# Patient Record
Sex: FEMALE | Employment: PART TIME | URBAN - METROPOLITAN AREA
[De-identification: names, ages, dates, MRNs, and addresses within clinical notes are randomized per-mention and may not be internally consistent; named-entity substitution may affect disease eponyms.]

---

## 2023-06-07 ENCOUNTER — HOSPITAL ENCOUNTER (OUTPATIENT)
Dept: DATA CONVERSION | Facility: HOSPITAL | Age: 68
End: 2023-06-07
Attending: RADIOLOGY | Admitting: RADIOLOGY
Payer: MEDICARE

## 2023-06-07 DIAGNOSIS — C50.811 MALIGNANT NEOPLASM OF OVERLAPPING SITES OF RIGHT FEMALE BREAST (MULTI): ICD-10-CM

## 2023-06-07 DIAGNOSIS — N63.10 UNSPECIFIED LUMP IN THE RIGHT BREAST, UNSPECIFIED QUADRANT: ICD-10-CM

## 2023-06-07 DIAGNOSIS — N63.15 UNSPECIFIED LUMP IN THE RIGHT BREAST, OVERLAPPING QUADRANTS: ICD-10-CM

## 2023-06-07 DIAGNOSIS — Z17.1 ESTROGEN RECEPTOR NEGATIVE STATUS (ER-): ICD-10-CM

## 2023-06-09 LAB
COMPLETE PATHOLOGY REPORT: NORMAL
CONVERTED ADDENDUM DIAGNOSIS 2: NORMAL
CONVERTED ADDENDUM DIAGNOSIS 3: NORMAL
CONVERTED CLINICAL DIAGNOSIS-HISTORY: NORMAL
CONVERTED FINAL DIAGNOSIS: NORMAL
CONVERTED FINAL REPORT PDF LINK TO COPY AND PASTE: NORMAL
CONVERTED GROSS DESCRIPTION: NORMAL

## 2023-08-11 ENCOUNTER — HOSPITAL ENCOUNTER (OUTPATIENT)
Dept: DATA CONVERSION | Facility: HOSPITAL | Age: 68
End: 2023-08-11

## 2023-08-11 DIAGNOSIS — C77.3 SECONDARY AND UNSPECIFIED MALIGNANT NEOPLASM OF AXILLA AND UPPER LIMB LYMPH NODES (MULTI): ICD-10-CM

## 2023-08-16 ENCOUNTER — HOSPITAL ENCOUNTER (OUTPATIENT)
Dept: DATA CONVERSION | Facility: HOSPITAL | Age: 68
Discharge: HOME | End: 2023-08-16
Payer: MEDICARE

## 2023-08-16 DIAGNOSIS — C77.3 SECONDARY AND UNSPECIFIED MALIGNANT NEOPLASM OF AXILLA AND UPPER LIMB LYMPH NODES (MULTI): ICD-10-CM

## 2023-08-16 DIAGNOSIS — C50.911 MALIGNANT NEOPLASM OF UNSPECIFIED SITE OF RIGHT FEMALE BREAST (MULTI): ICD-10-CM

## 2023-08-16 DIAGNOSIS — L76.32 POSTPROCEDURAL HEMATOMA OF SKIN AND SUBCUTANEOUS TISSUE FOLLOWING OTHER PROCEDURE: ICD-10-CM

## 2023-08-16 DIAGNOSIS — F17.200 NICOTINE DEPENDENCE, UNSPECIFIED, UNCOMPLICATED: ICD-10-CM

## 2023-08-16 LAB — GLUCOSE BLD STRIP.AUTO-MCNC: 132 MG/DL (ref 65–99)

## 2023-08-19 PROBLEM — E11.9 TYPE II DIABETES MELLITUS (MULTI): Status: ACTIVE | Noted: 2023-08-19

## 2023-08-19 PROBLEM — F31.9 BIPOLAR 1 DISORDER (MULTI): Status: ACTIVE | Noted: 2023-08-19

## 2023-08-19 PROBLEM — L40.9 PSORIASIS: Status: ACTIVE | Noted: 2023-08-19

## 2023-08-19 PROBLEM — C50.319 MALIGNANT NEOPLASM OF LOWER-INNER QUADRANT OF FEMALE BREAST (MULTI): Status: ACTIVE | Noted: 2023-08-19

## 2023-08-19 PROBLEM — B37.2 CANDIDIASIS OF SKIN AND NAILS: Status: ACTIVE | Noted: 2023-08-19

## 2023-08-19 PROBLEM — J30.9 ALLERGIC RHINITIS: Status: ACTIVE | Noted: 2023-08-19

## 2023-08-19 PROBLEM — I10 HIGH BLOOD PRESSURE: Status: ACTIVE | Noted: 2023-08-19

## 2023-08-19 PROBLEM — G47.00 INSOMNIA: Status: ACTIVE | Noted: 2023-08-19

## 2023-08-19 PROBLEM — K21.9 GASTROESOPHAGEAL REFLUX DISEASE: Status: ACTIVE | Noted: 2023-08-19

## 2023-08-19 PROBLEM — C77.3 MALIGNANT NEOPLASM METASTATIC TO LYMPH NODE OF UPPER EXTREMITY (MULTI): Status: ACTIVE | Noted: 2023-08-19

## 2023-08-19 PROBLEM — E78.00 HIGH CHOLESTEROL: Status: ACTIVE | Noted: 2023-08-19

## 2023-08-19 PROBLEM — F41.9 ANXIETY: Status: ACTIVE | Noted: 2023-08-19

## 2023-08-19 RX ORDER — BUPROPION HYDROCHLORIDE 150 MG/1
150 TABLET ORAL DAILY
COMMUNITY
Start: 2022-12-14 | End: 2023-10-18 | Stop reason: WASHOUT

## 2023-08-19 RX ORDER — PAROXETINE HYDROCHLORIDE 20 MG/1
20 TABLET, FILM COATED ORAL DAILY
COMMUNITY
Start: 2011-10-31

## 2023-08-19 RX ORDER — NYSTATIN 100000 U/G
CREAM TOPICAL 2 TIMES DAILY
COMMUNITY
Start: 2023-05-31 | End: 2023-12-01

## 2023-08-19 RX ORDER — PAROXETINE 10 MG/1
10 TABLET, FILM COATED ORAL DAILY
COMMUNITY
Start: 2022-11-10 | End: 2023-10-18 | Stop reason: WASHOUT

## 2023-08-19 RX ORDER — DEXTROSE 4 G
TABLET,CHEWABLE ORAL 3 TIMES DAILY
COMMUNITY
Start: 2021-11-18

## 2023-08-19 RX ORDER — GLUCOSAM/CHONDRO/HERB 149/HYAL 750-100 MG
1 TABLET ORAL DAILY
COMMUNITY

## 2023-08-19 RX ORDER — LAMOTRIGINE 25 MG/1
3 TABLET ORAL DAILY
COMMUNITY
Start: 2023-03-22 | End: 2023-11-08

## 2023-08-19 RX ORDER — BUPROPION HYDROCHLORIDE 75 MG/1
75 TABLET ORAL DAILY
COMMUNITY
Start: 2022-09-29 | End: 2023-10-18 | Stop reason: WASHOUT

## 2023-08-19 RX ORDER — QUETIAPINE FUMARATE 25 MG/1
25 TABLET, FILM COATED ORAL NIGHTLY
COMMUNITY
Start: 2022-09-07

## 2023-08-19 RX ORDER — LAMOTRIGINE 100 MG/1
100 TABLET ORAL DAILY
COMMUNITY

## 2023-08-19 RX ORDER — AMLODIPINE BESYLATE 10 MG/1
10 TABLET ORAL DAILY
COMMUNITY
End: 2023-10-11 | Stop reason: SDUPTHER

## 2023-08-19 RX ORDER — LORATADINE 10 MG/1
10 TABLET ORAL DAILY
COMMUNITY

## 2023-08-19 RX ORDER — IBUPROFEN 800 MG/1
800 TABLET ORAL EVERY 6 HOURS PRN
COMMUNITY
Start: 2023-04-25 | End: 2023-11-08

## 2023-08-19 RX ORDER — BUPROPION HCL 300 MG
300 TABLET, EXTENDED RELEASE 24 HR ORAL EVERY MORNING
COMMUNITY

## 2023-08-19 RX ORDER — TRIAMCINOLONE ACETONIDE 1 MG/G
1 CREAM TOPICAL 2 TIMES WEEKLY
COMMUNITY
Start: 2022-05-06 | End: 2023-11-06

## 2023-08-19 RX ORDER — ATORVASTATIN CALCIUM 10 MG/1
10 TABLET, FILM COATED ORAL EVERY OTHER DAY
COMMUNITY
End: 2024-01-31

## 2023-08-19 RX ORDER — LORATADINE 10 MG
10 TABLET,DISINTEGRATING ORAL DAILY
COMMUNITY
End: 2023-10-18 | Stop reason: WASHOUT

## 2023-08-19 RX ORDER — NAPROXEN SODIUM 220 MG/1
81 TABLET, FILM COATED ORAL ONCE
COMMUNITY
End: 2023-11-08 | Stop reason: WASHOUT

## 2023-08-19 RX ORDER — TOPIRAMATE 25 MG/1
25 TABLET, COATED ORAL DAILY
COMMUNITY

## 2023-08-19 RX ORDER — GABAPENTIN 400 MG/1
1 CAPSULE ORAL DAILY
COMMUNITY

## 2023-08-19 RX ORDER — METFORMIN HYDROCHLORIDE 500 MG/1
500 TABLET ORAL 3 TIMES DAILY
COMMUNITY
End: 2023-12-12 | Stop reason: SDUPTHER

## 2023-08-19 RX ORDER — LISINOPRIL 20 MG/1
20 TABLET ORAL DAILY
COMMUNITY
End: 2023-11-22 | Stop reason: SDUPTHER

## 2023-08-19 RX ORDER — PAROXETINE 30 MG/1
30 TABLET, FILM COATED ORAL DAILY
COMMUNITY
Start: 2022-11-03 | End: 2023-10-18 | Stop reason: WASHOUT

## 2023-08-19 RX ORDER — FAMOTIDINE 20 MG/1
20 TABLET, FILM COATED ORAL NIGHTLY PRN
COMMUNITY
Start: 2010-01-19

## 2023-08-19 RX ORDER — BLOOD SUGAR DIAGNOSTIC
STRIP MISCELLANEOUS 3 TIMES DAILY
COMMUNITY
Start: 2021-11-18

## 2023-09-14 ENCOUNTER — HOSPITAL ENCOUNTER (OUTPATIENT)
Dept: DATA CONVERSION | Facility: HOSPITAL | Age: 68
Discharge: HOME | End: 2023-09-14
Payer: MEDICARE

## 2023-09-14 DIAGNOSIS — C77.3 SECONDARY AND UNSPECIFIED MALIGNANT NEOPLASM OF AXILLA AND UPPER LIMB LYMPH NODES (MULTI): ICD-10-CM

## 2023-09-27 ENCOUNTER — HOSPITAL ENCOUNTER (OUTPATIENT)
Dept: DATA CONVERSION | Facility: HOSPITAL | Age: 68
Discharge: HOME | End: 2023-09-27
Payer: MEDICARE

## 2023-09-27 DIAGNOSIS — C50.311 MALIGNANT NEOPLASM OF LOWER-INNER QUADRANT OF RIGHT FEMALE BREAST (MULTI): ICD-10-CM

## 2023-10-02 ENCOUNTER — TELEPHONE (OUTPATIENT)
Dept: HEMATOLOGY/ONCOLOGY | Facility: HOSPITAL | Age: 68
End: 2023-10-02
Payer: MEDICARE

## 2023-10-02 DIAGNOSIS — C50.919 MALIGNANT NEOPLASM OF BREAST IN FEMALE, ESTROGEN RECEPTOR POSITIVE, UNSPECIFIED LATERALITY, UNSPECIFIED SITE OF BREAST (MULTI): Primary | ICD-10-CM

## 2023-10-02 DIAGNOSIS — Z17.0 MALIGNANT NEOPLASM OF BREAST IN FEMALE, ESTROGEN RECEPTOR POSITIVE, UNSPECIFIED LATERALITY, UNSPECIFIED SITE OF BREAST (MULTI): Primary | ICD-10-CM

## 2023-10-02 RX ORDER — ANASTROZOLE 1 MG/1
1 TABLET ORAL DAILY
Qty: 30 TABLET | Refills: 2 | Status: SHIPPED | OUTPATIENT
Start: 2023-10-02 | End: 2024-01-02 | Stop reason: SDUPTHER

## 2023-10-03 NOTE — TELEPHONE ENCOUNTER
Called patient with [facility] of 22.  No adjuvant chemotherapy required.  She will proceed with adjuvant RT- emailed Dr. Tineo.  Will reschedule her next visit with me to 3-4 months in person at [place] location.  She will start anastrozole, as RT will be delayed due to seroma.  Instructed to call with side effects.

## 2023-10-04 ENCOUNTER — PREP FOR PROCEDURE (OUTPATIENT)
Dept: OPERATING ROOM | Facility: HOSPITAL | Age: 68
End: 2023-10-04
Payer: MEDICARE

## 2023-10-04 ENCOUNTER — APPOINTMENT (OUTPATIENT)
Dept: PRIMARY CARE | Facility: CLINIC | Age: 68
End: 2023-10-04
Payer: MEDICARE

## 2023-10-04 PROBLEM — C50.919 BREAST CANCER (MULTI): Status: ACTIVE | Noted: 2023-10-04

## 2023-10-04 PROBLEM — F17.210 CIGARETTE SMOKER: Status: ACTIVE | Noted: 2023-10-04

## 2023-10-04 PROBLEM — E66.9 OBESITY WITH BODY MASS INDEX 30 OR GREATER: Status: ACTIVE | Noted: 2023-10-04

## 2023-10-04 RX ORDER — NYSTATIN 100000 [USP'U]/G
POWDER TOPICAL 2 TIMES DAILY
COMMUNITY
Start: 2023-07-28 | End: 2023-11-08 | Stop reason: ALTCHOICE

## 2023-10-05 ENCOUNTER — APPOINTMENT (OUTPATIENT)
Dept: HEMATOLOGY/ONCOLOGY | Facility: HOSPITAL | Age: 68
End: 2023-10-05
Payer: MEDICARE

## 2023-10-10 DIAGNOSIS — N64.89 SEROMA OF BREAST: Primary | ICD-10-CM

## 2023-10-11 ENCOUNTER — TELEPHONE (OUTPATIENT)
Dept: ADMISSION | Facility: HOSPITAL | Age: 68
End: 2023-10-11
Payer: MEDICARE

## 2023-10-11 ENCOUNTER — TELEPHONE (OUTPATIENT)
Dept: RADIATION ONCOLOGY | Facility: CLINIC | Age: 68
End: 2023-10-11
Payer: MEDICARE

## 2023-10-11 DIAGNOSIS — I10 PRIMARY HYPERTENSION: Primary | ICD-10-CM

## 2023-10-11 RX ORDER — AMLODIPINE BESYLATE 10 MG/1
10 TABLET ORAL DAILY
Qty: 90 TABLET | Refills: 1 | Status: SHIPPED | OUTPATIENT
Start: 2023-10-11 | End: 2024-04-04

## 2023-10-11 NOTE — TELEPHONE ENCOUNTER
The patient called in and left a VM on the RN Triage line wanting to speak with Dr. Ford nurse, no other details left. I called the patient back and she states that she wanted to make   Dr. Hannon and Dr. Tineo aware that per Dr. Boone did not put in the penrose drain in yesterday as scheduled, Dr. Boone stated that she aspirated 40cc of serosanguanous fluid and that the caridad wasn't needed, Wanted to know if it was OK to continue on anastrozole, also wants to know rad onc plan now. Per Tatyana, Dr. Richards # is [phone] if she needs contacted  Pt also stated that her Primary PCP is drawing 6 month lab work next month, does Dr. Hannon want to add anything?

## 2023-10-11 NOTE — TELEPHONE ENCOUNTER
10/11/23 0340 Spoke with patient regarding her visit with Dayanara Boone, surgeon yesterday. Patient states that Dr. Boone did not end up putting a drain in the patient. The seroma is smaller. She also spoke with Dr. Hannon last week and he started her on Anastrozole last week. Patient is wondering what the next step will be. Will discuss with Dr. Tineo and call the patient back. Patient verbalizes understanding with verbal teach back. Jose Luis Martinez MSN, RN, OCN

## 2023-10-11 NOTE — TELEPHONE ENCOUNTER
Dr. Parvez Arvizu is aware and radiation is working on getting her set up. She is supposed to see him in January I believe appointment is scheduled.

## 2023-10-18 ENCOUNTER — OFFICE VISIT (OUTPATIENT)
Dept: PRIMARY CARE | Facility: CLINIC | Age: 68
End: 2023-10-18

## 2023-10-18 VITALS — SYSTOLIC BLOOD PRESSURE: 136 MMHG | OXYGEN SATURATION: 96 % | HEART RATE: 86 BPM | DIASTOLIC BLOOD PRESSURE: 78 MMHG

## 2023-10-18 DIAGNOSIS — Z17.0 MALIGNANT NEOPLASM OF OVERLAPPING SITES OF RIGHT BREAST IN FEMALE, ESTROGEN RECEPTOR POSITIVE (MULTI): Primary | ICD-10-CM

## 2023-10-18 DIAGNOSIS — C50.811 MALIGNANT NEOPLASM OF OVERLAPPING SITES OF RIGHT BREAST IN FEMALE, ESTROGEN RECEPTOR POSITIVE (MULTI): Primary | ICD-10-CM

## 2023-10-18 DIAGNOSIS — Z85.3 HISTORY OF BREAST CANCER: ICD-10-CM

## 2023-10-18 DIAGNOSIS — G89.29 CHRONIC BILATERAL LOW BACK PAIN WITHOUT SCIATICA: Primary | ICD-10-CM

## 2023-10-18 DIAGNOSIS — M54.50 CHRONIC BILATERAL LOW BACK PAIN WITHOUT SCIATICA: Primary | ICD-10-CM

## 2023-10-18 PROCEDURE — 3075F SYST BP GE 130 - 139MM HG: CPT | Performed by: FAMILY MEDICINE

## 2023-10-18 PROCEDURE — 3078F DIAST BP <80 MM HG: CPT | Performed by: FAMILY MEDICINE

## 2023-10-18 PROCEDURE — 1125F AMNT PAIN NOTED PAIN PRSNT: CPT | Performed by: FAMILY MEDICINE

## 2023-10-18 PROCEDURE — 1159F MED LIST DOCD IN RCRD: CPT | Performed by: FAMILY MEDICINE

## 2023-10-18 PROCEDURE — 97811 ACUP 1/> W/O ESTIM EA ADD 15: CPT | Performed by: FAMILY MEDICINE

## 2023-10-18 PROCEDURE — 4010F ACE/ARB THERAPY RXD/TAKEN: CPT | Performed by: FAMILY MEDICINE

## 2023-10-18 PROCEDURE — 3044F HG A1C LEVEL LT 7.0%: CPT | Performed by: FAMILY MEDICINE

## 2023-10-18 PROCEDURE — 97810 ACUP 1/> WO ESTIM 1ST 15 MIN: CPT | Performed by: FAMILY MEDICINE

## 2023-10-18 PROCEDURE — 1160F RVW MEDS BY RX/DR IN RCRD: CPT | Performed by: FAMILY MEDICINE

## 2023-10-18 ASSESSMENT — PAIN SCALES - GENERAL: PAINLEVEL: 6

## 2023-10-18 NOTE — PROGRESS NOTES
Subjective   Patient ID: Tatyana Mckeon is a 68 y.o. female who presents for Acupuncture.    Gerri today for her acupuncture treatment.  She states her back pain has been doing pretty well but she has right knee pain that is worse.  She did have her breast drained by Dr. Gutiérrez.  Also, they started her on the anastrozole while they are waiting to see what they can to do with radiation.  She has not started BCP 3 yet.  She is currently taking son with Romero and Liang Allen.          Review of Systems    Objective   /78   Pulse 86   SpO2 96%     Physical Exam  Vitals reviewed.   Constitutional:       General: She is not in acute distress.     Appearance: Normal appearance.   Pulmonary:      Effort: Pulmonary effort is normal.      Breath sounds: Normal breath sounds.   Musculoskeletal:      Comments: Knee without too much swelling.  Some tenderness along the joint line on the right side.   Neurological:      Mental Status: She is alert.   Psychiatric:         Mood and Affect: Mood normal.         Thought Content: Thought content normal.         Judgment: Judgment normal.         Assessment/Plan   Diagnoses and all orders for this visit:  Chronic bilateral low back pain without sciatica  History of breast cancer  We will continue regular acupuncture treatments.  I want her to take the BCP 3 to help support her energy foundation and process any radiation or medications that she is taking.  Also continue the sun with moon to help manage the stress/liver function.  Back pain is pretty good so hopefully the knees will come around as well.      Acupuncture treatment: I used the following points bilaterally liver 2, liver 3, stomach 36, large intestine 4.  I also added the knees eyes on the right side.  These were retained for 20 minutes.  She tolerated this very well.

## 2023-10-18 NOTE — PATIENT INSTRUCTIONS
Continue to take the sun with moon and add the BCP 3.  We want to support you as you go through the radiation and the use of the anastrozole.

## 2023-10-19 ENCOUNTER — TELEPHONE (OUTPATIENT)
Dept: RADIATION ONCOLOGY | Facility: CLINIC | Age: 68
End: 2023-10-19
Payer: MEDICARE

## 2023-10-19 NOTE — TELEPHONE ENCOUNTER
10/19/23 1010 Patient calling to see if we have heard from Dr. Boone or received her note. She wants to know if she is cleared for radiation. This nurse sent an email directly to Dr. Boone looking for answers. Will call the patient back once we have heard from her. Patient verbalizes understanding with verbal teach back. Jose Luis ANDRADE, RN, OCN      10/19/23 1240 Patient was called and given an appointment for CT sim on 10/25/23. Jose Luis Martinez, RAYMOND, RN, OCN

## 2023-10-24 ENCOUNTER — HOSPITAL ENCOUNTER (OUTPATIENT)
Dept: RADIOLOGY | Facility: EXTERNAL LOCATION | Age: 68
Discharge: HOME | End: 2023-10-24
Payer: MEDICARE

## 2023-10-24 DIAGNOSIS — C50.812 MALIGNANT NEOPLASM OF OVERLAPPING SITES OF LEFT FEMALE BREAST, UNSPECIFIED ESTROGEN RECEPTOR STATUS (MULTI): ICD-10-CM

## 2023-10-25 ENCOUNTER — HOSPITAL ENCOUNTER (OUTPATIENT)
Dept: RADIATION ONCOLOGY | Facility: CLINIC | Age: 68
Setting detail: RADIATION/ONCOLOGY SERIES
Discharge: STILL ADMITTED OR ADMINISTRATIVE | End: 2023-10-25
Payer: MEDICARE

## 2023-10-25 PROCEDURE — 77334 RADIATION TREATMENT AID(S): CPT | Performed by: STUDENT IN AN ORGANIZED HEALTH CARE EDUCATION/TRAINING PROGRAM

## 2023-10-25 PROCEDURE — 77290 THER RAD SIMULAJ FIELD CPLX: CPT | Performed by: STUDENT IN AN ORGANIZED HEALTH CARE EDUCATION/TRAINING PROGRAM

## 2023-10-25 PROCEDURE — 77263 THER RADIOLOGY TX PLNG CPLX: CPT | Performed by: STUDENT IN AN ORGANIZED HEALTH CARE EDUCATION/TRAINING PROGRAM

## 2023-10-25 NOTE — PROGRESS NOTES
Pt is in today for CT sim. She received education on what to expect during today's scan, what to expect when treatments start, the next time she would hear from the office, as well as nside effects of xRT and OTV's in the future. She had paperwork to be given to social work and has spoken with finance prior to leaving today's appt. All questions were answered. No further concerns for nursing at this time. The patient verbalized understand of information and knows how to get in toouch with the office should she need us for any reason.

## 2023-11-05 DIAGNOSIS — L40.9 PSORIASIS: Primary | ICD-10-CM

## 2023-11-06 RX ORDER — TRIAMCINOLONE ACETONIDE 1 MG/G
CREAM TOPICAL
Qty: 30 G | Refills: 0 | Status: SHIPPED | OUTPATIENT
Start: 2023-11-06 | End: 2024-01-24

## 2023-11-08 ENCOUNTER — OFFICE VISIT (OUTPATIENT)
Dept: PRIMARY CARE | Facility: CLINIC | Age: 68
End: 2023-11-08

## 2023-11-08 VITALS
WEIGHT: 200 LBS | HEART RATE: 92 BPM | SYSTOLIC BLOOD PRESSURE: 137 MMHG | DIASTOLIC BLOOD PRESSURE: 57 MMHG | BODY MASS INDEX: 48.22 KG/M2

## 2023-11-08 DIAGNOSIS — M54.40 CHRONIC RIGHT-SIDED LOW BACK PAIN WITH SCIATICA, SCIATICA LATERALITY UNSPECIFIED: Primary | ICD-10-CM

## 2023-11-08 DIAGNOSIS — G89.29 CHRONIC RIGHT-SIDED LOW BACK PAIN WITH SCIATICA, SCIATICA LATERALITY UNSPECIFIED: Primary | ICD-10-CM

## 2023-11-08 PROCEDURE — 97810 ACUP 1/> WO ESTIM 1ST 15 MIN: CPT | Performed by: FAMILY MEDICINE

## 2023-11-08 PROCEDURE — 3044F HG A1C LEVEL LT 7.0%: CPT | Performed by: FAMILY MEDICINE

## 2023-11-08 PROCEDURE — 97811 ACUP 1/> W/O ESTIM EA ADD 15: CPT | Performed by: FAMILY MEDICINE

## 2023-11-08 PROCEDURE — 1125F AMNT PAIN NOTED PAIN PRSNT: CPT | Performed by: FAMILY MEDICINE

## 2023-11-08 PROCEDURE — 3075F SYST BP GE 130 - 139MM HG: CPT | Performed by: FAMILY MEDICINE

## 2023-11-08 PROCEDURE — 1159F MED LIST DOCD IN RCRD: CPT | Performed by: FAMILY MEDICINE

## 2023-11-08 PROCEDURE — 3078F DIAST BP <80 MM HG: CPT | Performed by: FAMILY MEDICINE

## 2023-11-08 PROCEDURE — 1160F RVW MEDS BY RX/DR IN RCRD: CPT | Performed by: FAMILY MEDICINE

## 2023-11-08 PROCEDURE — 4010F ACE/ARB THERAPY RXD/TAKEN: CPT | Performed by: FAMILY MEDICINE

## 2023-11-08 ASSESSMENT — PAIN SCALES - GENERAL: PAINLEVEL: 5

## 2023-11-08 NOTE — PATIENT INSTRUCTIONS
Continue your acupuncture treatments.    He just had labs done in May of this year so I do not need any before your appointment in 2 weeks.  We will do your hemoglobin A1c off your finger.

## 2023-11-08 NOTE — PROGRESS NOTES
Subjective   Patient ID: Tatyana Mckeon is a 68 y.o. female who presents for Acupuncture.    Well.  She still gets some pain that is in the low back and on the right side of the hip and sometimes down part of the leg.  However, this has been improved.    She is still frustrated that she has not had her radiation treatment scheduled yet.  Overall, though she states she has been doing pretty well dealing with all of this.         Review of Systems    Objective   /57   Pulse 92   Wt 90.7 kg (200 lb)   BMI 48.22 kg/m²     Physical Exam  Constitutional:       General: She is not in acute distress.     Appearance: Normal appearance. She is not ill-appearing.   Pulmonary:      Effort: Pulmonary effort is normal.   Skin:     General: Skin is warm and dry.      Coloration: Skin is not jaundiced.      Findings: No bruising.   Neurological:      Mental Status: She is alert.   Psychiatric:         Mood and Affect: Mood normal.         Thought Content: Thought content normal.         Assessment/Plan   Back Pain.   Overall doing well.  She will continue her regular acupuncture treatments.  She is scheduled for a diabetic follow-up in 2 weeks and only needs a hemoglobin A1c since she did have labs done in May and continues to have them done with her oncologist.  We will do hemoglobin A1c here.    Acupuncture note: I used the following points bilaterally: Stomach 36, 41, and 40; liver 2, LI 4, and bladder 40, 57 and 60.  She tolerated this very well.  She laid with her knees bent.  The lap over her lower abdomen.  Needles were retained for 20 minutes.

## 2023-11-11 ENCOUNTER — HOSPITAL ENCOUNTER (OUTPATIENT)
Dept: RADIATION ONCOLOGY | Facility: CLINIC | Age: 68
Setting detail: RADIATION/ONCOLOGY SERIES
Discharge: HOME | End: 2023-11-11
Payer: MEDICARE

## 2023-11-11 PROCEDURE — 77295 3-D RADIOTHERAPY PLAN: CPT | Performed by: STUDENT IN AN ORGANIZED HEALTH CARE EDUCATION/TRAINING PROGRAM

## 2023-11-11 PROCEDURE — 77334 RADIATION TREATMENT AID(S): CPT | Performed by: STUDENT IN AN ORGANIZED HEALTH CARE EDUCATION/TRAINING PROGRAM

## 2023-11-11 PROCEDURE — 77300 RADIATION THERAPY DOSE PLAN: CPT | Performed by: STUDENT IN AN ORGANIZED HEALTH CARE EDUCATION/TRAINING PROGRAM

## 2023-11-16 ENCOUNTER — HOSPITAL ENCOUNTER (OUTPATIENT)
Dept: RADIATION ONCOLOGY | Facility: CLINIC | Age: 68
Setting detail: RADIATION/ONCOLOGY SERIES
Discharge: HOME | End: 2023-11-16
Payer: MEDICARE

## 2023-11-16 ENCOUNTER — RADIATION ONCOLOGY OTV (OUTPATIENT)
Dept: RADIATION ONCOLOGY | Facility: CLINIC | Age: 68
End: 2023-11-16
Payer: MEDICARE

## 2023-11-16 VITALS
RESPIRATION RATE: 18 BRPM | BODY MASS INDEX: 39.39 KG/M2 | HEART RATE: 100 BPM | WEIGHT: 200.62 LBS | OXYGEN SATURATION: 97 % | SYSTOLIC BLOOD PRESSURE: 112 MMHG | TEMPERATURE: 96.4 F | DIASTOLIC BLOOD PRESSURE: 86 MMHG | HEIGHT: 60 IN

## 2023-11-16 DIAGNOSIS — C77.3 SECONDARY AND UNSPECIFIED MALIGNANT NEOPLASM OF AXILLA AND UPPER LIMB LYMPH NODES (MULTI): ICD-10-CM

## 2023-11-16 DIAGNOSIS — Z51.0 ENCOUNTER FOR ANTINEOPLASTIC RADIATION THERAPY: ICD-10-CM

## 2023-11-16 DIAGNOSIS — C50.811 MALIGNANT NEOPLASM OF OVERLAPPING SITES OF RIGHT FEMALE BREAST (MULTI): ICD-10-CM

## 2023-11-16 LAB
RAD ONC MSQ ACTUAL FRACTIONS DELIVERED: 1
RAD ONC MSQ ACTUAL SESSION DELIVERED DOSE: 266 CGRAY
RAD ONC MSQ ACTUAL TOTAL DOSE: 266 CGRAY
RAD ONC MSQ ELAPSED DAYS: 0
RAD ONC MSQ LAST DATE: NORMAL
RAD ONC MSQ PRESCRIBED FRACTIONAL DOSE: 266 CGRAY
RAD ONC MSQ PRESCRIBED NUMBER OF FRACTIONS: 16
RAD ONC MSQ PRESCRIBED TECHNIQUE: NORMAL
RAD ONC MSQ PRESCRIBED TOTAL DOSE: 4256 CGRAY
RAD ONC MSQ START DATE: NORMAL
RAD ONC MSQ TREATMENT COURSE NUMBER: 1
RAD ONC MSQ TREATMENT SITE: NORMAL

## 2023-11-16 PROCEDURE — 77412 RADIATION TX DELIVERY LVL 3: CPT | Performed by: STUDENT IN AN ORGANIZED HEALTH CARE EDUCATION/TRAINING PROGRAM

## 2023-11-16 PROCEDURE — 77427 RADIATION TX MANAGEMENT X5: CPT | Performed by: STUDENT IN AN ORGANIZED HEALTH CARE EDUCATION/TRAINING PROGRAM

## 2023-11-16 PROCEDURE — 77280 THER RAD SIMULAJ FIELD SMPL: CPT | Performed by: STUDENT IN AN ORGANIZED HEALTH CARE EDUCATION/TRAINING PROGRAM

## 2023-11-16 SDOH — ECONOMIC STABILITY: FOOD INSECURITY: WITHIN THE PAST 12 MONTHS, YOU WORRIED THAT YOUR FOOD WOULD RUN OUT BEFORE YOU GOT MONEY TO BUY MORE.: NEVER TRUE

## 2023-11-16 SDOH — ECONOMIC STABILITY: FOOD INSECURITY: WITHIN THE PAST 12 MONTHS, THE FOOD YOU BOUGHT JUST DIDN'T LAST AND YOU DIDN'T HAVE MONEY TO GET MORE.: NEVER TRUE

## 2023-11-16 ASSESSMENT — ENCOUNTER SYMPTOMS
OCCASIONAL FEELINGS OF UNSTEADINESS: 1
LOSS OF SENSATION IN FEET: 0
DEPRESSION: 0

## 2023-11-16 ASSESSMENT — PAIN SCALES - GENERAL: PAINLEVEL: 4

## 2023-11-16 ASSESSMENT — LIFESTYLE VARIABLES
SKIP TO QUESTIONS 9-10: 1
HOW OFTEN DO YOU HAVE A DRINK CONTAINING ALCOHOL: MONTHLY OR LESS
HOW MANY STANDARD DRINKS CONTAINING ALCOHOL DO YOU HAVE ON A TYPICAL DAY: 1 OR 2
AUDIT-C TOTAL SCORE: 1
HOW OFTEN DO YOU HAVE SIX OR MORE DRINKS ON ONE OCCASION: NEVER

## 2023-11-16 ASSESSMENT — PATIENT HEALTH QUESTIONNAIRE - PHQ9
SUM OF ALL RESPONSES TO PHQ9 QUESTIONS 1 & 2: 0
2. FEELING DOWN, DEPRESSED OR HOPELESS: NOT AT ALL
1. LITTLE INTEREST OR PLEASURE IN DOING THINGS: NOT AT ALL

## 2023-11-16 ASSESSMENT — SOCIAL DETERMINANTS OF HEALTH (SDOH): IN THE PAST 12 MONTHS, HAS THE ELECTRIC, GAS, OIL, OR WATER COMPANY THREATENED TO SHUT OFF SERVICE IN YOUR HOME?: NO

## 2023-11-16 NOTE — PROGRESS NOTES
Radiation Oncology On Treatment Visit    Patient Name:  Tatyana Mckeon  MRN:  [id number]  :  1955    Referring Provider: No ref. 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 Hematoma previously noted has continued to resolve. Denies any pain or discomfort.      OBJECTIVE:   Vital Signs:  /86 (BP Location: Left arm, Patient Position: Sitting)   Pulse 100   Temp 35.8 °C (96.4 °F) (Tympanic)   Resp 18   Ht 1.524 m (5')   Wt 91 kg (200 lb 9.9 oz)   SpO2 97%   BMI 39.18 kg/m²    Pain Scale: The patient's current pain level was assessed.  They report currently having a pain of 0 out of 10.    Other Pertinent Findings:     Toxicity Assessment          2023    15:07   Toxicity Assessment   Treatment Site Breast   Anorexia Grade 0   Anxiety Grade 0   Dehydration Grade 0   Depression Grade 0   Dermatitis Radiation Grade 0   Diarrhea Grade 0   Fatigue Grade 0   Fibrosis Deep Connective Tissue Grade 0   Fracture Grade 0   Nausea Grade 0    Pain Grade 0   Treatment Related Secondary Malignancy Grade 0   Tumor Pain Grade 0   Vomiting Grade 0   Abdominal Pain Grade 0   Dysphagia Grade 0   Esophagitis Grade 0   Gastric Hemorrhage Grade 0   Mucositis Oral Grade 0   Dry Mouth Grade 0   Breast Infection Grade 0   Seroma Grade 0   Joint Range of Motion Decreased Grade 0   Joint Range of Motion Decreased Lumbar Spine Grade 0   Brachial Plexopathy Grade 0   Breast Atrophy Grade 0   Breast Pain Grade 0   Nipple Deformity Grade 0   Pneumonitis Grade 0   Edema Limbs Grade 0   Lymphedema Grade 0   Thromboembolic Event Grade 0   Hot Flashes Grade 0        Assessment / Plan:  The patient is tolerating radiation therapy as anticipated.  Continue per current treatment plan.

## 2023-11-17 ENCOUNTER — HOSPITAL ENCOUNTER (OUTPATIENT)
Dept: RADIATION ONCOLOGY | Facility: CLINIC | Age: 68
Setting detail: RADIATION/ONCOLOGY SERIES
Discharge: HOME | End: 2023-11-17
Payer: MEDICARE

## 2023-11-17 DIAGNOSIS — Z51.0 ENCOUNTER FOR ANTINEOPLASTIC RADIATION THERAPY: ICD-10-CM

## 2023-11-17 DIAGNOSIS — C77.3 SECONDARY AND UNSPECIFIED MALIGNANT NEOPLASM OF AXILLA AND UPPER LIMB LYMPH NODES (MULTI): ICD-10-CM

## 2023-11-17 DIAGNOSIS — C50.811 MALIGNANT NEOPLASM OF OVERLAPPING SITES OF RIGHT FEMALE BREAST (MULTI): ICD-10-CM

## 2023-11-17 LAB
RAD ONC MSQ ACTUAL FRACTIONS DELIVERED: 2
RAD ONC MSQ ACTUAL SESSION DELIVERED DOSE: 266 CGRAY
RAD ONC MSQ ACTUAL TOTAL DOSE: 532 CGRAY
RAD ONC MSQ ELAPSED DAYS: 1
RAD ONC MSQ LAST DATE: NORMAL
RAD ONC MSQ PRESCRIBED FRACTIONAL DOSE: 266 CGRAY
RAD ONC MSQ PRESCRIBED NUMBER OF FRACTIONS: 16
RAD ONC MSQ PRESCRIBED TECHNIQUE: NORMAL
RAD ONC MSQ PRESCRIBED TOTAL DOSE: 4256 CGRAY
RAD ONC MSQ START DATE: NORMAL
RAD ONC MSQ TREATMENT COURSE NUMBER: 1
RAD ONC MSQ TREATMENT SITE: NORMAL

## 2023-11-17 PROCEDURE — 77412 RADIATION TX DELIVERY LVL 3: CPT | Performed by: STUDENT IN AN ORGANIZED HEALTH CARE EDUCATION/TRAINING PROGRAM

## 2023-11-19 ENCOUNTER — HOSPITAL ENCOUNTER (OUTPATIENT)
Dept: RADIATION ONCOLOGY | Facility: CLINIC | Age: 68
Setting detail: RADIATION/ONCOLOGY SERIES
Discharge: HOME | End: 2023-11-19
Payer: MEDICARE

## 2023-11-19 DIAGNOSIS — C77.3 SECONDARY AND UNSPECIFIED MALIGNANT NEOPLASM OF AXILLA AND UPPER LIMB LYMPH NODES (MULTI): ICD-10-CM

## 2023-11-19 DIAGNOSIS — C50.811 MALIGNANT NEOPLASM OF OVERLAPPING SITES OF RIGHT FEMALE BREAST (MULTI): ICD-10-CM

## 2023-11-19 DIAGNOSIS — Z51.0 ENCOUNTER FOR ANTINEOPLASTIC RADIATION THERAPY: ICD-10-CM

## 2023-11-19 LAB
RAD ONC MSQ ACTUAL FRACTIONS DELIVERED: 3
RAD ONC MSQ ACTUAL SESSION DELIVERED DOSE: 266 CGRAY
RAD ONC MSQ ACTUAL TOTAL DOSE: 798 CGRAY
RAD ONC MSQ ELAPSED DAYS: 3
RAD ONC MSQ LAST DATE: NORMAL
RAD ONC MSQ PRESCRIBED FRACTIONAL DOSE: 266 CGRAY
RAD ONC MSQ PRESCRIBED NUMBER OF FRACTIONS: 16
RAD ONC MSQ PRESCRIBED TECHNIQUE: NORMAL
RAD ONC MSQ PRESCRIBED TOTAL DOSE: 4256 CGRAY
RAD ONC MSQ START DATE: NORMAL
RAD ONC MSQ TREATMENT COURSE NUMBER: 1
RAD ONC MSQ TREATMENT SITE: NORMAL

## 2023-11-19 PROCEDURE — 77412 RADIATION TX DELIVERY LVL 3: CPT | Performed by: STUDENT IN AN ORGANIZED HEALTH CARE EDUCATION/TRAINING PROGRAM

## 2023-11-20 ENCOUNTER — HOSPITAL ENCOUNTER (OUTPATIENT)
Dept: RADIATION ONCOLOGY | Facility: CLINIC | Age: 68
Setting detail: RADIATION/ONCOLOGY SERIES
Discharge: HOME | End: 2023-11-20
Payer: MEDICARE

## 2023-11-20 ENCOUNTER — RADIATION ONCOLOGY OTV (OUTPATIENT)
Dept: RADIATION ONCOLOGY | Facility: CLINIC | Age: 68
End: 2023-11-20
Payer: MEDICARE

## 2023-11-20 VITALS
HEIGHT: 60 IN | WEIGHT: 202.82 LBS | OXYGEN SATURATION: 96 % | BODY MASS INDEX: 39.82 KG/M2 | DIASTOLIC BLOOD PRESSURE: 71 MMHG | HEART RATE: 95 BPM | SYSTOLIC BLOOD PRESSURE: 109 MMHG | RESPIRATION RATE: 18 BRPM | TEMPERATURE: 96.6 F

## 2023-11-20 DIAGNOSIS — Z51.0 ENCOUNTER FOR ANTINEOPLASTIC RADIATION THERAPY: ICD-10-CM

## 2023-11-20 DIAGNOSIS — C77.3 SECONDARY AND UNSPECIFIED MALIGNANT NEOPLASM OF AXILLA AND UPPER LIMB LYMPH NODES (MULTI): ICD-10-CM

## 2023-11-20 DIAGNOSIS — C50.811 MALIGNANT NEOPLASM OF OVERLAPPING SITES OF RIGHT FEMALE BREAST (MULTI): ICD-10-CM

## 2023-11-20 LAB
RAD ONC MSQ ACTUAL FRACTIONS DELIVERED: 4
RAD ONC MSQ ACTUAL SESSION DELIVERED DOSE: 266 CGRAY
RAD ONC MSQ ACTUAL TOTAL DOSE: 1064 CGRAY
RAD ONC MSQ ELAPSED DAYS: 4
RAD ONC MSQ LAST DATE: NORMAL
RAD ONC MSQ PRESCRIBED FRACTIONAL DOSE: 266 CGRAY
RAD ONC MSQ PRESCRIBED NUMBER OF FRACTIONS: 16
RAD ONC MSQ PRESCRIBED TECHNIQUE: NORMAL
RAD ONC MSQ PRESCRIBED TOTAL DOSE: 4256 CGRAY
RAD ONC MSQ START DATE: NORMAL
RAD ONC MSQ TREATMENT COURSE NUMBER: 1
RAD ONC MSQ TREATMENT SITE: NORMAL

## 2023-11-20 PROCEDURE — 77412 RADIATION TX DELIVERY LVL 3: CPT | Performed by: STUDENT IN AN ORGANIZED HEALTH CARE EDUCATION/TRAINING PROGRAM

## 2023-11-20 RX ORDER — IBUPROFEN 200 MG
200 TABLET ORAL 2 TIMES DAILY
COMMUNITY

## 2023-11-20 RX ORDER — ACETAMINOPHEN 500 MG
500 TABLET ORAL 2 TIMES DAILY
COMMUNITY

## 2023-11-20 RX ORDER — CORTISONE ACETATE 25 MG/1
25 TABLET ORAL DAILY
COMMUNITY
End: 2024-04-26 | Stop reason: ALTCHOICE

## 2023-11-20 ASSESSMENT — PAIN SCALES - GENERAL: PAINLEVEL: 4

## 2023-11-20 ASSESSMENT — COLUMBIA-SUICIDE SEVERITY RATING SCALE - C-SSRS
1. IN THE PAST MONTH, HAVE YOU WISHED YOU WERE DEAD OR WISHED YOU COULD GO TO SLEEP AND NOT WAKE UP?: NO
6. HAVE YOU EVER DONE ANYTHING, STARTED TO DO ANYTHING, OR PREPARED TO DO ANYTHING TO END YOUR LIFE?: NO
2. HAVE YOU ACTUALLY HAD ANY THOUGHTS OF KILLING YOURSELF?: NO

## 2023-11-20 ASSESSMENT — ENCOUNTER SYMPTOMS
OCCASIONAL FEELINGS OF UNSTEADINESS: 1
LOSS OF SENSATION IN FEET: 0
DEPRESSION: 0

## 2023-11-20 ASSESSMENT — PAIN DESCRIPTION - DESCRIPTORS: DESCRIPTORS: ACHING

## 2023-11-20 NOTE — PROGRESS NOTES
Radiation Oncology On Treatment Visit    Patient Name:  Tatyana Mckeon  MRN:  [id number]  :  1955    Referring Provider: No ref. 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 Mild erythema along the central/inferior aspect of the breast without desquamation. Denies any pain or discomfort. Discussed regular skin cream usage and care.     OBJECTIVE:   Vital Signs:  /71 (BP Location: Left arm, Patient Position: Sitting, BP Cuff Size: Adult)   Pulse 95   Temp 35.9 °C (96.6 °F) (Tympanic)   Resp 18   Ht 1.524 m (5')   Wt 92 kg (202 lb 13.2 oz)   SpO2 96%   BMI 39.61 kg/m²    Pain Scale: The patient's current pain level was assessed.  They report currently having a pain of 4 out of 10.    Other Pertinent Findings:     Toxicity Assessment          2023    15:07 2023    15:20   Toxicity Assessment   Treatment Site Breast Breast   Anorexia Grade 0 Grade 0   Anxiety Grade 0 Grade 0   Dehydration Grade 0 Grade 0   Depression Grade 0 Grade 0    Dermatitis Radiation Grade 0 Grade 1       slight redness to the right breast. Patient insturcted to start using Aquaphor BID   Diarrhea Grade 0 Grade 0   Fatigue Grade 0 Grade 0   Fibrosis Deep Connective Tissue Grade 0 Grade 0   Fracture Grade 0 Grade 0   Nausea Grade 0 Grade 0   Pain Grade 0 Grade 1       pain 4 in knees   Treatment Related Secondary Malignancy Grade 0    Tumor Pain Grade 0 Grade 0   Vomiting Grade 0 Grade 0   Abdominal Pain Grade 0    Dysphagia Grade 0    Esophagitis Grade 0    Gastric Hemorrhage Grade 0    Mucositis Oral Grade 0    Dry Mouth Grade 0 Grade 0   Breast Infection Grade 0 Grade 0   Seroma Grade 0 Grade 0   Joint Range of Motion Decreased Grade 0 Grade 0   Joint Range of Motion Decreased Lumbar Spine Grade 0 Grade 0   Brachial Plexopathy Grade 0 Grade 0   Breast Atrophy Grade 0 Grade 0   Breast Pain Grade 0 Grade 0   Nipple Deformity Grade 0 Grade 0   Pneumonitis Grade 0 Grade 0   Edema Limbs Grade 0 Grade 0   Lymphedema Grade 0 Grade 0   Thromboembolic Event Grade 0    Hot Flashes Grade 0 Grade 0        Assessment / Plan:  The patient is tolerating radiation therapy as anticipated.  Continue per current treatment plan.

## 2023-11-21 ENCOUNTER — HOSPITAL ENCOUNTER (OUTPATIENT)
Dept: RADIATION ONCOLOGY | Facility: CLINIC | Age: 68
Setting detail: RADIATION/ONCOLOGY SERIES
Discharge: HOME | End: 2023-11-21
Payer: MEDICARE

## 2023-11-21 DIAGNOSIS — Z51.0 ENCOUNTER FOR ANTINEOPLASTIC RADIATION THERAPY: ICD-10-CM

## 2023-11-21 DIAGNOSIS — C50.811 MALIGNANT NEOPLASM OF OVERLAPPING SITES OF RIGHT FEMALE BREAST (MULTI): ICD-10-CM

## 2023-11-21 DIAGNOSIS — C77.3 SECONDARY AND UNSPECIFIED MALIGNANT NEOPLASM OF AXILLA AND UPPER LIMB LYMPH NODES (MULTI): ICD-10-CM

## 2023-11-21 LAB
RAD ONC MSQ ACTUAL FRACTIONS DELIVERED: 5
RAD ONC MSQ ACTUAL SESSION DELIVERED DOSE: 266 CGRAY
RAD ONC MSQ ACTUAL TOTAL DOSE: 1330 CGRAY
RAD ONC MSQ ELAPSED DAYS: 5
RAD ONC MSQ LAST DATE: NORMAL
RAD ONC MSQ PRESCRIBED FRACTIONAL DOSE: 266 CGRAY
RAD ONC MSQ PRESCRIBED NUMBER OF FRACTIONS: 16
RAD ONC MSQ PRESCRIBED TECHNIQUE: NORMAL
RAD ONC MSQ PRESCRIBED TOTAL DOSE: 4256 CGRAY
RAD ONC MSQ START DATE: NORMAL
RAD ONC MSQ TREATMENT COURSE NUMBER: 1
RAD ONC MSQ TREATMENT SITE: NORMAL

## 2023-11-21 PROCEDURE — 77412 RADIATION TX DELIVERY LVL 3: CPT | Performed by: STUDENT IN AN ORGANIZED HEALTH CARE EDUCATION/TRAINING PROGRAM

## 2023-11-22 ENCOUNTER — OFFICE VISIT (OUTPATIENT)
Dept: PRIMARY CARE | Facility: CLINIC | Age: 68
End: 2023-11-22
Payer: MEDICARE

## 2023-11-22 ENCOUNTER — HOSPITAL ENCOUNTER (OUTPATIENT)
Dept: RADIATION ONCOLOGY | Facility: CLINIC | Age: 68
Setting detail: RADIATION/ONCOLOGY SERIES
Discharge: HOME | End: 2023-11-22
Payer: MEDICARE

## 2023-11-22 VITALS
SYSTOLIC BLOOD PRESSURE: 110 MMHG | HEART RATE: 95 BPM | OXYGEN SATURATION: 96 % | DIASTOLIC BLOOD PRESSURE: 68 MMHG | WEIGHT: 202 LBS | BODY MASS INDEX: 38.17 KG/M2

## 2023-11-22 VITALS
HEIGHT: 61 IN | DIASTOLIC BLOOD PRESSURE: 68 MMHG | BODY MASS INDEX: 38.14 KG/M2 | WEIGHT: 202 LBS | OXYGEN SATURATION: 96 % | HEART RATE: 95 BPM | SYSTOLIC BLOOD PRESSURE: 110 MMHG

## 2023-11-22 DIAGNOSIS — E11.9 TYPE 2 DIABETES MELLITUS WITHOUT COMPLICATION, WITHOUT LONG-TERM CURRENT USE OF INSULIN (MULTI): Primary | ICD-10-CM

## 2023-11-22 DIAGNOSIS — I10 PRIMARY HYPERTENSION: ICD-10-CM

## 2023-11-22 DIAGNOSIS — C77.3 SECONDARY AND UNSPECIFIED MALIGNANT NEOPLASM OF AXILLA AND UPPER LIMB LYMPH NODES (MULTI): ICD-10-CM

## 2023-11-22 DIAGNOSIS — C50.319 MALIGNANT NEOPLASM OF LOWER-INNER QUADRANT OF FEMALE BREAST, UNSPECIFIED ESTROGEN RECEPTOR STATUS, UNSPECIFIED LATERALITY (MULTI): Primary | ICD-10-CM

## 2023-11-22 DIAGNOSIS — Z51.0 ENCOUNTER FOR ANTINEOPLASTIC RADIATION THERAPY: ICD-10-CM

## 2023-11-22 DIAGNOSIS — C50.811 MALIGNANT NEOPLASM OF OVERLAPPING SITES OF RIGHT FEMALE BREAST (MULTI): ICD-10-CM

## 2023-11-22 DIAGNOSIS — C50.811 MALIGNANT NEOPLASM OF OVERLAPPING SITES OF RIGHT BREAST IN FEMALE, ESTROGEN RECEPTOR POSITIVE (MULTI): ICD-10-CM

## 2023-11-22 DIAGNOSIS — E78.00 HIGH CHOLESTEROL: ICD-10-CM

## 2023-11-22 DIAGNOSIS — Z17.0 MALIGNANT NEOPLASM OF OVERLAPPING SITES OF RIGHT BREAST IN FEMALE, ESTROGEN RECEPTOR POSITIVE (MULTI): ICD-10-CM

## 2023-11-22 PROBLEM — C54.1 ENDOMETRIAL CANCER (MULTI): Status: ACTIVE | Noted: 2023-11-22

## 2023-11-22 LAB
POC HEMOGLOBIN A1C: 6.5 % (ref 4.2–6.5)
RAD ONC MSQ ACTUAL FRACTIONS DELIVERED: 6
RAD ONC MSQ ACTUAL SESSION DELIVERED DOSE: 266 CGRAY
RAD ONC MSQ ACTUAL TOTAL DOSE: 1596 CGRAY
RAD ONC MSQ ELAPSED DAYS: 6
RAD ONC MSQ LAST DATE: NORMAL
RAD ONC MSQ PRESCRIBED FRACTIONAL DOSE: 266 CGRAY
RAD ONC MSQ PRESCRIBED NUMBER OF FRACTIONS: 16
RAD ONC MSQ PRESCRIBED TECHNIQUE: NORMAL
RAD ONC MSQ PRESCRIBED TOTAL DOSE: 4256 CGRAY
RAD ONC MSQ START DATE: NORMAL
RAD ONC MSQ TREATMENT COURSE NUMBER: 1
RAD ONC MSQ TREATMENT SITE: NORMAL

## 2023-11-22 PROCEDURE — 97810 ACUP 1/> WO ESTIM 1ST 15 MIN: CPT | Performed by: FAMILY MEDICINE

## 2023-11-22 PROCEDURE — 77307 TELETHX ISODOSE PLAN CPLX: CPT | Performed by: STUDENT IN AN ORGANIZED HEALTH CARE EDUCATION/TRAINING PROGRAM

## 2023-11-22 PROCEDURE — 1125F AMNT PAIN NOTED PAIN PRSNT: CPT | Performed by: FAMILY MEDICINE

## 2023-11-22 PROCEDURE — 77412 RADIATION TX DELIVERY LVL 3: CPT | Performed by: STUDENT IN AN ORGANIZED HEALTH CARE EDUCATION/TRAINING PROGRAM

## 2023-11-22 PROCEDURE — 77334 RADIATION TREATMENT AID(S): CPT | Performed by: STUDENT IN AN ORGANIZED HEALTH CARE EDUCATION/TRAINING PROGRAM

## 2023-11-22 PROCEDURE — 3074F SYST BP LT 130 MM HG: CPT | Performed by: FAMILY MEDICINE

## 2023-11-22 PROCEDURE — 99214 OFFICE O/P EST MOD 30 MIN: CPT | Mod: 25 | Performed by: FAMILY MEDICINE

## 2023-11-22 PROCEDURE — 1160F RVW MEDS BY RX/DR IN RCRD: CPT | Performed by: FAMILY MEDICINE

## 2023-11-22 PROCEDURE — 3078F DIAST BP <80 MM HG: CPT | Performed by: FAMILY MEDICINE

## 2023-11-22 PROCEDURE — 1159F MED LIST DOCD IN RCRD: CPT | Performed by: FAMILY MEDICINE

## 2023-11-22 PROCEDURE — 3044F HG A1C LEVEL LT 7.0%: CPT | Performed by: FAMILY MEDICINE

## 2023-11-22 PROCEDURE — 4010F ACE/ARB THERAPY RXD/TAKEN: CPT | Performed by: FAMILY MEDICINE

## 2023-11-22 PROCEDURE — 99214 OFFICE O/P EST MOD 30 MIN: CPT | Performed by: FAMILY MEDICINE

## 2023-11-22 PROCEDURE — 97811 ACUP 1/> W/O ESTIM EA ADD 15: CPT | Performed by: FAMILY MEDICINE

## 2023-11-22 PROCEDURE — 77336 RADIATION PHYSICS CONSULT: CPT | Mod: 59 | Performed by: STUDENT IN AN ORGANIZED HEALTH CARE EDUCATION/TRAINING PROGRAM

## 2023-11-22 RX ORDER — LISINOPRIL 20 MG/1
20 TABLET ORAL DAILY
Qty: 90 TABLET | Refills: 1 | Status: SHIPPED | OUTPATIENT
Start: 2023-11-22 | End: 2024-05-29

## 2023-11-22 ASSESSMENT — PATIENT HEALTH QUESTIONNAIRE - PHQ9
2. FEELING DOWN, DEPRESSED OR HOPELESS: NOT AT ALL
SUM OF ALL RESPONSES TO PHQ9 QUESTIONS 1 AND 2: 0
1. LITTLE INTEREST OR PLEASURE IN DOING THINGS: NOT AT ALL

## 2023-11-22 ASSESSMENT — ENCOUNTER SYMPTOMS
ABDOMINAL DISTENTION: 0
DIZZINESS: 0
OCCASIONAL FEELINGS OF UNSTEADINESS: 1
DEPRESSION: 0
FATIGUE: 1
POLYDIPSIA: 0
HEADACHES: 0
COUGH: 0
SHORTNESS OF BREATH: 0
LOSS OF SENSATION IN FEET: 0
ABDOMINAL PAIN: 0
PALPITATIONS: 0

## 2023-11-22 ASSESSMENT — PAIN SCALES - GENERAL
PAINLEVEL: 4
PAINLEVEL: 4

## 2023-11-22 NOTE — PATIENT INSTRUCTIONS
I recommend continuing the BCP and Sun with Romero.   Sugar is well controlled. Blood pressure is controlled.

## 2023-11-22 NOTE — PROGRESS NOTES
"Subjective   Patient ID: Tatyana Mckeon is a 68 y.o. female who presents for HTN/DM.    She presents today for regular follow-up of her diabetes and high blood pressure.  Blood pressure and diabetes are well-controlled with a hemoglobin A1c of 6.5%.    She is continue her radiation treatments and feels that she is doing okay with those.  She continues to put the lotion on the areas to keep her from getting irritated.  She does have some pain in that right outer quadrant of the breast.         Review of Systems   Constitutional:  Positive for fatigue.   Respiratory:  Negative for cough and shortness of breath.    Cardiovascular:  Negative for chest pain, palpitations and leg swelling.   Gastrointestinal:  Negative for abdominal distention and abdominal pain.   Endocrine: Negative for polydipsia and polyuria.   Neurological:  Negative for dizziness and headaches.       Objective   /68   Pulse 95   Ht 1.549 m (5' 1\")   Wt 91.6 kg (202 lb)   SpO2 96%   BMI 38.17 kg/m²     Physical Exam  Constitutional:       Appearance: Normal appearance.   Neck:      Thyroid: No thyromegaly or thyroid tenderness.      Vascular: No carotid bruit.   Cardiovascular:      Rate and Rhythm: Normal rate and regular rhythm.      Heart sounds: No murmur heard.  Pulmonary:      Effort: Pulmonary effort is normal.      Breath sounds: Normal breath sounds.   Musculoskeletal:      Cervical back: Neck supple.   Neurological:      Mental Status: She is alert.      Gait: Gait abnormal (Uses a walker.).   Psychiatric:         Mood and Affect: Mood normal.         Assessment/Plan   Diagnoses and all orders for this visit:  Type 2 diabetes mellitus without complication, with long-term current use of insulin (CMS/HCC)  Primary hypertension  High cholesterol  Malignant neoplasm of overlapping sites of right breast in female, estrogen receptor positive (CMS/HCC)  Overall, doing well from a high blood pressure and diabetes standpoint.  Will continue " her current medications.  She will continue her oncology and radiation treatments.  She will continue the BCP 3 and the son with Heather to help support her energy foundation as she moves through the treatments.  She will also have her acupuncture treatment today.

## 2023-11-22 NOTE — PROGRESS NOTES
Subjective   Patient ID: Tatyana Mckeon is a 68 y.o. female who presents for Acupuncture.    Presents for her acupuncture treatment.  She does have pain in the upper portion of her breast.  She is getting the radiation treatments.         Review of Systems    Objective   /68   Pulse 95   Wt 91.6 kg (202 lb)   SpO2 96%   BMI 38.17 kg/m²     Physical Exam  She is alert,    , Her affect is very pleasant.    Assessment/Plan   Diagnoses and all orders for this visit:  Malignant neoplasm of lower-inner quadrant of female breast, unspecified estrogen receptor status, unspecified laterality (CMS/HCC)  I treated her stomach radian today to see if we can move some of this pain through there.  She will continue herbs as detailed in her last note.    Acupuncture treatment: I used the following points bilaterally LI 4, LB 2, ST 13, 40, 41, 36, as well as 2 other points along the meridian on the shin.  He is retained for 20 minutes.  She tolerated this well.

## 2023-11-27 ENCOUNTER — HOSPITAL ENCOUNTER (OUTPATIENT)
Dept: RADIATION ONCOLOGY | Facility: CLINIC | Age: 68
Setting detail: RADIATION/ONCOLOGY SERIES
Discharge: HOME | End: 2023-11-27
Payer: MEDICARE

## 2023-11-27 DIAGNOSIS — C77.3 SECONDARY AND UNSPECIFIED MALIGNANT NEOPLASM OF AXILLA AND UPPER LIMB LYMPH NODES (MULTI): ICD-10-CM

## 2023-11-27 DIAGNOSIS — Z51.0 ENCOUNTER FOR ANTINEOPLASTIC RADIATION THERAPY: ICD-10-CM

## 2023-11-27 DIAGNOSIS — C50.811 MALIGNANT NEOPLASM OF OVERLAPPING SITES OF RIGHT FEMALE BREAST (MULTI): ICD-10-CM

## 2023-11-27 LAB
RAD ONC MSQ ACTUAL FRACTIONS DELIVERED: 7
RAD ONC MSQ ACTUAL SESSION DELIVERED DOSE: 266 CGRAY
RAD ONC MSQ ACTUAL TOTAL DOSE: 1862 CGRAY
RAD ONC MSQ ELAPSED DAYS: 11
RAD ONC MSQ LAST DATE: NORMAL
RAD ONC MSQ PRESCRIBED FRACTIONAL DOSE: 266 CGRAY
RAD ONC MSQ PRESCRIBED NUMBER OF FRACTIONS: 16
RAD ONC MSQ PRESCRIBED TECHNIQUE: NORMAL
RAD ONC MSQ PRESCRIBED TOTAL DOSE: 4256 CGRAY
RAD ONC MSQ START DATE: NORMAL
RAD ONC MSQ TREATMENT COURSE NUMBER: 1
RAD ONC MSQ TREATMENT SITE: NORMAL

## 2023-11-27 PROCEDURE — 77412 RADIATION TX DELIVERY LVL 3: CPT | Performed by: STUDENT IN AN ORGANIZED HEALTH CARE EDUCATION/TRAINING PROGRAM

## 2023-11-28 ENCOUNTER — HOSPITAL ENCOUNTER (OUTPATIENT)
Dept: RADIATION ONCOLOGY | Facility: CLINIC | Age: 68
Setting detail: RADIATION/ONCOLOGY SERIES
Discharge: HOME | End: 2023-11-28
Payer: MEDICARE

## 2023-11-28 DIAGNOSIS — Z51.0 ENCOUNTER FOR ANTINEOPLASTIC RADIATION THERAPY: ICD-10-CM

## 2023-11-28 DIAGNOSIS — C50.811 MALIGNANT NEOPLASM OF OVERLAPPING SITES OF RIGHT FEMALE BREAST (MULTI): ICD-10-CM

## 2023-11-28 DIAGNOSIS — C77.3 SECONDARY AND UNSPECIFIED MALIGNANT NEOPLASM OF AXILLA AND UPPER LIMB LYMPH NODES (MULTI): ICD-10-CM

## 2023-11-28 LAB
RAD ONC MSQ ACTUAL FRACTIONS DELIVERED: 8
RAD ONC MSQ ACTUAL SESSION DELIVERED DOSE: 266 CGRAY
RAD ONC MSQ ACTUAL TOTAL DOSE: 2128 CGRAY
RAD ONC MSQ ELAPSED DAYS: 12
RAD ONC MSQ LAST DATE: NORMAL
RAD ONC MSQ PRESCRIBED FRACTIONAL DOSE: 266 CGRAY
RAD ONC MSQ PRESCRIBED NUMBER OF FRACTIONS: 16
RAD ONC MSQ PRESCRIBED TECHNIQUE: NORMAL
RAD ONC MSQ PRESCRIBED TOTAL DOSE: 4256 CGRAY
RAD ONC MSQ START DATE: NORMAL
RAD ONC MSQ TREATMENT COURSE NUMBER: 1
RAD ONC MSQ TREATMENT SITE: NORMAL

## 2023-11-28 PROCEDURE — 77412 RADIATION TX DELIVERY LVL 3: CPT | Performed by: STUDENT IN AN ORGANIZED HEALTH CARE EDUCATION/TRAINING PROGRAM

## 2023-11-29 ENCOUNTER — HOSPITAL ENCOUNTER (OUTPATIENT)
Dept: RADIATION ONCOLOGY | Facility: CLINIC | Age: 68
Setting detail: RADIATION/ONCOLOGY SERIES
Discharge: HOME | End: 2023-11-29
Payer: MEDICARE

## 2023-11-29 DIAGNOSIS — C50.811 MALIGNANT NEOPLASM OF OVERLAPPING SITES OF RIGHT FEMALE BREAST (MULTI): ICD-10-CM

## 2023-11-29 DIAGNOSIS — Z51.0 ENCOUNTER FOR ANTINEOPLASTIC RADIATION THERAPY: ICD-10-CM

## 2023-11-29 DIAGNOSIS — C77.3 SECONDARY AND UNSPECIFIED MALIGNANT NEOPLASM OF AXILLA AND UPPER LIMB LYMPH NODES (MULTI): ICD-10-CM

## 2023-11-29 LAB
RAD ONC MSQ ACTUAL FRACTIONS DELIVERED: 9
RAD ONC MSQ ACTUAL SESSION DELIVERED DOSE: 266 CGRAY
RAD ONC MSQ ACTUAL TOTAL DOSE: 2394 CGRAY
RAD ONC MSQ ELAPSED DAYS: 13
RAD ONC MSQ LAST DATE: NORMAL
RAD ONC MSQ PRESCRIBED FRACTIONAL DOSE: 266 CGRAY
RAD ONC MSQ PRESCRIBED NUMBER OF FRACTIONS: 16
RAD ONC MSQ PRESCRIBED TECHNIQUE: NORMAL
RAD ONC MSQ PRESCRIBED TOTAL DOSE: 4256 CGRAY
RAD ONC MSQ START DATE: NORMAL
RAD ONC MSQ TREATMENT COURSE NUMBER: 1
RAD ONC MSQ TREATMENT SITE: NORMAL

## 2023-11-29 PROCEDURE — 77336 RADIATION PHYSICS CONSULT: CPT | Performed by: STUDENT IN AN ORGANIZED HEALTH CARE EDUCATION/TRAINING PROGRAM

## 2023-11-29 PROCEDURE — 77412 RADIATION TX DELIVERY LVL 3: CPT | Performed by: STUDENT IN AN ORGANIZED HEALTH CARE EDUCATION/TRAINING PROGRAM

## 2023-11-30 ENCOUNTER — RADIATION ONCOLOGY OTV (OUTPATIENT)
Dept: RADIATION ONCOLOGY | Facility: CLINIC | Age: 68
End: 2023-11-30
Payer: MEDICARE

## 2023-11-30 ENCOUNTER — HOSPITAL ENCOUNTER (OUTPATIENT)
Dept: RADIATION ONCOLOGY | Facility: CLINIC | Age: 68
Setting detail: RADIATION/ONCOLOGY SERIES
Discharge: HOME | End: 2023-11-30
Payer: MEDICARE

## 2023-11-30 VITALS
WEIGHT: 205.69 LBS | SYSTOLIC BLOOD PRESSURE: 100 MMHG | DIASTOLIC BLOOD PRESSURE: 55 MMHG | RESPIRATION RATE: 18 BRPM | HEART RATE: 95 BPM | BODY MASS INDEX: 38.83 KG/M2 | TEMPERATURE: 98.1 F | HEIGHT: 61 IN | OXYGEN SATURATION: 93 %

## 2023-11-30 DIAGNOSIS — C77.3 SECONDARY AND UNSPECIFIED MALIGNANT NEOPLASM OF AXILLA AND UPPER LIMB LYMPH NODES (MULTI): ICD-10-CM

## 2023-11-30 DIAGNOSIS — Z51.0 ENCOUNTER FOR ANTINEOPLASTIC RADIATION THERAPY: ICD-10-CM

## 2023-11-30 DIAGNOSIS — C50.811 MALIGNANT NEOPLASM OF OVERLAPPING SITES OF RIGHT FEMALE BREAST (MULTI): ICD-10-CM

## 2023-11-30 LAB
RAD ONC MSQ ACTUAL FRACTIONS DELIVERED: 10
RAD ONC MSQ ACTUAL SESSION DELIVERED DOSE: 266 CGRAY
RAD ONC MSQ ACTUAL TOTAL DOSE: 2660 CGRAY
RAD ONC MSQ ELAPSED DAYS: 14
RAD ONC MSQ LAST DATE: NORMAL
RAD ONC MSQ PRESCRIBED FRACTIONAL DOSE: 266 CGRAY
RAD ONC MSQ PRESCRIBED NUMBER OF FRACTIONS: 16
RAD ONC MSQ PRESCRIBED TECHNIQUE: NORMAL
RAD ONC MSQ PRESCRIBED TOTAL DOSE: 4256 CGRAY
RAD ONC MSQ START DATE: NORMAL
RAD ONC MSQ TREATMENT COURSE NUMBER: 1
RAD ONC MSQ TREATMENT SITE: NORMAL

## 2023-11-30 PROCEDURE — 77412 RADIATION TX DELIVERY LVL 3: CPT | Performed by: STUDENT IN AN ORGANIZED HEALTH CARE EDUCATION/TRAINING PROGRAM

## 2023-11-30 PROCEDURE — 77427 RADIATION TX MANAGEMENT X5: CPT | Performed by: STUDENT IN AN ORGANIZED HEALTH CARE EDUCATION/TRAINING PROGRAM

## 2023-11-30 ASSESSMENT — PAIN SCALES - GENERAL
PAINLEVEL: 4
PAINLEVEL_OUTOF10: 4

## 2023-11-30 ASSESSMENT — ENCOUNTER SYMPTOMS
LOSS OF SENSATION IN FEET: 0
DEPRESSION: 0
OCCASIONAL FEELINGS OF UNSTEADINESS: 1

## 2023-11-30 ASSESSMENT — PAIN DESCRIPTION - DESCRIPTORS: DESCRIPTORS: ACHING

## 2023-11-30 ASSESSMENT — PAIN - FUNCTIONAL ASSESSMENT: PAIN_FUNCTIONAL_ASSESSMENT: 0-10

## 2023-11-30 NOTE — PROGRESS NOTES
"Radiation Oncology On Treatment Visit    Patient Name:  Tatyana Mckeon  MRN:  [id number]  :  1955    Referring Provider: No ref. 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 Increased erythema with some mild pruritus along the inframammary fold; discussed using hydrocortisone if needed. Continue routine skin care.       OBJECTIVE:   Vital Signs:  /55 (BP Location: Left arm, Patient Position: Sitting, BP Cuff Size: Large adult)   Pulse 95   Temp 36.7 °C (98.1 °F)   Resp 18   Ht 1.549 m (5' 0.98\")   Wt 93.3 kg (205 lb 11 oz)   SpO2 93%   BMI 38.89 kg/m²    Pain Scale: The patient's current pain level was assessed.  They report currently having a pain of 4 out of 10.    Other Pertinent Findings:     Toxicity Assessment          2023    15:07 2023    15:20 2023    14:53   Toxicity Assessment   Treatment Site Breast Breast Breast   Anorexia Grade 0 Grade 0 Grade 0   Anxiety Grade 0 Grade 0 Grade 0 "   Dehydration Grade 0 Grade 0 Grade 0   Depression Grade 0 Grade 0 Grade 0   Dermatitis Radiation Grade 0 Grade 1       slight redness to the right breast. Patient insturcted to start using Aquaphor BID Grade 2       bumps under the right breast and redness t/o-using Hydrogel   Diarrhea Grade 0 Grade 0    Fatigue Grade 0 Grade 0 Grade 1       naps during the day   Fibrosis Deep Connective Tissue Grade 0 Grade 0 Grade 0   Fracture Grade 0 Grade 0 Grade 0   Nausea Grade 0 Grade 0 Grade 0   Pain Grade 0 Grade 1       pain 4 in knees Grade 1       pain score4--right knee, cortisone injections as needed   Treatment Related Secondary Malignancy Grade 0  Grade 0   Tumor Pain Grade 0 Grade 0 Grade 0   Vomiting Grade 0 Grade 0    Abdominal Pain Grade 0     Dysphagia Grade 0     Esophagitis Grade 0     Gastric Hemorrhage Grade 0     Mucositis Oral Grade 0     Dry Mouth Grade 0 Grade 0 Grade 0   Breast Infection Grade 0 Grade 0 Grade 0   Seroma Grade 0 Grade 0 Grade 0   Joint Range of Motion Decreased Grade 0 Grade 0 Grade 0   Joint Range of Motion Decreased Lumbar Spine Grade 0 Grade 0    Brachial Plexopathy Grade 0 Grade 0 Grade 0   Breast Atrophy Grade 0 Grade 0 Grade 0   Breast Pain Grade 0 Grade 0 Grade 1   Nipple Deformity Grade 0 Grade 0 Grade 0   Pneumonitis Grade 0 Grade 0 Grade 0   Edema Limbs Grade 0 Grade 0 Grade 0   Lymphedema Grade 0 Grade 0 Grade 0   Thromboembolic Event Grade 0     Hot Flashes Grade 0 Grade 0 Grade 1        Assessment / Plan:  The patient is tolerating radiation therapy as anticipated.  Continue per current treatment plan.

## 2023-12-01 ENCOUNTER — HOSPITAL ENCOUNTER (OUTPATIENT)
Dept: RADIATION ONCOLOGY | Facility: CLINIC | Age: 68
Setting detail: RADIATION/ONCOLOGY SERIES
Discharge: HOME | End: 2023-12-01
Payer: MEDICARE

## 2023-12-01 DIAGNOSIS — C77.3 SECONDARY AND UNSPECIFIED MALIGNANT NEOPLASM OF AXILLA AND UPPER LIMB LYMPH NODES (MULTI): ICD-10-CM

## 2023-12-01 DIAGNOSIS — C50.811 MALIGNANT NEOPLASM OF OVERLAPPING SITES OF RIGHT FEMALE BREAST (MULTI): ICD-10-CM

## 2023-12-01 DIAGNOSIS — Z51.0 ENCOUNTER FOR ANTINEOPLASTIC RADIATION THERAPY: ICD-10-CM

## 2023-12-01 DIAGNOSIS — B37.2 CANDIDIASIS OF SKIN AND NAILS: Primary | ICD-10-CM

## 2023-12-01 DIAGNOSIS — B37.31 VAGINAL CANDIDIASIS: ICD-10-CM

## 2023-12-01 LAB
RAD ONC MSQ ACTUAL FRACTIONS DELIVERED: 11
RAD ONC MSQ ACTUAL SESSION DELIVERED DOSE: 266 CGRAY
RAD ONC MSQ ACTUAL TOTAL DOSE: 2926 CGRAY
RAD ONC MSQ ELAPSED DAYS: 15
RAD ONC MSQ LAST DATE: NORMAL
RAD ONC MSQ PRESCRIBED FRACTIONAL DOSE: 266 CGRAY
RAD ONC MSQ PRESCRIBED NUMBER OF FRACTIONS: 16
RAD ONC MSQ PRESCRIBED TECHNIQUE: NORMAL
RAD ONC MSQ PRESCRIBED TOTAL DOSE: 4256 CGRAY
RAD ONC MSQ START DATE: NORMAL
RAD ONC MSQ TREATMENT COURSE NUMBER: 1
RAD ONC MSQ TREATMENT SITE: NORMAL

## 2023-12-01 PROCEDURE — 77412 RADIATION TX DELIVERY LVL 3: CPT | Performed by: STUDENT IN AN ORGANIZED HEALTH CARE EDUCATION/TRAINING PROGRAM

## 2023-12-01 RX ORDER — NYSTATIN 100000 U/G
CREAM TOPICAL
Qty: 60 G | Refills: 0 | Status: SHIPPED | OUTPATIENT
Start: 2023-12-01 | End: 2024-01-03

## 2023-12-04 ENCOUNTER — HOSPITAL ENCOUNTER (OUTPATIENT)
Dept: RADIATION ONCOLOGY | Facility: CLINIC | Age: 68
Setting detail: RADIATION/ONCOLOGY SERIES
Discharge: HOME | End: 2023-12-04
Payer: MEDICARE

## 2023-12-04 DIAGNOSIS — C50.811 MALIGNANT NEOPLASM OF OVERLAPPING SITES OF RIGHT FEMALE BREAST (MULTI): ICD-10-CM

## 2023-12-04 DIAGNOSIS — C77.3 SECONDARY AND UNSPECIFIED MALIGNANT NEOPLASM OF AXILLA AND UPPER LIMB LYMPH NODES (MULTI): ICD-10-CM

## 2023-12-04 DIAGNOSIS — Z51.0 ENCOUNTER FOR ANTINEOPLASTIC RADIATION THERAPY: ICD-10-CM

## 2023-12-04 LAB
RAD ONC MSQ ACTUAL FRACTIONS DELIVERED: 12
RAD ONC MSQ ACTUAL SESSION DELIVERED DOSE: 266 CGRAY
RAD ONC MSQ ACTUAL TOTAL DOSE: 3192 CGRAY
RAD ONC MSQ ELAPSED DAYS: 18
RAD ONC MSQ LAST DATE: NORMAL
RAD ONC MSQ PRESCRIBED FRACTIONAL DOSE: 266 CGRAY
RAD ONC MSQ PRESCRIBED NUMBER OF FRACTIONS: 16
RAD ONC MSQ PRESCRIBED TECHNIQUE: NORMAL
RAD ONC MSQ PRESCRIBED TOTAL DOSE: 4256 CGRAY
RAD ONC MSQ START DATE: NORMAL
RAD ONC MSQ TREATMENT COURSE NUMBER: 1
RAD ONC MSQ TREATMENT SITE: NORMAL

## 2023-12-04 PROCEDURE — 77417 THER RADIOLOGY PORT IMAGE(S): CPT | Performed by: STUDENT IN AN ORGANIZED HEALTH CARE EDUCATION/TRAINING PROGRAM

## 2023-12-04 PROCEDURE — 77412 RADIATION TX DELIVERY LVL 3: CPT | Performed by: STUDENT IN AN ORGANIZED HEALTH CARE EDUCATION/TRAINING PROGRAM

## 2023-12-05 ENCOUNTER — HOSPITAL ENCOUNTER (OUTPATIENT)
Dept: RADIATION ONCOLOGY | Facility: CLINIC | Age: 68
Setting detail: RADIATION/ONCOLOGY SERIES
Discharge: HOME | End: 2023-12-05
Payer: MEDICARE

## 2023-12-05 DIAGNOSIS — C50.811 MALIGNANT NEOPLASM OF OVERLAPPING SITES OF RIGHT FEMALE BREAST (MULTI): ICD-10-CM

## 2023-12-05 DIAGNOSIS — Z51.0 ENCOUNTER FOR ANTINEOPLASTIC RADIATION THERAPY: ICD-10-CM

## 2023-12-05 DIAGNOSIS — C77.3 SECONDARY AND UNSPECIFIED MALIGNANT NEOPLASM OF AXILLA AND UPPER LIMB LYMPH NODES (MULTI): ICD-10-CM

## 2023-12-05 LAB
RAD ONC MSQ ACTUAL FRACTIONS DELIVERED: 13
RAD ONC MSQ ACTUAL SESSION DELIVERED DOSE: 266 CGRAY
RAD ONC MSQ ACTUAL TOTAL DOSE: 3458 CGRAY
RAD ONC MSQ ELAPSED DAYS: 19
RAD ONC MSQ LAST DATE: NORMAL
RAD ONC MSQ PRESCRIBED FRACTIONAL DOSE: 266 CGRAY
RAD ONC MSQ PRESCRIBED NUMBER OF FRACTIONS: 16
RAD ONC MSQ PRESCRIBED TECHNIQUE: NORMAL
RAD ONC MSQ PRESCRIBED TOTAL DOSE: 4256 CGRAY
RAD ONC MSQ START DATE: NORMAL
RAD ONC MSQ TREATMENT COURSE NUMBER: 1
RAD ONC MSQ TREATMENT SITE: NORMAL

## 2023-12-05 PROCEDURE — 77412 RADIATION TX DELIVERY LVL 3: CPT | Performed by: STUDENT IN AN ORGANIZED HEALTH CARE EDUCATION/TRAINING PROGRAM

## 2023-12-06 ENCOUNTER — HOSPITAL ENCOUNTER (OUTPATIENT)
Dept: RADIATION ONCOLOGY | Facility: CLINIC | Age: 68
Setting detail: RADIATION/ONCOLOGY SERIES
Discharge: HOME | End: 2023-12-06
Payer: MEDICARE

## 2023-12-06 ENCOUNTER — OFFICE VISIT (OUTPATIENT)
Dept: PRIMARY CARE | Facility: CLINIC | Age: 68
End: 2023-12-06
Payer: MEDICARE

## 2023-12-06 VITALS — SYSTOLIC BLOOD PRESSURE: 118 MMHG | DIASTOLIC BLOOD PRESSURE: 80 MMHG

## 2023-12-06 DIAGNOSIS — C50.319 MALIGNANT NEOPLASM OF LOWER-INNER QUADRANT OF FEMALE BREAST, UNSPECIFIED ESTROGEN RECEPTOR STATUS, UNSPECIFIED LATERALITY (MULTI): Primary | ICD-10-CM

## 2023-12-06 DIAGNOSIS — C50.811 MALIGNANT NEOPLASM OF OVERLAPPING SITES OF RIGHT FEMALE BREAST (MULTI): ICD-10-CM

## 2023-12-06 DIAGNOSIS — Z51.0 ENCOUNTER FOR ANTINEOPLASTIC RADIATION THERAPY: ICD-10-CM

## 2023-12-06 DIAGNOSIS — C77.3 SECONDARY AND UNSPECIFIED MALIGNANT NEOPLASM OF AXILLA AND UPPER LIMB LYMPH NODES (MULTI): ICD-10-CM

## 2023-12-06 LAB
RAD ONC MSQ ACTUAL FRACTIONS DELIVERED: 14
RAD ONC MSQ ACTUAL SESSION DELIVERED DOSE: 266 CGRAY
RAD ONC MSQ ACTUAL TOTAL DOSE: 3724 CGRAY
RAD ONC MSQ ELAPSED DAYS: 20
RAD ONC MSQ LAST DATE: NORMAL
RAD ONC MSQ PRESCRIBED FRACTIONAL DOSE: 266 CGRAY
RAD ONC MSQ PRESCRIBED NUMBER OF FRACTIONS: 16
RAD ONC MSQ PRESCRIBED TECHNIQUE: NORMAL
RAD ONC MSQ PRESCRIBED TOTAL DOSE: 4256 CGRAY
RAD ONC MSQ START DATE: NORMAL
RAD ONC MSQ TREATMENT COURSE NUMBER: 1
RAD ONC MSQ TREATMENT SITE: NORMAL

## 2023-12-06 PROCEDURE — 3079F DIAST BP 80-89 MM HG: CPT | Performed by: FAMILY MEDICINE

## 2023-12-06 PROCEDURE — 1125F AMNT PAIN NOTED PAIN PRSNT: CPT | Performed by: FAMILY MEDICINE

## 2023-12-06 PROCEDURE — 97810 ACUP 1/> WO ESTIM 1ST 15 MIN: CPT | Performed by: FAMILY MEDICINE

## 2023-12-06 PROCEDURE — 97811 ACUP 1/> W/O ESTIM EA ADD 15: CPT | Performed by: FAMILY MEDICINE

## 2023-12-06 PROCEDURE — 77336 RADIATION PHYSICS CONSULT: CPT | Performed by: STUDENT IN AN ORGANIZED HEALTH CARE EDUCATION/TRAINING PROGRAM

## 2023-12-06 PROCEDURE — 3044F HG A1C LEVEL LT 7.0%: CPT | Performed by: FAMILY MEDICINE

## 2023-12-06 PROCEDURE — 3074F SYST BP LT 130 MM HG: CPT | Performed by: FAMILY MEDICINE

## 2023-12-06 PROCEDURE — 1159F MED LIST DOCD IN RCRD: CPT | Performed by: FAMILY MEDICINE

## 2023-12-06 PROCEDURE — 77412 RADIATION TX DELIVERY LVL 3: CPT | Performed by: STUDENT IN AN ORGANIZED HEALTH CARE EDUCATION/TRAINING PROGRAM

## 2023-12-06 PROCEDURE — 4010F ACE/ARB THERAPY RXD/TAKEN: CPT | Performed by: FAMILY MEDICINE

## 2023-12-06 PROCEDURE — 99024 POSTOP FOLLOW-UP VISIT: CPT | Performed by: FAMILY MEDICINE

## 2023-12-06 PROCEDURE — 1160F RVW MEDS BY RX/DR IN RCRD: CPT | Performed by: FAMILY MEDICINE

## 2023-12-06 ASSESSMENT — PAIN SCALES - GENERAL: PAINLEVEL: 4

## 2023-12-06 NOTE — PATIENT INSTRUCTIONS
Continue the BCP 3 herb.  If the swelling continues, you can certainly add sun with Moon herb, 4-5 twice a day.  This radiation generates a lot of heat and that can help cool things down.

## 2023-12-06 NOTE — PROGRESS NOTES
Acupuncture treatment Subjective   Patient ID: Tatyana Mckeon is a 68 y.o. female who presents for Acupuncture.    She presents today for her acupuncture treatment.  She states that she continues with radiation treatments.  She states she has not been fatigued and has felt well.  She states there is some swelling in the breast and there can be reimaging this.  She is taking BCP 3.    She sees her chiropractor as well for back pain.    She had some pain in the right shoulder as well, she thinks from holding her hands above her head so often during the radiation treatment.         Review of Systems    Objective   /80     Physical Exam  Constitutional:       General: She is not in acute distress.     Appearance: She is not ill-appearing.   Pulmonary:      Effort: Pulmonary effort is normal.   Skin:     General: Skin is warm and dry.   Neurological:      Mental Status: She is alert.   Psychiatric:         Mood and Affect: Mood normal.         Thought Content: Thought content normal.         Assessment/Plan   Diagnoses and all orders for this visit:  Malignant neoplasm of lower-inner quadrant of female breast, unspecified estrogen receptor status, unspecified laterality (CMS/HCC)  Continue her regular acupuncture treatments.  She will continue BCP 3 herb.  Can consider son with moon as well, if this inflammation is not improving.  She will continue to see her chiropractor who also helps with her back pain.     Acupuncture treatment: I used the following points bilaterally: LI 4, ST 36, ST 40 and LV 2.  I added LI 14 and LI 10 on the right side.

## 2023-12-07 ENCOUNTER — HOSPITAL ENCOUNTER (OUTPATIENT)
Dept: RADIATION ONCOLOGY | Facility: CLINIC | Age: 68
Setting detail: RADIATION/ONCOLOGY SERIES
Discharge: HOME | End: 2023-12-07
Payer: MEDICARE

## 2023-12-07 ENCOUNTER — RADIATION ONCOLOGY OTV (OUTPATIENT)
Dept: RADIATION ONCOLOGY | Facility: CLINIC | Age: 68
End: 2023-12-07
Payer: MEDICARE

## 2023-12-07 ENCOUNTER — RADIATION ONCOLOGY OTV (OUTPATIENT)
Dept: RADIATION ONCOLOGY | Facility: CLINIC | Age: 68
End: 2023-12-07

## 2023-12-07 VITALS
SYSTOLIC BLOOD PRESSURE: 105 MMHG | RESPIRATION RATE: 18 BRPM | DIASTOLIC BLOOD PRESSURE: 71 MMHG | BODY MASS INDEX: 38.66 KG/M2 | WEIGHT: 204.48 LBS | OXYGEN SATURATION: 95 % | HEART RATE: 86 BPM | TEMPERATURE: 96.4 F

## 2023-12-07 DIAGNOSIS — Z51.0 ENCOUNTER FOR ANTINEOPLASTIC RADIATION THERAPY: ICD-10-CM

## 2023-12-07 DIAGNOSIS — C77.3 SECONDARY AND UNSPECIFIED MALIGNANT NEOPLASM OF AXILLA AND UPPER LIMB LYMPH NODES (MULTI): ICD-10-CM

## 2023-12-07 DIAGNOSIS — C50.811 MALIGNANT NEOPLASM OF OVERLAPPING SITES OF RIGHT FEMALE BREAST (MULTI): ICD-10-CM

## 2023-12-07 LAB
RAD ONC MSQ ACTUAL FRACTIONS DELIVERED: 15
RAD ONC MSQ ACTUAL SESSION DELIVERED DOSE: 266 CGRAY
RAD ONC MSQ ACTUAL TOTAL DOSE: 3990 CGRAY
RAD ONC MSQ ELAPSED DAYS: 21
RAD ONC MSQ LAST DATE: NORMAL
RAD ONC MSQ PRESCRIBED FRACTIONAL DOSE: 266 CGRAY
RAD ONC MSQ PRESCRIBED NUMBER OF FRACTIONS: 16
RAD ONC MSQ PRESCRIBED TECHNIQUE: NORMAL
RAD ONC MSQ PRESCRIBED TOTAL DOSE: 4256 CGRAY
RAD ONC MSQ START DATE: NORMAL
RAD ONC MSQ TREATMENT COURSE NUMBER: 1
RAD ONC MSQ TREATMENT SITE: NORMAL

## 2023-12-07 PROCEDURE — 77427 RADIATION TX MANAGEMENT X5: CPT | Performed by: STUDENT IN AN ORGANIZED HEALTH CARE EDUCATION/TRAINING PROGRAM

## 2023-12-07 PROCEDURE — 77412 RADIATION TX DELIVERY LVL 3: CPT | Performed by: STUDENT IN AN ORGANIZED HEALTH CARE EDUCATION/TRAINING PROGRAM

## 2023-12-07 RX ORDER — ASPIRIN 81 MG/1
81 TABLET ORAL DAILY
COMMUNITY

## 2023-12-07 ASSESSMENT — PAIN SCALES - GENERAL
PAINLEVEL: 4
PAINLEVEL_OUTOF10: 4

## 2023-12-07 ASSESSMENT — PAIN - FUNCTIONAL ASSESSMENT: PAIN_FUNCTIONAL_ASSESSMENT: 0-10

## 2023-12-07 ASSESSMENT — PAIN DESCRIPTION - DESCRIPTORS: DESCRIPTORS: ACHING

## 2023-12-07 NOTE — PROGRESS NOTES
Radiation Oncology On Treatment Visit    Patient Name:  Tatyana Mckeon  MRN:  [id number]  :  1955    Referring Provider: No ref. 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             SUBJECTIVE: Tolerating well. More diffuse breast erythema without desquamation. Denies any significant pain or discomfort. Denies any pruritus.      OBJECTIVE:   Vital Signs:  /71   Pulse 86   Temp 35.8 °C (96.4 °F)   Resp 18   Wt 92.8 kg (204 lb 7.6 oz)   SpO2 95%   BMI 38.66 kg/m²    Pain Scale: The patient's current pain level was assessed.  They report currently having a pain of 4 out of 10.    Other Pertinent Findings:     Toxicity Assessment          2023    15:07 2023    15:20 2023    14:53 2023    14:55   Toxicity Assessment   Treatment Site Breast Breast Breast Breast   Anorexia Grade 0 Grade 0 Grade 0 Grade 0   Anxiety Grade 0 Grade 0 Grade 0 Grade 0   Dehydration Grade 0 Grade 0 Grade  0 Grade 0   Depression Grade 0 Grade 0 Grade 0 Grade 0   Dermatitis Radiation Grade 0 Grade 1       slight redness to the right breast. Patient insturcted to start using Aquaphor BID Grade 2       bumps under the right breast and redness t/o-using Hydrogel Grade 1       redness to right breast--Using Aquaphor and CeraVe   Diarrhea Grade 0 Grade 0  Grade 0   Fatigue Grade 0 Grade 0 Grade 1       naps during the day Grade 0   Fibrosis Deep Connective Tissue Grade 0 Grade 0 Grade 0 Grade 0   Fracture Grade 0 Grade 0 Grade 0 Grade 0   Nausea Grade 0 Grade 0 Grade 0 Grade 0   Pain Grade 0 Grade 1       pain 4 in knees Grade 1       pain score4--right knee, cortisone injections as needed Grade 1       pain scale 4 to right knee   Treatment Related Secondary Malignancy Grade 0  Grade 0 Grade 0   Tumor Pain Grade 0 Grade 0 Grade 0 Grade 0   Vomiting Grade 0 Grade 0  Grade 0   Abdominal Pain Grade 0      Dysphagia Grade 0      Esophagitis Grade 0      Gastric Hemorrhage Grade 0      Mucositis Oral Grade 0      Dry Mouth Grade 0 Grade 0 Grade 0 Grade 0   Breast Infection Grade 0 Grade 0 Grade 0 Grade 0   Seroma Grade 0 Grade 0 Grade 0 Grade 0   Joint Range of Motion Decreased Grade 0 Grade 0 Grade 0 Grade 0   Joint Range of Motion Decreased Lumbar Spine Grade 0 Grade 0  Grade 0   Brachial Plexopathy Grade 0 Grade 0 Grade 0 Grade 0   Breast Atrophy Grade 0 Grade 0 Grade 0 Grade 0   Breast Pain Grade 0 Grade 0 Grade 1 Grade 0   Nipple Deformity Grade 0 Grade 0 Grade 0 Grade 0   Pneumonitis Grade 0 Grade 0 Grade 0 Grade 0   Edema Limbs Grade 0 Grade 0 Grade 0 Grade 0   Lymphedema Grade 0 Grade 0 Grade 0 Grade 0   Thromboembolic Event Grade 0      Hot Flashes Grade 0 Grade 0 Grade 1 Grade 1       occasional        Assessment / Plan:  The patient is tolerating radiation therapy as anticipated.  Continue per current treatment plan.

## 2023-12-08 ENCOUNTER — HOSPITAL ENCOUNTER (OUTPATIENT)
Dept: RADIATION ONCOLOGY | Facility: CLINIC | Age: 68
Setting detail: RADIATION/ONCOLOGY SERIES
Discharge: HOME | End: 2023-12-08
Payer: MEDICARE

## 2023-12-08 DIAGNOSIS — C50.811 MALIGNANT NEOPLASM OF OVERLAPPING SITES OF RIGHT FEMALE BREAST (MULTI): ICD-10-CM

## 2023-12-08 DIAGNOSIS — C77.3 SECONDARY AND UNSPECIFIED MALIGNANT NEOPLASM OF AXILLA AND UPPER LIMB LYMPH NODES (MULTI): ICD-10-CM

## 2023-12-08 DIAGNOSIS — Z51.0 ENCOUNTER FOR ANTINEOPLASTIC RADIATION THERAPY: ICD-10-CM

## 2023-12-08 LAB
RAD ONC MSQ ACTUAL FRACTIONS DELIVERED: 16
RAD ONC MSQ ACTUAL SESSION DELIVERED DOSE: 266 CGRAY
RAD ONC MSQ ACTUAL TOTAL DOSE: 4256 CGRAY
RAD ONC MSQ ELAPSED DAYS: 22
RAD ONC MSQ LAST DATE: NORMAL
RAD ONC MSQ PRESCRIBED FRACTIONAL DOSE: 266 CGRAY
RAD ONC MSQ PRESCRIBED NUMBER OF FRACTIONS: 16
RAD ONC MSQ PRESCRIBED TECHNIQUE: NORMAL
RAD ONC MSQ PRESCRIBED TOTAL DOSE: 4256 CGRAY
RAD ONC MSQ START DATE: NORMAL
RAD ONC MSQ TREATMENT COURSE NUMBER: 1
RAD ONC MSQ TREATMENT SITE: NORMAL

## 2023-12-08 PROCEDURE — 77412 RADIATION TX DELIVERY LVL 3: CPT | Performed by: STUDENT IN AN ORGANIZED HEALTH CARE EDUCATION/TRAINING PROGRAM

## 2023-12-11 ENCOUNTER — APPOINTMENT (OUTPATIENT)
Dept: RADIATION ONCOLOGY | Facility: CLINIC | Age: 68
End: 2023-12-11
Payer: MEDICARE

## 2023-12-11 ENCOUNTER — TELEPHONE (OUTPATIENT)
Dept: RADIATION ONCOLOGY | Facility: CLINIC | Age: 68
End: 2023-12-11
Payer: MEDICARE

## 2023-12-12 ENCOUNTER — HOSPITAL ENCOUNTER (OUTPATIENT)
Dept: RADIATION ONCOLOGY | Facility: CLINIC | Age: 68
Setting detail: RADIATION/ONCOLOGY SERIES
Discharge: HOME | End: 2023-12-12
Payer: MEDICARE

## 2023-12-12 DIAGNOSIS — E11.9 TYPE 2 DIABETES MELLITUS WITHOUT COMPLICATION, WITHOUT LONG-TERM CURRENT USE OF INSULIN (MULTI): Primary | ICD-10-CM

## 2023-12-12 DIAGNOSIS — C50.811 MALIGNANT NEOPLASM OF OVERLAPPING SITES OF RIGHT FEMALE BREAST (MULTI): ICD-10-CM

## 2023-12-12 DIAGNOSIS — C77.3 SECONDARY AND UNSPECIFIED MALIGNANT NEOPLASM OF AXILLA AND UPPER LIMB LYMPH NODES (MULTI): ICD-10-CM

## 2023-12-12 DIAGNOSIS — Z51.0 ENCOUNTER FOR ANTINEOPLASTIC RADIATION THERAPY: ICD-10-CM

## 2023-12-12 LAB
RAD ONC MSQ ACTUAL FRACTIONS DELIVERED: 1
RAD ONC MSQ ACTUAL SESSION DELIVERED DOSE: 250 CGRAY
RAD ONC MSQ ACTUAL TOTAL DOSE: 250 CGRAY
RAD ONC MSQ ELAPSED DAYS: 0
RAD ONC MSQ LAST DATE: NORMAL
RAD ONC MSQ PRESCRIBED FRACTIONAL DOSE: 250 CGRAY
RAD ONC MSQ PRESCRIBED NUMBER OF FRACTIONS: 4
RAD ONC MSQ PRESCRIBED TECHNIQUE: NORMAL
RAD ONC MSQ PRESCRIBED TOTAL DOSE: 1000 CGRAY
RAD ONC MSQ START DATE: NORMAL
RAD ONC MSQ TREATMENT COURSE NUMBER: 1
RAD ONC MSQ TREATMENT SITE: NORMAL

## 2023-12-12 PROCEDURE — 77280 THER RAD SIMULAJ FIELD SMPL: CPT | Performed by: STUDENT IN AN ORGANIZED HEALTH CARE EDUCATION/TRAINING PROGRAM

## 2023-12-12 PROCEDURE — 77412 RADIATION TX DELIVERY LVL 3: CPT | Performed by: STUDENT IN AN ORGANIZED HEALTH CARE EDUCATION/TRAINING PROGRAM

## 2023-12-13 ENCOUNTER — HOSPITAL ENCOUNTER (OUTPATIENT)
Dept: RADIATION ONCOLOGY | Facility: CLINIC | Age: 68
Setting detail: RADIATION/ONCOLOGY SERIES
Discharge: HOME | End: 2023-12-13
Payer: MEDICARE

## 2023-12-13 ENCOUNTER — RADIATION ONCOLOGY OTV (OUTPATIENT)
Dept: RADIATION ONCOLOGY | Facility: CLINIC | Age: 68
End: 2023-12-13
Payer: MEDICARE

## 2023-12-13 VITALS
SYSTOLIC BLOOD PRESSURE: 93 MMHG | RESPIRATION RATE: 18 BRPM | DIASTOLIC BLOOD PRESSURE: 56 MMHG | BODY MASS INDEX: 38.82 KG/M2 | OXYGEN SATURATION: 94 % | WEIGHT: 205.36 LBS | HEART RATE: 87 BPM | TEMPERATURE: 96.8 F

## 2023-12-13 DIAGNOSIS — C77.3 SECONDARY AND UNSPECIFIED MALIGNANT NEOPLASM OF AXILLA AND UPPER LIMB LYMPH NODES (MULTI): ICD-10-CM

## 2023-12-13 DIAGNOSIS — Z51.0 ENCOUNTER FOR ANTINEOPLASTIC RADIATION THERAPY: ICD-10-CM

## 2023-12-13 DIAGNOSIS — C50.811 MALIGNANT NEOPLASM OF OVERLAPPING SITES OF RIGHT FEMALE BREAST (MULTI): ICD-10-CM

## 2023-12-13 LAB
RAD ONC MSQ ACTUAL FRACTIONS DELIVERED: 2
RAD ONC MSQ ACTUAL SESSION DELIVERED DOSE: 250 CGRAY
RAD ONC MSQ ACTUAL TOTAL DOSE: 500 CGRAY
RAD ONC MSQ ELAPSED DAYS: 1
RAD ONC MSQ LAST DATE: NORMAL
RAD ONC MSQ PRESCRIBED FRACTIONAL DOSE: 250 CGRAY
RAD ONC MSQ PRESCRIBED NUMBER OF FRACTIONS: 4
RAD ONC MSQ PRESCRIBED TECHNIQUE: NORMAL
RAD ONC MSQ PRESCRIBED TOTAL DOSE: 1000 CGRAY
RAD ONC MSQ START DATE: NORMAL
RAD ONC MSQ TREATMENT COURSE NUMBER: 1
RAD ONC MSQ TREATMENT SITE: NORMAL

## 2023-12-13 PROCEDURE — 77014 CHG CT GUIDANCE RADIATION THERAPY FLDS PLACEMENT: CPT | Performed by: STUDENT IN AN ORGANIZED HEALTH CARE EDUCATION/TRAINING PROGRAM

## 2023-12-13 PROCEDURE — 77336 RADIATION PHYSICS CONSULT: CPT | Performed by: STUDENT IN AN ORGANIZED HEALTH CARE EDUCATION/TRAINING PROGRAM

## 2023-12-13 PROCEDURE — 77412 RADIATION TX DELIVERY LVL 3: CPT | Performed by: STUDENT IN AN ORGANIZED HEALTH CARE EDUCATION/TRAINING PROGRAM

## 2023-12-13 PROCEDURE — 77427 RADIATION TX MANAGEMENT X5: CPT | Performed by: STUDENT IN AN ORGANIZED HEALTH CARE EDUCATION/TRAINING PROGRAM

## 2023-12-13 PROCEDURE — 77387 GUIDANCE FOR RADJ TX DLVR: CPT | Performed by: STUDENT IN AN ORGANIZED HEALTH CARE EDUCATION/TRAINING PROGRAM

## 2023-12-13 ASSESSMENT — PAIN SCALES - GENERAL: PAINLEVEL: 4

## 2023-12-13 NOTE — PROGRESS NOTES
Radiation Oncology On Treatment Visit    Patient Name:  Tatyana Mckeon  MRN:  [id number]  :  1955    Referring Provider: No ref. 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)        Malignant neoplasm of lower-inner quadrant of female breast (CMS/HCC)        Breast cancer (CMS/HCC)        Endometrial cancer (CMS/HCC)        Malignant neoplasm of overlapping sites of right breast in female, estrogen receptor positive (CMS/HCC)         Treatment Summary:  Radiation Treatments       Active   R breast boost (Started on 2023)   Most recent fraction: 250 cGy given on 2023   Total given: 500 cGy / 1,000 cGy  (2 of 4 fractions)   Elapsed Days: 1   Technique: 3D           Completed   R breast (Started on 2023)   Most recent fraction: 266 cGy given on 2023   Total given: 4,256 cGy / 4,256 cGy  (16 of 16 fractions)   Elapsed Days: 22   Technique: 3D                   SUBJECTIVE: Mildly increased breast erythema, still with no desquamation. Mild discomfort around the nipple, with no significant pain. Mild pruritus, and using hydrocortisone with relief.      OBJECTIVE:   Vital Signs:  BP 93/56 (BP Location: Left arm, Patient Position: Sitting)   Pulse 87   Temp 36 °C (96.8 °F)   Resp 18   Wt 93.2 kg (205 lb 5.7 oz)   SpO2 94%   BMI 38.82 kg/m²    Pain Scale: The patient's current pain level was assessed.  They report currently having a pain of 4 out of 10.    Other Pertinent Findings:     Toxicity Assessment          2023    15:07 2023     15:20 11/30/2023    14:53 12/7/2023    14:55 12/13/2023    10:12   Toxicity Assessment   Treatment Site Breast Breast Breast Breast Breast   Anorexia Grade 0 Grade 0 Grade 0 Grade 0 Grade 0   Anxiety Grade 0 Grade 0 Grade 0 Grade 0 Grade 0   Dehydration Grade 0 Grade 0 Grade 0 Grade 0 Grade 0   Depression Grade 0 Grade 0 Grade 0 Grade 0 Grade 0   Dermatitis Radiation Grade 0 Grade 1       slight redness to the right breast. Patient insturcted to start using Aquaphor BID Grade 2       bumps under the right breast and redness t/o-using Hydrogel Grade 1       redness to right breast--Using Aquaphor and CeraVe Grade 1   Diarrhea Grade 0 Grade 0  Grade 0 Grade 0   Fatigue Grade 0 Grade 0 Grade 1       naps during the day Grade 0 Grade 0   Fibrosis Deep Connective Tissue Grade 0 Grade 0 Grade 0 Grade 0 Grade 0   Fracture Grade 0 Grade 0 Grade 0 Grade 0 Grade 0   Nausea Grade 0 Grade 0 Grade 0 Grade 0 Grade 0   Pain Grade 0 Grade 1       pain 4 in knees Grade 1       pain score4--right knee, cortisone injections as needed Grade 1       pain scale 4 to right knee Grade 1       4/10 right knee   Treatment Related Secondary Malignancy Grade 0  Grade 0 Grade 0 Grade 0   Tumor Pain Grade 0 Grade 0 Grade 0 Grade 0 Grade 0   Vomiting Grade 0 Grade 0  Grade 0 Grade 0   Abdominal Pain Grade 0    Grade 0   Dysphagia Grade 0    Grade 0   Esophagitis Grade 0    Grade 0   Gastric Hemorrhage Grade 0    Grade 0   Mucositis Oral Grade 0    Grade 0   Dry Mouth Grade 0 Grade 0 Grade 0 Grade 0 Grade 0   Breast Infection Grade 0 Grade 0 Grade 0 Grade 0 Grade 0   Seroma Grade 0 Grade 0 Grade 0 Grade 0 Grade 0   Joint Range of Motion Decreased Grade 0 Grade 0 Grade 0 Grade 0 Grade 0   Joint Range of Motion Decreased Lumbar Spine Grade 0 Grade 0  Grade 0 Grade 0   Brachial Plexopathy Grade 0 Grade 0 Grade 0 Grade 0 Grade 0   Breast Atrophy Grade 0 Grade 0 Grade 0 Grade 0 Grade 0   Breast Pain Grade 0 Grade 0 Grade 1 Grade 0 Grade 0   Nipple  Deformity Grade 0 Grade 0 Grade 0 Grade 0 Grade 0   Pneumonitis Grade 0 Grade 0 Grade 0 Grade 0 Grade 0   Edema Limbs Grade 0 Grade 0 Grade 0 Grade 0 Grade 0   Lymphedema Grade 0 Grade 0 Grade 0 Grade 0 Grade 0   Thromboembolic Event Grade 0    Grade 0   Hot Flashes Grade 0 Grade 0 Grade 1 Grade 1       occasional Grade 0        Assessment / Plan:  The patient is tolerating radiation therapy as anticipated.  Continue per current treatment plan.

## 2023-12-14 ENCOUNTER — HOSPITAL ENCOUNTER (OUTPATIENT)
Dept: RADIATION ONCOLOGY | Facility: CLINIC | Age: 68
Setting detail: RADIATION/ONCOLOGY SERIES
Discharge: HOME | End: 2023-12-14
Payer: MEDICARE

## 2023-12-14 DIAGNOSIS — Z51.0 ENCOUNTER FOR ANTINEOPLASTIC RADIATION THERAPY: ICD-10-CM

## 2023-12-14 DIAGNOSIS — C77.3 SECONDARY AND UNSPECIFIED MALIGNANT NEOPLASM OF AXILLA AND UPPER LIMB LYMPH NODES (MULTI): ICD-10-CM

## 2023-12-14 DIAGNOSIS — E11.9 TYPE 2 DIABETES MELLITUS WITHOUT COMPLICATION, WITHOUT LONG-TERM CURRENT USE OF INSULIN (MULTI): Primary | ICD-10-CM

## 2023-12-14 DIAGNOSIS — C50.811 MALIGNANT NEOPLASM OF OVERLAPPING SITES OF RIGHT FEMALE BREAST (MULTI): ICD-10-CM

## 2023-12-14 LAB
RAD ONC MSQ ACTUAL FRACTIONS DELIVERED: 3
RAD ONC MSQ ACTUAL SESSION DELIVERED DOSE: 250 CGRAY
RAD ONC MSQ ACTUAL TOTAL DOSE: 750 CGRAY
RAD ONC MSQ ELAPSED DAYS: 2
RAD ONC MSQ LAST DATE: NORMAL
RAD ONC MSQ PRESCRIBED FRACTIONAL DOSE: 250 CGRAY
RAD ONC MSQ PRESCRIBED NUMBER OF FRACTIONS: 4
RAD ONC MSQ PRESCRIBED TECHNIQUE: NORMAL
RAD ONC MSQ PRESCRIBED TOTAL DOSE: 1000 CGRAY
RAD ONC MSQ START DATE: NORMAL
RAD ONC MSQ TREATMENT COURSE NUMBER: 1
RAD ONC MSQ TREATMENT SITE: NORMAL

## 2023-12-14 PROCEDURE — 77014 CHG CT GUIDANCE RADIATION THERAPY FLDS PLACEMENT: CPT | Performed by: STUDENT IN AN ORGANIZED HEALTH CARE EDUCATION/TRAINING PROGRAM

## 2023-12-14 PROCEDURE — 77412 RADIATION TX DELIVERY LVL 3: CPT | Performed by: STUDENT IN AN ORGANIZED HEALTH CARE EDUCATION/TRAINING PROGRAM

## 2023-12-14 PROCEDURE — 77387 GUIDANCE FOR RADJ TX DLVR: CPT | Performed by: STUDENT IN AN ORGANIZED HEALTH CARE EDUCATION/TRAINING PROGRAM

## 2023-12-14 RX ORDER — METFORMIN HYDROCHLORIDE 500 MG/1
500 TABLET ORAL 3 TIMES DAILY
Qty: 90 TABLET | Refills: 1 | Status: SHIPPED | OUTPATIENT
Start: 2023-12-14 | End: 2024-02-19

## 2023-12-14 RX ORDER — METFORMIN HYDROCHLORIDE 500 MG/1
500 TABLET ORAL 3 TIMES DAILY
Qty: 270 TABLET | Refills: 0 | Status: SHIPPED | OUTPATIENT
Start: 2023-12-14 | End: 2024-04-26 | Stop reason: SDUPTHER

## 2023-12-15 ENCOUNTER — HOSPITAL ENCOUNTER (OUTPATIENT)
Dept: RADIATION ONCOLOGY | Facility: CLINIC | Age: 68
Setting detail: RADIATION/ONCOLOGY SERIES
End: 2023-12-15
Payer: MEDICARE

## 2023-12-15 ENCOUNTER — HOSPITAL ENCOUNTER (OUTPATIENT)
Dept: RADIATION ONCOLOGY | Facility: CLINIC | Age: 68
Setting detail: RADIATION/ONCOLOGY SERIES
Discharge: HOME | End: 2023-12-15
Payer: MEDICARE

## 2023-12-15 DIAGNOSIS — C77.3 SECONDARY AND UNSPECIFIED MALIGNANT NEOPLASM OF AXILLA AND UPPER LIMB LYMPH NODES (MULTI): ICD-10-CM

## 2023-12-15 DIAGNOSIS — Z51.0 ENCOUNTER FOR ANTINEOPLASTIC RADIATION THERAPY: ICD-10-CM

## 2023-12-15 DIAGNOSIS — C50.811 MALIGNANT NEOPLASM OF OVERLAPPING SITES OF RIGHT FEMALE BREAST (MULTI): ICD-10-CM

## 2023-12-15 LAB
RAD ONC MSQ ACTUAL FRACTIONS DELIVERED: 4
RAD ONC MSQ ACTUAL SESSION DELIVERED DOSE: 250 CGRAY
RAD ONC MSQ ACTUAL TOTAL DOSE: 1000 CGRAY
RAD ONC MSQ ELAPSED DAYS: 3
RAD ONC MSQ LAST DATE: NORMAL
RAD ONC MSQ PRESCRIBED FRACTIONAL DOSE: 250 CGRAY
RAD ONC MSQ PRESCRIBED NUMBER OF FRACTIONS: 4
RAD ONC MSQ PRESCRIBED TECHNIQUE: NORMAL
RAD ONC MSQ PRESCRIBED TOTAL DOSE: 1000 CGRAY
RAD ONC MSQ START DATE: NORMAL
RAD ONC MSQ TREATMENT COURSE NUMBER: 1
RAD ONC MSQ TREATMENT SITE: NORMAL

## 2023-12-15 PROCEDURE — 77412 RADIATION TX DELIVERY LVL 3: CPT | Performed by: STUDENT IN AN ORGANIZED HEALTH CARE EDUCATION/TRAINING PROGRAM

## 2023-12-15 PROCEDURE — 77387 GUIDANCE FOR RADJ TX DLVR: CPT | Performed by: RADIOLOGY

## 2023-12-15 PROCEDURE — 77014 CHG CT GUIDANCE RADIATION THERAPY FLDS PLACEMENT: CPT | Performed by: RADIOLOGY

## 2023-12-15 NOTE — PROGRESS NOTES
12/15/23 1446 Patient completed 20 fractions of radiation to the right breast today. Patient given and reviewed What you need to know after radiation. We reviewed again side effect of radiation and how the effects can linger for 1-2 weeks. Patient instructed to call with questions or concerns. Patient scheduled to follow up with Dr. Tineo on 1/24/24. Patient verbalizes understanding with verbal teach back. Jose Luis Martinez MSN, RN, OCN

## 2023-12-15 NOTE — PROGRESS NOTES
Radiation Oncology Treatment Summary    Patient Name:  Tatyana Mckeon  MRN:  [id number]  :  1955    Referring Provider: No ref. provider found  Primary Care Provider: Osvaldo Wilcox MD    Brief History: Tatyana Mckeon is a 68 y.o. female with No matching staging information was found for the patient..  The patient completed radiotherapy as outlined below.    Radiation Treatment Summary:    Radiation Treatments       No active radiation treatments to show.          Concurrent Chemotherapy:  Treatment Plans      Radiation Treatments       Active   No active radiation treatments to show.     Completed   R breast (Started on 2023)   Most recent fraction: 266 cGy given on 2023   Total given: 4,256 cGy / 4,256 cGy  (16 of 16 fractions)   Elapsed Days: 22   Technique: 3D        R breast boost (Started on 2023)   Most recent fraction: 250 cGy given on 12/15/2023   Total given: 1,000 cGy / 1,000 cGy  (4 of 4 fractions)   Elapsed Days: 3   Technique: 3D                   Patient completed 20 fractions of radiation to the right breast today. 23-12/15/23 3D 4256cGy. Follow up appointment 24.       CTCAE Toxicity Overview:   Toxicity Assessment          2023    14:53 2023    14:55 2023    10:12   Toxicity Assessment   Treatment Site Breast Breast Breast   Anorexia Grade 0 Grade 0 Grade 0   Anxiety Grade 0 Grade 0 Grade 0   Dehydration Grade 0 Grade 0 Grade 0   Depression Grade 0 Grade 0 Grade 0   Dermatitis Radiation Grade 2       bumps under the right breast and redness t/o-using Hydrogel Grade 1       redness to right breast--Using Aquaphor and CeraVe Grade 1   Diarrhea  Grade 0 Grade 0   Fatigue Grade 1       naps during the day Grade 0 Grade 0   Fibrosis Deep Connective Tissue Grade 0 Grade 0 Grade 0   Fracture Grade 0 Grade 0 Grade 0   Nausea Grade 0 Grade 0 Grade 0   Pain Grade 1       pain score4--right knee, cortisone injections as needed Grade 1       pain scale 4 to right  knee Grade 1       4/10 right knee   Treatment Related Secondary Malignancy Grade 0 Grade 0 Grade 0   Tumor Pain Grade 0 Grade 0 Grade 0   Vomiting  Grade 0 Grade 0   Abdominal Pain   Grade 0   Dysphagia   Grade 0   Esophagitis   Grade 0   Gastric Hemorrhage   Grade 0   Mucositis Oral   Grade 0   Dry Mouth Grade 0 Grade 0 Grade 0   Breast Infection Grade 0 Grade 0 Grade 0   Seroma Grade 0 Grade 0 Grade 0   Joint Range of Motion Decreased Grade 0 Grade 0 Grade 0   Joint Range of Motion Decreased Lumbar Spine  Grade 0 Grade 0   Brachial Plexopathy Grade 0 Grade 0 Grade 0   Breast Atrophy Grade 0 Grade 0 Grade 0   Breast Pain Grade 1 Grade 0 Grade 0   Nipple Deformity Grade 0 Grade 0 Grade 0   Pneumonitis Grade 0 Grade 0 Grade 0   Edema Limbs Grade 0 Grade 0 Grade 0   Lymphedema Grade 0 Grade 0 Grade 0   Thromboembolic Event   Grade 0   Hot Flashes Grade 1 Grade 1       occasional Grade 0     Patient Disposition: Tolerated well with expected grade 1 radiation dermatitis mild pruritus.

## 2023-12-27 ENCOUNTER — OFFICE VISIT (OUTPATIENT)
Dept: PRIMARY CARE | Facility: CLINIC | Age: 68
End: 2023-12-27

## 2023-12-27 VITALS — DIASTOLIC BLOOD PRESSURE: 68 MMHG | OXYGEN SATURATION: 97 % | SYSTOLIC BLOOD PRESSURE: 132 MMHG | HEART RATE: 89 BPM

## 2023-12-27 DIAGNOSIS — C77.3 MALIGNANT NEOPLASM METASTATIC TO LYMPH NODE OF UPPER EXTREMITY (MULTI): ICD-10-CM

## 2023-12-27 DIAGNOSIS — L58.9 RADIATION DERMATITIS: Primary | ICD-10-CM

## 2023-12-27 PROCEDURE — 1159F MED LIST DOCD IN RCRD: CPT | Performed by: FAMILY MEDICINE

## 2023-12-27 PROCEDURE — 97810 ACUP 1/> WO ESTIM 1ST 15 MIN: CPT | Performed by: FAMILY MEDICINE

## 2023-12-27 PROCEDURE — 3075F SYST BP GE 130 - 139MM HG: CPT | Performed by: FAMILY MEDICINE

## 2023-12-27 PROCEDURE — 1160F RVW MEDS BY RX/DR IN RCRD: CPT | Performed by: FAMILY MEDICINE

## 2023-12-27 PROCEDURE — 3044F HG A1C LEVEL LT 7.0%: CPT | Performed by: FAMILY MEDICINE

## 2023-12-27 PROCEDURE — 97811 ACUP 1/> W/O ESTIM EA ADD 15: CPT | Performed by: FAMILY MEDICINE

## 2023-12-27 PROCEDURE — 4010F ACE/ARB THERAPY RXD/TAKEN: CPT | Performed by: FAMILY MEDICINE

## 2023-12-27 PROCEDURE — 1125F AMNT PAIN NOTED PAIN PRSNT: CPT | Performed by: FAMILY MEDICINE

## 2023-12-27 PROCEDURE — 3078F DIAST BP <80 MM HG: CPT | Performed by: FAMILY MEDICINE

## 2023-12-27 ASSESSMENT — PATIENT HEALTH QUESTIONNAIRE - PHQ9
SUM OF ALL RESPONSES TO PHQ9 QUESTIONS 1 AND 2: 0
2. FEELING DOWN, DEPRESSED OR HOPELESS: NOT AT ALL
1. LITTLE INTEREST OR PLEASURE IN DOING THINGS: NOT AT ALL

## 2023-12-27 ASSESSMENT — ENCOUNTER SYMPTOMS
LOSS OF SENSATION IN FEET: 0
OCCASIONAL FEELINGS OF UNSTEADINESS: 0
DEPRESSION: 0

## 2023-12-27 ASSESSMENT — PAIN SCALES - GENERAL: PAINLEVEL: 4

## 2023-12-27 NOTE — PROGRESS NOTES
Subjective   Patient ID: Tatyana Mckeon is a 68 y.o. female who presents for Acupuncture.    HPI   She presents today for her acupuncture treatment.  She is finished her treatments and states she does have little inflammation and some scar tissue in the breast right now.  She is following that with her doctors.  She does have some radiation dermatitis over the top of this, she states.  It was itchy but it is less itchy than it once was.  She is currently taking son with romero and BCP 3.    Review of Systems    Objective   /68   Pulse 89   SpO2 97%     Physical Exam  She is alert, parishes, her affect is pleasant.  No edema.  Respirations are easy.  She does have some redness of the skin over the right breast.    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  She will use the sun with Romero with a drop body over the dermatitis and that should help pull out some of the heat.      Acupuncture treatment: I used the following points bilaterally ST 13, 36, 40, 41; liver 2.  These were retained for 20 minutes.  She tolerated this well.

## 2023-12-27 NOTE — PATIENT INSTRUCTIONS
Continue Sun With Romero, 4 twice a day, and BCP3, 3 twice a day.  You may also combine a few capsules of the Sun with Romero with a few drops of honey and smear this over the dermatitis, which will help take the heat out of it.  Do this at least once a day.

## 2024-01-02 DIAGNOSIS — C50.919 MALIGNANT NEOPLASM OF BREAST IN FEMALE, ESTROGEN RECEPTOR POSITIVE, UNSPECIFIED LATERALITY, UNSPECIFIED SITE OF BREAST (MULTI): ICD-10-CM

## 2024-01-02 DIAGNOSIS — Z17.0 MALIGNANT NEOPLASM OF BREAST IN FEMALE, ESTROGEN RECEPTOR POSITIVE, UNSPECIFIED LATERALITY, UNSPECIFIED SITE OF BREAST (MULTI): ICD-10-CM

## 2024-01-02 RX ORDER — ANASTROZOLE 1 MG/1
1 TABLET ORAL DAILY
Qty: 90 TABLET | Refills: 3 | Status: SHIPPED | OUTPATIENT
Start: 2024-01-02 | End: 2024-03-15 | Stop reason: SDUPTHER

## 2024-01-03 DIAGNOSIS — B37.31 VAGINAL CANDIDIASIS: ICD-10-CM

## 2024-01-03 RX ORDER — NYSTATIN 100000 U/G
CREAM TOPICAL
Qty: 60 G | Refills: 2 | Status: SHIPPED | OUTPATIENT
Start: 2024-01-03 | End: 2024-04-09

## 2024-01-10 ENCOUNTER — OFFICE VISIT (OUTPATIENT)
Dept: HEMATOLOGY/ONCOLOGY | Facility: CLINIC | Age: 69
End: 2024-01-10
Payer: MEDICARE

## 2024-01-10 VITALS
OXYGEN SATURATION: 96 % | DIASTOLIC BLOOD PRESSURE: 76 MMHG | SYSTOLIC BLOOD PRESSURE: 132 MMHG | BODY MASS INDEX: 38.72 KG/M2 | WEIGHT: 204.81 LBS | TEMPERATURE: 95 F | RESPIRATION RATE: 18 BRPM | HEART RATE: 96 BPM

## 2024-01-10 DIAGNOSIS — C50.919 MALIGNANT NEOPLASM OF BREAST IN FEMALE, ESTROGEN RECEPTOR POSITIVE, UNSPECIFIED LATERALITY, UNSPECIFIED SITE OF BREAST (MULTI): ICD-10-CM

## 2024-01-10 DIAGNOSIS — Z79.811 USE OF ANASTROZOLE: Primary | ICD-10-CM

## 2024-01-10 DIAGNOSIS — L65.9 ALOPECIA: ICD-10-CM

## 2024-01-10 DIAGNOSIS — Z17.0 MALIGNANT NEOPLASM OF BREAST IN FEMALE, ESTROGEN RECEPTOR POSITIVE, UNSPECIFIED LATERALITY, UNSPECIFIED SITE OF BREAST (MULTI): ICD-10-CM

## 2024-01-10 DIAGNOSIS — T45.1X5A HOT FLASHES RELATED TO AROMATASE INHIBITOR THERAPY: ICD-10-CM

## 2024-01-10 DIAGNOSIS — R23.2 HOT FLASHES RELATED TO AROMATASE INHIBITOR THERAPY: ICD-10-CM

## 2024-01-10 PROCEDURE — 1125F AMNT PAIN NOTED PAIN PRSNT: CPT | Performed by: INTERNAL MEDICINE

## 2024-01-10 PROCEDURE — 3078F DIAST BP <80 MM HG: CPT | Performed by: INTERNAL MEDICINE

## 2024-01-10 PROCEDURE — 3075F SYST BP GE 130 - 139MM HG: CPT | Performed by: INTERNAL MEDICINE

## 2024-01-10 PROCEDURE — 1159F MED LIST DOCD IN RCRD: CPT | Performed by: INTERNAL MEDICINE

## 2024-01-10 PROCEDURE — 99214 OFFICE O/P EST MOD 30 MIN: CPT | Performed by: INTERNAL MEDICINE

## 2024-01-10 PROCEDURE — 4010F ACE/ARB THERAPY RXD/TAKEN: CPT | Performed by: INTERNAL MEDICINE

## 2024-01-10 ASSESSMENT — ENCOUNTER SYMPTOMS
DEPRESSION: 0
LOSS OF SENSATION IN FEET: 0
OCCASIONAL FEELINGS OF UNSTEADINESS: 1

## 2024-01-10 ASSESSMENT — PAIN SCALES - GENERAL: PAINLEVEL: 4

## 2024-01-10 ASSESSMENT — COLUMBIA-SUICIDE SEVERITY RATING SCALE - C-SSRS
6. HAVE YOU EVER DONE ANYTHING, STARTED TO DO ANYTHING, OR PREPARED TO DO ANYTHING TO END YOUR LIFE?: NO
2. HAVE YOU ACTUALLY HAD ANY THOUGHTS OF KILLING YOURSELF?: NO
1. IN THE PAST MONTH, HAVE YOU WISHED YOU WERE DEAD OR WISHED YOU COULD GO TO SLEEP AND NOT WAKE UP?: NO

## 2024-01-10 NOTE — PROGRESS NOTES
Pt seen in office today for a follow up visit with  Dr. Hannon for management of her breast cancer.  She is without complaints today and denies pain.     Medications, pharmacy preference and allergies were reviewed with patient and updated in the medical record.     Per orders, she is to receive info on Zometa and she is to consider starting this. A PI sheet has been printed and reviewed with her. A dexa scan has been ordered and she will follow up in clinic in 6 months.    Our contact information was given to patient and they were encouraged to contact us with any questions or concerns.     Patient verbalized understanding and agreement regarding discussed information via verbal feedback. Pt escorted to scheduling.

## 2024-01-13 PROBLEM — C50.811 MALIGNANT NEOPLASM OF OVERLAPPING SITES OF RIGHT BREAST IN FEMALE, ESTROGEN RECEPTOR POSITIVE (MULTI): Status: RESOLVED | Noted: 2023-10-18 | Resolved: 2024-01-13

## 2024-01-13 PROBLEM — C77.3 MALIGNANT NEOPLASM METASTATIC TO LYMPH NODE OF UPPER EXTREMITY (MULTI): Status: RESOLVED | Noted: 2023-08-19 | Resolved: 2024-01-13

## 2024-01-13 PROBLEM — C50.319 MALIGNANT NEOPLASM OF LOWER-INNER QUADRANT OF FEMALE BREAST (MULTI): Status: RESOLVED | Noted: 2023-08-19 | Resolved: 2024-01-13

## 2024-01-13 PROBLEM — Z17.0 MALIGNANT NEOPLASM OF OVERLAPPING SITES OF RIGHT BREAST IN FEMALE, ESTROGEN RECEPTOR POSITIVE (MULTI): Status: RESOLVED | Noted: 2023-10-18 | Resolved: 2024-01-13

## 2024-01-13 NOTE — PROGRESS NOTES
DIVISION OF MEDICAL ONCOLOGY    Patient ID: Tatyana Mckeon is a 68 y.o. female.  MRN: [id number]  : 1955  The patient presents to clinic today for her history of breast cancer.     Cancer Staging   Breast cancer (CMS/HCC)  Staging form: Breast, AJCC 8th Edition  - Pathologic stage from 2023: Stage IIA (pT2, pN1a, cM0, G3, ER+, AK+, HER2-, Oncotype DX score: 22) - Signed by Neo Hannon MD on 2024    Diagnostic/Therapeutic History:  -23: US-guided CNB showed IDC grade 2, LN was negative. ER >95% AK 90% Her2-negative (2+ IHC; LUIS ratio 1.7).  -23: Right lumpectomy/SLNBx performed. 3 cm of grade 3 IDC noted.  SLN was positive for carcinoma. LVI indeterminate, +LOC measuring 2 mm.  -23: Right ALND: 0/15 LN's involved.  -PET/CT 23: 8 mm focus of soft tissue density (SUV 4.1) in lower right axilla, unclear etiology (?reactive). Otherwise negative. Follow-up US was unremarkable.  pT2 pN1a G3 [stage IIA]  Oncotype 22, no chemotherapy recommended.  Anastrozole initiated 10/2023 (RT was delayed for seroma).  Adjuvant RT completed 12/15/23.    History of Present Illness (HPI)/Interval History:  Tatyana Mckeon presents today for routine FUV on anastrozole.  Overall, she has been feeling well since RT is over.  She is having hot flashes, specially in the evening.  She also notes worsening of her alopecia.  No new bone pain, dyspnea, cough, unintentional weight loss, abdominal pain.    Review of Systems:  14-point ROS otherwise negative, as per HPI/Interval History.    History reviewed. No pertinent past medical history.  Patient Active Problem List   Diagnosis    Allergic rhinitis    Anxiety    Bipolar 1 disorder (CMS/HCC)    Candidiasis of skin and nails    Gastroesophageal reflux disease    High blood pressure    High cholesterol    Insomnia    Psoriasis    Type II diabetes mellitus (CMS/HCC)    Cigarette smoker    Obesity with body mass index 30 or greater    Breast cancer (CMS/HCC)     Chronic right-sided low back pain with sciatica    Endometrial cancer (CMS/HCC)        Past Surgical History:   Procedure Laterality Date    BI MAMMO GUIDED LOCALIZATION BREAST RIGHT Right 7/21/2023    BI MAMMO GUIDED LOCALIZATION BREAST RIGHT LAK SURG [facility]    US GUIDED BIOPSY LYMPH NODE SUPERFICIAL  6/7/2023    US GUIDED BIOPSY LYMPH NODE SUPERFICIAL 6/7/2023 [facility] US       Social History     Socioeconomic History    Marital status:      Spouse name: None    Number of children: None    Years of education: None    Highest education level: None   Occupational History    None   Tobacco Use    Smoking status: Every Day     Packs/day: 0.50     Years: 40.00     Additional pack years: 0.00     Total pack years: 20.00     Types: Cigarettes     Passive exposure: Current    Smokeless tobacco: Never   Vaping Use    Vaping Use: Never used   Substance and Sexual Activity    Alcohol use: Yes    Drug use: Never    Sexual activity: None   Other Topics Concern    None   Social History Narrative    None     Social Determinants of Health     Financial Resource Strain: Not on file   Food Insecurity: No Food Insecurity (11/16/2023)    Hunger Vital Sign     Worried About Running Out of Food in the Last Year: Never true     Ran Out of Food in the Last Year: Never true   Transportation Needs: Not on file   Physical Activity: Not on file   Stress: Not on file   Social Connections: Not on file   Intimate Partner Violence: Not on file   Housing Stability: Not on file       Family History   Problem Relation Name Age of Onset    Stroke Mother      Other (high blood pressure) Father      Prostate cancer Father      Heart disease Father          with MI at age 71    Diabetes Father      Hypertension Father      Diabetes Sister      Hypertension Sister      Cancer Brother      Hypertension Brother         Allergies:   Allergies   Allergen Reactions    Pollen Extracts Itching    Cephalexin Diarrhea    Naproxen Unknown         Current  Outpatient Medications:     acetaminophen (Tylenol) 500 mg tablet, Take 1 tablet (500 mg) by mouth 2 times a day., Disp: , Rfl:     amLODIPine (Norvasc) 10 mg tablet, Take 1 tablet (10 mg) by mouth once daily., Disp: 90 tablet, Rfl: 1    anastrozole (Arimidex) 1 mg tablet, Take 1 tablet (1 mg total) by mouth once daily.  Swallow whole with a drink of water., Disp: 90 tablet, Rfl: 3    aspirin 81 mg EC tablet, Take 1 tablet (81 mg) by mouth once daily., Disp: , Rfl:     atorvastatin (Lipitor) 10 mg tablet, Take 1 tablet (10 mg) by mouth every other day., Disp: , Rfl:     blood sugar diagnostic (Accu-Chek Guide test strips) strip, 3 times a day., Disp: , Rfl:     blood-glucose meter misc, 3 times a day., Disp: , Rfl:     famotidine (Pepcid) 20 mg tablet, Take 1 tablet (20 mg) by mouth as needed at bedtime., Disp: , Rfl:     ibuprofen 200 mg tablet, Take 1 tablet (200 mg) by mouth 2 times a day., Disp: , Rfl:     LaMICtal 100 mg tablet, Take 1 tablet (100 mg) by mouth once daily., Disp: , Rfl:     lisinopril 20 mg tablet, Take 1 tablet (20 mg) by mouth once daily., Disp: 90 tablet, Rfl: 1    loratadine (Claritin) 10 mg tablet, Take 1 tablet (10 mg) by mouth once daily., Disp: , Rfl:     metFORMIN (Glucophage) 500 mg tablet, Take 1 tablet (500 mg) by mouth 3 times a day., Disp: 90 tablet, Rfl: 1    multivitamin capsule, Take 1 capsule by mouth once daily., Disp: , Rfl:     Neurontin 400 mg capsule, Take 1 capsule (400 mg) by mouth once daily., Disp: , Rfl:     nystatin (Mycostatin) cream, APPLY EXTERNALLY TWICE DAILY, Disp: 60 g, Rfl: 2    omega 3-dha-epa-fish oil (Fish OiL) 1,000 mg (120 mg-180 mg) capsule, Take 1 capsule (1,000 mg) by mouth once daily., Disp: , Rfl:     PaxiL 20 mg tablet, Take 1 tablet (20 mg) by mouth once daily., Disp: , Rfl:     QUEtiapine (SEROquel) 25 mg tablet, Take 1 tablet (25 mg) by mouth once daily at bedtime., Disp: , Rfl:     Topamax 25 mg tablet, Take 1 tablet (25 mg) by mouth once  daily., Disp: , Rfl:     triamcinolone (Kenalog) 0.1 % cream, APPLY 1 APPLICATION EXTERNALLY TWICE A DAY AS NEEDED FOR 30 DAYS, Disp: 30 g, Rfl: 0    Wellbutrin  mg 24 hr tablet, Take 1 tablet (300 mg) by mouth once daily in the morning., Disp: , Rfl:     cortisone (Cortone) 25 mg tablet, Take 1 tablet (25 mg) by mouth once daily., Disp: , Rfl:     metFORMIN (Glucophage) 500 mg tablet, TAKE ONE TABLET BY MOUTH THREE TIMES A DAY (Patient not taking: Reported on 1/10/2024), Disp: 270 tablet, Rfl: 0    NON FORMULARY, Take by mouth 2 times a day. Sun and Romero - 5 caps, Disp: , Rfl:      Objective    BSA: 2 meters squared  /76 (BP Location: Left arm, Patient Position: Sitting, BP Cuff Size: Adult long)   Pulse 96   Temp 35 °C (95 °F) (Temporal)   Resp 18   Wt 92.9 kg (204 lb 12.9 oz)   SpO2 96%   BMI 38.72 kg/m²     ECOG Performance Status:  The ECOG performance scale today is ECO- Restricted in physically strenuous activity.  Carries out light duty.    Physical Exam  Vitals and nursing note reviewed.   Constitutional:       General: She is not in acute distress.     Appearance: Normal appearance. She is not ill-appearing.   HENT:      Head: Normocephalic and atraumatic.      Comments: Alopecia (chronic)  Eyes:      General: No scleral icterus.     Conjunctiva/sclera: Conjunctivae normal.   Cardiovascular:      Rate and Rhythm: Normal rate and regular rhythm.      Pulses: Normal pulses.      Heart sounds: Normal heart sounds. No murmur heard.  Pulmonary:      Effort: Pulmonary effort is normal. No respiratory distress.      Breath sounds: Normal breath sounds.   Musculoskeletal:      Cervical back: Neck supple. No rigidity or tenderness.   Lymphadenopathy:      Cervical: No cervical adenopathy.   Skin:     General: Skin is warm and dry.      Coloration: Skin is not jaundiced.      Findings: No rash.   Neurological:      General: No focal deficit present.      Mental Status: She is alert and oriented  to person, place, and time.      Cranial Nerves: No cranial nerve deficit.   Psychiatric:         Mood and Affect: Mood normal.         Behavior: Behavior normal.         Laboratory Data:  Lab Results   Component Value Date    WBC 10.0 07/13/2023    HGB 14.5 07/13/2023    HCT 44.3 (H) 07/13/2023    MCV 96.3 07/13/2023     07/13/2023       Chemistry    Lab Results   Component Value Date/Time     05/17/2023 1319    K 4.2 05/17/2023 1319     05/17/2023 1319    CO2 21 (L) 05/17/2023 1319    BUN 19 05/17/2023 1319    CREATININE 0.7 05/17/2023 1319    Lab Results   Component Value Date/Time    CALCIUM 9.1 05/17/2023 1319    ALKPHOS 74 05/17/2023 1319    AST 15 05/17/2023 1319    ALT 13 05/17/2023 1319    BILITOT 0.2 05/17/2023 1319        Radiology: N/A    Pathology: N/A      Assessment/Plan:  Tatyana Mckeon is a 68 y.o. female with a history of right-sided breast cancer, who presents today for follow-up evaluation on adjuvant anastrozole.    Breast cancer (CMS/HCC)  - Continue anastrozole 1 mg daily.  - She will monitor hair loss and call me if she wants to try a different agent.  - Updated DEXA scan ordered.  - She will consider adjuvant Zometa. Potential toxicities discussed.  - Plan for mammogram ~6 months after end of RT. She has follow-up with Dr. Boone.     Disposition.  - RTC 6 months.  - She has our contact information and was instructed to call with concerns/questions in the interim.    Orders Placed This Encounter   Procedures    XR DEXA bone density      Neo Hannon MD  Hematology and Medical Oncology  [facility]

## 2024-01-13 NOTE — ASSESSMENT & PLAN NOTE
- Continue anastrozole 1 mg daily.  - She will monitor hair loss and call me if she wants to try a different agent.  - Updated DEXA scan ordered.  - She will consider adjuvant Zometa. Potential toxicities discussed.  - Plan for mammogram ~6 months after end of RT. She has follow-up with Dr. Boone.

## 2024-01-17 ENCOUNTER — ANCILLARY PROCEDURE (OUTPATIENT)
Dept: RADIOLOGY | Facility: CLINIC | Age: 69
End: 2024-01-17
Payer: MEDICARE

## 2024-01-17 ENCOUNTER — OFFICE VISIT (OUTPATIENT)
Dept: PRIMARY CARE | Facility: CLINIC | Age: 69
End: 2024-01-17

## 2024-01-17 VITALS
SYSTOLIC BLOOD PRESSURE: 126 MMHG | BODY MASS INDEX: 38.78 KG/M2 | HEART RATE: 73 BPM | WEIGHT: 205.4 LBS | TEMPERATURE: 97.8 F | DIASTOLIC BLOOD PRESSURE: 68 MMHG | OXYGEN SATURATION: 98 % | HEIGHT: 61 IN

## 2024-01-17 DIAGNOSIS — R21 RASH: ICD-10-CM

## 2024-01-17 DIAGNOSIS — M54.50 CHRONIC BILATERAL LOW BACK PAIN WITHOUT SCIATICA: Primary | ICD-10-CM

## 2024-01-17 DIAGNOSIS — Z79.811 USE OF ANASTROZOLE: ICD-10-CM

## 2024-01-17 DIAGNOSIS — G89.29 CHRONIC BILATERAL LOW BACK PAIN WITHOUT SCIATICA: Primary | ICD-10-CM

## 2024-01-17 PROCEDURE — 4010F ACE/ARB THERAPY RXD/TAKEN: CPT | Performed by: FAMILY MEDICINE

## 2024-01-17 PROCEDURE — 1125F AMNT PAIN NOTED PAIN PRSNT: CPT | Performed by: FAMILY MEDICINE

## 2024-01-17 PROCEDURE — 1159F MED LIST DOCD IN RCRD: CPT | Performed by: FAMILY MEDICINE

## 2024-01-17 PROCEDURE — 77085 DXA BONE DENSITY AXL VRT FX: CPT

## 2024-01-17 PROCEDURE — 97810 ACUP 1/> WO ESTIM 1ST 15 MIN: CPT | Performed by: FAMILY MEDICINE

## 2024-01-17 PROCEDURE — 1160F RVW MEDS BY RX/DR IN RCRD: CPT | Performed by: FAMILY MEDICINE

## 2024-01-17 PROCEDURE — 3074F SYST BP LT 130 MM HG: CPT | Performed by: FAMILY MEDICINE

## 2024-01-17 PROCEDURE — 3078F DIAST BP <80 MM HG: CPT | Performed by: FAMILY MEDICINE

## 2024-01-17 PROCEDURE — 97811 ACUP 1/> W/O ESTIM EA ADD 15: CPT | Performed by: FAMILY MEDICINE

## 2024-01-17 RX ORDER — CLOTRIMAZOLE AND BETAMETHASONE DIPROPIONATE 10; .64 MG/G; MG/G
1 CREAM TOPICAL EVERY 12 HOURS
COMMUNITY
Start: 2021-06-16 | End: 2024-05-22 | Stop reason: ALTCHOICE

## 2024-01-17 RX ORDER — ARIPIPRAZOLE 5 MG/1
TABLET ORAL
COMMUNITY
Start: 2022-05-06 | End: 2024-04-26 | Stop reason: ALTCHOICE

## 2024-01-17 ASSESSMENT — PATIENT HEALTH QUESTIONNAIRE - PHQ9
1. LITTLE INTEREST OR PLEASURE IN DOING THINGS: NOT AT ALL
2. FEELING DOWN, DEPRESSED OR HOPELESS: NOT AT ALL
SUM OF ALL RESPONSES TO PHQ9 QUESTIONS 1 AND 2: 0

## 2024-01-17 ASSESSMENT — PAIN SCALES - GENERAL: PAINLEVEL: 4

## 2024-01-17 NOTE — PROGRESS NOTES
"Subjective   Patient ID: Tatyana Mckeon is a 68 y.o. female who presents for Acupuncture .    HPI   She presents for acupuncture treatment.  Since her back pain has been pretty good.  She is losing more of her hair.  She is states she already has male pattern baldness is losing more.  They told her that related to the anastrozole.  She denies a rash on her breast that they are following as well.    Review of Systems    Objective   /68   Pulse 73   Temp 36.6 °C (97.8 °F)   Ht 1.547 m (5' 0.9\")   Wt 93.2 kg (205 lb 6.4 oz)   SpO2 98%   BMI 38.94 kg/m²     Physical Exam  She is alert, no apparent distress.  Her affect is very pleasant.  Respirations are easy.  The upper portion of the right breast she has some red-kendell round to oval lesions somewhat raised all about 2 to 4 mm.    Assessment/Plan   Diagnoses and all orders for this visit:  Chronic bilateral low back pain without sciatica  Rash  She will continue the BCP 3 per help support her energy examination.  She will continue to work with her oncologist on the rash.  Return to the energy ahq is much as she is able.    Puncture treatment: I used the following points bilaterally with her in a supine position: LI 4, LI 10, ST 36, ST 40, spleen 6.  These retained for 15 minutes.  The placement of prone position and placed needles at BL 40, 57 and 60 as well as 23 and 24.  All bilaterally.  These were retained for 15 minutes.  She tolerated this well.       "

## 2024-01-18 ENCOUNTER — TELEPHONE (OUTPATIENT)
Dept: HEMATOLOGY/ONCOLOGY | Facility: CLINIC | Age: 69
End: 2024-01-18
Payer: MEDICARE

## 2024-01-18 NOTE — TELEPHONE ENCOUNTER
AT the request of Dr. Hannon, patient has been called and made aware that per Dr. Hannon, her Dexa scan was normal. Patient verbalized understanding and agreement regarding discussed information via verbal feedback.

## 2024-01-18 NOTE — TELEPHONE ENCOUNTER
----- Message from Neo Hannon MD sent at 1/17/2024  3:10 PM EST -----  Can you let her know that bone density is normal? Thanks!  ----- Message -----  From: Interface, Radiology Results In  Sent: 1/17/2024   2:11 PM EST  To: Neo Hannon MD

## 2024-01-24 ENCOUNTER — HOSPITAL ENCOUNTER (OUTPATIENT)
Dept: RADIATION ONCOLOGY | Facility: CLINIC | Age: 69
Setting detail: RADIATION/ONCOLOGY SERIES
Discharge: HOME | End: 2024-01-24
Payer: MEDICARE

## 2024-01-24 VITALS
RESPIRATION RATE: 18 BRPM | SYSTOLIC BLOOD PRESSURE: 118 MMHG | OXYGEN SATURATION: 94 % | HEART RATE: 95 BPM | WEIGHT: 208 LBS | TEMPERATURE: 95 F | DIASTOLIC BLOOD PRESSURE: 76 MMHG | BODY MASS INDEX: 39.43 KG/M2

## 2024-01-24 DIAGNOSIS — L40.9 PSORIASIS: ICD-10-CM

## 2024-01-24 DIAGNOSIS — C50.811 MALIGNANT NEOPLASM OF OVERLAPPING SITES OF RIGHT FEMALE BREAST (MULTI): ICD-10-CM

## 2024-01-24 PROCEDURE — 99024 POSTOP FOLLOW-UP VISIT: CPT | Performed by: STUDENT IN AN ORGANIZED HEALTH CARE EDUCATION/TRAINING PROGRAM

## 2024-01-24 RX ORDER — TRIAMCINOLONE ACETONIDE 1 MG/G
CREAM TOPICAL
Qty: 30 G | Refills: 1 | Status: SHIPPED | OUTPATIENT
Start: 2024-01-24

## 2024-01-24 ASSESSMENT — PAIN SCALES - GENERAL: PAINLEVEL: 4

## 2024-01-24 ASSESSMENT — COLUMBIA-SUICIDE SEVERITY RATING SCALE - C-SSRS
2. HAVE YOU ACTUALLY HAD ANY THOUGHTS OF KILLING YOURSELF?: NO
6. HAVE YOU EVER DONE ANYTHING, STARTED TO DO ANYTHING, OR PREPARED TO DO ANYTHING TO END YOUR LIFE?: NO
1. IN THE PAST MONTH, HAVE YOU WISHED YOU WERE DEAD OR WISHED YOU COULD GO TO SLEEP AND NOT WAKE UP?: NO

## 2024-01-24 ASSESSMENT — ENCOUNTER SYMPTOMS
DEPRESSION: 0
LOSS OF SENSATION IN FEET: 0
OCCASIONAL FEELINGS OF UNSTEADINESS: 1

## 2024-01-24 NOTE — PROGRESS NOTES
1/24/24 1053 Follow up patient here today to see Dr. Tineo for right breast cancer. Patient completed 20 fractions of radiation on 12/15/24. Patient continues on the Anastrozole with c/o hair thinning. Medical oncology is aware. Patient states that she noted small open areas on her right breast about 3 week ago. She did see PCP regarding skin issue and he suggested that she try dabbing on some Kenalog cream. She has not tried that yet. Dr. Tineo suggested she see dermatology since it is not from radiation. Patient will call PCP back. Per Dr. Tineo, patient to follow up as needed but continue to follow up with all of her other physician. Patient asking appropriate questions. Patient verbalizes understanding with verbal teach back. Jose Luis Martinez MSN, RN, OCN

## 2024-01-24 NOTE — PROGRESS NOTES
Radiation Oncology Follow-Up    Patient Name:  Tatyana Mckeon  MRN:  [id number]  :  1955    Referring Provider: Sarahi Tineo DO  Primary Care Provider: Osvaldo Wilcox MD  Care Team: Patient Care Team:  Osvaldo Wilcox MD as PCP - General (Family Medicine)  Osvaldo Wilcox MD as Primary Care Provider  Neo Hannon MD as Consulting Physician (Hematology and Oncology)    Date of Service: 2024     SUBJECTIVE  History of Present Illness:   Ms. Mckeon is a 68 y.o. woman, who returns to radiation oncology clinic today for follow-up after completing adjuvant right breast radiation, as below. After finishing treatment, she had a mild radiation dermatitis which improved within 1 week of treatment. Approximately 2 weeks ago, she noted interval development of plaque-like lesions along her right breast; of note, she has a history of similar lesions on her face and hands. She denies any significant pruritus or discomfort. She denies any breast masses. She continues to tolerate anastrozole, though does note worsening male pattern baldness.    Treatment Rendered:   Radiation Treatments       Active   No active radiation treatments to show.     Completed   R breast (Started on 2023)   Most recent fraction: 266 cGy given on 2023   Total given: 4,256 cGy / 4,256 cGy  (16 of 16 fractions)   Elapsed Days: 22   Technique: 3D        R breast boost (Started on 2023)   Most recent fraction: 250 cGy given on 12/15/2023   Total given: 1,000 cGy / 1,000 cGy  (4 of 4 fractions)   Elapsed Days: 3   Technique: 3D                     Review of Systems:   Review of Systems - Oncology  The patient's current pain level was assessed.  They report currently having a pain of 4 out of 10.  They feel their pain is under control with the use of pain medications - knee pain    Performance Status:   The Karnofsky performance scale today is 90, Able to carry on normal activity; minor signs or symptoms of disease (ECOG  equivalent 0).        OBJECTIVE  Vital Signs:  /76 (BP Location: Left arm, Patient Position: Sitting, BP Cuff Size: Adult long)   Pulse 95   Temp 35 °C (95 °F) (Temporal)   Resp 18   Wt 94.3 kg (208 lb 0.1 oz)   SpO2 94%   BMI 39.43 kg/m²    Physical Exam:  Physical Exam  Vitals reviewed. Exam conducted with a chaperone present.   Constitutional:       General: She is not in acute distress.     Appearance: Normal appearance. She is not ill-appearing.   HENT:      Head: Normocephalic and atraumatic.      Right Ear: External ear normal.      Left Ear: External ear normal.      Mouth/Throat:      Mouth: Mucous membranes are moist.      Pharynx: Oropharynx is clear.   Eyes:      Conjunctiva/sclera: Conjunctivae normal.   Cardiovascular:      Rate and Rhythm: Normal rate.   Pulmonary:      Effort: Pulmonary effort is normal. No respiratory distress.   Chest:   Breasts:     Right: No swelling, bleeding, mass, skin change or tenderness.      Left: No swelling, bleeding, mass, skin change or tenderness.      Comments: The breasts were examined in the supine and upright positions, and are overall symmetrical in appearance. The right breast has a well-healed lumpectomy scar with mildly palpable underlying fibrosis. There is generalized faint hyperpigmentation of the right breast. Additionally seen were numerous superimposed plaque-like lesions around the entire right breast, with additional few lesions seen on the contralateral (left) breast. These are similar in appearance to lesion seen on the right lower extremity. Otherwise, the remainder of the breast exam exhibits normal breast tissue in the right and left breasts, without any discrete firmness, or nodularity to palpation. There is no other tenderness or palpable masses. The overlying skin is otherwise normal. There is no appreciable axillary lymphadenopathy in the right or left axilla.   Abdominal:      General: There is no distension.   Musculoskeletal:          General: Normal range of motion.      Cervical back: Normal range of motion and neck supple.   Lymphadenopathy:      Upper Body:      Right upper body: No axillary adenopathy.      Left upper body: No axillary adenopathy.   Skin:     General: Skin is warm and dry.   Neurological:      Mental Status: She is alert and oriented to person, place, and time.   Psychiatric:         Mood and Affect: Mood normal.         Behavior: Behavior normal.            ASSESSMENT:  Ms. Mckeon is a 68 y.o. woman with right Breast cancer (CMS/[facility]), Pathologic: Stage IIA (pT2, pN1a, cM0, G3, ER+, WY+, HER2-, Oncotype DX score: 22), status post status post lumpectomy and sentinel node biopsy, followed by axillary dissection and evacuation  of hematoma of the breast. She is clinically doing well from a posttreatment perspective, with interval resolution of her posttreatment related dermatitis, however with subsequent more recent development of new plaque-like lesions likely unrelated to her radiation treatment.    PLAN: We reviewed that based on the visual appearance and onset of the plaque like lesions, their presence on the contralateral breast - as well as other body sites - and the fact that she has had similar lesions in the past prior to her treatment, this is more likely an unrelated dermatologic condition (i.e. psoriasis) rather than a posttreatment radiation dermatitis. We recommended she follow-up with her primary care physician, and also offered a referral to dermatology; after discussion, she would prefer to follow-up with her primary care physician.     She will otherwise continue on her anastrozole, and will have close follow-up with her medical oncologist. She also has follow-up already scheduled with her surgeon, who will coordinate her mammograms. Given her overall low risk disease, and excellent response to treatment and recovery, we discussed that she may continue to follow-up with just her medical oncologist and  surgeon going forward, and return to see us only on an as-needed basis in the future. She was in agreement with this plan, though is certainly welcome to contact us at any point if any additional concerns arise.    [facility] Guidelines were applicable to guide this patients treatment plan.  Sarahi Tineo, DO

## 2024-01-24 NOTE — ADDENDUM NOTE
Encounter addended by: Jose Luis Martinez RN on: 1/24/2024 10:58 AM   Actions taken: Clinical Note Signed, Patient Education assessment filed, Patient Education documented on

## 2024-01-31 ENCOUNTER — OFFICE VISIT (OUTPATIENT)
Dept: PRIMARY CARE | Facility: CLINIC | Age: 69
End: 2024-01-31

## 2024-01-31 VITALS — OXYGEN SATURATION: 96 % | DIASTOLIC BLOOD PRESSURE: 70 MMHG | HEART RATE: 83 BPM | SYSTOLIC BLOOD PRESSURE: 124 MMHG

## 2024-01-31 DIAGNOSIS — G89.29 CHRONIC RIGHT-SIDED LOW BACK PAIN WITH SCIATICA, SCIATICA LATERALITY UNSPECIFIED: Primary | ICD-10-CM

## 2024-01-31 DIAGNOSIS — M54.40 CHRONIC RIGHT-SIDED LOW BACK PAIN WITH SCIATICA, SCIATICA LATERALITY UNSPECIFIED: Primary | ICD-10-CM

## 2024-01-31 DIAGNOSIS — E78.00 HIGH CHOLESTEROL: Primary | ICD-10-CM

## 2024-01-31 PROCEDURE — 1157F ADVNC CARE PLAN IN RCRD: CPT | Performed by: FAMILY MEDICINE

## 2024-01-31 PROCEDURE — 4010F ACE/ARB THERAPY RXD/TAKEN: CPT | Performed by: FAMILY MEDICINE

## 2024-01-31 PROCEDURE — 1125F AMNT PAIN NOTED PAIN PRSNT: CPT | Performed by: FAMILY MEDICINE

## 2024-01-31 PROCEDURE — 3078F DIAST BP <80 MM HG: CPT | Performed by: FAMILY MEDICINE

## 2024-01-31 PROCEDURE — 97810 ACUP 1/> WO ESTIM 1ST 15 MIN: CPT | Performed by: FAMILY MEDICINE

## 2024-01-31 PROCEDURE — 97811 ACUP 1/> W/O ESTIM EA ADD 15: CPT | Performed by: FAMILY MEDICINE

## 2024-01-31 PROCEDURE — 3074F SYST BP LT 130 MM HG: CPT | Performed by: FAMILY MEDICINE

## 2024-01-31 PROCEDURE — 1159F MED LIST DOCD IN RCRD: CPT | Performed by: FAMILY MEDICINE

## 2024-01-31 RX ORDER — ATORVASTATIN CALCIUM 10 MG/1
10 TABLET, FILM COATED ORAL EVERY OTHER DAY
Qty: 45 TABLET | Refills: 1 | Status: SHIPPED | OUTPATIENT
Start: 2024-01-31

## 2024-01-31 ASSESSMENT — LIFESTYLE VARIABLES
HOW MANY STANDARD DRINKS CONTAINING ALCOHOL DO YOU HAVE ON A TYPICAL DAY: 1 OR 2
AUDIT-C TOTAL SCORE: 1
HOW OFTEN DO YOU HAVE SIX OR MORE DRINKS ON ONE OCCASION: NEVER
HOW OFTEN DO YOU HAVE A DRINK CONTAINING ALCOHOL: MONTHLY OR LESS
SKIP TO QUESTIONS 9-10: 1

## 2024-01-31 ASSESSMENT — ENCOUNTER SYMPTOMS
LOSS OF SENSATION IN FEET: 0
OCCASIONAL FEELINGS OF UNSTEADINESS: 0
DEPRESSION: 0

## 2024-01-31 ASSESSMENT — PATIENT HEALTH QUESTIONNAIRE - PHQ9
1. LITTLE INTEREST OR PLEASURE IN DOING THINGS: NOT AT ALL
SUM OF ALL RESPONSES TO PHQ9 QUESTIONS 1 AND 2: 0
2. FEELING DOWN, DEPRESSED OR HOPELESS: NOT AT ALL

## 2024-01-31 ASSESSMENT — PAIN SCALES - GENERAL: PAINLEVEL: 4

## 2024-01-31 NOTE — PROGRESS NOTES
Subjective   Patient ID: Tatyana Mckeon is a 68 y.o. female who presents for acupuncture.    HPI   She presents today for Ricotuss treatment.  She states her doctor to concluded that this rash on her breast is related arises.  She has been using the Kenalog cream and has been gradually decreasing and resolving.  States back pain has been pretty good.  Overall she has been feeling better.    Review of Systems    Objective   /70   Pulse 83   SpO2 96%     Physical Exam  Alert, no apparent distress, affect is pleasant.  Respirations are easy.  This rash on the upper chest is certainly lighter in color now.  Does seem to be improving.    Assessment/Plan   Diagnoses and all orders for this visit:  Chronic right-sided low back pain with sciatica, sciatica laterality unspecified  Overall, she is seems to be improving here.  Continue her regular acupuncture treatments.    Acupuncture treatment: I used the following points bilaterally LI 4, SJ 4, LV 2, ST 36, GB 43.  These retained for 15 minutes.  The placed on her side and placed needles in BL 40, 760 and 34.  These were retained for 15 minutes.  She tolerated.

## 2024-02-07 ENCOUNTER — APPOINTMENT (OUTPATIENT)
Dept: PRIMARY CARE | Facility: CLINIC | Age: 69
End: 2024-02-07
Payer: MEDICARE

## 2024-02-18 DIAGNOSIS — E11.9 TYPE 2 DIABETES MELLITUS WITHOUT COMPLICATION, WITHOUT LONG-TERM CURRENT USE OF INSULIN (MULTI): ICD-10-CM

## 2024-02-19 RX ORDER — METFORMIN HYDROCHLORIDE 500 MG/1
500 TABLET ORAL 3 TIMES DAILY
Qty: 270 TABLET | Refills: 1 | Status: SHIPPED | OUTPATIENT
Start: 2024-02-19

## 2024-02-19 NOTE — TELEPHONE ENCOUNTER
Last well visit 11/22/22  Last acupuncture 1/31  Acupuncture scheduled 2/21  Last refill 12/14/23  Last A1C 11/22   6.5

## 2024-02-21 ENCOUNTER — OFFICE VISIT (OUTPATIENT)
Dept: PRIMARY CARE | Facility: CLINIC | Age: 69
End: 2024-02-21

## 2024-02-21 VITALS
SYSTOLIC BLOOD PRESSURE: 122 MMHG | OXYGEN SATURATION: 98 % | DIASTOLIC BLOOD PRESSURE: 70 MMHG | WEIGHT: 214.8 LBS | BODY MASS INDEX: 40.72 KG/M2 | HEART RATE: 90 BPM

## 2024-02-21 DIAGNOSIS — M54.50 CHRONIC LOW BACK PAIN, UNSPECIFIED BACK PAIN LATERALITY, UNSPECIFIED WHETHER SCIATICA PRESENT: Primary | ICD-10-CM

## 2024-02-21 DIAGNOSIS — G89.29 CHRONIC LOW BACK PAIN, UNSPECIFIED BACK PAIN LATERALITY, UNSPECIFIED WHETHER SCIATICA PRESENT: Primary | ICD-10-CM

## 2024-02-21 DIAGNOSIS — G89.29 CHRONIC PAIN OF BOTH KNEES: ICD-10-CM

## 2024-02-21 DIAGNOSIS — M25.561 CHRONIC PAIN OF BOTH KNEES: ICD-10-CM

## 2024-02-21 DIAGNOSIS — E11.9 TYPE 2 DIABETES MELLITUS WITHOUT COMPLICATION, WITHOUT LONG-TERM CURRENT USE OF INSULIN (MULTI): ICD-10-CM

## 2024-02-21 DIAGNOSIS — M25.562 CHRONIC PAIN OF BOTH KNEES: ICD-10-CM

## 2024-02-21 LAB — POC HEMOGLOBIN A1C: 6.3 % (ref 4.2–6.5)

## 2024-02-21 PROCEDURE — 1157F ADVNC CARE PLAN IN RCRD: CPT | Performed by: FAMILY MEDICINE

## 2024-02-21 PROCEDURE — 4010F ACE/ARB THERAPY RXD/TAKEN: CPT | Performed by: FAMILY MEDICINE

## 2024-02-21 PROCEDURE — 3078F DIAST BP <80 MM HG: CPT | Performed by: FAMILY MEDICINE

## 2024-02-21 PROCEDURE — 1159F MED LIST DOCD IN RCRD: CPT | Performed by: FAMILY MEDICINE

## 2024-02-21 PROCEDURE — 97811 ACUP 1/> W/O ESTIM EA ADD 15: CPT | Performed by: FAMILY MEDICINE

## 2024-02-21 PROCEDURE — 83036 HEMOGLOBIN GLYCOSYLATED A1C: CPT | Performed by: FAMILY MEDICINE

## 2024-02-21 PROCEDURE — 3074F SYST BP LT 130 MM HG: CPT | Performed by: FAMILY MEDICINE

## 2024-02-21 PROCEDURE — 1125F AMNT PAIN NOTED PAIN PRSNT: CPT | Performed by: FAMILY MEDICINE

## 2024-02-21 PROCEDURE — 97810 ACUP 1/> WO ESTIM 1ST 15 MIN: CPT | Performed by: FAMILY MEDICINE

## 2024-02-21 ASSESSMENT — PAIN SCALES - GENERAL: PAINLEVEL: 5

## 2024-02-21 ASSESSMENT — ENCOUNTER SYMPTOMS
LOSS OF SENSATION IN FEET: 0
OCCASIONAL FEELINGS OF UNSTEADINESS: 1
DEPRESSION: 0

## 2024-02-21 NOTE — PROGRESS NOTES
Subjective   Patient ID: Tatyana Mckeon is a 68 y.o. female who presents for Acupuncture.    HPI   She presents today for her acupuncture treatment.  Overall, she states she has been doing pretty well.  She has had some swelling in her thigh on the right side intermittently.  He states in the morning it is gone completely and then if she sits in a certain chair at home it will swell up again.  She is gained 7 pounds, but she states that she did not eat well at all over the holidays.  With Alvarado's Day.    No chest pain or shortness of breath.  No swelling of the feet at all.    Review of Systems    Objective   /70   Pulse 90   Wt 97.4 kg (214 lb 12.8 oz)   SpO2 98%   BMI 40.72 kg/m²     Physical Exam  Constitutional:       Appearance: Normal appearance. She is not ill-appearing.   Eyes:      Conjunctiva/sclera: Conjunctivae normal.   Cardiovascular:      Rate and Rhythm: Normal rate.   Pulmonary:      Effort: Pulmonary effort is normal.      Breath sounds: No rhonchi or rales.   Musculoskeletal:      Comments: Some mild tenderness with palpation of the lower back.  Tenderness with palpation of the joint lines of the knees   Skin:     General: Skin is warm.      Coloration: Skin is not jaundiced.   Neurological:      Mental Status: She is alert.   Psychiatric:         Mood and Affect: Mood normal.         Assessment/Plan   Diagnoses and all orders for this visit:  Chronic low back pain, unspecified back pain laterality, unspecified whether sciatica present  Chronic pain of both knees  Will continue her acupuncture treatments.  We did do a hemoglobin A1c as well today and looks very good.

## 2024-02-21 NOTE — PATIENT INSTRUCTIONS
PAIN MANAGEMENT IN ADULTS    What is pain? Pain is your body’s way to reacting to injury or illness. Everybody reacts to pain in different ways. What you think is painful may not be painful to someone else. But, pain is whatever you say it is!  What causes pain? Many things, such as an injury, surgery, or a disease can cause pain. Some pain is caused by pressure one a nerve, such as a cancerous tumor or slipped disc. Other pain is caused when nerves are cut as in an accident or surgery. After an injury or surgery you may not want to move the painful part of our body at all. But, you may have pain because you are not moving this body part. Sometimes there is no clear reason for your pain.    What are the different types of pain?  Acute pain is short?lived and lasts less than 3 months. Caregivers help first work to remove the cause of the pain, such as fixing a broken arm. Acute pain usually can be controlled or stopped with pain medicine.  Chronic pain lasts longer than 3?6 months. This kind of pain is often more complicated. Caregivers may use medicine along with other treatments, like self?hypnosis and relaxation to help you learn to deal with the chronic pain.  What is your pain like? Caregivers want you to talk to them about your pain. This helps them learn what may be causing the pain and how best to treat it.  Why is pain control important? Pain can affect your appetite, how well you sleep, your energy and your ability to do things. Pain can also affect your mood and relationship with others. If caregivers can help you control your pain, you will suffer less and can even heal faster.  How can pain medicine be given? Following are the many different ways pain medicine can be given depending on the kind of pain you are experiencing.  By mouth: you may be given pills or liquid to swallow or you may be given a pill or liquid to put under your tongue. Some medicine can also be given as a lozenge  like a cough drop or even a special lollipop.  Rectal: medicine in a suppository is put into your rectum.  Shot/Injection: pain medicine can be given as a shot/injection into an IV, into a muscle or under the skin  Topical: medicine in a cream or gel is spread over the skin.  Transdermal: medicine given in a patch and put onto the skin. This medicine is released slowly to give pain relief for as long as 72 hours.    How can you take pain medicine safely and make it work best for you? The following are many different ways pain medicine can be given depending on the kind of pain you have.  Some pain medicines can make you breathe less deeply and less often. The medicine may also make you sleepy, dizzy, and unsafe to drive a car or use heavy equipment. For these reasons, it is very important to follow your caregiver’s advice on how to use this medicine.  Sometimes the pain is worse when you first wake up in the morning. This may happened if you did not have enough pain medicine in your blood stream to last through the night. Caregivers may tell you to take a dose of pain medicine during the night.  Some foods, alcohol, and other medicines may cause unpleasant side effects when you take pain medicine. Follow your caregiver’s advice about how to prevent these problems.  Pain medicine can make you constipated. Straining with a BM can make you pain worse. Do not try to push the BM out if it’s too hard. Contact your caregiver if you become constipated.  Other non?drug pain control methods. There are many pain control techniques that can held you deal with pain even if it does not go away completely. It is important to practice some of the techniques when you don’t have pain if possible. This will help the technique work better during an attack of pain.  Cold and heat: both cold and heat can help lessen some types of post?op pain. Caregivers will tell you if cold and/or hot packs will help your pain.  Distraction: teaches  you to focus your attention on something other than pain. Playing cards or games, talking and visiting family may relax you and keep you from thinking about pain.  Hydrotherapy: is a gentle water exercise program. It can strengthen muscles that are not injured and lessen inflammation. Talk to your physical therapist or your caregiver about starting a hydrotherapy program.  Massage  Music  Physical therapy  Rest  Surgery/Nerve blocks  Call your caregiver if you have any of the following problems:  The pain medicine you are taking makes you sleepier than usual or confused  You have new pain or the pain seems different than before  You have constipation  Care agreement: You have the right to help plan your care. To help with this plan you must learn about your pain, what is causing it, and how it can be treated. You can then discuss treatment options with your caregivers. Work with them to decide what care will be used to treat you. You always have the right to refuse treatment.

## 2024-02-28 ENCOUNTER — NURSE TRIAGE (OUTPATIENT)
Dept: HEMATOLOGY/ONCOLOGY | Facility: HOSPITAL | Age: 69
End: 2024-02-28
Payer: MEDICARE

## 2024-02-28 DIAGNOSIS — R22.43 LOCALIZED SWELLING, MASS, OR LUMP OF LOWER EXTREMITY, BILATERAL: Primary | ICD-10-CM

## 2024-02-28 NOTE — TELEPHONE ENCOUNTER
Pt advised that bilateral LE ultrasound was ordered.  Per Carrie Osorio PA-C, pt instructed to hold anastrozole for 2 weeks and call back with an update on her side effects.  Pt agreeable to this plan and was transferred to scheduling to set up ultrasound.

## 2024-02-28 NOTE — TELEPHONE ENCOUNTER
Pt has had BLE edema for the past 2-3 weeks, R>L.  She has baseline swelling of R knee to which she attributes the right side being more swollen.  LE's are slightly red and she denies pain or warmth.  No chest pain or difficulty breathing.  She also notes #12 weight gain over the past 6 weeks, hot flashes and hair loss since starting anastrozole and inquires about changing medication due to side effects.    Additional Information   Commented on: What helps these problems?     better in the morning after being prone overnight   Commented on: Have you had any of these things recently?     On anastrozole x5 months    Protocols used: Swelling/Deep Venous Thrombosis (DVT)

## 2024-02-29 ENCOUNTER — HOSPITAL ENCOUNTER (OUTPATIENT)
Dept: RADIOLOGY | Facility: HOSPITAL | Age: 69
Discharge: HOME | End: 2024-02-29
Payer: MEDICARE

## 2024-02-29 DIAGNOSIS — R22.43 LOCALIZED SWELLING, MASS, OR LUMP OF LOWER EXTREMITY, BILATERAL: ICD-10-CM

## 2024-02-29 PROCEDURE — 93970 EXTREMITY STUDY: CPT

## 2024-03-01 ENCOUNTER — OFFICE VISIT (OUTPATIENT)
Dept: PRIMARY CARE | Facility: CLINIC | Age: 69
End: 2024-03-01
Payer: MEDICARE

## 2024-03-01 ENCOUNTER — TELEPHONE (OUTPATIENT)
Dept: HEMATOLOGY/ONCOLOGY | Facility: HOSPITAL | Age: 69
End: 2024-03-01
Payer: MEDICARE

## 2024-03-01 VITALS
HEART RATE: 106 BPM | DIASTOLIC BLOOD PRESSURE: 76 MMHG | SYSTOLIC BLOOD PRESSURE: 124 MMHG | WEIGHT: 216.4 LBS | OXYGEN SATURATION: 97 % | BODY MASS INDEX: 41.02 KG/M2

## 2024-03-01 DIAGNOSIS — R60.0 LOCALIZED EDEMA: Primary | ICD-10-CM

## 2024-03-01 PROCEDURE — 3078F DIAST BP <80 MM HG: CPT | Performed by: FAMILY MEDICINE

## 2024-03-01 PROCEDURE — 4010F ACE/ARB THERAPY RXD/TAKEN: CPT | Performed by: FAMILY MEDICINE

## 2024-03-01 PROCEDURE — 3074F SYST BP LT 130 MM HG: CPT | Performed by: FAMILY MEDICINE

## 2024-03-01 PROCEDURE — 1159F MED LIST DOCD IN RCRD: CPT | Performed by: FAMILY MEDICINE

## 2024-03-01 PROCEDURE — 1125F AMNT PAIN NOTED PAIN PRSNT: CPT | Performed by: FAMILY MEDICINE

## 2024-03-01 PROCEDURE — 99213 OFFICE O/P EST LOW 20 MIN: CPT | Performed by: FAMILY MEDICINE

## 2024-03-01 PROCEDURE — 1157F ADVNC CARE PLAN IN RCRD: CPT | Performed by: FAMILY MEDICINE

## 2024-03-01 RX ORDER — FUROSEMIDE 20 MG/1
20-40 TABLET ORAL 2 TIMES DAILY
Qty: 30 TABLET | Refills: 0 | Status: SHIPPED | OUTPATIENT
Start: 2024-03-01 | End: 2024-03-20 | Stop reason: ALTCHOICE

## 2024-03-01 ASSESSMENT — ENCOUNTER SYMPTOMS
OCCASIONAL FEELINGS OF UNSTEADINESS: 1
LOSS OF SENSATION IN FEET: 0
DEPRESSION: 0

## 2024-03-01 ASSESSMENT — PAIN SCALES - GENERAL: PAINLEVEL: 6

## 2024-03-01 NOTE — TELEPHONE ENCOUNTER
Called pt regarding US, negative for DVT however identified a cyst on the posterior fossa of the right knee. She did note that she has overlying skin changes to bilateral lower extremities. Recommended following up with PCP regarding this and possible referral to vascular to evaluate further. She will continue to hold anastrozole for 2 weeks and call with an update. She is appreciative of the phone call and agreeable to plan.

## 2024-03-01 NOTE — PATIENT INSTRUCTIONS
Take the lasix 20mg daily for the next 3-4 days, weighing yourself every day. If you're not losing weight, go up to 40mg. Let me know how you're doing on this next week. Will need to check potassium if you're going to need this long term.                     PAIN MANAGEMENT IN ADULTS    What is pain? Pain is your body’s way to reacting to injury or illness. Everybody reacts to pain in different ways. What you think is painful may not be painful to someone else. But, pain is whatever you say it is!  What causes pain? Many things, such as an injury, surgery, or a disease can cause pain. Some pain is caused by pressure one a nerve, such as a cancerous tumor or slipped disc. Other pain is caused when nerves are cut as in an accident or surgery. After an injury or surgery you may not want to move the painful part of our body at all. But, you may have pain because you are not moving this body part. Sometimes there is no clear reason for your pain.    What are the different types of pain?  Acute pain is short?lived and lasts less than 3 months. Caregivers help first work to remove the cause of the pain, such as fixing a broken arm. Acute pain usually can be controlled or stopped with pain medicine.  Chronic pain lasts longer than 3?6 months. This kind of pain is often more complicated. Caregivers may use medicine along with other treatments, like self?hypnosis and relaxation to help you learn to deal with the chronic pain.  What is your pain like? Caregivers want you to talk to them about your pain. This helps them learn what may be causing the pain and how best to treat it.  Why is pain control important? Pain can affect your appetite, how well you sleep, your energy and your ability to do things. Pain can also affect your mood and relationship with others. If caregivers can help you control your pain, you will suffer less and can even heal faster.  How can pain medicine be given? Following are the many different ways  pain medicine can be given depending on the kind of pain you are experiencing.  By mouth: you may be given pills or liquid to swallow or you may be given a pill or liquid to put under your tongue. Some medicine can also be given as a lozenge like a cough drop or even a special lollipop.  Rectal: medicine in a suppository is put into your rectum.  Shot/Injection: pain medicine can be given as a shot/injection into an IV, into a muscle or under the skin  Topical: medicine in a cream or gel is spread over the skin.  Transdermal: medicine given in a patch and put onto the skin. This medicine is released slowly to give pain relief for as long as 72 hours.    How can you take pain medicine safely and make it work best for you? The following are many different ways pain medicine can be given depending on the kind of pain you have.  Some pain medicines can make you breathe less deeply and less often. The medicine may also make you sleepy, dizzy, and unsafe to drive a car or use heavy equipment. For these reasons, it is very important to follow your caregiver’s advice on how to use this medicine.  Sometimes the pain is worse when you first wake up in the morning. This may happened if you did not have enough pain medicine in your blood stream to last through the night. Caregivers may tell you to take a dose of pain medicine during the night.  Some foods, alcohol, and other medicines may cause unpleasant side effects when you take pain medicine. Follow your caregiver’s advice about how to prevent these problems.  Pain medicine can make you constipated. Straining with a BM can make you pain worse. Do not try to push the BM out if it’s too hard. Contact your caregiver if you become constipated.  Other non?drug pain control methods. There are many pain control techniques that can held you deal with pain even if it does not go away completely. It is important to practice some of the techniques when you don’t have pain if possible.  This will help the technique work better during an attack of pain.  Cold and heat: both cold and heat can help lessen some types of post?op pain. Caregivers will tell you if cold and/or hot packs will help your pain.  Distraction: teaches you to focus your attention on something other than pain. Playing cards or games, talking and visiting family may relax you and keep you from thinking about pain.  Hydrotherapy: is a gentle water exercise program. It can strengthen muscles that are not injured and lessen inflammation. Talk to your physical therapist or your caregiver about starting a hydrotherapy program.  Massage  Music  Physical therapy  Rest  Surgery/Nerve blocks  Call your caregiver if you have any of the following problems:  The pain medicine you are taking makes you sleepier than usual or confused  You have new pain or the pain seems different than before  You have constipation  Care agreement: You have the right to help plan your care. To help with this plan you must learn about your pain, what is causing it, and how it can be treated. You can then discuss treatment options with your caregivers. Work with them to decide what care will be used to treat you. You always have the right to refuse treatment.

## 2024-03-01 NOTE — PROGRESS NOTES
Subjective   Patient ID: Tatyana Mckeon is a 69 y.o. female who presents for Edema in both legs.    HPI   She presents today because she is just had this slowly increasing edema.  She saw her oncologist who had her tested for DVT but that was negative.  Is more in the right likely due to the left.  She feels it is all up into her lower thigh as well.  She is getting 10 pounds since December.  She is gained a few pounds since her last visit.  She is not have any chest pain or shortness of breath.  She states she is always had some swelling due to the right knee but it is more lately.    Review of Systems    Objective   /76   Pulse 106   Wt 98.2 kg (216 lb 6.4 oz)   SpO2 97%   BMI 41.02 kg/m²     Physical Exam  Cardiovascular:      Rate and Rhythm: Normal rate and regular rhythm.      Heart sounds: No murmur heard.  Musculoskeletal:      Right lower leg: Edema (She has bilateral edema the right side is worse than the left.  Nontender to palpate the lower leg or lower thigh.) present.      Left lower leg: Edema present.         Assessment/Plan   Diagnoses and all orders for this visit:  Localized edema  Will use Lasix 20 mg over the next 3 days daily and see how she does.  If he is not noticing any change in a day or 2 on this and has not lost any weight, should go up to 40 mg.  She let me know how she does over that time.  We discussed getting her back toward 205 is a weight and then using Lasix as needed.  We discussed that the potassium can get low if she is taking this daily so may need to check to see if she is going to be on this longer term.  I will wait to hear for her next week from her next week.

## 2024-03-04 ENCOUNTER — APPOINTMENT (OUTPATIENT)
Dept: PRIMARY CARE | Facility: CLINIC | Age: 69
End: 2024-03-04
Payer: MEDICARE

## 2024-03-06 NOTE — PROGRESS NOTES
Subjective   Patient ID: Tatyana Mckeon is a 69 y.o. female who presents for No chief complaint on file..  HPI    Review of Systems    Objective   Physical Exam    Assessment/Plan   {Assess/PlanSmartLinks:28612}         Olivia Boone MD 03/06/24 9:06 AM

## 2024-03-08 ENCOUNTER — APPOINTMENT (OUTPATIENT)
Dept: SURGERY | Facility: CLINIC | Age: 69
End: 2024-03-08
Payer: MEDICARE

## 2024-03-08 ENCOUNTER — APPOINTMENT (OUTPATIENT)
Dept: PRIMARY CARE | Facility: CLINIC | Age: 69
End: 2024-03-08
Payer: MEDICARE

## 2024-03-08 ENCOUNTER — PATIENT MESSAGE (OUTPATIENT)
Dept: PRIMARY CARE | Facility: CLINIC | Age: 69
End: 2024-03-08
Payer: MEDICARE

## 2024-03-08 DIAGNOSIS — R60.0 LOCALIZED EDEMA: Primary | ICD-10-CM

## 2024-03-12 NOTE — PROGRESS NOTES
Subjective   Patient ID: Tatyana Mckeon is a 69 y.o. female who presents for 6 month follow up breast exam.  HPI  Last seen on 9/21/23 for evaluation of right breast seroma. Drain was considered but not placed due to small size of fluid collection.  No new breast imaging.   Adjuvant RT completed 12/15/23 with .  Anastrozole initiated 10/2023 with Dr. Hannon. Last seen on 1/10/24.     Past Medical History:   Diagnosis Date    Bipolar 1 disorder (CMS/HCC)     Breast cancer (CMS/HCC) 06/2023    Right Breast - IDC and DCIS    Depression     Endometrial cancer (CMS/HCC)     Hyperlipidemia     Hypertension     Psoriasis       Allergies   Allergen Reactions    Pollen Extracts Itching    Cephalexin Diarrhea    Naproxen Unknown      Past Surgical History:   Procedure Laterality Date    AXILLARY NODE DISSECTION  08/16/2023    Right Axillary Dissection and Evacuation of a right breast hematoma    BI MAMMO GUIDED LOCALIZATION BREAST RIGHT Right 07/21/2023    Wire localization lumpectomy right breast with sentinel node biopsy    BREAST LUMPECTOMY  2000    Left breast    HYSTERECTOMY  2014    LAPAROTOMY OOPHERECTOMY  2014    US GUIDED BIOPSY LYMPH NODE SUPERFICIAL  06/07/2023    Ultrasound guided right breast and axillary lymph node biopsy      Family History   Problem Relation Name Age of Onset    Stroke Mother      Other (high blood pressure) Father      Prostate cancer Father      Heart disease Father          with MI at age 71    Diabetes Father      Hypertension Father      Diabetes Sister      Hypertension Sister      Other (oral cancer) Brother      Hypertension Brother          Review of Systems   Constitutional:  Negative for appetite change, fever and unexpected weight change.   HENT:  Negative for congestion and trouble swallowing.    Respiratory:  Negative for cough and shortness of breath.    Cardiovascular:  Negative for chest pain and palpitations.   Gastrointestinal:  Negative for abdominal pain, diarrhea and  nausea.   Genitourinary:  Negative for difficulty urinating and dysuria.   Musculoskeletal:  Negative for back pain and gait problem.   Skin:  Negative for color change and rash.   Neurological:  Negative for dizziness, speech difficulty and headaches.   Hematological:  Does not bruise/bleed easily.   Psychiatric/Behavioral:  Negative for confusion and suicidal ideas.      Objective   Physical Exam  Physical Exam:  There were no vitals taken for this visit.   GENERAL APPEARANCE: Patient appears in no acute distress.   EYES: Sclera non-icteric, PERRLA.   ENT Normal appearance of ears and nose.   NECK/THYROID: Neck: no masses. Thyroid: no masses.   LYMPH NODES: No cervical or supraclavicular lymphadenopathy.   CARDIOVASCULAR Heart: RRR, no murmurs; Carotid bruits: none; Peripheral edema: none.   RESPIRATORY: Lungs: Bilateral inspiratory and expiratory wheezing; no respiratory distress.   BREASTS: Exam done both in upright and supine position. On inspection no skin dimpling, +peau d'orange and mild erythema of the right breast secondary to radiation therapy.  Patient with lumpectomy scar right breast done remotely with dimpling..; Masses: Patient with palpable mass associated with lumpectomy scar right breast likely consistent with scar tissue or seroma as patient does have a chronic seroma formation requiring treatment in the past.  Axillary lymphadenopathy: none.   GI (ABDOMEN) No intraabdominal mass appreciated, no hepatosplenomegaly; Hernia: none; Tenderness: none.   PSYCH: Patient oriented to time, place and person, normal affect.    Assessment/Plan   Problem List Items Addressed This Visit    None       Patient presents to office today for evaluation of axillary wound.   Patient saw Dr. Tineo on 09/13/2023, Dr. Tineo recommended she come back into office to have area evaluated and consider drainage of the breast.   Dr. Tineo recommends radiation treatment to the breast alone to a dose of 42.56 Gy followed by  consideration of a 10 Gy in 4 fractions lumpectomy bed boost.  Breast History:   Patient had a diagnostic bilateral mammogram with 3D HOWIE on 05/26/2023, with no previous imaging it demonstrated; scattered fibroglandular densities of the breasts seen bilaterally. Within the right breast there is a spiculated mass measuring about 2.6 cm seen at the 6 o'clock position of the right breast at the inframammary fold, about 11 cm from the nipple appears intimately associated with the chest wall, skin dimpling is noted within this region.   There is an area of skin dimpling along the upper-outer quadrant of the left breast with associated 5 cm focal asymmetry with areas of heterogenerous calcifications about 12 cm from nipple, likely fat necrosis from patient's previous surgery. Bilateral breast ultrasound recommended.  Patient had ultrasound of bilateral breasts on 0526/2023 it demonstrated;   Right breast 6 o'clock position there is 2.5 X 1.9 X 1.8 cm hypoechoic mass with angulated margins and posterior acoustic shadowing, appears to extend to the dermis. In addition, there is an abnormal right axillary lymph node seen with asymmetric cortex measuring 4mm.  Left breast reveals no suspicious breast masses idenifted, some normal breast tissue can be seen, likely an area of fat necrosis.   Category 5: highly suggestive of malignancy. Recommend ultrasound guided biopsy of right breast mass and right axillary lymph node.   Patient 1st felt the mass on mother's Day of this year. She denies any nipple discharge. She has a history of open biopsy remotely for benign disease in the left breast  Patient is status post ultrasound guided biopsy of right breast and axillary lymph node on 06/07/2023.   Pathology revealing right breast biopsy; invasive ductal carcinoma grade 2 of 3 with asscoicated microcalifications. ER-positive, MD - positive and HER2/katie- equivocal 2+.   Right axillary lymph node biopsy pathology revealing; portion of  lymph node, negative of carcinoma.   Imaging is concordant.  Patient is status post wire localization lumpectomy right breast with sentinel node biopsy on 07/21/2023.   Pathology revealed right breast lumpectomy at 6 o'clock position; invasive ductal carcinoma measuring 3 cm grade 3 of 3, margin is 1 mm from the closest anterior margin. DCIS present, cribriform and solid types, intermediate to high nuclear grade, margins negative.  Right breast sentinel node biopsy revealed; one lymph node positive for metastasis, tumor measures 8 mm.  Patient presents to office today for her post operative appointment and drain removal.  Patient is status post right axillary regional axillary dissection and evacuation of a right breast hematoma with KELSEY drain placement 08/16/2023.  Pathology revealed right axillary contents; fifteen lymph nodes, negative for metastasis.   Patient saw Dr. Tineo on 09/13/2023, Dr. Tineo recommended she come back into office to have area evaluated and consider drainage of the breast. Ultrasound ordered- seroma present; drain placement scheduled but not completed due to small size of seroma.   Previous Biopsy: Yes, Left, Breast, Benign. Menarche age: 11. Age of first delivery: Nulliparous. Breastfeed: N/A. Menopause age: hysterectomy at 2014 at 59. HRT: No. Family history of breast cancer: No. Family history of ovarian cancer: No. Family history of pancreatic cancer: No.     Olivia Boone MD 03/12/24 10:55 AM

## 2024-03-13 ENCOUNTER — TELEPHONE (OUTPATIENT)
Dept: HEMATOLOGY/ONCOLOGY | Facility: HOSPITAL | Age: 69
End: 2024-03-13
Payer: MEDICARE

## 2024-03-13 ENCOUNTER — APPOINTMENT (OUTPATIENT)
Dept: PRIMARY CARE | Facility: CLINIC | Age: 69
End: 2024-03-13
Payer: MEDICARE

## 2024-03-13 DIAGNOSIS — C50.911 INFILTRATING DUCTAL CARCINOMA OF RIGHT BREAST, STAGE 2 (MULTI): ICD-10-CM

## 2024-03-13 DIAGNOSIS — C50.919 MALIGNANT NEOPLASM OF BREAST IN FEMALE, ESTROGEN RECEPTOR POSITIVE, UNSPECIFIED LATERALITY, UNSPECIFIED SITE OF BREAST (MULTI): ICD-10-CM

## 2024-03-13 DIAGNOSIS — Z17.0 MALIGNANT NEOPLASM OF BREAST IN FEMALE, ESTROGEN RECEPTOR POSITIVE, UNSPECIFIED LATERALITY, UNSPECIFIED SITE OF BREAST (MULTI): ICD-10-CM

## 2024-03-13 NOTE — TELEPHONE ENCOUNTER
Pt called to update after being off anastrozole for 2 weeks.  She notes that hot flashes are less frequent and hair loss is improved.  She followed up with PCP regarding edema, no cardiac concerns.  Edema is improving on lasix 40mg daily.

## 2024-03-14 ENCOUNTER — APPOINTMENT (OUTPATIENT)
Dept: SURGERY | Facility: CLINIC | Age: 69
End: 2024-03-14
Payer: MEDICARE

## 2024-03-14 DIAGNOSIS — C50.911 INFILTRATING DUCTAL CARCINOMA OF RIGHT BREAST, STAGE 2 (MULTI): Primary | ICD-10-CM

## 2024-03-14 RX ORDER — EXEMESTANE 25 MG/1
25 TABLET ORAL DAILY
Qty: 30 TABLET | Refills: 2 | Status: SHIPPED | OUTPATIENT
Start: 2024-03-14 | End: 2024-03-15 | Stop reason: ENTERED-IN-ERROR

## 2024-03-14 RX ORDER — EXEMESTANE 25 MG/1
25 TABLET ORAL DAILY
Qty: 30 TABLET | Refills: 2 | Status: SHIPPED | OUTPATIENT
Start: 2024-03-14 | End: 2024-03-14 | Stop reason: SDUPTHER

## 2024-03-14 ASSESSMENT — ENCOUNTER SYMPTOMS
DIZZINESS: 0
BACK PAIN: 0
BRUISES/BLEEDS EASILY: 0
SPEECH DIFFICULTY: 0
PALPITATIONS: 0
COUGH: 0
FEVER: 0
NAUSEA: 0
DIARRHEA: 0
HEADACHES: 0
COLOR CHANGE: 0
TROUBLE SWALLOWING: 0
APPETITE CHANGE: 0
ABDOMINAL PAIN: 0
DYSURIA: 0
UNEXPECTED WEIGHT CHANGE: 0
CONFUSION: 0
SHORTNESS OF BREATH: 0
DIFFICULTY URINATING: 0

## 2024-03-14 NOTE — TELEPHONE ENCOUNTER
Returned call to patient and she was made aware of Dr. Hannon's recommendation of switching her endocrine therapy to exemestane daily. She is in agreement with this plan.Message to be sent to Carrie CHANDRA to assist with sending the script as Dr. Hannon is out of the office today and tomorrow, Patient verbalized understanding and agreement regarding discussed information via verbal feedback.

## 2024-03-14 NOTE — ASSESSMENT & PLAN NOTE
Patient with normal exam and imaging.  Patient will be 6 months out from radiation therapy in June.  Will order bilateral mammogram in June.  Patient with a repeat exam with me in July.

## 2024-03-14 NOTE — ASSESSMENT & PLAN NOTE
Patient with normal exam.  Patient will be 6 months out of radiation in June.  Recommend bilateral mammogram at that time.  Patient to have a follow-up exam in July.

## 2024-03-14 NOTE — TELEPHONE ENCOUNTER
Tatyana called back in regarding her Exemestane; states it will cost over $70/month which she is unable to afford.     I advised her to check out [facility] and see if there is a more reasonably priced pharmacy in her area.     She will call us back to re-route the medication.   Message routed to [place] team.

## 2024-03-15 ENCOUNTER — OFFICE VISIT (OUTPATIENT)
Dept: SURGERY | Facility: CLINIC | Age: 69
End: 2024-03-15
Payer: MEDICARE

## 2024-03-15 VITALS
SYSTOLIC BLOOD PRESSURE: 98 MMHG | DIASTOLIC BLOOD PRESSURE: 74 MMHG | WEIGHT: 207 LBS | BODY MASS INDEX: 38.09 KG/M2 | RESPIRATION RATE: 20 BRPM | TEMPERATURE: 97.5 F | HEIGHT: 62 IN | OXYGEN SATURATION: 97 % | HEART RATE: 106 BPM

## 2024-03-15 DIAGNOSIS — C50.511 MALIGNANT NEOPLASM OF LOWER-OUTER QUADRANT OF RIGHT FEMALE BREAST (MULTI): ICD-10-CM

## 2024-03-15 DIAGNOSIS — C50.911 INFILTRATING DUCTAL CARCINOMA OF RIGHT BREAST, STAGE 2 (MULTI): Primary | ICD-10-CM

## 2024-03-15 PROCEDURE — 1157F ADVNC CARE PLAN IN RCRD: CPT | Performed by: SURGERY

## 2024-03-15 PROCEDURE — 99214 OFFICE O/P EST MOD 30 MIN: CPT | Performed by: SURGERY

## 2024-03-15 PROCEDURE — 3074F SYST BP LT 130 MM HG: CPT | Performed by: SURGERY

## 2024-03-15 PROCEDURE — 1159F MED LIST DOCD IN RCRD: CPT | Performed by: SURGERY

## 2024-03-15 PROCEDURE — 4010F ACE/ARB THERAPY RXD/TAKEN: CPT | Performed by: SURGERY

## 2024-03-15 PROCEDURE — 3078F DIAST BP <80 MM HG: CPT | Performed by: SURGERY

## 2024-03-15 PROCEDURE — 1126F AMNT PAIN NOTED NONE PRSNT: CPT | Performed by: SURGERY

## 2024-03-15 RX ORDER — ANASTROZOLE 1 MG/1
1 TABLET ORAL DAILY
Qty: 90 TABLET | Refills: 3 | Status: SHIPPED | OUTPATIENT
Start: 2024-03-15 | End: 2024-03-15 | Stop reason: ENTERED-IN-ERROR

## 2024-03-15 RX ORDER — EXEMESTANE 25 MG/1
25 TABLET ORAL DAILY
Qty: 30 TABLET | Refills: 2 | Status: SHIPPED | OUTPATIENT
Start: 2024-03-15 | End: 2024-05-09 | Stop reason: SINTOL

## 2024-03-15 ASSESSMENT — PAIN SCALES - GENERAL: PAINLEVEL: 0-NO PAIN

## 2024-03-15 ASSESSMENT — PATIENT HEALTH QUESTIONNAIRE - PHQ9
SUM OF ALL RESPONSES TO PHQ9 QUESTIONS 1 & 2: 0
1. LITTLE INTEREST OR PLEASURE IN DOING THINGS: NOT AT ALL
2. FEELING DOWN, DEPRESSED OR HOPELESS: NOT AT ALL

## 2024-03-15 NOTE — TELEPHONE ENCOUNTER
Pt would like to have exemestane prescription sent to [facility] in [place].  They have the best cash price she could find.

## 2024-03-15 NOTE — TELEPHONE ENCOUNTER
Pt states that the wrong medication was prescribed. She states that Aromasin should be sent to [facility] and she will use [facility] to offset the cost.  Message sent to team.

## 2024-03-20 ENCOUNTER — OFFICE VISIT (OUTPATIENT)
Dept: PRIMARY CARE | Facility: CLINIC | Age: 69
End: 2024-03-20

## 2024-03-20 VITALS
WEIGHT: 210.6 LBS | SYSTOLIC BLOOD PRESSURE: 122 MMHG | OXYGEN SATURATION: 98 % | DIASTOLIC BLOOD PRESSURE: 74 MMHG | HEART RATE: 96 BPM | BODY MASS INDEX: 38.52 KG/M2

## 2024-03-20 DIAGNOSIS — R60.0 LOCALIZED EDEMA: ICD-10-CM

## 2024-03-20 DIAGNOSIS — M17.0 PRIMARY OSTEOARTHRITIS OF BOTH KNEES: Primary | ICD-10-CM

## 2024-03-20 PROCEDURE — 97810 ACUP 1/> WO ESTIM 1ST 15 MIN: CPT | Performed by: FAMILY MEDICINE

## 2024-03-20 PROCEDURE — 97811 ACUP 1/> W/O ESTIM EA ADD 15: CPT | Performed by: FAMILY MEDICINE

## 2024-03-20 PROCEDURE — 3078F DIAST BP <80 MM HG: CPT | Performed by: FAMILY MEDICINE

## 2024-03-20 PROCEDURE — 3074F SYST BP LT 130 MM HG: CPT | Performed by: FAMILY MEDICINE

## 2024-03-20 PROCEDURE — 1157F ADVNC CARE PLAN IN RCRD: CPT | Performed by: FAMILY MEDICINE

## 2024-03-20 PROCEDURE — 4010F ACE/ARB THERAPY RXD/TAKEN: CPT | Performed by: FAMILY MEDICINE

## 2024-03-20 PROCEDURE — 1159F MED LIST DOCD IN RCRD: CPT | Performed by: FAMILY MEDICINE

## 2024-03-20 PROCEDURE — 1125F AMNT PAIN NOTED PAIN PRSNT: CPT | Performed by: FAMILY MEDICINE

## 2024-03-20 ASSESSMENT — ENCOUNTER SYMPTOMS
LOSS OF SENSATION IN FEET: 0
OCCASIONAL FEELINGS OF UNSTEADINESS: 1
DEPRESSION: 0

## 2024-03-20 ASSESSMENT — PAIN SCALES - GENERAL: PAINLEVEL: 4

## 2024-03-20 NOTE — PATIENT INSTRUCTIONS
Continue to hold on the Lasix but weigh yourself daily.  If you get up over 2 pounds in a day or 3 pounds in 2 days, take the Lasix and get yourself back down to your baseline weight.    Will continue the acupuncture every 2 weeks.    Consider that when you are off the hormone blocker, your skin healed, your hair improved and you felt better overall.  Your body is healing itself, as it did the rash.  Give yourself credit for that.                PAIN MANAGEMENT IN ADULTS    What is pain? Pain is your body’s way to reacting to injury or illness. Everybody reacts to pain in different ways. What you think is painful may not be painful to someone else. But, pain is whatever you say it is!  What causes pain? Many things, such as an injury, surgery, or a disease can cause pain. Some pain is caused by pressure one a nerve, such as a cancerous tumor or slipped disc. Other pain is caused when nerves are cut as in an accident or surgery. After an injury or surgery you may not want to move the painful part of our body at all. But, you may have pain because you are not moving this body part. Sometimes there is no clear reason for your pain.    What are the different types of pain?  Acute pain is short?lived and lasts less than 3 months. Caregivers help first work to remove the cause of the pain, such as fixing a broken arm. Acute pain usually can be controlled or stopped with pain medicine.  Chronic pain lasts longer than 3?6 months. This kind of pain is often more complicated. Caregivers may use medicine along with other treatments, like self?hypnosis and relaxation to help you learn to deal with the chronic pain.  What is your pain like? Caregivers want you to talk to them about your pain. This helps them learn what may be causing the pain and how best to treat it.  Why is pain control important? Pain can affect your appetite, how well you sleep, your energy and your ability to do things. Pain can also affect your mood and  relationship with others. If caregivers can help you control your pain, you will suffer less and can even heal faster.  How can pain medicine be given? Following are the many different ways pain medicine can be given depending on the kind of pain you are experiencing.  By mouth: you may be given pills or liquid to swallow or you may be given a pill or liquid to put under your tongue. Some medicine can also be given as a lozenge like a cough drop or even a special lollipop.  Rectal: medicine in a suppository is put into your rectum.  Shot/Injection: pain medicine can be given as a shot/injection into an IV, into a muscle or under the skin  Topical: medicine in a cream or gel is spread over the skin.  Transdermal: medicine given in a patch and put onto the skin. This medicine is released slowly to give pain relief for as long as 72 hours.    How can you take pain medicine safely and make it work best for you? The following are many different ways pain medicine can be given depending on the kind of pain you have.  Some pain medicines can make you breathe less deeply and less often. The medicine may also make you sleepy, dizzy, and unsafe to drive a car or use heavy equipment. For these reasons, it is very important to follow your caregiver’s advice on how to use this medicine.  Sometimes the pain is worse when you first wake up in the morning. This may happened if you did not have enough pain medicine in your blood stream to last through the night. Caregivers may tell you to take a dose of pain medicine during the night.  Some foods, alcohol, and other medicines may cause unpleasant side effects when you take pain medicine. Follow your caregiver’s advice about how to prevent these problems.  Pain medicine can make you constipated. Straining with a BM can make you pain worse. Do not try to push the BM out if it’s too hard. Contact your caregiver if you become constipated.  Other non?drug pain control methods. There are  many pain control techniques that can held you deal with pain even if it does not go away completely. It is important to practice some of the techniques when you don’t have pain if possible. This will help the technique work better during an attack of pain.  Cold and heat: both cold and heat can help lessen some types of post?op pain. Caregivers will tell you if cold and/or hot packs will help your pain.  Distraction: teaches you to focus your attention on something other than pain. Playing cards or games, talking and visiting family may relax you and keep you from thinking about pain.  Hydrotherapy: is a gentle water exercise program. It can strengthen muscles that are not injured and lessen inflammation. Talk to your physical therapist or your caregiver about starting a hydrotherapy program.  Massage  Music  Physical therapy  Rest  Surgery/Nerve blocks  Call your caregiver if you have any of the following problems:  The pain medicine you are taking makes you sleepier than usual or confused  You have new pain or the pain seems different than before  You have constipation  Care agreement: You have the right to help plan your care. To help with this plan you must learn about your pain, what is causing it, and how it can be treated. You can then discuss treatment options with your caregivers. Work with them to decide what care will be used to treat you. You always have the right to refuse treatment.

## 2024-03-20 NOTE — PROGRESS NOTES
Subjective   Patient ID: Tatyana Mckeon is a 69 y.o. female who presents for Acupuncture.    HPI   She presents today for her acupuncture treatment.  She states has been doing better.  She lost 10 pounds on her Lasix and is back down to her usual weight now.  She states she is taking the Lasix.    She did have an injection in her right knee which has helped quite a bit.  She states that she can have her knee replacement 3 months after that.  She is going to be talking to Dr. Carrion, who will be doing her surgery, this month.    She states she was on the hormone blocker and was not tolerating well so was going to switch to a different 1.  However, when she stopped the hormone blocker her psoriatic rash on her wrists and elbow started to improve.  She noticed that her hair was coming back.    Review of Systems    Objective   /74   Pulse 96   Wt 95.5 kg (210 lb 9.6 oz)   SpO2 98%   BMI 38.52 kg/m²     Physical Exam  She is alert, no parishes, her affect is pleasant.  Respirations are easy.  She does have psoriatic plaques on her right wrist and on her elbows.    Assessment/Plan   Diagnoses and all orders for this visit:  Primary osteoarthritis of both knees  Localized edema  She will continue to use Lasix on an as-needed basis.  She wears of every day and she goes up 2 pounds in a day or 3 and 2 days she will take a Lasix and get her weight back down to her baseline weight.    She will continue the acupuncture twice a month.    I asked her to consider that when she got off the hormone blocker her skin started to heal and her hair started to grow back and she started to feel better.  That is, her health improved off of it.  If she is unable to tolerate it she could consider not taking it.        Acupuncture treatment: I used the following points bilaterally.  The knees eyes, LV 2, ST 36, SJ 4, LI 4, LI 14.  These were retained for 20 minutes with heat lamp over her feet.  She tolerated this well.

## 2024-03-22 DIAGNOSIS — R60.0 LOCALIZED EDEMA: ICD-10-CM

## 2024-03-22 NOTE — TELEPHONE ENCOUNTER
Patient calls, regarding the Lasix and her edema, she was told to take if she gain weight, she does not have any  medication and she needs this to be sent to [facility]- she wants to make sure it has instructions for use.   Last seen 03/20/24- she states she has gained 1.5 lbs in 2 days

## 2024-03-23 RX ORDER — FUROSEMIDE 20 MG/1
20-40 TABLET ORAL 2 TIMES DAILY
Qty: 30 TABLET | Refills: 1 | Status: SHIPPED | OUTPATIENT
Start: 2024-03-23 | End: 2024-05-08

## 2024-04-03 ENCOUNTER — OFFICE VISIT (OUTPATIENT)
Dept: PRIMARY CARE | Facility: CLINIC | Age: 69
End: 2024-04-03
Payer: MEDICARE

## 2024-04-03 VITALS — HEART RATE: 86 BPM | DIASTOLIC BLOOD PRESSURE: 76 MMHG | OXYGEN SATURATION: 98 % | SYSTOLIC BLOOD PRESSURE: 124 MMHG

## 2024-04-03 DIAGNOSIS — M54.50 CHRONIC BILATERAL LOW BACK PAIN WITHOUT SCIATICA: Primary | ICD-10-CM

## 2024-04-03 DIAGNOSIS — G89.29 CHRONIC BILATERAL LOW BACK PAIN WITHOUT SCIATICA: Primary | ICD-10-CM

## 2024-04-03 DIAGNOSIS — F17.210 CIGARETTE SMOKER: ICD-10-CM

## 2024-04-03 DIAGNOSIS — R60.0 LOCALIZED EDEMA: ICD-10-CM

## 2024-04-03 PROCEDURE — 3078F DIAST BP <80 MM HG: CPT | Performed by: FAMILY MEDICINE

## 2024-04-03 PROCEDURE — 4010F ACE/ARB THERAPY RXD/TAKEN: CPT | Performed by: FAMILY MEDICINE

## 2024-04-03 PROCEDURE — 3074F SYST BP LT 130 MM HG: CPT | Performed by: FAMILY MEDICINE

## 2024-04-03 PROCEDURE — 1159F MED LIST DOCD IN RCRD: CPT | Performed by: FAMILY MEDICINE

## 2024-04-03 PROCEDURE — 1157F ADVNC CARE PLAN IN RCRD: CPT | Performed by: FAMILY MEDICINE

## 2024-04-03 PROCEDURE — 1125F AMNT PAIN NOTED PAIN PRSNT: CPT | Performed by: FAMILY MEDICINE

## 2024-04-03 PROCEDURE — 97810 ACUP 1/> WO ESTIM 1ST 15 MIN: CPT | Performed by: FAMILY MEDICINE

## 2024-04-03 PROCEDURE — 97811 ACUP 1/> W/O ESTIM EA ADD 15: CPT | Performed by: FAMILY MEDICINE

## 2024-04-03 ASSESSMENT — PAIN SCALES - GENERAL: PAINLEVEL: 5

## 2024-04-03 ASSESSMENT — ENCOUNTER SYMPTOMS
OCCASIONAL FEELINGS OF UNSTEADINESS: 1
DEPRESSION: 0
LOSS OF SENSATION IN FEET: 0

## 2024-04-03 NOTE — PATIENT INSTRUCTIONS
Let me know if have any trouble getting the patches.  The 21 mg dose is a good place to start.  Usually go down a step every month but you can go earlier if you are feeling like you are doing well.    I did put in for a BMP.  Will check that as long as you keep the same dosing, will be good.              PAIN MANAGEMENT IN ADULTS    What is pain? Pain is your body’s way to reacting to injury or illness. Everybody reacts to pain in different ways. What you think is painful may not be painful to someone else. But, pain is whatever you say it is!  What causes pain? Many things, such as an injury, surgery, or a disease can cause pain. Some pain is caused by pressure one a nerve, such as a cancerous tumor or slipped disc. Other pain is caused when nerves are cut as in an accident or surgery. After an injury or surgery you may not want to move the painful part of our body at all. But, you may have pain because you are not moving this body part. Sometimes there is no clear reason for your pain.    What are the different types of pain?  Acute pain is short?lived and lasts less than 3 months. Caregivers help first work to remove the cause of the pain, such as fixing a broken arm. Acute pain usually can be controlled or stopped with pain medicine.  Chronic pain lasts longer than 3?6 months. This kind of pain is often more complicated. Caregivers may use medicine along with other treatments, like self?hypnosis and relaxation to help you learn to deal with the chronic pain.  What is your pain like? Caregivers want you to talk to them about your pain. This helps them learn what may be causing the pain and how best to treat it.  Why is pain control important? Pain can affect your appetite, how well you sleep, your energy and your ability to do things. Pain can also affect your mood and relationship with others. If caregivers can help you control your pain, you will suffer less and can even heal faster.  How can pain medicine be  given? Following are the many different ways pain medicine can be given depending on the kind of pain you are experiencing.  By mouth: you may be given pills or liquid to swallow or you may be given a pill or liquid to put under your tongue. Some medicine can also be given as a lozenge like a cough drop or even a special lollipop.  Rectal: medicine in a suppository is put into your rectum.  Shot/Injection: pain medicine can be given as a shot/injection into an IV, into a muscle or under the skin  Topical: medicine in a cream or gel is spread over the skin.  Transdermal: medicine given in a patch and put onto the skin. This medicine is released slowly to give pain relief for as long as 72 hours.    How can you take pain medicine safely and make it work best for you? The following are many different ways pain medicine can be given depending on the kind of pain you have.  Some pain medicines can make you breathe less deeply and less often. The medicine may also make you sleepy, dizzy, and unsafe to drive a car or use heavy equipment. For these reasons, it is very important to follow your caregiver’s advice on how to use this medicine.  Sometimes the pain is worse when you first wake up in the morning. This may happened if you did not have enough pain medicine in your blood stream to last through the night. Caregivers may tell you to take a dose of pain medicine during the night.  Some foods, alcohol, and other medicines may cause unpleasant side effects when you take pain medicine. Follow your caregiver’s advice about how to prevent these problems.  Pain medicine can make you constipated. Straining with a BM can make you pain worse. Do not try to push the BM out if it’s too hard. Contact your caregiver if you become constipated.  Other non?drug pain control methods. There are many pain control techniques that can held you deal with pain even if it does not go away completely. It is important to practice some of the  techniques when you don’t have pain if possible. This will help the technique work better during an attack of pain.  Cold and heat: both cold and heat can help lessen some types of post?op pain. Caregivers will tell you if cold and/or hot packs will help your pain.  Distraction: teaches you to focus your attention on something other than pain. Playing cards or games, talking and visiting family may relax you and keep you from thinking about pain.  Hydrotherapy: is a gentle water exercise program. It can strengthen muscles that are not injured and lessen inflammation. Talk to your physical therapist or your caregiver about starting a hydrotherapy program.  Massage  Music  Physical therapy  Rest  Surgery/Nerve blocks  Call your caregiver if you have any of the following problems:  The pain medicine you are taking makes you sleepier than usual or confused  You have new pain or the pain seems different than before  You have constipation  Care agreement: You have the right to help plan your care. To help with this plan you must learn about your pain, what is causing it, and how it can be treated. You can then discuss treatment options with your caregivers. Work with them to decide what care will be used to treat you. You always have the right to refuse treatment.

## 2024-04-03 NOTE — PROGRESS NOTES
Subjective   Patient ID: Tatyana Mckeon is a 69 y.o. female who presents for Acupuncture.    HPI   She presents today for her acupuncture treatment.  She states her back pain has been very well-controlled.  She feels she is getting a benefit from the treatments.  Also, she is now taking Lasix every day at 20 mg and sometimes adds a 40 mg dose.  She does that about 3 times a week.  She is able to keep her weight around 205 to 207.    Her surgeon has told her that he will not operate and that she quit smoking so she wants to do that.  She does not want to use the medicines because she seen that they can interfere with her psych medicines and right now she is a good cocktail of that.  She wants to use the patches.  She think she can get these on [employer].  She has been doing some research on that.  She is dedicated to quitting.    Review of Systems    Objective   /76   Pulse 86   SpO2 98%     Physical Exam  Constitutional:       Appearance: Normal appearance. She is not ill-appearing.   Pulmonary:      Effort: Pulmonary effort is normal.   Musculoskeletal:      Right lower leg: No edema.      Left lower leg: No edema.      Comments: Some tenderness with palpation of the lower back.   Neurological:      Mental Status: She is alert.   Psychiatric:         Mood and Affect: Mood normal.         Assessment/Plan   Diagnoses and all orders for this visit:  Chronic bilateral low back pain without sciatica  Localized edema  Cigarette smoker  Doing well with the low back pain.  Will continue treatment.  She will see her chiropractor later today who also works with her knees.    She is going to start the patch, getting this off of [employer].  She let me know if she has any trouble with that.    She will continue with the Lasix at 20 mg a day and then the 40 mg as needed.  Will check a BMP now that she is on this dosing.

## 2024-04-04 DIAGNOSIS — I10 PRIMARY HYPERTENSION: ICD-10-CM

## 2024-04-04 RX ORDER — AMLODIPINE BESYLATE 10 MG/1
10 TABLET ORAL DAILY
Qty: 90 TABLET | Refills: 1 | Status: SHIPPED | OUTPATIENT
Start: 2024-04-04

## 2024-04-08 DIAGNOSIS — B37.31 VAGINAL CANDIDIASIS: ICD-10-CM

## 2024-04-09 RX ORDER — NYSTATIN 100000 U/G
CREAM TOPICAL
Qty: 60 G | Refills: 0 | Status: SHIPPED | OUTPATIENT
Start: 2024-04-09 | End: 2024-05-21

## 2024-04-12 ENCOUNTER — TELEPHONE (OUTPATIENT)
Dept: ADMISSION | Facility: HOSPITAL | Age: 69
End: 2024-04-12
Payer: MEDICARE

## 2024-04-12 NOTE — TELEPHONE ENCOUNTER
Pt having labs drawn by PCP office on 4/24 (CBC and CMP) wants to ensure oncology team does not want any other labs ordered.     Pt has been on exemestane x 3 weeks. Overall doing well- no weight issues, no further hair loss, hot flashes continue but are manageable . Pt having some drowsiness issues mid day and early evening- pharmacist advised she can take exemestane early evening/at night to see if that helps- as long as same time daily.     Pt has FUV on 7/10 with mammogram on 6/19.

## 2024-04-17 ENCOUNTER — APPOINTMENT (OUTPATIENT)
Dept: PRIMARY CARE | Facility: CLINIC | Age: 69
End: 2024-04-17
Payer: MEDICARE

## 2024-04-24 ENCOUNTER — E-VISIT (OUTPATIENT)
Dept: PRIMARY CARE | Facility: CLINIC | Age: 69
End: 2024-04-24
Payer: MEDICARE

## 2024-04-24 DIAGNOSIS — I10 PRIMARY HYPERTENSION: Primary | ICD-10-CM

## 2024-04-26 ENCOUNTER — LAB (OUTPATIENT)
Dept: LAB | Facility: LAB | Age: 69
End: 2024-04-26
Payer: MEDICARE

## 2024-04-26 ENCOUNTER — OFFICE VISIT (OUTPATIENT)
Dept: PRIMARY CARE | Facility: CLINIC | Age: 69
End: 2024-04-26

## 2024-04-26 VITALS — HEART RATE: 72 BPM | OXYGEN SATURATION: 98 % | SYSTOLIC BLOOD PRESSURE: 122 MMHG | DIASTOLIC BLOOD PRESSURE: 68 MMHG

## 2024-04-26 DIAGNOSIS — Z17.0 MALIGNANT NEOPLASM OF OVERLAPPING SITES OF LEFT BREAST IN FEMALE, ESTROGEN RECEPTOR POSITIVE (MULTI): ICD-10-CM

## 2024-04-26 DIAGNOSIS — C50.812 MALIGNANT NEOPLASM OF OVERLAPPING SITES OF LEFT BREAST IN FEMALE, ESTROGEN RECEPTOR POSITIVE (MULTI): ICD-10-CM

## 2024-04-26 DIAGNOSIS — G89.29 CHRONIC BILATERAL LOW BACK PAIN WITHOUT SCIATICA: Primary | ICD-10-CM

## 2024-04-26 DIAGNOSIS — I10 PRIMARY HYPERTENSION: ICD-10-CM

## 2024-04-26 DIAGNOSIS — M54.50 CHRONIC BILATERAL LOW BACK PAIN WITHOUT SCIATICA: Primary | ICD-10-CM

## 2024-04-26 LAB
ALBUMIN SERPL BCP-MCNC: 4.3 G/DL (ref 3.4–5)
ALP SERPL-CCNC: 70 U/L (ref 33–136)
ALT SERPL W P-5'-P-CCNC: 10 U/L (ref 7–45)
ANION GAP SERPL CALC-SCNC: 13 MMOL/L (ref 10–20)
AST SERPL W P-5'-P-CCNC: 13 U/L (ref 9–39)
BASOPHILS # BLD AUTO: 0.05 X10*3/UL (ref 0–0.1)
BASOPHILS NFR BLD AUTO: 0.5 %
BILIRUB SERPL-MCNC: 0.3 MG/DL (ref 0–1.2)
BUN SERPL-MCNC: 25 MG/DL (ref 6–23)
CALCIUM SERPL-MCNC: 9.4 MG/DL (ref 8.6–10.3)
CHLORIDE SERPL-SCNC: 106 MMOL/L (ref 98–107)
CO2 SERPL-SCNC: 26 MMOL/L (ref 21–32)
CREAT SERPL-MCNC: 0.76 MG/DL (ref 0.5–1.05)
EGFRCR SERPLBLD CKD-EPI 2021: 84 ML/MIN/1.73M*2
EOSINOPHIL # BLD AUTO: 0.3 X10*3/UL (ref 0–0.4)
EOSINOPHIL NFR BLD AUTO: 3.3 %
ERYTHROCYTE [DISTWIDTH] IN BLOOD BY AUTOMATED COUNT: 12.7 % (ref 11.5–14.5)
GLUCOSE SERPL-MCNC: 99 MG/DL (ref 74–99)
HCT VFR BLD AUTO: 42 % (ref 36–46)
HGB BLD-MCNC: 14 G/DL (ref 12–16)
IMM GRANULOCYTES # BLD AUTO: 0.03 X10*3/UL (ref 0–0.5)
IMM GRANULOCYTES NFR BLD AUTO: 0.3 % (ref 0–0.9)
LYMPHOCYTES # BLD AUTO: 2.05 X10*3/UL (ref 0.8–3)
LYMPHOCYTES NFR BLD AUTO: 22.2 %
MCH RBC QN AUTO: 32.1 PG (ref 26–34)
MCHC RBC AUTO-ENTMCNC: 33.3 G/DL (ref 32–36)
MCV RBC AUTO: 96 FL (ref 80–100)
MONOCYTES # BLD AUTO: 0.63 X10*3/UL (ref 0.05–0.8)
MONOCYTES NFR BLD AUTO: 6.8 %
NEUTROPHILS # BLD AUTO: 6.16 X10*3/UL (ref 1.6–5.5)
NEUTROPHILS NFR BLD AUTO: 66.9 %
NRBC BLD-RTO: 0 /100 WBCS (ref 0–0)
PLATELET # BLD AUTO: 294 X10*3/UL (ref 150–450)
POTASSIUM SERPL-SCNC: 4.6 MMOL/L (ref 3.5–5.3)
PROT SERPL-MCNC: 7 G/DL (ref 6.4–8.2)
RBC # BLD AUTO: 4.36 X10*6/UL (ref 4–5.2)
SODIUM SERPL-SCNC: 140 MMOL/L (ref 136–145)
WBC # BLD AUTO: 9.2 X10*3/UL (ref 4.4–11.3)

## 2024-04-26 PROCEDURE — 36415 COLL VENOUS BLD VENIPUNCTURE: CPT

## 2024-04-26 PROCEDURE — 1125F AMNT PAIN NOTED PAIN PRSNT: CPT | Performed by: FAMILY MEDICINE

## 2024-04-26 PROCEDURE — 1159F MED LIST DOCD IN RCRD: CPT | Performed by: FAMILY MEDICINE

## 2024-04-26 PROCEDURE — 97811 ACUP 1/> W/O ESTIM EA ADD 15: CPT | Performed by: FAMILY MEDICINE

## 2024-04-26 PROCEDURE — 3078F DIAST BP <80 MM HG: CPT | Performed by: FAMILY MEDICINE

## 2024-04-26 PROCEDURE — 4010F ACE/ARB THERAPY RXD/TAKEN: CPT | Performed by: FAMILY MEDICINE

## 2024-04-26 PROCEDURE — 1157F ADVNC CARE PLAN IN RCRD: CPT | Performed by: FAMILY MEDICINE

## 2024-04-26 PROCEDURE — 97810 ACUP 1/> WO ESTIM 1ST 15 MIN: CPT | Performed by: FAMILY MEDICINE

## 2024-04-26 PROCEDURE — 3074F SYST BP LT 130 MM HG: CPT | Performed by: FAMILY MEDICINE

## 2024-04-26 ASSESSMENT — ENCOUNTER SYMPTOMS
OCCASIONAL FEELINGS OF UNSTEADINESS: 1
DEPRESSION: 0
LOSS OF SENSATION IN FEET: 0

## 2024-04-26 ASSESSMENT — PAIN SCALES - GENERAL: PAINLEVEL: 4

## 2024-04-28 NOTE — PROGRESS NOTES
Subjective   Patient ID: Tatyana Mckeon is a 69 y.o. female who presents for Acupuncture.    HPI   Presents today for her acupuncture treatment.  She would like to treat the back pain as well as the overall health.  She states she is put on for knee surgery till she sure things are okay with her breast.  She is following with Dr. Gutiérrez for that and will have a recheck early in June.  If that is okay then she will go ahead and schedule the knee surgery.  In the meantime, she is looking to quit smoking.  She did get some 21 mg nicotine patches which she is going to try.    Review of Systems    Objective   /68   Pulse 72   SpO2 98%     Physical Exam  She is alert, no apparent distress, her affect is pleasant.  She does have some tenderness in the paraspinal muscles of the back and even over the spine itself.    Assessment/Plan   Diagnoses and all orders for this visit:  Chronic bilateral low back pain without sciatica  Malignant neoplasm of overlapping sites of left breast in female, estrogen receptor positive (Multi)  Will continue her current treatment.  She will continue to follow-up with Dr. Gutiérrez.  She will start the nicotine patches and let me know if she has any issues with those.  She is dedicated to quitting smoking and improving her health.    Acupuncture treatment: I used the following points bilaterally ST 36, ST 40, GB 43, LV 2, LI 4.  These were retained for 20 minutes.  She tolerated this well.

## 2024-05-01 ENCOUNTER — TELEPHONE (OUTPATIENT)
Dept: HEMATOLOGY/ONCOLOGY | Facility: HOSPITAL | Age: 69
End: 2024-05-01
Payer: MEDICARE

## 2024-05-01 ENCOUNTER — APPOINTMENT (OUTPATIENT)
Dept: PRIMARY CARE | Facility: CLINIC | Age: 69
End: 2024-05-01
Payer: MEDICARE

## 2024-05-01 NOTE — TELEPHONE ENCOUNTER
Tatyana is calling to let Dr. Hannon know that she had a CMP and CBD drawn.  She wanted to make sure you saw the results.  Ordered by the primary and he is also happy with results.      She says her hair is better.    She says she is seeing weight gain and has been on exemestane for 6 weeks- does not feel she is swelling.    She is bipolar per her report.  She states she is more fatigued and depressed.  She feels it is not severe but noticed it is more often than usual for her.  She is also speaking to her psychiatrist about this too.      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  Patient is wondering if the NP she works with can continue to order anastrozole because they are federally funded and the cost would only be $9 per month.   The NP she works with is Mariaelena Bone [phone]    Message sent to the team.

## 2024-05-01 NOTE — TELEPHONE ENCOUNTER
Per Dr. Hannon:  Please let her know I reviewed labs, and they look good to me.   If she would prefer to go back to anastrozole, I'm completely fine with that.   And yes, if it works better for the NP to order the medication, that's also totally fine with me.   Call to patient to let her know above.  She was able verbalize the plan.    She did say that she had mentioned the anastrozole but that the NP did continue the exemestane.  She was mentioning that the anastrozole was cheaper but she is still on exemestane.  She is also able to get the exemestane cheaper.      Message sent to the team

## 2024-05-01 NOTE — PROGRESS NOTES
Subjective   Patient ID: Tatyana Mckeon is a 69 y.o. female who presents for c/o breast soreness and hard tissue.      HPI    Last seen in office on 3/15/2024. Patient had palpable mass associated with lumpectomy scar right breast likely consistent with scar tissue or seroma as patient does have a chronic seroma formation requiring treatment in the past.   Patient comes in again today because she believes that the right breast is getting worse.  She feels a bandlike thickening across the entire right breast.  No significant pain but tender with palpation.  No new breast imaging completed- mammogram scheduled for 6/19/2024.  Dr. Tineo last seen 1/24/2024  Continuing Exemestane    Review of Systems   Constitutional:  Negative for appetite change, fever and unexpected weight change.   HENT:  Negative for congestion and trouble swallowing.    Respiratory:  Negative for cough and shortness of breath.    Cardiovascular:  Negative for chest pain and palpitations.   Gastrointestinal:  Negative for abdominal pain, diarrhea and nausea.   Genitourinary:  Negative for difficulty urinating and dysuria.   Musculoskeletal:  Negative for back pain and gait problem.   Skin:  Negative for color change and rash.   Neurological:  Negative for dizziness, speech difficulty and headaches.   Hematological:  Does not bruise/bleed easily.   Psychiatric/Behavioral:  Negative for confusion and suicidal ideas.        Past Surgical History:   Procedure Laterality Date    AXILLARY NODE DISSECTION  08/16/2023    Right Axillary Dissection and Evacuation of a right breast hematoma    BI MAMMO GUIDED LOCALIZATION BREAST RIGHT Right 07/21/2023    Wire localization lumpectomy right breast with sentinel node biopsy    BREAST LUMPECTOMY  2000    Left breast    HYSTERECTOMY  2014    LAPAROTOMY OOPHERECTOMY  2014    US GUIDED BIOPSY LYMPH NODE SUPERFICIAL  06/07/2023    Ultrasound guided right breast and axillary lymph node biopsy     Social History:  Tobacco  "Use - yes  Do you use any recreational drugs - no  Alcohol Use - rare  Caffeine Intake - occasional  Working - employed  Marital status -   Living with - alone  Allergies   Allergen Reactions    Pollen Extracts Itching    Cephalexin Diarrhea    Naproxen Unknown     Family History   Problem Relation Name Age of Onset    Stroke Mother      Other (high blood pressure) Father      Prostate cancer Father      Heart disease Father          with MI at age 71    Diabetes Father      Hypertension Father      Diabetes Sister      Hypertension Sister      Other (oral cancer) Brother      Hypertension Brother       Past Medical History:   Diagnosis Date    Bipolar 1 disorder (Multi)     Breast cancer (Multi) 06/2023    Right Breast - IDC and DCIS    Depression     Diabetes mellitus (Multi)     Type 2    Endometrial cancer (Multi)     Hyperlipidemia     Hypertension     Psoriasis          Objective   Physical Exam  Physical Exam:  /79 (BP Location: Left arm, Patient Position: Sitting)   Pulse 95   Temp 36.4 °C (97.5 °F)   Ht 1.575 m (5' 2\")   Wt 96.2 kg (212 lb) Comment: per pt/ uses own scale  SpO2 97%   BMI 38.78 kg/m²    GENERAL APPEARANCE: Patient appears in no acute distress.   EYES: Sclera non-icteric, PERRLA.   ENT Normal appearance of ears and nose.   NECK/THYROID: Neck: no masses. Thyroid: no masses.   LYMPH NODES: No cervical or supraclavicular lymphadenopathy.   CARDIOVASCULAR Heart: RRR, no murmurs; Carotid bruits: none; Peripheral edema: none.   RESPIRATORY: Lungs: Bilateral inspiratory and expiratory wheezing; no respiratory distress.   BREASTS: Exam done both in upright and supine position. On inspection   No skin dimpling but patient with peau d'orange and mild erythema right breast .  At this point this is very consistent with lymphedema of the right breast..  Lumpectomy scar from remote surgery has mild dimpling.; Masses:   Palpable thickening associated with the lumpectomy scar on the right " breast,   This may be slightly larger and corresponds to likely a seroma.  Less likely scar tissue...  Axillary lymphadenopathy: none.   GI (ABDOMEN) No intraabdominal mass appreciated, no hepatosplenomegaly; Hernia: none; Tenderness: none.   PSYCH: Patient oriented to time, place and person, normal affect.        Assessment/Plan   Problem List Items Addressed This Visit             ICD-10-CM    Breast cancer (Multi) C50.919       Patient with lump associated with lumpectomy scar slightly more pronounced today than with her last visit.  Likely a seroma.  May be scar tissue.  Will add ultrasound to her mammogram.  Imaging due mid June we will see her in July.         Relevant Orders     US breast limited right    Lymphedema - Primary I89.0      generic lymphedema-patient with lymphedema of the right breast mild erythema and peau d'orange.  Recommend massage therapy.  Reassurance given.         Relevant Orders    Referral to Physical Medicine Rehab     Breast History:    Patient presents to office today for evaluation of axillary wound.   Patient saw Dr. Tineo on 09/13/2023, Dr. Tineo recommended she come back into office to have area evaluated and consider drainage of the breast.   Dr. Tineo recommends radiation treatment to the breast alone to a dose of 42.56 Gy followed by consideration of a 10 Gy in 4 fractions lumpectomy bed boost.  Patient had a diagnostic bilateral mammogram with 3D HOWIE on 05/26/2023, with no previous imaging it demonstrated; scattered fibroglandular densities of the breasts seen bilaterally. Within the right breast there is a spiculated mass measuring about 2.6 cm seen at the 6 o'clock position of the right breast at the inframammary fold, about 11 cm from the nipple appears intimately associated with the chest wall, skin dimpling is noted within this region.   There is an area of skin dimpling along the upper-outer quadrant of the left breast with associated 5 cm focal asymmetry with areas  of heterogenerous calcifications about 12 cm from nipple, likely fat necrosis from patient's previous surgery. Bilateral breast ultrasound recommended.  Patient had ultrasound of bilateral breasts on 0526/2023 it demonstrated;   Right breast 6 o'clock position there is 2.5 X 1.9 X 1.8 cm hypoechoic mass with angulated margins and posterior acoustic shadowing, appears to extend to the dermis. In addition, there is an abnormal right axillary lymph node seen with asymmetric cortex measuring 4mm.  Left breast reveals no suspicious breast masses idenifted, some normal breast tissue can be seen, likely an area of fat necrosis.   Category 5: highly suggestive of malignancy. Recommend ultrasound guided biopsy of right breast mass and right axillary lymph node.   Patient 1st felt the mass on mother's Day of this year. She denies any nipple discharge. She has a history of open biopsy remotely for benign disease in the left breast  Patient is status post ultrasound guided biopsy of right breast and axillary lymph node on 06/07/2023.   Pathology revealing right breast biopsy; invasive ductal carcinoma grade 2 of 3 with asscoicated microcalifications. ER-positive, CA - positive and HER2/katie- equivocal 2+.   Right axillary lymph node biopsy pathology revealing; portion of lymph node, negative of carcinoma.   Imaging is concordant.  Patient is status post wire localization lumpectomy right breast with sentinel node biopsy on 07/21/2023.   Pathology revealed right breast lumpectomy at 6 o'clock position; invasive ductal carcinoma measuring 3 cm grade 3 of 3, margin is 1 mm from the closest anterior margin. DCIS present, cribriform and solid types, intermediate to high nuclear grade, margins negative.  Right breast sentinel node biopsy revealed; one lymph node positive for metastasis, tumor measures 8 mm.  Patient presents to office today for her post operative appointment and drain removal.  Patient is status post right axillary  regional axillary dissection and evacuation of a right breast hematoma with KELSEY drain placement 08/16/2023.  Pathology revealed right axillary contents; fifteen lymph nodes, negative for metastasis.   Patient saw Dr. Tineo on 09/13/2023, Dr. Tineo recommended she come back into office to have area evaluated and consider drainage of the breast. Ultrasound ordered- seroma present; drain placement scheduled but not completed due to small size of seroma.   Adjuvant RT completed 12/15/23 with .  Anastrozole initiated 10/2023 with Dr. Hannon.    Previous Biopsy: Yes, Left, Breast, Benign. Menarche age: 11. Age of first delivery: Nulliparous. Breastfeed: N/A. Menopause age: hysterectomy at 2014 at 59. HRT: No. Family history of breast cancer: No. Family history of ovarian cancer: No. Family history of pancreatic cancer: No.        Olivia Boone MD 05/03/24 11:44 AM

## 2024-05-02 ASSESSMENT — ENCOUNTER SYMPTOMS
NAUSEA: 0
PALPITATIONS: 0
SHORTNESS OF BREATH: 0
BRUISES/BLEEDS EASILY: 0
HEADACHES: 0
FEVER: 0
COUGH: 0
APPETITE CHANGE: 0
CONFUSION: 0
COLOR CHANGE: 0
BACK PAIN: 0
SPEECH DIFFICULTY: 0
UNEXPECTED WEIGHT CHANGE: 0
DIFFICULTY URINATING: 0
DYSURIA: 0
TROUBLE SWALLOWING: 0
DIARRHEA: 0
DIZZINESS: 0
ABDOMINAL PAIN: 0

## 2024-05-03 ENCOUNTER — OFFICE VISIT (OUTPATIENT)
Dept: SURGERY | Facility: CLINIC | Age: 69
End: 2024-05-03
Payer: MEDICARE

## 2024-05-03 VITALS
HEART RATE: 95 BPM | DIASTOLIC BLOOD PRESSURE: 79 MMHG | TEMPERATURE: 97.5 F | WEIGHT: 212 LBS | OXYGEN SATURATION: 97 % | HEIGHT: 62 IN | BODY MASS INDEX: 39.01 KG/M2 | SYSTOLIC BLOOD PRESSURE: 109 MMHG

## 2024-05-03 DIAGNOSIS — Z17.0 MALIGNANT NEOPLASM OF OVERLAPPING SITES OF LEFT BREAST IN FEMALE, ESTROGEN RECEPTOR POSITIVE (MULTI): ICD-10-CM

## 2024-05-03 DIAGNOSIS — C50.812 MALIGNANT NEOPLASM OF OVERLAPPING SITES OF LEFT BREAST IN FEMALE, ESTROGEN RECEPTOR POSITIVE (MULTI): ICD-10-CM

## 2024-05-03 DIAGNOSIS — I89.0 LYMPHEDEMA: Primary | ICD-10-CM

## 2024-05-03 PROBLEM — N64.4 BREAST PAIN, RIGHT: Status: ACTIVE | Noted: 2024-05-03

## 2024-05-03 PROCEDURE — 3074F SYST BP LT 130 MM HG: CPT | Performed by: SURGERY

## 2024-05-03 PROCEDURE — 4010F ACE/ARB THERAPY RXD/TAKEN: CPT | Performed by: SURGERY

## 2024-05-03 PROCEDURE — 99214 OFFICE O/P EST MOD 30 MIN: CPT | Performed by: SURGERY

## 2024-05-03 PROCEDURE — 1159F MED LIST DOCD IN RCRD: CPT | Performed by: SURGERY

## 2024-05-03 PROCEDURE — 1125F AMNT PAIN NOTED PAIN PRSNT: CPT | Performed by: SURGERY

## 2024-05-03 PROCEDURE — 3078F DIAST BP <80 MM HG: CPT | Performed by: SURGERY

## 2024-05-03 PROCEDURE — 1157F ADVNC CARE PLAN IN RCRD: CPT | Performed by: SURGERY

## 2024-05-03 ASSESSMENT — PATIENT HEALTH QUESTIONNAIRE - PHQ9
SUM OF ALL RESPONSES TO PHQ9 QUESTIONS 1 AND 2: 2
1. LITTLE INTEREST OR PLEASURE IN DOING THINGS: SEVERAL DAYS
2. FEELING DOWN, DEPRESSED OR HOPELESS: SEVERAL DAYS

## 2024-05-03 ASSESSMENT — PAIN SCALES - GENERAL: PAINLEVEL: 4

## 2024-05-03 NOTE — ASSESSMENT & PLAN NOTE
generic lymphedema-patient with lymphedema of the right breast mild erythema and peau d'orange.  Recommend massage therapy.  Reassurance given.

## 2024-05-03 NOTE — ASSESSMENT & PLAN NOTE
Patient with lump associated with lumpectomy scar slightly more pronounced today than with her last visit.  Likely a seroma.  May be scar tissue.  Will add ultrasound to her mammogram.  Imaging due mid June we will see her in July.

## 2024-05-07 PROBLEM — L65.9 ALOPECIA: Status: ACTIVE | Noted: 2024-05-07

## 2024-05-08 ENCOUNTER — OFFICE VISIT (OUTPATIENT)
Dept: PRIMARY CARE | Facility: CLINIC | Age: 69
End: 2024-05-08

## 2024-05-08 VITALS — DIASTOLIC BLOOD PRESSURE: 72 MMHG | HEART RATE: 94 BPM | SYSTOLIC BLOOD PRESSURE: 128 MMHG | OXYGEN SATURATION: 95 %

## 2024-05-08 DIAGNOSIS — R60.0 LOCALIZED EDEMA: ICD-10-CM

## 2024-05-08 DIAGNOSIS — I89.0 LYMPHEDEMA: Primary | ICD-10-CM

## 2024-05-08 DIAGNOSIS — M54.50 CHRONIC BILATERAL LOW BACK PAIN WITHOUT SCIATICA: ICD-10-CM

## 2024-05-08 DIAGNOSIS — R60.0 LOWER EXTREMITY EDEMA: ICD-10-CM

## 2024-05-08 DIAGNOSIS — G89.29 CHRONIC BILATERAL LOW BACK PAIN WITHOUT SCIATICA: ICD-10-CM

## 2024-05-08 PROCEDURE — 3078F DIAST BP <80 MM HG: CPT | Performed by: FAMILY MEDICINE

## 2024-05-08 PROCEDURE — 3074F SYST BP LT 130 MM HG: CPT | Performed by: FAMILY MEDICINE

## 2024-05-08 PROCEDURE — 97810 ACUP 1/> WO ESTIM 1ST 15 MIN: CPT | Performed by: FAMILY MEDICINE

## 2024-05-08 PROCEDURE — 1157F ADVNC CARE PLAN IN RCRD: CPT | Performed by: FAMILY MEDICINE

## 2024-05-08 PROCEDURE — 1125F AMNT PAIN NOTED PAIN PRSNT: CPT | Performed by: FAMILY MEDICINE

## 2024-05-08 PROCEDURE — 1159F MED LIST DOCD IN RCRD: CPT | Performed by: FAMILY MEDICINE

## 2024-05-08 PROCEDURE — 4010F ACE/ARB THERAPY RXD/TAKEN: CPT | Performed by: FAMILY MEDICINE

## 2024-05-08 PROCEDURE — 97811 ACUP 1/> W/O ESTIM EA ADD 15: CPT | Performed by: FAMILY MEDICINE

## 2024-05-08 RX ORDER — FUROSEMIDE 20 MG/1
20-40 TABLET ORAL 2 TIMES DAILY
Qty: 90 TABLET | Refills: 1 | Status: SHIPPED | OUTPATIENT
Start: 2024-05-08 | End: 2025-05-08

## 2024-05-08 ASSESSMENT — PAIN SCALES - GENERAL: PAINLEVEL: 5

## 2024-05-08 ASSESSMENT — PATIENT HEALTH QUESTIONNAIRE - PHQ9
1. LITTLE INTEREST OR PLEASURE IN DOING THINGS: SEVERAL DAYS
2. FEELING DOWN, DEPRESSED OR HOPELESS: SEVERAL DAYS
SUM OF ALL RESPONSES TO PHQ9 QUESTIONS 1 AND 2: 2
10. IF YOU CHECKED OFF ANY PROBLEMS, HOW DIFFICULT HAVE THESE PROBLEMS MADE IT FOR YOU TO DO YOUR WORK, TAKE CARE OF THINGS AT HOME, OR GET ALONG WITH OTHER PEOPLE: SOMEWHAT DIFFICULT

## 2024-05-08 ASSESSMENT — ENCOUNTER SYMPTOMS
OCCASIONAL FEELINGS OF UNSTEADINESS: 1
DEPRESSION: 0
LOSS OF SENSATION IN FEET: 0

## 2024-05-08 NOTE — PROGRESS NOTES
Subjective   Patient ID: Tatyana Mckeon is a 69 y.o. female who presents for acupuncture.    HPI   She presents for acupuncture treatment.  States her back pain has been okay.  Maybe now she has some lymphedema in the lower breast.  She did have a lymph node dissection.  There was a positive nodes with a 1 and more.  This was on the right side.    She is on the estrogen blocker, the new 1, this is doing well with that.  She states that last side effects with it and was not sure what to do.  She may go back to the old one.    Been having more edema in the lower extremities as well.  She is taking the Lasix more as needed.    Review of Systems    Objective   /72   Pulse 94   SpO2 95%     Physical Exam  Constitutional:       Appearance: Normal appearance. She is not ill-appearing.   Cardiovascular:      Rate and Rhythm: Normal rate.   Pulmonary:      Effort: Pulmonary effort is normal.   Musculoskeletal:      Right lower leg: Edema (Mild) present.      Left lower leg: Edema (Mild) present.   Skin:     General: Skin is warm and dry.   Neurological:      Mental Status: She is alert.   Psychiatric:         Mood and Affect: Mood normal.         Behavior: Behavior normal.         Assessment/Plan   Diagnoses and all orders for this visit:  Lymphedema  Lower extremity edema  Chronic bilateral low back pain without sciatica  Treated the pericardium Mouradian today to see if we can clean up some of this lymphedema.  She will discuss the estrogen blocker with her oncologist.  She will discuss pacifically the absolute risk reduction with taking the so she can assess her risk much better since she has so many side effects on these.    Acupuncture treatment: I used LV 2, GB 43, PC 3, PC 6, XL heart point bilaterally.  These retained for 15 minutes.  I then placed needles at BL 40, 57 and 60 bilaterally.  He is retained for 15 minutes.  She tolerated this well.

## 2024-05-09 ENCOUNTER — NURSE TRIAGE (OUTPATIENT)
Dept: ADMISSION | Facility: HOSPITAL | Age: 69
End: 2024-05-09
Payer: MEDICARE

## 2024-05-09 DIAGNOSIS — C50.911 INFILTRATING DUCTAL CARCINOMA OF RIGHT BREAST, STAGE 2 (MULTI): Primary | ICD-10-CM

## 2024-05-09 RX ORDER — ANASTROZOLE 1 MG/1
1 TABLET ORAL DAILY
Qty: 90 TABLET | Refills: 3 | Status: SHIPPED | OUTPATIENT
Start: 2024-05-09 | End: 2024-06-05 | Stop reason: SINTOL

## 2024-05-09 NOTE — TELEPHONE ENCOUNTER
Tatyana aware that Dr. Hannon is sending in the Anastrozole today, however, he would like her to wait 3-4 weeks before starting.  She will stop the Exemestane today and not take anything for 3-4 weeks to ensure she is feeling better prior to starting the Anastrozole.   Patient verbalized understanding and has no further questions or concerns at this time.

## 2024-05-09 NOTE — TELEPHONE ENCOUNTER
Tatyana called to discuss switching back to Anastrozole.  She was taken off of the Anastrozole for hair loss and has been on the Exemestane for about seven weeks per patient.    She is bipolar and feels the Exemestane is causing her to feel more depressed than normal.  She follows closely with a psychiatrist. She is currently taking Paxil 20mg daily, Wellbutrin 300mg XR daily and the following medications at night: Gabapentin 400mg Lamictal 100mg, Topamax 25mg and Seroquel 12.5mg.     She has been not wanting to go out and do anything on the weekends, has loss of interest, sleeping more.  No feeling of hopelessness or thoughts of harming herself/others.   She feels her psych meds have been working well and did not feel this way on the Anastrozole.     She wanted team to be aware that she was diagnosed with right breast lymphedema on 5/3 by Dr. Boone. She will be going for breast massage on 5/21.     She also c/o swelling in her left leg. Her PCP, Dr. Wilcox, is aware and performed acupuncture on this yesterday. She states her PCP examined the leg and does not feel it is a DVT. It is not red/warm/painful. He is following her closely for this.     Dr. Wilcox is also having Tatyana weigh herself daily and prescribed her Lasix to take as needed.     Asking for Anastrozole to be sent to [facility] on file.     Additional Information   Is there a support person present?     She lives alone, however, she does work part-time and she has a good support system at work per patient.   Commented on: What area is affected? (The most common areas of obstruction are the pelvic, inguinal, and axillary nodes)     Right breast and left leg   Commented on: In the last two weeks, have you had any of these problems almost every day?     Smoking more cigarettes than normal   Commented on: Who do you rely on for help? Are they nearby and able to help you when needed?     Nieces/nephews/coworkers    Protocols used: Lymphedema, Depressed  Mood

## 2024-05-19 DIAGNOSIS — B37.31 VAGINAL CANDIDIASIS: ICD-10-CM

## 2024-05-21 ENCOUNTER — APPOINTMENT (OUTPATIENT)
Dept: PHYSICAL MEDICINE AND REHAB | Facility: CLINIC | Age: 69
End: 2024-05-21
Payer: MEDICARE

## 2024-05-21 RX ORDER — NYSTATIN 100000 U/G
CREAM TOPICAL 2 TIMES DAILY
Qty: 60 G | Refills: 0 | Status: SHIPPED | OUTPATIENT
Start: 2024-05-21

## 2024-05-22 ENCOUNTER — OFFICE VISIT (OUTPATIENT)
Dept: PRIMARY CARE | Facility: CLINIC | Age: 69
End: 2024-05-22

## 2024-05-22 VITALS
SYSTOLIC BLOOD PRESSURE: 126 MMHG | DIASTOLIC BLOOD PRESSURE: 68 MMHG | HEART RATE: 87 BPM | BODY MASS INDEX: 38.67 KG/M2 | OXYGEN SATURATION: 97 % | WEIGHT: 211.4 LBS

## 2024-05-22 VITALS
HEART RATE: 87 BPM | OXYGEN SATURATION: 97 % | WEIGHT: 211.4 LBS | BODY MASS INDEX: 38.67 KG/M2 | SYSTOLIC BLOOD PRESSURE: 126 MMHG | DIASTOLIC BLOOD PRESSURE: 68 MMHG

## 2024-05-22 DIAGNOSIS — M54.40 CHRONIC RIGHT-SIDED LOW BACK PAIN WITH SCIATICA, SCIATICA LATERALITY UNSPECIFIED: Primary | ICD-10-CM

## 2024-05-22 DIAGNOSIS — F31.9 BIPOLAR 1 DISORDER (MULTI): ICD-10-CM

## 2024-05-22 DIAGNOSIS — I89.0 LYMPHEDEMA: ICD-10-CM

## 2024-05-22 DIAGNOSIS — L40.9 PSORIASIS: ICD-10-CM

## 2024-05-22 DIAGNOSIS — K21.9 GASTROESOPHAGEAL REFLUX DISEASE, UNSPECIFIED WHETHER ESOPHAGITIS PRESENT: ICD-10-CM

## 2024-05-22 DIAGNOSIS — E11.9 TYPE 2 DIABETES MELLITUS WITHOUT COMPLICATION, WITHOUT LONG-TERM CURRENT USE OF INSULIN (MULTI): Primary | ICD-10-CM

## 2024-05-22 DIAGNOSIS — I10 PRIMARY HYPERTENSION: ICD-10-CM

## 2024-05-22 DIAGNOSIS — G89.29 CHRONIC RIGHT-SIDED LOW BACK PAIN WITH SCIATICA, SCIATICA LATERALITY UNSPECIFIED: Primary | ICD-10-CM

## 2024-05-22 LAB — POC HEMOGLOBIN A1C: 6.5 % (ref 4.2–6.5)

## 2024-05-22 PROCEDURE — 97810 ACUP 1/> WO ESTIM 1ST 15 MIN: CPT | Performed by: FAMILY MEDICINE

## 2024-05-22 PROCEDURE — 3074F SYST BP LT 130 MM HG: CPT | Performed by: FAMILY MEDICINE

## 2024-05-22 PROCEDURE — 1157F ADVNC CARE PLAN IN RCRD: CPT | Performed by: FAMILY MEDICINE

## 2024-05-22 PROCEDURE — 1159F MED LIST DOCD IN RCRD: CPT | Performed by: FAMILY MEDICINE

## 2024-05-22 PROCEDURE — 1125F AMNT PAIN NOTED PAIN PRSNT: CPT | Performed by: FAMILY MEDICINE

## 2024-05-22 PROCEDURE — 3078F DIAST BP <80 MM HG: CPT | Performed by: FAMILY MEDICINE

## 2024-05-22 PROCEDURE — 97811 ACUP 1/> W/O ESTIM EA ADD 15: CPT | Performed by: FAMILY MEDICINE

## 2024-05-22 PROCEDURE — 99214 OFFICE O/P EST MOD 30 MIN: CPT | Performed by: FAMILY MEDICINE

## 2024-05-22 PROCEDURE — 83036 HEMOGLOBIN GLYCOSYLATED A1C: CPT | Performed by: FAMILY MEDICINE

## 2024-05-22 PROCEDURE — 1160F RVW MEDS BY RX/DR IN RCRD: CPT | Performed by: FAMILY MEDICINE

## 2024-05-22 ASSESSMENT — PAIN SCALES - GENERAL
PAINLEVEL: 3
PAINLEVEL: 3

## 2024-05-22 ASSESSMENT — PATIENT HEALTH QUESTIONNAIRE - PHQ9
1. LITTLE INTEREST OR PLEASURE IN DOING THINGS: NOT AT ALL
2. FEELING DOWN, DEPRESSED OR HOPELESS: NOT AT ALL
2. FEELING DOWN, DEPRESSED OR HOPELESS: NOT AT ALL
SUM OF ALL RESPONSES TO PHQ9 QUESTIONS 1 AND 2: 0
1. LITTLE INTEREST OR PLEASURE IN DOING THINGS: NOT AT ALL
SUM OF ALL RESPONSES TO PHQ9 QUESTIONS 1 AND 2: 0

## 2024-05-22 ASSESSMENT — ENCOUNTER SYMPTOMS
HEADACHES: 0
DEPRESSION: 0
OCCASIONAL FEELINGS OF UNSTEADINESS: 1
ACTIVITY CHANGE: 0
CHEST TIGHTNESS: 0
COUGH: 0
PALPITATIONS: 0
DIZZINESS: 0
POLYDIPSIA: 0
SHORTNESS OF BREATH: 0
LOSS OF SENSATION IN FEET: 0

## 2024-05-22 NOTE — PROGRESS NOTES
Subjective   Patient ID: Tatyana Mckeon is a 69 y.o. female who presents for 6 mo HTN/DM Check.    HPI   Presents for follow-up on her diabetes and other chronic medical conditions.  HgbA1c is 6.9.   No lasix or estrogen blocker since beginning of May. Lost weight. Hair is coming back.  That is, off of that medicine she is doing much better.  Reflux is controlled.     Review of Systems   Constitutional:  Negative for activity change.   Respiratory:  Negative for cough, chest tightness and shortness of breath.    Cardiovascular:  Negative for chest pain, palpitations and leg swelling.   Endocrine: Negative for polydipsia and polyuria.   Neurological:  Negative for dizziness and headaches.       Objective   /68   Pulse 87   Wt 95.9 kg (211 lb 6.4 oz)   SpO2 97%   BMI 38.67 kg/m²     Physical Exam  Constitutional:       Appearance: Normal appearance.   Neck:      Thyroid: No thyromegaly or thyroid tenderness.      Vascular: No carotid bruit.   Cardiovascular:      Rate and Rhythm: Normal rate and regular rhythm.      Heart sounds: No murmur heard.  Pulmonary:      Effort: Pulmonary effort is normal.      Breath sounds: Normal breath sounds.   Musculoskeletal:      Cervical back: Neck supple.   Neurological:      Mental Status: She is alert.   Psychiatric:         Mood and Affect: Mood normal.         Assessment/Plan   Diagnoses and all orders for this visit:  Type 2 diabetes mellitus without complication, without long-term current use of insulin (Multi)  Gastroesophageal reflux disease, unspecified whether esophagitis present  Primary hypertension  Bipolar 1 disorder (Multi)  Psoriasis  Continue current medicines. Okay to hold on lasix and follow the weight--can restart if it goes up.   Continue to hold the estrogen blocker as it seems to be causing more problems and its potential to help.   Continue the BCP3.         Vasoocclusive sickle cell crisis

## 2024-05-22 NOTE — PROGRESS NOTES
Subjective   Patient ID: Tatyana Mckeon is a 69 y.o. female who presents for Acupuncture.    HPI   Presents for acupuncture treatment.  States overall she has been doing well.  Is been 2 weeks off the estrogen blocking she is not sure she is even little go back on those again.  She had her cocktail of medications with her psychiatrist worked out so well and then she stepped off.  She states she just did not feel like going out.  She would get up and go to work but she would not go out visit with friends or do anything else.  She even canceled her lymph massage.  Feeling a bit better today.    Back pain has been stable.  Seems little bit worse when her mood is bad.    Review of Systems    Objective   /68   Pulse 87   Wt 95.9 kg (211 lb 6.4 oz)   SpO2 97%   BMI 38.67 kg/m²     Physical Exam  Is alert, no apparent distress, her affect is very pleasant today.  Respirations are easy.  No edema.    Assessment/Plan   Diagnoses and all orders for this visit:  Chronic right-sided low back pain with sciatica, sciatica laterality unspecified  Lymphedema  Bipolar 1 disorder (Multi)  We will continue her regular acupuncture.  Will continue the BCP 3 herb, which she does feel is helping.  And we will certainly support her through any treatments of the cancer.    Acupuncture treatment: Use the following points bilaterally: LI 4, LV 2, GB 43, ST 36 and Marivel points neuro stomach 38.  I also added PC 6 and PC 3 on the left side.  These retained for 20 minutes with heat lamp over the lower abdomen.  She tolerated this well.

## 2024-05-22 NOTE — PATIENT INSTRUCTIONS
Continue current medicines. Okay to hold on lasix and follow the weight--can restart if it goes up.   Continue to hold the estrogen blocker.   Continue the BCP3 herb to help support your Kidney meridian.

## 2024-05-28 DIAGNOSIS — E11.9 TYPE 2 DIABETES MELLITUS WITHOUT COMPLICATION, WITHOUT LONG-TERM CURRENT USE OF INSULIN (MULTI): ICD-10-CM

## 2024-05-29 RX ORDER — LISINOPRIL 20 MG/1
20 TABLET ORAL DAILY
Qty: 90 TABLET | Refills: 1 | Status: SHIPPED | OUTPATIENT
Start: 2024-05-29

## 2024-06-03 ENCOUNTER — CONSULT (OUTPATIENT)
Dept: PHYSICAL MEDICINE AND REHAB | Facility: CLINIC | Age: 69
End: 2024-06-03
Payer: MEDICARE

## 2024-06-03 VITALS
TEMPERATURE: 97.9 F | HEART RATE: 91 BPM | WEIGHT: 212.1 LBS | RESPIRATION RATE: 16 BRPM | SYSTOLIC BLOOD PRESSURE: 106 MMHG | BODY MASS INDEX: 38.79 KG/M2 | DIASTOLIC BLOOD PRESSURE: 66 MMHG

## 2024-06-03 DIAGNOSIS — I89.0 LYMPHEDEMA: ICD-10-CM

## 2024-06-03 DIAGNOSIS — I89.0 LYMPHEDEMA OF BREAST: Primary | ICD-10-CM

## 2024-06-03 PROCEDURE — 99214 OFFICE O/P EST MOD 30 MIN: CPT | Performed by: PHYSICAL MEDICINE & REHABILITATION

## 2024-06-03 PROCEDURE — 99204 OFFICE O/P NEW MOD 45 MIN: CPT | Performed by: PHYSICAL MEDICINE & REHABILITATION

## 2024-06-03 ASSESSMENT — PAIN SCALES - GENERAL: PAINLEVEL: 4

## 2024-06-03 NOTE — PROGRESS NOTES
[facility]  Cl[facility]  \     [facility]   Ms. Mckeon is a 69 y.o.  woman with right Breast cancer Pathologic: Stage IIA (pT2, pN1a, cM0, G3, ER+, [place]+, HER2-,  status post status post lumpectomy and sentinel node biopsy 7/2023, followed by axillary dissection and evacuation  of hematoma of the breast 8/2023.  She was started on arimidex but stopped due to hair loss/weight gain. Now on exemestane but taking a break due to worsening mood. She then completed adjuvant radiation to breast completed 12/2024.     States in march she is noticed a tight lenny on right breast below. Feels hard but not tender. Especially under breast, no swelling in the arm that she noticed,  Some swelling on side of breast.    Has not done any lymphedema therapy.     Tried to push on the breast.   Shoudler rom intact.  Regular bra and not wear wire. Does not wear.          Imaging  Reviewed w patient and personally 6/2024    Mammo 2023  FINDINGS  Patient had right lumpectomy 07/21/2023.  On the PET-CT scan from 09/14/2023 a large, 9.1 x 6.3 cm fluid collection  was seen in the region of lumpectomy in the right breast. On today's exam a  large fluid collection is seen extending from the 3 o'clock to the 7 o'clock  position of the right breast, measuring 7.9 x 4.9 cm, containing small  septations/minimal debris. On Doppler ultrasound there is no internal blood  flow.  This corresponds to an improving postsurgical seroma.     IMPRESSION:  No sonographic  evidence of malignancy.  Past Medical History:   Diagnosis Date    Bipolar 1 disorder (Multi)     Breast cancer (Multi) 06/2023    Right Breast - IDC and DCIS    Depression     Diabetes mellitus (Multi)     Type 2    Endometrial cancer (Multi)     Hyperlipidemia     Hypertension     Psoriasis     Endometrial cancer sp hysterectomy  Past Surgical History:   Procedure Laterality Date    AXILLARY NODE DISSECTION  08/16/2023    Right Axillary Dissection and Evacuation of a right breast hematoma    BI MAMMO  GUIDED LOCALIZATION BREAST RIGHT Right 07/21/2023    Wire localization lumpectomy right breast with sentinel node biopsy    BREAST LUMPECTOMY  2000    Left breast    HYSTERECTOMY  2014    LAPAROTOMY OOPHERECTOMY  2014    US GUIDED BIOPSY LYMPH NODE SUPERFICIAL  06/07/2023    Ultrasound guided right breast and axillary lymph node biopsy     Past Medical History:   Diagnosis Date    Bipolar 1 disorder (Multi)     Breast cancer (Multi) 06/2023    Right Breast - IDC and DCIS    Depression     Diabetes mellitus (Multi)     Type 2    Endometrial cancer (Multi)     Hyperlipidemia     Hypertension     Psoriasis      Family History   Problem Relation Name Age of Onset    Stroke Mother      Other (high blood pressure) Father      Prostate cancer Father      Heart disease Father          with MI at age 71    Diabetes Father      Hypertension Father      Diabetes Sister      Hypertension Sister      Other (oral cancer) Brother      Hypertension Brother       Social History     Socioeconomic History    Marital status:      Spouse name: Not on file    Number of children: Not on file    Years of education: Not on file    Highest education level: Not on file   Occupational History    Not on file   Tobacco Use    Smoking status: Every Day     Current packs/day: 0.50     Average packs/day: 0.5 packs/day for 40.0 years (20.0 ttl pk-yrs)     Types: Cigarettes     Passive exposure: Current    Smokeless tobacco: Never   Vaping Use    Vaping status: Never Used   Substance and Sexual Activity    Alcohol use: Yes     Comment: 2x a year    Drug use: Never    Sexual activity: Defer   Other Topics Concern    Not on file   Social History Narrative    Not on file     Social Determinants of Health     Financial Resource Strain: Not on file   Food Insecurity: No Food Insecurity (11/16/2023)    Hunger Vital Sign     Worried About Running Out of Food in the Last Year: Never true     Ran Out of Food in the Last Year: Never true    Transportation Needs: Not on file   Physical Activity: Not on file   Stress: Not on file   Social Connections: Not on file   Intimate Partner Violence: Not on file   Housing Stability: Not on file     Lives alone  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  Denies alcohol or drug use         REVIEW OF SYSTEMS      Pain: Denies  Sleep: WNL in a regular bed  Mood: WNL  Appetite/Nutrition: WNL  Motor: Denies weakness  Sensory: Denies numbness or tingling  Skin: No ulcerations, infection or drainage  Chest Pain: breast swelling pain  Dyspnea: Denies  Nausea/Vomiting: Denies  Fatigue (include 1-10 rating if present): Denies        PHYSICAL EXAMINATION      General: Alert, normal build, no acute distress  /66 (BP Location: Left arm)   Pulse 91   Temp 36.6 °C (97.9 °F)   Resp 16   Wt 96.2 kg (212 lb 1.6 oz)   BMI 38.79 kg/m²       Affect: Appropriate  Skin: No redness or streaking, no open areas. No drainage. No discoloration.  HEENT: WNL.  Heart: RRR  Lungs: Respirations unlabored.  Extremities: Normal contour. Stemmer's negative.     Arm Circumferences (cm):     5/2024    Index L 6.5    R 6.5    MCP's L 18.5    R 18.5    Below elbow L(10 cm) 25    R 23.5    Below elbow L (5cm) 26.5    R 26.5    Above elbow L (10 cm) 35.5    R 34.5            Wrist nbl 17     R flexion abduction 170     Neck: Supple  Back: Normal contour  Motor: 5/5 strength all extremities  R breast lymphedema, also lateral chest wall         Reflexes: 1-2+ bilaterally  Coordination: Intact rapid alternating movements  Gait: WNL     IMPRESSION Ms. Mckeon is a 69 y.o.  woman with right Breast cancer Pathologic: Stage IIA (pT2, pN1a, cM0, G3, ER+, [place]+, HER2-,  status post status post lumpectomy and sentinel node biopsy 7/2023, followed by axillary dissection and evacuation  of hematoma of the breast 8/2023.  She was started on arimidex but stopped due to hair loss/weight gain. Now on exemestane but taking a break due to worsening mood. She then completed  adjuvant radiation to breast completed 12/2024. She is now       PLAN  Orders Placed This Encounter   Procedures    Referral to Occupational Therapy     1. referral to OT:  mld, compression garments, pump, shoulder rom  2. Compression bra  3, discussed shoulder rom, posture  4. Fu 8 weeks  5. Will discuss [facility] at follow up       Charline Hays MD  Physical Medicine and Rehabilitation

## 2024-06-05 ENCOUNTER — OFFICE VISIT (OUTPATIENT)
Dept: PRIMARY CARE | Facility: CLINIC | Age: 69
End: 2024-06-05

## 2024-06-05 VITALS — OXYGEN SATURATION: 98 % | SYSTOLIC BLOOD PRESSURE: 118 MMHG | DIASTOLIC BLOOD PRESSURE: 68 MMHG | HEART RATE: 101 BPM

## 2024-06-05 DIAGNOSIS — C50.812 MALIGNANT NEOPLASM OF OVERLAPPING SITES OF LEFT BREAST IN FEMALE, ESTROGEN RECEPTOR POSITIVE (MULTI): ICD-10-CM

## 2024-06-05 DIAGNOSIS — Z17.0 MALIGNANT NEOPLASM OF OVERLAPPING SITES OF LEFT BREAST IN FEMALE, ESTROGEN RECEPTOR POSITIVE (MULTI): ICD-10-CM

## 2024-06-05 DIAGNOSIS — G89.29 CHRONIC RIGHT-SIDED LOW BACK PAIN WITH SCIATICA, SCIATICA LATERALITY UNSPECIFIED: Primary | ICD-10-CM

## 2024-06-05 DIAGNOSIS — M54.40 CHRONIC RIGHT-SIDED LOW BACK PAIN WITH SCIATICA, SCIATICA LATERALITY UNSPECIFIED: Primary | ICD-10-CM

## 2024-06-05 DIAGNOSIS — I89.0 LYMPHEDEMA: ICD-10-CM

## 2024-06-05 PROCEDURE — 97810 ACUP 1/> WO ESTIM 1ST 15 MIN: CPT | Performed by: FAMILY MEDICINE

## 2024-06-05 PROCEDURE — 97811 ACUP 1/> W/O ESTIM EA ADD 15: CPT | Performed by: FAMILY MEDICINE

## 2024-06-05 PROCEDURE — 3078F DIAST BP <80 MM HG: CPT | Performed by: FAMILY MEDICINE

## 2024-06-05 PROCEDURE — 4010F ACE/ARB THERAPY RXD/TAKEN: CPT | Performed by: FAMILY MEDICINE

## 2024-06-05 PROCEDURE — 1157F ADVNC CARE PLAN IN RCRD: CPT | Performed by: FAMILY MEDICINE

## 2024-06-05 PROCEDURE — 3074F SYST BP LT 130 MM HG: CPT | Performed by: FAMILY MEDICINE

## 2024-06-05 PROCEDURE — 1159F MED LIST DOCD IN RCRD: CPT | Performed by: FAMILY MEDICINE

## 2024-06-05 NOTE — PROGRESS NOTES
Subjective   Patient ID: Tatyana Mckeon is a 69 y.o. female who presents for Acupuncture.    HPI   Here for acupuncture treatment.  She did have an evaluation of her lymphedema.  She was scheduled with OT/lymph therapy.  However, she really is not able to schedule until August.  She will be checking back with that doctor to be certain that that is a good plan.    Overall she states she has been doing pretty well.  Things are stable at this time.  Mood has been good.  Her back pain is stable.    Review of Systems    Objective   /68   Pulse 101   SpO2 98%     Physical Exam    Assessment/Plan   Diagnoses and all orders for this visit:  Lymphedema  Malignant neoplasm of overlapping sites of left breast in female, estrogen receptor positive (Multi)  Chronic right-sided low back pain with sciatica, sciatica laterality unspecified  Treated the pericardium Mouradian today to hopefully improve the lung function.  She will continue the BCP 3 herb.    Acupuncture treatment: Use the following points bilaterally LI 4, ST 36, ST 38/Marivel, LV 2, GB 34.  These were retained for 15 minutes.  I then placed her knees bent while still on her back and placed needles at BL 40, 57 and 60 and also at the knees eyes all bilaterally.  These were retained for 15 minutes.  She tolerated this well.

## 2024-06-10 ENCOUNTER — TELEPHONE (OUTPATIENT)
Dept: HEMATOLOGY/ONCOLOGY | Facility: CLINIC | Age: 69
End: 2024-06-10
Payer: MEDICARE

## 2024-06-10 ENCOUNTER — TELEPHONE (OUTPATIENT)
Dept: PHYSICAL MEDICINE AND REHAB | Facility: CLINIC | Age: 69
End: 2024-06-10
Payer: MEDICARE

## 2024-06-10 DIAGNOSIS — I89.0 LYMPHEDEMA OF BREAST: Primary | ICD-10-CM

## 2024-06-10 DIAGNOSIS — C50.911 INFILTRATING DUCTAL CARCINOMA OF RIGHT BREAST, STAGE 2 (MULTI): ICD-10-CM

## 2024-06-10 NOTE — TELEPHONE ENCOUNTER
Spoke to pt this AM she is having trouble getting an appointment for PT but would also like a script for a compression bra.

## 2024-06-14 ENCOUNTER — TELEPHONE (OUTPATIENT)
Dept: PHYSICAL MEDICINE AND REHAB | Facility: CLINIC | Age: 69
End: 2024-06-14
Payer: MEDICARE

## 2024-06-14 NOTE — TELEPHONE ENCOUNTER
Pt called.  Fitting location was asking what compression strength she needs.  Light, Moderate or heavy.  So pt can call location.  Please advise.

## 2024-06-15 ENCOUNTER — HOSPITAL ENCOUNTER (OUTPATIENT)
Dept: RADIOLOGY | Facility: EXTERNAL LOCATION | Age: 69
Discharge: HOME | End: 2024-06-15
Payer: MEDICARE

## 2024-06-15 DIAGNOSIS — M79.672 PAIN IN LEFT FOOT: ICD-10-CM

## 2024-06-18 ENCOUNTER — APPOINTMENT (OUTPATIENT)
Dept: PRIMARY CARE | Facility: CLINIC | Age: 69
End: 2024-06-18

## 2024-06-19 ENCOUNTER — APPOINTMENT (OUTPATIENT)
Dept: RADIOLOGY | Facility: HOSPITAL | Age: 69
End: 2024-06-19
Payer: MEDICARE

## 2024-06-19 ENCOUNTER — HOSPITAL ENCOUNTER (OUTPATIENT)
Dept: RADIOLOGY | Facility: HOSPITAL | Age: 69
Discharge: HOME | End: 2024-06-19
Payer: MEDICARE

## 2024-06-19 VITALS — BODY MASS INDEX: 38.83 KG/M2 | HEIGHT: 62 IN | WEIGHT: 211 LBS

## 2024-06-19 DIAGNOSIS — C50.812 MALIGNANT NEOPLASM OF OVERLAPPING SITES OF LEFT BREAST IN FEMALE, ESTROGEN RECEPTOR POSITIVE (MULTI): ICD-10-CM

## 2024-06-19 DIAGNOSIS — Z17.0 MALIGNANT NEOPLASM OF OVERLAPPING SITES OF LEFT BREAST IN FEMALE, ESTROGEN RECEPTOR POSITIVE (MULTI): ICD-10-CM

## 2024-06-19 DIAGNOSIS — C50.511 MALIGNANT NEOPLASM OF LOWER-OUTER QUADRANT OF RIGHT FEMALE BREAST (MULTI): ICD-10-CM

## 2024-06-19 DIAGNOSIS — C50.911 INFILTRATING DUCTAL CARCINOMA OF RIGHT BREAST, STAGE 2 (MULTI): ICD-10-CM

## 2024-06-19 PROCEDURE — 77066 DX MAMMO INCL CAD BI: CPT | Performed by: RADIOLOGY

## 2024-06-19 PROCEDURE — G0279 TOMOSYNTHESIS, MAMMO: HCPCS | Performed by: RADIOLOGY

## 2024-06-19 PROCEDURE — 76642 ULTRASOUND BREAST LIMITED: CPT | Mod: RT

## 2024-06-19 PROCEDURE — 77062 BREAST TOMOSYNTHESIS BI: CPT

## 2024-06-19 PROCEDURE — 76642 ULTRASOUND BREAST LIMITED: CPT | Performed by: RADIOLOGY

## 2024-06-24 DIAGNOSIS — L40.9 PSORIASIS: ICD-10-CM

## 2024-06-24 DIAGNOSIS — B37.31 VAGINAL CANDIDIASIS: ICD-10-CM

## 2024-06-24 RX ORDER — NYSTATIN 100000 U/G
CREAM TOPICAL
Qty: 60 G | Refills: 0 | Status: SHIPPED | OUTPATIENT
Start: 2024-06-24

## 2024-06-24 RX ORDER — TRIAMCINOLONE ACETONIDE 1 MG/G
CREAM TOPICAL
Qty: 30 G | Refills: 0 | Status: SHIPPED | OUTPATIENT
Start: 2024-06-24

## 2024-07-03 ENCOUNTER — OFFICE VISIT (OUTPATIENT)
Dept: PRIMARY CARE | Facility: CLINIC | Age: 69
End: 2024-07-03

## 2024-07-03 VITALS — DIASTOLIC BLOOD PRESSURE: 72 MMHG | SYSTOLIC BLOOD PRESSURE: 120 MMHG | HEART RATE: 88 BPM

## 2024-07-03 DIAGNOSIS — R60.0 LOWER EXTREMITY EDEMA: Primary | ICD-10-CM

## 2024-07-03 PROBLEM — C77.3 MALIGNANT NEOPLASM METASTATIC TO LYMPH NODE OF UPPER EXTREMITY (MULTI): Status: ACTIVE | Noted: 2024-07-03

## 2024-07-03 PROCEDURE — 4010F ACE/ARB THERAPY RXD/TAKEN: CPT | Performed by: FAMILY MEDICINE

## 2024-07-03 PROCEDURE — 1125F AMNT PAIN NOTED PAIN PRSNT: CPT | Performed by: FAMILY MEDICINE

## 2024-07-03 PROCEDURE — 3078F DIAST BP <80 MM HG: CPT | Performed by: FAMILY MEDICINE

## 2024-07-03 PROCEDURE — 1123F ACP DISCUSS/DSCN MKR DOCD: CPT | Performed by: FAMILY MEDICINE

## 2024-07-03 PROCEDURE — 1158F ADVNC CARE PLAN TLK DOCD: CPT | Performed by: FAMILY MEDICINE

## 2024-07-03 PROCEDURE — 1157F ADVNC CARE PLAN IN RCRD: CPT | Performed by: FAMILY MEDICINE

## 2024-07-03 PROCEDURE — 1159F MED LIST DOCD IN RCRD: CPT | Performed by: FAMILY MEDICINE

## 2024-07-03 PROCEDURE — 3074F SYST BP LT 130 MM HG: CPT | Performed by: FAMILY MEDICINE

## 2024-07-03 RX ORDER — KETOCONAZOLE 20 MG/ML
SHAMPOO, SUSPENSION TOPICAL
COMMUNITY
Start: 2024-07-01

## 2024-07-03 ASSESSMENT — PATIENT HEALTH QUESTIONNAIRE - PHQ9
2. FEELING DOWN, DEPRESSED OR HOPELESS: NOT AT ALL
1. LITTLE INTEREST OR PLEASURE IN DOING THINGS: NOT AT ALL
SUM OF ALL RESPONSES TO PHQ9 QUESTIONS 1 AND 2: 0

## 2024-07-03 ASSESSMENT — PAIN SCALES - GENERAL: PAINLEVEL: 6

## 2024-07-03 NOTE — PATIENT INSTRUCTIONS
Add back the BCP 3, 3 twice a day.  We need to support her kidneys.  Here.  Not only at the fracture, which indicates kidney related deficiency, we have a lot of worry in her life which further depletes it.  Bone broth is helpful as well as shrimp, black beans, kidney beans.                  PAIN MANAGEMENT IN ADULTS    What is pain? Pain is your body’s way to reacting to injury or illness. Everybody reacts to pain in different ways. What you think is painful may not be painful to someone else. But, pain is whatever you say it is!  What causes pain? Many things, such as an injury, surgery, or a disease can cause pain. Some pain is caused by pressure one a nerve, such as a cancerous tumor or slipped disc. Other pain is caused when nerves are cut as in an accident or surgery. After an injury or surgery you may not want to move the painful part of our body at all. But, you may have pain because you are not moving this body part. Sometimes there is no clear reason for your pain.    What are the different types of pain?  Acute pain is short?lived and lasts less than 3 months. Caregivers help first work to remove the cause of the pain, such as fixing a broken arm. Acute pain usually can be controlled or stopped with pain medicine.  Chronic pain lasts longer than 3?6 months. This kind of pain is often more complicated. Caregivers may use medicine along with other treatments, like self?hypnosis and relaxation to help you learn to deal with the chronic pain.  What is your pain like? Caregivers want you to talk to them about your pain. This helps them learn what may be causing the pain and how best to treat it.  Why is pain control important? Pain can affect your appetite, how well you sleep, your energy and your ability to do things. Pain can also affect your mood and relationship with others. If caregivers can help you control your pain, you will suffer less and can even heal faster.  How can pain medicine be given?  Following are the many different ways pain medicine can be given depending on the kind of pain you are experiencing.  By mouth: you may be given pills or liquid to swallow or you may be given a pill or liquid to put under your tongue. Some medicine can also be given as a lozenge like a cough drop or even a special lollipop.  Rectal: medicine in a suppository is put into your rectum.  Shot/Injection: pain medicine can be given as a shot/injection into an IV, into a muscle or under the skin  Topical: medicine in a cream or gel is spread over the skin.  Transdermal: medicine given in a patch and put onto the skin. This medicine is released slowly to give pain relief for as long as 72 hours.    How can you take pain medicine safely and make it work best for you? The following are many different ways pain medicine can be given depending on the kind of pain you have.  Some pain medicines can make you breathe less deeply and less often. The medicine may also make you sleepy, dizzy, and unsafe to drive a car or use heavy equipment. For these reasons, it is very important to follow your caregiver’s advice on how to use this medicine.  Sometimes the pain is worse when you first wake up in the morning. This may happened if you did not have enough pain medicine in your blood stream to last through the night. Caregivers may tell you to take a dose of pain medicine during the night.  Some foods, alcohol, and other medicines may cause unpleasant side effects when you take pain medicine. Follow your caregiver’s advice about how to prevent these problems.  Pain medicine can make you constipated. Straining with a BM can make you pain worse. Do not try to push the BM out if it’s too hard. Contact your caregiver if you become constipated.  Other non?drug pain control methods. There are many pain control techniques that can held you deal with pain even if it does not go away completely. It is important to practice some of the techniques  when you don’t have pain if possible. This will help the technique work better during an attack of pain.  Cold and heat: both cold and heat can help lessen some types of post?op pain. Caregivers will tell you if cold and/or hot packs will help your pain.  Distraction: teaches you to focus your attention on something other than pain. Playing cards or games, talking and visiting family may relax you and keep you from thinking about pain.  Hydrotherapy: is a gentle water exercise program. It can strengthen muscles that are not injured and lessen inflammation. Talk to your physical therapist or your caregiver about starting a hydrotherapy program.  Massage  Music  Physical therapy  Rest  Surgery/Nerve blocks  Call your caregiver if you have any of the following problems:  The pain medicine you are taking makes you sleepier than usual or confused  You have new pain or the pain seems different than before  You have constipation  Care agreement: You have the right to help plan your care. To help with this plan you must learn about your pain, what is causing it, and how it can be treated. You can then discuss treatment options with your caregivers. Work with them to decide what care will be used to treat you. You always have the right to refuse treatment.

## 2024-07-03 NOTE — PROGRESS NOTES
Subjective   Patient ID: Tatyana Mckeon is a 69 y.o. female who presents for Acupuncture.    HPI   She presents today for her acupuncture treatment.  She been having little more swelling in her feet.  States that she had a little more stress and that she had a mammogram on the left side which showed calcifications.  They wanted a biopsy that.  She not currently having any breast cancer treatments other than the massage therapy for the lymphedema.    Review of Systems    Objective   /72   Pulse 88     Physical Exam  She is alert, no apparent distress, her affect is pleasant.  Respirations are easy.  She has swelling of the bilateral.  Swelling extends up into the proximal lower leg as well.    Assessment/Plan   Diagnoses and all orders for this visit:  Lower extremity edema  She will continue her acupuncture treatments every 2 weeks.  She does have BCP 3 at home which she states he is only taking intermittently.  I want her to take this at 3 twice a day to help support the kidney meridian.  With that fracture in the foot, but the notes that kidney deficiency uncertain with all the worry that she has going there is a strain on that Mouradian.    Acupuncture treatment: The following points bilaterally LI 4, ST 36, 38, 40; GB 43, LV 2.  These retained 50 minutes.  I then put needles at BL 40, 57 and 60 bilaterally.  These were retained for 15 minutes.  She tolerated that well.

## 2024-07-08 ENCOUNTER — APPOINTMENT (OUTPATIENT)
Dept: SURGERY | Facility: CLINIC | Age: 69
End: 2024-07-08
Payer: MEDICARE

## 2024-07-09 ENCOUNTER — OFFICE VISIT (OUTPATIENT)
Dept: SURGERY | Facility: CLINIC | Age: 69
End: 2024-07-09
Payer: MEDICARE

## 2024-07-09 ENCOUNTER — APPOINTMENT (OUTPATIENT)
Dept: SURGERY | Facility: CLINIC | Age: 69
End: 2024-07-09
Payer: MEDICARE

## 2024-07-09 VITALS
HEIGHT: 62 IN | OXYGEN SATURATION: 95 % | HEART RATE: 85 BPM | BODY MASS INDEX: 38.83 KG/M2 | TEMPERATURE: 97.8 F | SYSTOLIC BLOOD PRESSURE: 110 MMHG | WEIGHT: 211 LBS | DIASTOLIC BLOOD PRESSURE: 90 MMHG

## 2024-07-09 DIAGNOSIS — C50.911 INFILTRATING DUCTAL CARCINOMA OF RIGHT BREAST, STAGE 2 (MULTI): Primary | ICD-10-CM

## 2024-07-09 DIAGNOSIS — R92.0 MICROCALCIFICATIONS OF THE BREAST: ICD-10-CM

## 2024-07-09 DIAGNOSIS — I89.0 LYMPHEDEMA: ICD-10-CM

## 2024-07-09 PROCEDURE — 1125F AMNT PAIN NOTED PAIN PRSNT: CPT | Performed by: SURGERY

## 2024-07-09 PROCEDURE — 4004F PT TOBACCO SCREEN RCVD TLK: CPT | Performed by: SURGERY

## 2024-07-09 PROCEDURE — 3074F SYST BP LT 130 MM HG: CPT | Performed by: SURGERY

## 2024-07-09 PROCEDURE — 99214 OFFICE O/P EST MOD 30 MIN: CPT | Performed by: SURGERY

## 2024-07-09 PROCEDURE — 4010F ACE/ARB THERAPY RXD/TAKEN: CPT | Performed by: SURGERY

## 2024-07-09 PROCEDURE — 1159F MED LIST DOCD IN RCRD: CPT | Performed by: SURGERY

## 2024-07-09 PROCEDURE — 1157F ADVNC CARE PLAN IN RCRD: CPT | Performed by: SURGERY

## 2024-07-09 PROCEDURE — 3080F DIAST BP >= 90 MM HG: CPT | Performed by: SURGERY

## 2024-07-09 ASSESSMENT — ENCOUNTER SYMPTOMS
DIZZINESS: 0
LOSS OF SENSATION IN FEET: 0
DIARRHEA: 0
ABDOMINAL PAIN: 0
DYSURIA: 0
SPEECH DIFFICULTY: 0
APPETITE CHANGE: 0
COLOR CHANGE: 0
CONFUSION: 0
BACK PAIN: 0
TROUBLE SWALLOWING: 0
DEPRESSION: 0
NAUSEA: 0
FEVER: 0
PALPITATIONS: 0
COUGH: 0
UNEXPECTED WEIGHT CHANGE: 0
HEADACHES: 0
DIFFICULTY URINATING: 0
BRUISES/BLEEDS EASILY: 0
OCCASIONAL FEELINGS OF UNSTEADINESS: 1
SHORTNESS OF BREATH: 0

## 2024-07-09 ASSESSMENT — COLUMBIA-SUICIDE SEVERITY RATING SCALE - C-SSRS
6. HAVE YOU EVER DONE ANYTHING, STARTED TO DO ANYTHING, OR PREPARED TO DO ANYTHING TO END YOUR LIFE?: NO
1. IN THE PAST MONTH, HAVE YOU WISHED YOU WERE DEAD OR WISHED YOU COULD GO TO SLEEP AND NOT WAKE UP?: NO
2. HAVE YOU ACTUALLY HAD ANY THOUGHTS OF KILLING YOURSELF?: NO

## 2024-07-09 ASSESSMENT — PAIN SCALES - GENERAL: PAINLEVEL: 6

## 2024-07-09 ASSESSMENT — PATIENT HEALTH QUESTIONNAIRE - PHQ9
1. LITTLE INTEREST OR PLEASURE IN DOING THINGS: NOT AT ALL
SUM OF ALL RESPONSES TO PHQ9 QUESTIONS 1 & 2: 0
2. FEELING DOWN, DEPRESSED OR HOPELESS: NOT AT ALL

## 2024-07-09 NOTE — ASSESSMENT & PLAN NOTE
Patient has an appointment this month for massage therapy for the lymphedema of the breast.  She is going to be getting fitted for a bra as well.

## 2024-07-09 NOTE — ASSESSMENT & PLAN NOTE
Patient to be scheduled for stereotactic biopsy of the left breast in the radiology department. Risk, benefits and alternatives to this plan were thoroughly discussed with the patient who agrees to proceed.  The procedure was also thoroughly discussed as well as her follow-up. She is to see me in the office in one week but I also will call as soon as I see the pathology which is usually 4 working days from procedure.

## 2024-07-09 NOTE — ASSESSMENT & PLAN NOTE
Patient with normal right breast imaging and exam.  Patient did come off endocrine therapy due to depression.  Patient has an appointment with her oncologist coming up to discuss endocrine therapy options

## 2024-07-10 ENCOUNTER — OFFICE VISIT (OUTPATIENT)
Dept: HEMATOLOGY/ONCOLOGY | Facility: CLINIC | Age: 69
End: 2024-07-10
Payer: MEDICARE

## 2024-07-10 VITALS
RESPIRATION RATE: 18 BRPM | TEMPERATURE: 97 F | HEART RATE: 100 BPM | HEIGHT: 60 IN | OXYGEN SATURATION: 95 % | WEIGHT: 213.52 LBS | SYSTOLIC BLOOD PRESSURE: 115 MMHG | DIASTOLIC BLOOD PRESSURE: 76 MMHG | BODY MASS INDEX: 41.92 KG/M2

## 2024-07-10 DIAGNOSIS — Z17.0 MALIGNANT NEOPLASM OF BREAST IN FEMALE, ESTROGEN RECEPTOR POSITIVE, UNSPECIFIED LATERALITY, UNSPECIFIED SITE OF BREAST (MULTI): Primary | ICD-10-CM

## 2024-07-10 DIAGNOSIS — R92.1 BREAST CALCIFICATION, LEFT: ICD-10-CM

## 2024-07-10 DIAGNOSIS — C50.919 MALIGNANT NEOPLASM OF BREAST IN FEMALE, ESTROGEN RECEPTOR POSITIVE, UNSPECIFIED LATERALITY, UNSPECIFIED SITE OF BREAST (MULTI): Primary | ICD-10-CM

## 2024-07-10 DIAGNOSIS — C77.3 MALIGNANT NEOPLASM METASTATIC TO LYMPH NODE OF UPPER EXTREMITY (MULTI): ICD-10-CM

## 2024-07-10 PROCEDURE — 99215 OFFICE O/P EST HI 40 MIN: CPT

## 2024-07-10 PROCEDURE — 3074F SYST BP LT 130 MM HG: CPT

## 2024-07-10 PROCEDURE — 4010F ACE/ARB THERAPY RXD/TAKEN: CPT

## 2024-07-10 PROCEDURE — 3078F DIAST BP <80 MM HG: CPT

## 2024-07-10 PROCEDURE — 1125F AMNT PAIN NOTED PAIN PRSNT: CPT

## 2024-07-10 PROCEDURE — 1157F ADVNC CARE PLAN IN RCRD: CPT

## 2024-07-10 ASSESSMENT — PAIN SCALES - GENERAL: PAINLEVEL: 5

## 2024-07-10 NOTE — PATIENT INSTRUCTIONS
We could consider starting letrozole after left breast biopsy is back and after lymphedema evaluation.

## 2024-07-17 ENCOUNTER — HOSPITAL ENCOUNTER (OUTPATIENT)
Dept: RADIOLOGY | Facility: HOSPITAL | Age: 69
Discharge: HOME | End: 2024-07-17
Payer: MEDICARE

## 2024-07-17 DIAGNOSIS — R92.8 OTHER ABNORMAL AND INCONCLUSIVE FINDINGS ON DIAGNOSTIC IMAGING OF BREAST: ICD-10-CM

## 2024-07-17 PROCEDURE — 2500000005 HC RX 250 GENERAL PHARMACY W/O HCPCS: Performed by: STUDENT IN AN ORGANIZED HEALTH CARE EDUCATION/TRAINING PROGRAM

## 2024-07-17 PROCEDURE — 19081 BX BREAST 1ST LESION STRTCTC: CPT | Mod: LT

## 2024-07-17 PROCEDURE — 2720000007 HC OR 272 NO HCPCS

## 2024-07-17 PROCEDURE — A4648 IMPLANTABLE TISSUE MARKER: HCPCS

## 2024-07-17 PROCEDURE — 77065 DX MAMMO INCL CAD UNI: CPT

## 2024-07-17 RX ORDER — LIDOCAINE HYDROCHLORIDE 10 MG/ML
10 INJECTION, SOLUTION EPIDURAL; INFILTRATION; INTRACAUDAL; PERINEURAL ONCE
Status: COMPLETED | OUTPATIENT
Start: 2024-07-17 | End: 2024-07-17

## 2024-07-17 ASSESSMENT — PAIN - FUNCTIONAL ASSESSMENT
PAIN_FUNCTIONAL_ASSESSMENT: 0-10
PAIN_FUNCTIONAL_ASSESSMENT: 0-10

## 2024-07-17 ASSESSMENT — PAIN SCALES - GENERAL
PAINLEVEL_OUTOF10: 0 - NO PAIN

## 2024-07-17 NOTE — Clinical Note
Pressure held x 10 minutes, incision dry, steri strips intact and compression dressing applied. Ice pack applied. Clip films completed and okayed

## 2024-07-17 NOTE — DISCHARGE INSTRUCTIONS
AFTER THE TEST  A steri-strip and bandage will be placed over the incision. You may shower after 24 hours. Remove bandage after 24 hours. Remove bandage after the shower. Leave the steri-strips in place to fall off on their own. If after 1 week the steri-strips are still on, you may remove them. Avoid swimming or soaking in tub for 3 days.     You may have mild discomfort at the test site. If needed, you may take Tylenol (Acetaminophen) for pain. Please avoid taking NSAIDs, Motrin, Advil, Aleve, or ibuprofen for 24 hours following the biopsy. After 24 hours you may resume NSAIDSs.     If you take aspirin, Plavix, Coumadin, Xarelto or Eliquis please tell us. If these medications were stopped by your provider, please ask them when to resume.     You may have some tenderness, bruising or slight bleeding at the site. Please apply ice packs to the site for 15 minutes on and 15 minutes off for a 2 hour minimum.     Most people can return to their usual routine after the procedure. Avoid Strenuous activity for 24 hours.     Sleep in a bra the night after your biopsy. Continue to do so for comfort.     Call your provider if you have any of the following symptoms :  Fever  Increased pain  Increased bleeding  Redness  Increased swelling  Yellowish drainage  Your provider will get the biopsy results within 5 - 7 days. Call your provider with any questions.     Patient education brochure and pain/comfort measures have been reviewed.   Phone number provided to contact [facility] if problems arise.     Patient verbalized understanding of home going instructions.     Pt discharged home at this time.  No c/o pain, steri strips dry and intact.  Ice pack in pt's bra.

## 2024-07-19 ENCOUNTER — EVALUATION (OUTPATIENT)
Dept: OCCUPATIONAL THERAPY | Facility: CLINIC | Age: 69
End: 2024-07-19
Payer: MEDICARE

## 2024-07-19 ENCOUNTER — APPOINTMENT (OUTPATIENT)
Dept: PRIMARY CARE | Facility: CLINIC | Age: 69
End: 2024-07-19

## 2024-07-19 DIAGNOSIS — I89.0 LYMPHEDEMA OF BREAST: ICD-10-CM

## 2024-07-19 DIAGNOSIS — I89.0 LYMPHEDEMA: Primary | ICD-10-CM

## 2024-07-19 PROCEDURE — 97166 OT EVAL MOD COMPLEX 45 MIN: CPT | Mod: GO

## 2024-07-19 PROCEDURE — 97535 SELF CARE MNGMENT TRAINING: CPT | Mod: GO

## 2024-07-19 ASSESSMENT — ENCOUNTER SYMPTOMS
LOSS OF SENSATION IN FEET: 0
OCCASIONAL FEELINGS OF UNSTEADINESS: 1
DEPRESSION: 0

## 2024-07-19 ASSESSMENT — ACTIVITIES OF DAILY LIVING (ADL)
HOME_MANAGEMENT_TIME_ENTRY: 17
ADL_ASSISTANCE: INDEPENDENT

## 2024-07-19 ASSESSMENT — PAIN SCALES - GENERAL: PAINLEVEL_OUTOF10: 1

## 2024-07-19 ASSESSMENT — PAIN - FUNCTIONAL ASSESSMENT: PAIN_FUNCTIONAL_ASSESSMENT: 0-10

## 2024-07-19 NOTE — PROGRESS NOTES
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  Pt reporting mod I with ADLs and IADLs due to decreased endurance and intermittent pain.  Rec lymphedema therapy to address proper fitting garments, reduce edema, increased participation with ADLs and IADLs, I with home management of lymphedema condition, lymphedema pump,       Plan:  Intervention plan includes: ADLS, compression bandaging, education/instruction, garment measuring/fitting, home program, IADLs, manual lymphatic drainage, manual therapy, therapeutic activities, therapeutic exercises    Outpatient Plan  Frequency: 1/xweek  Duration: 8 weeks  Rehab Potential: Excellent  Plan of Care Agreement: Patient    Subjective   Current Problem:  1. Lymphedema  Follow Up In Occupational Therapy      2. Lymphedema of breast  Referral to Occupational Therapy    Follow Up In Occupational Therapy        General:   OT Received On: 24  General  Reason for Referral: lymphedema of breast  Referred By: MD Lis  Past Medical History Relevant to Rehab: PMHx: right breast biopsy; invasive ductal carcinoma grade 2 of 3 with asscoicated microcalifications. ER-positive, NE - positive and HER2/katie- equivocal 2+.  15 lymph nodes removed  General Comment: visit 1, no auth onset 2023  Precautions:  Precautions  STEADI Fall Risk Score (The score of 4 or more indicates an increased risk of falling): 4  Precautions Comment: none  Vital Signs:     Pain Assessment:  Pain Assessment  Pain Assessment: 0-10  0-10 (Numeric) Pain Score:  1    Objective   Cognition:     Cognition Test Scores:     Home Living:  Home Living  Type of Home: House  Lives With: Alone  Home Adaptive Equipment: Walker rolling or standard  Home Layout: One level  Home Access: Ramped entrance  Bathroom Shower/Tub: Walk-in shower  Bathroom Toilet: Standard  Bathroom Equipment: Grab bars in shower, Shower chair with back  Prior Function Per Pt/Caregiver Report:  Prior Function Per Pt/Caregiver Report  Level of [place]: Independent with ADLs and functional transfers, Independent with homemaking with ambulation  ADL Assistance: Independent  Homemaking Assistance: Independent  Ambulatory Assistance: Independent  Vocational: Part time employment  Hand Dominance: Right  IADL History:  Homemaking Responsibilities: Yes  Meal Prep Responsibility: Primary  Laundry Responsibility: Primary  Cleaning Responsibility: Primary  Bill Paying/Finance Responsibility: Primary  Shopping Responsibility: Primary  ADL:     Activity Tolerance:  Activity Tolerance  Endurance: Tolerates 10 - 20 min exercise with multiple rests     Extremities: RUE   RUE : Within Functional Limits  RUE Strength  RUE Overall Strength: Greater than or equal to 3/5 as evidenced by functional mobility and LUE   LUE: Within Functional Limits  LUE Strength  LUE Overall Strength: Within Functional Limits - strength 5/5  Lymphedema Assessment    Lymphedema Assessments:  Lymphedema Assessments: Upper extremities  Right Upper Extremity:  R Thumb Distal Phalange Girth: 6 cm  R Thumb Proximal Phalange Girth: 6 cm  R Metacarpals (cm): 18.4 cm  R Palm (cm): 0 cm  R Wrist (cm): 17 cm  R 10 cm Above Wrist (cm): 22 cm  R 15 cm Above Wrist (cm): 26 cm  R 20 cm Above Wrist (cm): 27 cm  R Elbow (cm): 27 cm  R 5 cm Above Elbow: 31 cm  R 10 cm Above Elbow: 34.5 cm  R 20 cm Above Elbow: 42.5 cm  R Axilla: 0 cm  R Upper Extremity Total: 257.4  Left Upper Extremity:  L Thumb Distal Phalange Girth: 6 cm  L Thumb Proximal Phalange Girth: 6.5  cm  L Metacarpals (cm): 17 cm  L Palm (cm): 0 cm  L Wrist (cm): 17.4 cm  L 10 cm Above Wrist (cm): 23.5 cm  L 15 cm Above Wrist (cm): 26.5 cm  L 20 cm Above Wrist (cm): 27.5 cm  L Elbow (cm): 29 cm  L 5 cm Above Elbow: 32 cm  L 10cm Above Elbow: 36  L 20 cm Above Elbow: 41  L Axilla: 0 cm  Left upper extremity total: 262.4     Prior Function:  Level of [place]: Independent with ADLs and functional transfers, Independent with homemaking with ambulation  ADL Assistance: Independent  Homemaking Assistance: Independent  Ambulatory Assistance: Independent  Vocational: Part time employment  Hand Dominance: Right  Pain Assessment:  Pain Assessment: 0-10  0-10 (Numeric) Pain Score: 1  Therapy Precautions:  STEADI Fall Risk Score (The score of 4 or more indicates an increased risk of falling): 4  Precautions Comment: none      Outcome Measures:  OT Adult Other Outcome Measures  Lymphedema Life Impact Scale (LLIS): 18    OP EDUCATION:  Education  Individual(s) Educated: Patient  Education Provided: Lymphedema  Home Program: Handout issued  Risk and Benefits Discussed with Patient/Caregiver/Other: yes  Patient/Caregiver Demonstrated Understanding: yes  Plan of Care Discussed and Agreed Upon: yes    Goals:  Active       Lymphedema       Pt will demo volume reduction in R breast in order for proper fitting of medical compression garments and increase ease in transfers and IADLs        Start:  07/19/24    Expected End:  10/25/24            Pt will I perform skin care for infection prevention and integrity of skin        Start:  07/19/24    Expected End:  10/25/24            Pt will be I with stating and demonstrating home exercise program in order to improve patients ability to perform self-care, household, work and/or leisure tasks.        Start:  07/19/24    Expected End:  10/25/24            Pt will complete self  mld in order to promote increased tissue pliability for maintenance with chronic lymphedema        Start:   07/19/24    Expected End:  10/25/24

## 2024-07-22 ENCOUNTER — OFFICE VISIT (OUTPATIENT)
Dept: PHYSICAL MEDICINE AND REHAB | Facility: CLINIC | Age: 69
End: 2024-07-22
Payer: MEDICARE

## 2024-07-22 VITALS
TEMPERATURE: 97.5 F | SYSTOLIC BLOOD PRESSURE: 115 MMHG | RESPIRATION RATE: 20 BRPM | HEART RATE: 87 BPM | DIASTOLIC BLOOD PRESSURE: 62 MMHG

## 2024-07-22 DIAGNOSIS — I89.0 LYMPHEDEMA OF BREAST: Primary | ICD-10-CM

## 2024-07-22 DIAGNOSIS — I89.0 LYMPHEDEMA: ICD-10-CM

## 2024-07-22 PROCEDURE — 99213 OFFICE O/P EST LOW 20 MIN: CPT | Performed by: PHYSICAL MEDICINE & REHABILITATION

## 2024-07-22 PROCEDURE — 3074F SYST BP LT 130 MM HG: CPT | Performed by: PHYSICAL MEDICINE & REHABILITATION

## 2024-07-22 PROCEDURE — 4004F PT TOBACCO SCREEN RCVD TLK: CPT | Performed by: PHYSICAL MEDICINE & REHABILITATION

## 2024-07-22 PROCEDURE — 4010F ACE/ARB THERAPY RXD/TAKEN: CPT | Performed by: PHYSICAL MEDICINE & REHABILITATION

## 2024-07-22 PROCEDURE — 1157F ADVNC CARE PLAN IN RCRD: CPT | Performed by: PHYSICAL MEDICINE & REHABILITATION

## 2024-07-22 PROCEDURE — 1159F MED LIST DOCD IN RCRD: CPT | Performed by: PHYSICAL MEDICINE & REHABILITATION

## 2024-07-22 PROCEDURE — 1125F AMNT PAIN NOTED PAIN PRSNT: CPT | Performed by: PHYSICAL MEDICINE & REHABILITATION

## 2024-07-22 PROCEDURE — 3078F DIAST BP <80 MM HG: CPT | Performed by: PHYSICAL MEDICINE & REHABILITATION

## 2024-07-22 ASSESSMENT — PAIN SCALES - GENERAL: PAINLEVEL: 4

## 2024-07-22 NOTE — PROGRESS NOTES
PM[facility]R Lymphedema  Clinic  \     [facility]   Ms. Mckeon is a 69 y.o.  woman with right Breast cancer Pathologic: Stage IIA (pT2, pN1a, cM0, G3, ER+, AK+, HER2-,  status post status post lumpectomy and sentinel node biopsy 7/2023, followed by axillary dissection and evacuation  of hematoma of the breast 8/2023.  She was started on arimidex but stopped due to hair loss/weight gain. Now on exemestane but taking a break due to worsening mood. She then completed adjuvant radiation to breast completed 12/2024.       Recall  States in march she is noticed a tight lenny on right breast below. Feels hard but not tender. Especially under breast, no swelling in the arm that she noticed,  Some swelling on side of breast.    Has not done any lymphedema therapy.     Tried to push on the breast.   Shoudler rom intact.  Regular bra and not wear wire. Does not wear.      She was last seen in June 2024. At that time we discussed:  1. referral to OT:  mld, compression garments, pump, shoulder rom  2. Compression bra  3, discussed shoulder rom, posture  4. Fu 8 weeks  5. Will discuss [facility] at follow up    She broke left 5th metatarsal and now is a boot. Had pt eval, needs garment.  Has seroma around surgery site.  Now pending biopsy left.     Imaging  Reviewed w patient and personally 07/22/24    Mammo 2024  IMPRESSION:  1. Indeterminate left breast calcifications. A stereotactic biopsy is  recommended. This was discussed with the patient. A preprocedure form  has been completed.  2. Seroma at the palpable area of concern at the lumpectomy site in  the right breast. Clinical follow-up is recommended.  Mammo 2023  FINDINGS  Patient had right lumpectomy 07/21/2023.  On the PET-CT scan from 09/14/2023 a large, 9.1 x 6.3 cm fluid collection  was seen in the region of lumpectomy in the right breast. On today's exam a  large fluid collection is seen extending from the 3 o'clock to the 7 o'clock  position of the right breast,  measuring 7.9 x 4.9 cm, containing small  septations/minimal debris. On Doppler ultrasound there is no internal blood  flow.  This corresponds to an improving postsurgical seroma.     IMPRESSION:  No sonographic  evidence of malignancy.  Past Medical History:   Diagnosis Date    Bipolar 1 disorder (Multi)     Breast cancer (Multi) 06/2023    Right Breast - IDC and DCIS    Depression     Diabetes mellitus (Multi)     Type 2    Endometrial cancer (Multi)     Hyperlipidemia     Hypertension     Psoriasis     Endometrial cancer sp hysterectomy  Past Surgical History:   Procedure Laterality Date    AXILLARY NODE DISSECTION  08/16/2023    Right Axillary Dissection and Evacuation of a right breast hematoma    BI MAMMO GUIDED BREAST RIGHT LOCALIZATION Right 07/21/2023    Wire localization lumpectomy right breast with sentinel node biopsy    BREAST LUMPECTOMY  2000    Left breast    HYSTERECTOMY  2014    LAPAROTOMY OOPHERECTOMY  2014    US GUIDED BIOPSY LYMPH NODE SUPERFICIAL  06/07/2023    Ultrasound guided right breast and axillary lymph node biopsy     Past Medical History:   Diagnosis Date    Bipolar 1 disorder (Multi)     Breast cancer (Multi) 06/2023    Right Breast - IDC and DCIS    Depression     Diabetes mellitus (Multi)     Type 2    Endometrial cancer (Multi)     Hyperlipidemia     Hypertension     Psoriasis      Family History   Problem Relation Name Age of Onset    Stroke Mother      Other (high blood pressure) Father      Prostate cancer Father      Heart disease Father          with MI at age 71    Diabetes Father      Hypertension Father      Diabetes Sister      Hypertension Sister      Other (oral cancer) Brother      Hypertension Brother       Social History     Socioeconomic History    Marital status:      Spouse name: Not on file    Number of children: Not on file    Years of education: Not on file    Highest education level: Not on file   Occupational History    Occupation: Retired   Tobacco Use     Smoking status: Every Day     Current packs/day: 0.50     Average packs/day: 0.5 packs/day for 40.0 years (20.0 ttl pk-yrs)     Types: Cigarettes     Passive exposure: Current    Smokeless tobacco: Never   Vaping Use    Vaping status: Never Used   Substance and Sexual Activity    Alcohol use: Yes     Comment: 2x a year    Drug use: Never    Sexual activity: Defer   Other Topics Concern    Not on file   Social History Narrative    Not on file     Social Determinants of Health     Financial Resource Strain: Not on file   Food Insecurity: No Food Insecurity (11/16/2023)    Hunger Vital Sign     Worried About Running Out of Food in the Last Year: Never true     Ran Out of Food in the Last Year: Never true   Transportation Needs: Not on file   Physical Activity: Not on file   Stress: Not on file   Social Connections: Not on file   Intimate Partner Violence: Not on file   Housing Stability: Not on file     Lives alone  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  Denies alcohol or drug use         REVIEW OF SYSTEMS      Pain: Denies  Sleep: WNL in a regular bed  Mood: WNL  Appetite/Nutrition: WNL  Motor: Denies weakness  Sensory: Denies numbness or tingling  Skin: No ulcerations, infection or drainage  Chest Pain: breast swelling pain  Dyspnea: Denies  Nausea/Vomiting: Denies  Fatigue (include 1-10 rating if present): Denies        PHYSICAL EXAMINATION      General: Alert, normal build, no acute distress  /62 (BP Location: Left arm, Patient Position: Sitting)   Pulse 87   Temp 36.4 °C (97.5 °F)   Resp 20       Affect: Appropriate  Skin: No redness or streaking, no open areas. No drainage. No discoloration.  HEENT: WNL.  Heart: RRR  Lungs: Respirations unlabored.  Extremities: Normal contour. Stemmer's negative.     Arm Circumferences (cm):     5/2024    Index L 6.5    R 6.5    MCP's L 18.5    R 18.5    Below elbow L(10 cm) 25    R 23.5    Below elbow L (5cm) 26.5    R 26.5    Above elbow L (10 cm) 35.5    R 34.5             Wrist nbl 17     R flexion abduction 170     Neck: Supple  Back: Normal contour  Motor: 5/5 strength all extremities  R breast lymphedema, also lateral chest wall         Reflexes: 1-2+ bilaterally  Coordination: Intact rapid alternating movements  Gait: WNL    Promis screen 7/2024  PF: 14/130  Fatigue: 12/15  Social' 7/15  Xochilt; 5  Distress 5     IMPRESSION Ms. Mckeon is a 69 y.o.  woman with right Breast cancer Pathologic: Stage IIA (pT2, pN1a, cM0, G3, ER+, AL+, HER2-,  status post status post lumpectomy and sentinel node biopsy 7/2023, followed by axillary dissection and evacuation  of hematoma of the breast 8/2023.  She was started on arimidex but stopped due to hair loss/weight gain. Now on exemestane but taking a break due to worsening mood and other side effects. She then completed adjuvant radiation to breast completed 12/2024. She is presenting with R breast and mild arm lymphedema. She is also pending left breast biopsy results.       PLAN      1. referral to OT:  mld, compression garments, pump, shoulder rom, had eval, will also give a compression sleeve and glove order  2. Compression bra script pending appt, now has left breast biopsy results pending.  3, discussed shoulder rom, posture  4. Fu 12 weeks  5. Will discuss [facility] at follow up       Charline Hays MD  Physical Medicine and Rehabilitation

## 2024-07-23 ENCOUNTER — APPOINTMENT (OUTPATIENT)
Dept: OCCUPATIONAL THERAPY | Facility: CLINIC | Age: 69
End: 2024-07-23
Payer: MEDICARE

## 2024-07-23 DIAGNOSIS — B37.31 VAGINAL CANDIDIASIS: ICD-10-CM

## 2024-07-23 RX ORDER — NYSTATIN 100000 U/G
CREAM TOPICAL
Qty: 60 G | Refills: 1 | Status: SHIPPED | OUTPATIENT
Start: 2024-07-23

## 2024-07-24 LAB
LAB AP ASR DISCLAIMER: NORMAL
LABORATORY COMMENT REPORT: NORMAL
PATH REPORT.FINAL DX SPEC: NORMAL
PATH REPORT.GROSS SPEC: NORMAL
PATH REPORT.RELEVANT HX SPEC: NORMAL
PATH REPORT.TOTAL CANCER: NORMAL

## 2024-07-24 NOTE — PROGRESS NOTES
Subjective   Patient ID: Tatyana Mckeon is a 69 y.o. female who presents for follow up breast biopsy.  HPI  Patient returns to clinic for follow up 7/17/2024 left breast stereotactic biopsy.  Patient last seen in office on 7/9/2024 for consult for left breast biopsy.    FINAL DIAGNOSIS   A. LEFT BREAST CALCIFICATIONS, STEREOTACTIC-GUIDED CORE NEEDLE BIOPSY:  --ATYPICAL DUCTAL HYPERPLASIA WITH ASSOCIATED MICROCALCIFICATIONS INVOLVING SCLEROSING LESION, SEE NOTE  --SCLEROSING LESION WITH ASSOCIATED STROMAL MICROCALCIFICATION  --BENIGN BREAST DUCTS AND LOBULES WITH ASSOCIATED MICROCALCIFICATIONS     Note: Multiple deeper levels are examined.  Immunohistochemical stains with appropriate controls are performed.  SMMHC and p63 immunohistochemical stains highlight myoepithelial lining in atypical ductal hyperplasia.  This case has been reviewed at breast intradepartmental consensus conference via Zoom meeting.       Social History:  Tobacco Use - yes  Do you use any recreational drugs - no  Alcohol Use - rare  Caffeine Intake - occasional  Working - employed  Marital status -   Living with - alone      Review of Systems    Objective   Physical Exam  Left breast biopsy site healed nicely. Mild ecchymosis  Assessment/Plan   Problem List Items Addressed This Visit             ICD-10-CM    Breast cancer (Multi) C50.919    Lymphedema I89.0     Patient following up with lymphedema clinic.  Going to be fitted for a bra as well as a sleeve for the right breast issues.         Atypical ductal hyperplasia of left breast - Primary N60.92     Patient diagnosed with atypical ductal hyperplasia and core biopsy left breast.  Personal history of breast cancer on the right.  Recommend Magseed localization and lumpectomy.  Risk benefits alternatives of doing the procedure were thoroughly discussed with the patient agrees to proceed         Relevant Orders    Case Request Operating Room: Lumpectomy w/ Magseed Localization (Completed)      Breast History:    Patient presents to office today for evaluation of axillary wound.   Patient saw Dr. Tineo on 09/13/2023, Dr. Tineo recommended she come back into office to have area evaluated and consider drainage of the breast.   Dr. Tineo recommends radiation treatment to the breast alone to a dose of 42.56 Gy followed by consideration of a 10 Gy in 4 fractions lumpectomy bed boost.  Patient had a diagnostic bilateral mammogram with 3D HOWIE on 05/26/2023, with no previous imaging it demonstrated; scattered fibroglandular densities of the breasts seen bilaterally. Within the right breast there is a spiculated mass measuring about 2.6 cm seen at the 6 o'clock position of the right breast at the inframammary fold, about 11 cm from the nipple appears intimately associated with the chest wall, skin dimpling is noted within this region.   There is an area of skin dimpling along the upper-outer quadrant of the left breast with associated 5 cm focal asymmetry with areas of heterogenerous calcifications about 12 cm from nipple, likely fat necrosis from patient's previous surgery. Bilateral breast ultrasound recommended.  Patient had ultrasound of bilateral breasts on 0526/2023 it demonstrated;   Right breast 6 o'clock position there is 2.5 X 1.9 X 1.8 cm hypoechoic mass with angulated margins and posterior acoustic shadowing, appears to extend to the dermis. In addition, there is an abnormal right axillary lymph node seen with asymmetric cortex measuring 4mm.  Left breast reveals no suspicious breast masses idenifted, some normal breast tissue can be seen, likely an area of fat necrosis.   Category 5: highly suggestive of malignancy. Recommend ultrasound guided biopsy of right breast mass and right axillary lymph node.   Patient 1st felt the mass on mother's Day of this year. She denies any nipple discharge. She has a history of open biopsy remotely for benign disease in the left breast  Patient is status post  ultrasound guided biopsy of right breast and axillary lymph node on 06/07/2023.   Pathology revealing right breast biopsy; invasive ductal carcinoma grade 2 of 3 with asscoicated microcalifications. ER-positive, CT - positive and HER2/katie- equivocal 2+.   Right axillary lymph node biopsy pathology revealing; portion of lymph node, negative of carcinoma.   Imaging is concordant.  Patient is status post wire localization lumpectomy right breast with sentinel node biopsy on 07/21/2023.   Pathology revealed right breast lumpectomy at 6 o'clock position; invasive ductal carcinoma measuring 3 cm grade 3 of 3, margin is 1 mm from the closest anterior margin. DCIS present, cribriform and solid types, intermediate to high nuclear grade, margins negative.  Right breast sentinel node biopsy revealed; one lymph node positive for metastasis, tumor measures 8 mm.  Patient presents to office today for her post operative appointment and drain removal.  Patient is status post right axillary regional axillary dissection and evacuation of a right breast hematoma with KELSEY drain placement 08/16/2023.  Pathology revealed right axillary contents; fifteen lymph nodes, negative for metastasis.   Patient saw Dr. Tineo on 09/13/2023, Dr. Tineo recommended she come back into office to have area evaluated and consider drainage of the breast. Ultrasound ordered- seroma present; drain placement scheduled but not completed due to small size of seroma.   Adjuvant RT completed 12/15/23 with .  Anastrozole initiated 10/2023 with Dr. Hannon.    Previous Biopsy: Yes, Left, Breast, Benign. Menarche age: 11. Age of first delivery: Nulliparous. Breastfeed: N/A. Menopause age: hysterectomy at 2014 at 59. HRT: No. Family history of breast cancer: No. Family history of ovarian cancer: No. Family history of pancreatic cancer: No.     Last seen on 5/3/24 with lymphedema of the right breast mild erythema and peau d'orange. Recommended massage therapy;  referral was placed.  At appointment on 5/3/2024 patient with lump associated with lumpectomy scar slightly more pronounced. Likely a seroma. May be scar tissue. Recent imaging called for a biopsy, so she is also here today for biopsy discussion.  Patient's recent imaging specifically showed microcalcifications in the area of the previous lumpectomy of the left breast.  This lumpectomy was done in the year 2000 and was not cancer. .  Patient with a seroma in the right breast corresponding to the palpable area of concern.  Recommending biopsy of the left breast calcifications.  Started  Exemestane but stopped due to depression symptoms     BI MAMMO BILATERAL DIAGNOSTIC TOMOSYNTHESIS; BI US BREAST LIMITED  RIGHT;  6/19/2024   INDICATION:  History of a right lumpectomy with radiation therapy in 2023. History  of a left lumpectomy.      COMPARISON:  05/26/2023, 06/07/2023, 07/21/2023      FINDINGS:  MAMMOGRAPHY: 2D and tomosynthesis images were reviewed at 1 mm slice  thickness.      Density:  There are areas of scattered fibroglandular tissue.      Scarring is noted in the central medial right breast at posterior  depth. There is skin and trabecular thickening of the right breast  which is most consistent with the patient's history of radiation  therapy. Scarring is seen in the central lateral left breast at  middle depth. In the central lateral left breast posterior to the  lumpectomy site there are pleomorphic grouped calcifications. The  group measures 2.4 cm.      ULTRASOUND: Targeted ultrasound was performed of the palpable area of  concern at the right lumpectomy site. In the right breast at the 5  o'clock position 7 cm from the nipple there is a seroma. The seroma  and lumpectomy site measure 5.9 x 3.3 x 4.5 cm. Edema is noted.      IMPRESSION:  1. Indeterminate left breast calcifications. A stereotactic biopsy is  recommended. This was discussed with the patient. A preprocedure form  has been completed.  2.  Seroma at the palpable area of concern at the lumpectomy site in  the right breast. Clinical follow-up is recommended.          BI-RADS CATEGORY:  BI-RADS Category:  4 Suspicious.  Recommendation:  Surgical Consultation and Biopsy.  Recommended Date:  Immediate.  Laterality:  Left.  Patient to be scheduled for stereotactic biopsy of the left breast in the radiology department. Risk, benefits and alternatives to this plan were thoroughly discussed with the patient who agrees to proceed.  The procedure was also thoroughly discussed as well as her follow-up. She is to see me in the office in one week but I also will call as soon as I see the pathology which is usually 4 working days from procedure.        Olivia Boone MD 07/30/24 1:38 PM

## 2024-07-25 ENCOUNTER — TELEPHONE (OUTPATIENT)
Dept: SURGERY | Facility: CLINIC | Age: 69
End: 2024-07-25
Payer: MEDICARE

## 2024-07-30 ENCOUNTER — OFFICE VISIT (OUTPATIENT)
Dept: SURGERY | Facility: CLINIC | Age: 69
End: 2024-07-30
Payer: MEDICARE

## 2024-07-30 VITALS
HEART RATE: 61 BPM | OXYGEN SATURATION: 96 % | WEIGHT: 209.5 LBS | TEMPERATURE: 97.9 F | HEIGHT: 59 IN | DIASTOLIC BLOOD PRESSURE: 57 MMHG | BODY MASS INDEX: 42.24 KG/M2 | SYSTOLIC BLOOD PRESSURE: 102 MMHG

## 2024-07-30 DIAGNOSIS — Z17.0 MALIGNANT NEOPLASM OF OVERLAPPING SITES OF LEFT BREAST IN FEMALE, ESTROGEN RECEPTOR POSITIVE (MULTI): ICD-10-CM

## 2024-07-30 DIAGNOSIS — N60.92 ATYPICAL DUCTAL HYPERPLASIA OF LEFT BREAST: Primary | ICD-10-CM

## 2024-07-30 DIAGNOSIS — C50.812 MALIGNANT NEOPLASM OF OVERLAPPING SITES OF LEFT BREAST IN FEMALE, ESTROGEN RECEPTOR POSITIVE (MULTI): ICD-10-CM

## 2024-07-30 DIAGNOSIS — I89.0 LYMPHEDEMA: ICD-10-CM

## 2024-07-30 PROCEDURE — 3074F SYST BP LT 130 MM HG: CPT | Performed by: SURGERY

## 2024-07-30 PROCEDURE — 1159F MED LIST DOCD IN RCRD: CPT | Performed by: SURGERY

## 2024-07-30 PROCEDURE — 4010F ACE/ARB THERAPY RXD/TAKEN: CPT | Performed by: SURGERY

## 2024-07-30 PROCEDURE — 1125F AMNT PAIN NOTED PAIN PRSNT: CPT | Performed by: SURGERY

## 2024-07-30 PROCEDURE — 1157F ADVNC CARE PLAN IN RCRD: CPT | Performed by: SURGERY

## 2024-07-30 PROCEDURE — 99214 OFFICE O/P EST MOD 30 MIN: CPT | Performed by: SURGERY

## 2024-07-30 PROCEDURE — 4004F PT TOBACCO SCREEN RCVD TLK: CPT | Performed by: SURGERY

## 2024-07-30 PROCEDURE — 3078F DIAST BP <80 MM HG: CPT | Performed by: SURGERY

## 2024-07-30 PROCEDURE — 3008F BODY MASS INDEX DOCD: CPT | Performed by: SURGERY

## 2024-07-30 RX ORDER — SODIUM CHLORIDE, SODIUM LACTATE, POTASSIUM CHLORIDE, CALCIUM CHLORIDE 600; 310; 30; 20 MG/100ML; MG/100ML; MG/100ML; MG/100ML
100 INJECTION, SOLUTION INTRAVENOUS CONTINUOUS
OUTPATIENT
Start: 2024-07-30

## 2024-07-30 RX ORDER — CEFAZOLIN SODIUM 2 G/100ML
2 INJECTION, SOLUTION INTRAVENOUS ONCE
OUTPATIENT
Start: 2024-07-30 | End: 2024-07-30

## 2024-07-30 ASSESSMENT — ENCOUNTER SYMPTOMS
OCCASIONAL FEELINGS OF UNSTEADINESS: 1
LOSS OF SENSATION IN FEET: 0
DEPRESSION: 0

## 2024-07-30 ASSESSMENT — PAIN SCALES - GENERAL: PAINLEVEL: 4

## 2024-07-30 ASSESSMENT — COLUMBIA-SUICIDE SEVERITY RATING SCALE - C-SSRS
1. IN THE PAST MONTH, HAVE YOU WISHED YOU WERE DEAD OR WISHED YOU COULD GO TO SLEEP AND NOT WAKE UP?: NO
2. HAVE YOU ACTUALLY HAD ANY THOUGHTS OF KILLING YOURSELF?: NO
6. HAVE YOU EVER DONE ANYTHING, STARTED TO DO ANYTHING, OR PREPARED TO DO ANYTHING TO END YOUR LIFE?: NO

## 2024-07-30 NOTE — ASSESSMENT & PLAN NOTE
Patient diagnosed with atypical ductal hyperplasia and core biopsy left breast.  Personal history of breast cancer on the right.  Recommend Magseed localization and lumpectomy.  Risk benefits alternatives of doing the procedure were thoroughly discussed with the patient agrees to proceed

## 2024-07-30 NOTE — ASSESSMENT & PLAN NOTE
Patient following up with lymphedema clinic.  Going to be fitted for a bra as well as a sleeve for the right breast issues.

## 2024-07-31 ENCOUNTER — APPOINTMENT (OUTPATIENT)
Dept: PRIMARY CARE | Facility: CLINIC | Age: 69
End: 2024-07-31
Payer: MEDICARE

## 2024-08-06 ENCOUNTER — TELEPHONE (OUTPATIENT)
Dept: HEMATOLOGY/ONCOLOGY | Facility: HOSPITAL | Age: 69
End: 2024-08-06
Payer: MEDICARE

## 2024-08-06 NOTE — TELEPHONE ENCOUNTER
"Tatyana calls today to pass along the results of her left breast biopsy to VARUN Márquez. She states that the biopsy showed \"Atypical Ductal Hyperplasia.\"    She met with Renetta Kapoor and due to her history of right breast cancer, she wants to perform a lumpectomy. That is scheduled for 9/6.    Message sent to provider/team.  "

## 2024-08-09 ENCOUNTER — HOSPITAL ENCOUNTER (OUTPATIENT)
Dept: RADIOLOGY | Facility: HOSPITAL | Age: 69
Discharge: HOME | End: 2024-08-09
Payer: MEDICARE

## 2024-08-09 DIAGNOSIS — R92.2 INCONCLUSIVE MAMMOGRAM: ICD-10-CM

## 2024-08-09 DIAGNOSIS — N60.92 ATYPICAL DUCTAL HYPERPLASIA OF LEFT BREAST: ICD-10-CM

## 2024-08-09 PROCEDURE — 77065 DX MAMMO INCL CAD UNI: CPT

## 2024-08-09 PROCEDURE — 2780000003 HC OR 278 NO HCPCS

## 2024-08-09 PROCEDURE — A4648 IMPLANTABLE TISSUE MARKER: HCPCS

## 2024-08-09 PROCEDURE — 19281 PERQ DEVICE BREAST 1ST IMAG: CPT | Mod: LT

## 2024-08-09 PROCEDURE — 2500000005 HC RX 250 GENERAL PHARMACY W/O HCPCS: Performed by: RADIOLOGY

## 2024-08-09 RX ORDER — LIDOCAINE HYDROCHLORIDE 10 MG/ML
5 INJECTION, SOLUTION EPIDURAL; INFILTRATION; INTRACAUDAL; PERINEURAL ONCE
Status: DISCONTINUED | OUTPATIENT
Start: 2024-08-09 | End: 2024-08-10 | Stop reason: HOSPADM

## 2024-08-09 RX ORDER — LIDOCAINE HYDROCHLORIDE 10 MG/ML
INJECTION, SOLUTION EPIDURAL; INFILTRATION; INTRACAUDAL; PERINEURAL AS NEEDED
Status: COMPLETED | OUTPATIENT
Start: 2024-08-09 | End: 2024-08-09

## 2024-08-14 ENCOUNTER — APPOINTMENT (OUTPATIENT)
Dept: PRIMARY CARE | Facility: CLINIC | Age: 69
End: 2024-08-14
Payer: MEDICARE

## 2024-08-16 DIAGNOSIS — E11.9 TYPE 2 DIABETES MELLITUS WITHOUT COMPLICATION, WITHOUT LONG-TERM CURRENT USE OF INSULIN (MULTI): ICD-10-CM

## 2024-08-19 ENCOUNTER — TREATMENT (OUTPATIENT)
Dept: OCCUPATIONAL THERAPY | Facility: CLINIC | Age: 69
End: 2024-08-19
Payer: MEDICARE

## 2024-08-19 DIAGNOSIS — I89.0 LYMPHEDEMA: ICD-10-CM

## 2024-08-19 DIAGNOSIS — I89.0 LYMPHEDEMA OF BREAST: ICD-10-CM

## 2024-08-19 PROCEDURE — 97140 MANUAL THERAPY 1/> REGIONS: CPT | Mod: GO

## 2024-08-19 PROCEDURE — 97535 SELF CARE MNGMENT TRAINING: CPT | Mod: GO

## 2024-08-19 RX ORDER — METFORMIN HYDROCHLORIDE 500 MG/1
500 TABLET ORAL 3 TIMES DAILY
Qty: 270 TABLET | Refills: 1 | Status: SHIPPED | OUTPATIENT
Start: 2024-08-19

## 2024-08-19 ASSESSMENT — PAIN SCALES - GENERAL: PAINLEVEL_OUTOF10: 2

## 2024-08-19 ASSESSMENT — ACTIVITIES OF DAILY LIVING (ADL): HOME_MANAGEMENT_TIME_ENTRY: 11

## 2024-08-19 ASSESSMENT — PAIN - FUNCTIONAL ASSESSMENT: PAIN_FUNCTIONAL_ASSESSMENT: 0-10

## 2024-08-19 NOTE — PROGRESS NOTES
Occupational Therapy    Occupational Therapy Treatment    Name: Tatyana Mckeon  MRN: [id number]  : 1955  Date: 24    Time Entry:  Time Calculation  Start Time: 1101  Stop Time: 1149  Time Calculation (min): 48 min        OT Therapeutic Procedures Time Entry  Manual Therapy Time Entry: 37  Self Care/Home Management (ADLs) Time Entry: 11                Assessment:  lymph flow, hivamat.       Plan:  Continue with POC as tolerated, monitor home program, assess new garments,           Subjective   General:  OT Last Visit  OT Received On: 24  General  General Comment: visit 2, no auth onset 2023  Precautions:     Pain Assessment:  Pain Assessment  Pain Assessment: 0-10  0-10 (Numeric) Pain Score: 2  Pain Type: Chronic pain  Pain Location: Breast  Pain Orientation: Right    Objective    Therapy/Activity:      Therapeutic Activity  Therapeutic Activity Performed: Yes  Therapeutic Activity 1: pt arrived with [employer] bra with  bra straps a little longer with zelcro in wrong placement due to too long.  discussed with pt to notiify DME and see about modifications.  Therapeutic Activity 2: applied swatch of k tape to assess skin and will use next session    Manual Therapy  Manual Therapy Performed: Yes  Manual Therapy Activity 1: girth measurements with volume reduction with RUE and nipple site  Manual Therapy Activity 2: hivamat with MLD in order to promote lymph flow with R breast and fibrotic tissue     Lymphedema Assessment    Lymphedema Assessments:  Lymphedema Assessments: Upper extremities  Right Upper Extremity:  R Thumb Distal Phalange Girth: 6 cm  R Thumb Proximal Phalange Girth: 6 cm  R Metacarpals (cm): 18 cm  R Palm (cm): 0 cm  R Wrist (cm): 17 cm  R 10 cm Above Wrist (cm): 23 cm  R 15 cm Above Wrist (cm): 25.5 cm  R 20 cm Above Wrist (cm): 27 cm  R Elbow (cm): 27 cm  R 5 cm Above Elbow: 30 cm  R 10 cm Above Elbow: 33 cm  R 20 cm Above Elbow: 42 cm  R Axilla: 0 cm  R Upper Extremity Total:  254.5  Left Upper Extremity:     UE Skin Appearance/Condition and Girth:  Other (comment): axillary 113 cm increased .5 cm  ; 127 nipple cm decreased 5 cm  Prior Function:     Pain Assessment:  Pain Assessment: 0-10  0-10 (Numeric) Pain Score: 2  Pain Type: Chronic pain  Pain Location: Breast  Pain Orientation: Right  Therapy Precautions:       OP EDUCATION:       Goals:  Active       Lymphedema       Pt will demo volume reduction in R breast in order for proper fitting of medical compression garments and increase ease in transfers and IADLs        Start:  07/19/24    Expected End:  10/25/24            Pt will I perform skin care for infection prevention and integrity of skin        Start:  07/19/24    Expected End:  10/25/24            Pt will be I with stating and demonstrating home exercise program in order to improve patients ability to perform self-care, household, work and/or leisure tasks.        Start:  07/19/24    Expected End:  10/25/24            Pt will complete self  mld in order to promote increased tissue pliability for maintenance with chronic lymphedema        Start:  07/19/24    Expected End:  10/25/24

## 2024-08-21 ENCOUNTER — APPOINTMENT (OUTPATIENT)
Dept: PHYSICAL THERAPY | Facility: CLINIC | Age: 69
End: 2024-08-21
Payer: MEDICARE

## 2024-08-21 ENCOUNTER — OFFICE VISIT (OUTPATIENT)
Dept: PRIMARY CARE | Facility: CLINIC | Age: 69
End: 2024-08-21

## 2024-08-21 VITALS
WEIGHT: 211 LBS | DIASTOLIC BLOOD PRESSURE: 70 MMHG | SYSTOLIC BLOOD PRESSURE: 110 MMHG | HEART RATE: 92 BPM | BODY MASS INDEX: 42.62 KG/M2

## 2024-08-21 DIAGNOSIS — I89.0 LYMPHEDEMA: ICD-10-CM

## 2024-08-21 DIAGNOSIS — M54.50 CHRONIC BILATERAL LOW BACK PAIN WITHOUT SCIATICA: Primary | ICD-10-CM

## 2024-08-21 DIAGNOSIS — M25.561 CHRONIC PAIN OF RIGHT KNEE: ICD-10-CM

## 2024-08-21 DIAGNOSIS — G89.29 CHRONIC BILATERAL LOW BACK PAIN WITHOUT SCIATICA: Primary | ICD-10-CM

## 2024-08-21 DIAGNOSIS — G89.29 CHRONIC PAIN OF RIGHT KNEE: ICD-10-CM

## 2024-08-21 PROCEDURE — 1158F ADVNC CARE PLAN TLK DOCD: CPT | Performed by: FAMILY MEDICINE

## 2024-08-21 PROCEDURE — 1125F AMNT PAIN NOTED PAIN PRSNT: CPT | Performed by: FAMILY MEDICINE

## 2024-08-21 PROCEDURE — 97810 ACUP 1/> WO ESTIM 1ST 15 MIN: CPT | Performed by: FAMILY MEDICINE

## 2024-08-21 PROCEDURE — 3074F SYST BP LT 130 MM HG: CPT | Performed by: FAMILY MEDICINE

## 2024-08-21 PROCEDURE — 1157F ADVNC CARE PLAN IN RCRD: CPT | Performed by: FAMILY MEDICINE

## 2024-08-21 PROCEDURE — 1159F MED LIST DOCD IN RCRD: CPT | Performed by: FAMILY MEDICINE

## 2024-08-21 PROCEDURE — 4004F PT TOBACCO SCREEN RCVD TLK: CPT | Performed by: FAMILY MEDICINE

## 2024-08-21 PROCEDURE — 97811 ACUP 1/> W/O ESTIM EA ADD 15: CPT | Performed by: FAMILY MEDICINE

## 2024-08-21 PROCEDURE — 1123F ACP DISCUSS/DSCN MKR DOCD: CPT | Performed by: FAMILY MEDICINE

## 2024-08-21 PROCEDURE — 4010F ACE/ARB THERAPY RXD/TAKEN: CPT | Performed by: FAMILY MEDICINE

## 2024-08-21 PROCEDURE — 3078F DIAST BP <80 MM HG: CPT | Performed by: FAMILY MEDICINE

## 2024-08-21 ASSESSMENT — PAIN SCALES - GENERAL: PAINLEVEL: 5

## 2024-08-21 NOTE — PATIENT INSTRUCTIONS
I recommend adding green dragon, 3 twice a day, and Bapchule Q 3 twice a day.  This will help you to process a lot of what is going on as well as support your energy foundation.

## 2024-08-21 NOTE — PROGRESS NOTES
Subjective   Patient ID: Tatyana Mckeon is a 69 y.o. female who presents for Accupuncture.    HPI   She presents today for acupuncture treatment.  She states she feels a little overwhelmed is going.  She does need to take the aromatase inhibitor right now.  They are giving her a break until October.  She is going for lymphedema treatment and that is helping quite a bit.  She is going to get fitted for a sleeve for her right arm this month as well.  Unfortunately, she did have the biopsy results come back somewhat atypical so Dr. Boone wants to do a lumpectomy on the left side in September.    Continues to have the knee pain.  Continues with back pain.    Review of Systems    Objective   /70   Pulse 92   Wt 95.7 kg (211 lb)   BMI 42.62 kg/m²     Physical Exam    Assessment/Plan   Diagnoses and all orders for this visit:  Chronic bilateral low back pain without sciatica  Chronic pain of right knee  Lymphedema  She will continue treatment with Dr. Gutiérrez.  She will continue with her lymphedema massage.  I do recommend she add green dragon and imperial Chi.  She will help her process a lot of what is going on and help support her energy foundation.  She feels he has these at home so she will check and let me know.    Acupuncture treatment: Use the following points bilaterally LI 4, LV 2, GB 43, ST 36; added the knees eyes on the right side.  He is retained for 15 minutes with a heating lamp over her lower abdomen.  I then placed needles at BL 40, 57 and 60 bilaterally and these were retained for 15 minutes.  She tolerated this well.

## 2024-08-22 ENCOUNTER — PRE-ADMISSION TESTING (OUTPATIENT)
Dept: PREADMISSION TESTING | Facility: HOSPITAL | Age: 69
End: 2024-08-22
Payer: MEDICARE

## 2024-08-22 VITALS
WEIGHT: 214 LBS | BODY MASS INDEX: 43.14 KG/M2 | HEIGHT: 59 IN | SYSTOLIC BLOOD PRESSURE: 118 MMHG | TEMPERATURE: 97.2 F | RESPIRATION RATE: 16 BRPM | HEART RATE: 97 BPM | OXYGEN SATURATION: 98 % | DIASTOLIC BLOOD PRESSURE: 66 MMHG

## 2024-08-22 DIAGNOSIS — Z01.818 PREOP TESTING: Primary | ICD-10-CM

## 2024-08-22 DIAGNOSIS — R94.31 ABNORMAL EKG: ICD-10-CM

## 2024-08-22 LAB
ANION GAP SERPL CALC-SCNC: 11 MMOL/L
BASOPHILS # BLD AUTO: 0.05 X10*3/UL (ref 0–0.1)
BASOPHILS NFR BLD AUTO: 0.5 %
BUN SERPL-MCNC: ABNORMAL MG/DL
CALCIUM SERPL-MCNC: 9.2 MG/DL (ref 8.5–10.4)
CHLORIDE SERPL-SCNC: 105 MMOL/L (ref 97–107)
CO2 SERPL-SCNC: 24 MMOL/L (ref 24–31)
CREAT SERPL-MCNC: 0.7 MG/DL (ref 0.4–1.6)
EGFRCR SERPLBLD CKD-EPI 2021: >90 ML/MIN/1.73M*2
EOSINOPHIL # BLD AUTO: 0.3 X10*3/UL (ref 0–0.4)
EOSINOPHIL NFR BLD AUTO: 3 %
ERYTHROCYTE [DISTWIDTH] IN BLOOD BY AUTOMATED COUNT: 12.9 % (ref 11.5–14.5)
GLUCOSE SERPL-MCNC: 115 MG/DL (ref 65–99)
HCT VFR BLD AUTO: 40.1 % (ref 36–46)
HGB BLD-MCNC: 13.3 G/DL (ref 12–16)
IMM GRANULOCYTES # BLD AUTO: 0.04 X10*3/UL (ref 0–0.5)
IMM GRANULOCYTES NFR BLD AUTO: 0.4 % (ref 0–0.9)
LYMPHOCYTES # BLD AUTO: 1.95 X10*3/UL (ref 0.8–3)
LYMPHOCYTES NFR BLD AUTO: 19.4 %
MCH RBC QN AUTO: 31.1 PG (ref 26–34)
MCHC RBC AUTO-ENTMCNC: 33.2 G/DL (ref 32–36)
MCV RBC AUTO: 94 FL (ref 80–100)
MONOCYTES # BLD AUTO: 0.53 X10*3/UL (ref 0.05–0.8)
MONOCYTES NFR BLD AUTO: 5.3 %
NEUTROPHILS # BLD AUTO: 7.19 X10*3/UL (ref 1.6–5.5)
NEUTROPHILS NFR BLD AUTO: 71.4 %
NRBC BLD-RTO: 0 /100 WBCS (ref 0–0)
PLATELET # BLD AUTO: 292 X10*3/UL (ref 150–450)
POTASSIUM SERPL-SCNC: 4.3 MMOL/L (ref 3.4–5.1)
RBC # BLD AUTO: 4.28 X10*6/UL (ref 4–5.2)
SODIUM SERPL-SCNC: 140 MMOL/L (ref 133–145)
WBC # BLD AUTO: 10.1 X10*3/UL (ref 4.4–11.3)

## 2024-08-22 PROCEDURE — 82565 ASSAY OF CREATININE: CPT

## 2024-08-22 PROCEDURE — 85025 COMPLETE CBC W/AUTO DIFF WBC: CPT

## 2024-08-22 PROCEDURE — 93010 ELECTROCARDIOGRAM REPORT: CPT | Performed by: INTERNAL MEDICINE

## 2024-08-22 PROCEDURE — 36415 COLL VENOUS BLD VENIPUNCTURE: CPT

## 2024-08-22 PROCEDURE — 99203 OFFICE O/P NEW LOW 30 MIN: CPT | Performed by: NURSE PRACTITIONER

## 2024-08-22 PROCEDURE — 93005 ELECTROCARDIOGRAM TRACING: CPT

## 2024-08-22 ASSESSMENT — DUKE ACTIVITY SCORE INDEX (DASI)
CAN YOU DO HEAVY WORK AROUND THE HOUSE LIKE SCRUBBING FLOORS OR LIFTING AND MOVING HEAVY FURNITURE: NO
CAN YOU DO LIGHT WORK AROUND THE HOUSE LIKE DUSTING OR WASHING DISHES: YES
CAN YOU DO YARD WORK LIKE RAKING LEAVES, WEEDING OR PUSHING A MOWER: YES
CAN YOU WALK INDOORS, SUCH AS AROUND YOUR HOUSE: YES
CAN YOU WALK A BLOCK OR TWO ON LEVEL GROUND: YES
CAN YOU CLIMB A FLIGHT OF STAIRS OR WALK UP A HILL: YES
TOTAL_SCORE: 28.7
CAN YOU PARTICIPATE IN STRENOUS SPORTS LIKE SWIMMING, SINGLES TENNIS, FOOTBALL, BASKETBALL, OR SKIING: NO
CAN YOU RUN A SHORT DISTANCE: NO
DASI METS SCORE: 6.3
CAN YOU DO MODERATE WORK AROUND THE HOUSE LIKE VACUUMING, SWEEPING FLOORS OR CARRYING GROCERIES: YES
CAN YOU HAVE SEXUAL RELATIONS: YES
CAN YOU PARTICIPATE IN MODERATE RECREATIONAL ACTIVITIES LIKE GOLF, BOWLING, DANCING, DOUBLES TENNIS OR THROWING A BASEBALL OR FOOTBALL: NO
CAN YOU TAKE CARE OF YOURSELF (EAT, DRESS, BATHE, OR USE TOILET): YES

## 2024-08-22 ASSESSMENT — ENCOUNTER SYMPTOMS
EYES NEGATIVE: 1
RESPIRATORY NEGATIVE: 1
CARDIOVASCULAR NEGATIVE: 1
GASTROINTESTINAL NEGATIVE: 1
PSYCHIATRIC NEGATIVE: 1
HEMATOLOGIC/LYMPHATIC NEGATIVE: 1
CONSTITUTIONAL NEGATIVE: 1
NEUROLOGICAL NEGATIVE: 1
ENDOCRINE NEGATIVE: 1

## 2024-08-22 ASSESSMENT — PAIN SCALES - GENERAL: PAINLEVEL_OUTOF10: 4

## 2024-08-22 ASSESSMENT — PAIN DESCRIPTION - DESCRIPTORS: DESCRIPTORS: ACHING

## 2024-08-22 ASSESSMENT — PAIN - FUNCTIONAL ASSESSMENT: PAIN_FUNCTIONAL_ASSESSMENT: 0-10

## 2024-08-22 NOTE — H&P (VIEW-ONLY)
CPM/PAT Evaluation       Name: Tatyana Mckeon (Tatyana Mckeon)  /Age: 1955/69 y.o.     In-Person       Chief Complaint: atypical ductal hyperplasia of left breast     HPI    Pt is a 69 year old female with atypical  ductal hyperplasia of left breast . Pt was diagnosed and treated for right breast cancer in . Pt completed the breast biopsy and the pathology report showed: ATYPICAL DUCTAL HYPERPLASIA .  Pt discussed the results with her surgeon and has been scheduled for left breast lumpectomy with Magseed localization. Pt denies CP, SOB, or dizziness.     Past Medical History:   Diagnosis Date    Acquired lymphedema     right breast    Bipolar 1 disorder (Multi)     Breast cancer (Multi) 2023    Right Breast - IDC and DCIS    Depression     Diabetes mellitus (Multi)     Type 2    Endometrial cancer (Multi)     Hyperlipidemia     Hypertension     Osteoarthritis of right knee     Peripheral edema     Psoriasis     Toe fracture, left      Past Surgical History:   Procedure Laterality Date    AXILLARY NODE DISSECTION  2023    Right Axillary Dissection and Evacuation of a right breast hematoma    BI MAMMO GUIDED BREAST RIGHT LOCALIZATION Right 2023    Wire localization lumpectomy right breast with sentinel node biopsy    BREAST BIOPSY Left 2024    Sterotactic    BREAST LUMPECTOMY  2000    Left breast    HYSTERECTOMY  2014    LAPAROTOMY OOPHERECTOMY  2014    US GUIDED BIOPSY LYMPH NODE SUPERFICIAL  2023    Ultrasound guided right breast and axillary lymph node biopsy     Social History     Tobacco Use    Smoking status: Every Day     Current packs/day: 0.50     Average packs/day: 0.5 packs/day for 40.0 years (20.0 ttl pk-yrs)     Types: Cigarettes     Passive exposure: Current    Smokeless tobacco: Never   Substance Use Topics    Alcohol use: Yes     Comment: 2x a year     Social History     Substance and Sexual Activity   Drug Use Not Currently    Types: Marijuana    Comment: SMOKED MJ 40  YEARS AGO       Patient Sexual activity questions deferred to the physician.    Family History   Problem Relation Name Age of Onset    Stroke Mother      Other (high blood pressure) Father      Prostate cancer Father      Heart disease Father          with MI at age 71    Diabetes Father      Hypertension Father      Diabetes Sister      Hypertension Sister      Other (oral cancer) Brother      Hypertension Brother         Allergies   Allergen Reactions    Pollen Extracts Itching    Cephalexin Diarrhea    Naproxen Unknown     Current Outpatient Medications   Medication Sig Dispense Refill    acetaminophen (Tylenol) 500 mg tablet Take 1 tablet (500 mg) by mouth 2 times a day.      amLODIPine (Norvasc) 10 mg tablet TAKE ONE TABLET BY MOUTH DAILY 90 tablet 1    aspirin 81 mg EC tablet Take 1 tablet (81 mg) by mouth once daily.      atorvastatin (Lipitor) 10 mg tablet TAKE ONE TABLET BY MOUTH EVERY OTHER DAY 45 tablet 1    famotidine (Pepcid) 20 mg tablet Take 1 tablet (20 mg) by mouth once daily.      furosemide (Lasix) 20 mg tablet TAKE 1-2 TABLETS (20-40 MG) BY MOUTH 2 TIMES A DAY (Patient taking differently: Take 1-2 tablets (20-40 mg) by mouth if needed.) 90 tablet 1    ibuprofen 200 mg tablet Take 1 tablet (200 mg) by mouth 2 times a day.      ketoconazole (NIZOral) 2 % shampoo Apply topically.      LaMICtal 100 mg tablet Take 1 tablet (100 mg) by mouth once daily.      lisinopril 20 mg tablet TAKE ONE TABLET BY MOUTH EVERY DAY 90 tablet 1    loratadine (Claritin) 10 mg tablet Take 1 tablet (10 mg) by mouth once daily.      metFORMIN (Glucophage) 500 mg tablet TAKE ONE TABLET BY MOUTH THREE TIMES A  tablet 1    multivitamin capsule Take 1 capsule by mouth once daily.      Neurontin 400 mg capsule Take 1 capsule (400 mg) by mouth once daily.      nystatin (Mycostatin) cream APPLY EXTERNALLY TWICE DAILY 60 g 1    omega 3-dha-epa-fish oil (Fish OiL) 1,000 mg (120 mg-180 mg) capsule Take 1 capsule (1,000 mg) by  "mouth once daily.      PaxiL 20 mg tablet Take 1 tablet (20 mg) by mouth once daily.      QUEtiapine (SEROquel) 25 mg tablet Take 0.5 tablets (12.5 mg) by mouth once daily at bedtime.      Topamax 25 mg tablet Take 1 tablet (25 mg) by mouth once daily.      Wellbutrin  mg 24 hr tablet Take 1 tablet (300 mg) by mouth once daily in the morning.      blood sugar diagnostic (Accu-Chek Guide test strips) strip 3 times a day.      blood-glucose meter misc 3 times a day.      triamcinolone (Kenalog) 0.1 % cream Apply to affected area twice daily as needed (Patient not taking: Reported on 8/22/2024) 30 g 0     No current facility-administered medications for this visit.          Review of Systems   Constitutional: Negative.    HENT: Negative.     Eyes: Negative.    Respiratory: Negative.     Cardiovascular: Negative.    Gastrointestinal: Negative.    Endocrine: Negative.    Genitourinary: Negative.    Musculoskeletal:         Right knee pain. Left toe fracture. Pt reports she has had recent falls d/t right knee pain and balance. Ambulates with a walker   Skin:         Multiple psoriasis patches on bilateral arms, legs, and inner thigh.    Neurological: Negative.    Hematological: Negative.    Psychiatric/Behavioral: Negative.       /66   Pulse 97   Temp 36.2 °C (97.2 °F) (Temporal)   Resp 16   Ht 1.499 m (4' 11\")   Wt 97.1 kg (214 lb)   SpO2 98%   BMI 43.22 kg/m²     Physical Exam  Vitals reviewed.   Constitutional:       Appearance: She is morbidly obese.   HENT:      Head: Normocephalic and atraumatic.      Nose: Nose normal.      Mouth/Throat:      Mouth: Mucous membranes are moist.      Pharynx: Oropharynx is clear.   Eyes:      Extraocular Movements: Extraocular movements intact.      Conjunctiva/sclera: Conjunctivae normal.      Pupils: Pupils are equal, round, and reactive to light.   Cardiovascular:      Rate and Rhythm: Normal rate and regular rhythm.      Pulses: Normal pulses.      Heart " sounds: Normal heart sounds.   Pulmonary:      Effort: Pulmonary effort is normal.      Breath sounds: Normal breath sounds.   Abdominal:      General: Bowel sounds are normal.      Palpations: Abdomen is soft.   Musculoskeletal:      Cervical back: Normal range of motion and neck supple.      Comments: Right knee pain. Pt ambulates with a walker   Skin:     General: Skin is warm and dry.      Comments: Multiple psoriasis patches on her bilateral arms   Neurological:      General: No focal deficit present.      Mental Status: She is alert and oriented to person, place, and time. Mental status is at baseline.   Psychiatric:         Mood and Affect: Mood normal.         Behavior: Behavior normal.         Thought Content: Thought content normal.         Judgment: Judgment normal.        PAT AIRWAY:   Airway:     Mallampati::  III    TM distance::  >3 FB    Neck ROM::  Full   Missing a few teeth    ASA: 3  DASI: 28.7  METS: 6.3  CHADS: 4%  RCRI: 0.4%  Ariscat: 1.6%    Assessment and Plan:     Atypical ductal hyperplasia of left breast: lumpectomy with Magseed localization left.  HTN: managed with amlodipine and lisinopril.   Hyperlipidemia: managed with atorvastatin.   H/O right breast cancer: s/p right breast wire localization lumpectomy with sentinel node biopsy 7/2023, 8/16/2023 axillary lymph node dissection and evacuation of right breast hematoma. Pt completed radiation treatment 12/2023.   DM2: 5/22/2024 HgbA1C: 6.5; Pt is taking Metformin.   H/O endometrial cancer s/p hysterectomy  Bipolar/ Depression: Pt is taking Paxil, Seroquel, Wellbutrin XL, and lamictal.   Psoriasis: Multiple Psoriasis patches   GERD: pt is taking famotidine  OA: right knee  Peripheral edema: Pt takes as needed furosemide.   BMI: 43.22  Daily cigarette smoking   Falls: pt ambulates with a walker d/t having falls because of her right knee pain.     CBC and BMP collected in PAT.   EKG performed in PAT.     Dr Churchill Cardiac clearance note in  "Epic:  \"Preop cardiovascular exam  The patient is stable from a cardiac standpoint.  She exhibits no chest pain or anginal symptoms.  He had no episodes of heart failure.  She has an average to good functional capacity.  Her twelve-lead EKG reveals a sinus rhythm with a right bundle branch block pattern and nonspecific T wave abnormality.  Based on these factors I believe she may proceed with the lumpectomy procedure at acceptable low cardiovascular risk.\"    MIGUEL Ortiz-CNP    [facility]:  Secure messaging 8/26/24  Dr Boone, I am following up on Tatyana Mckeon who was seen in [facility] on 8/22 in advance of her breast surgery. Her confirmed EKG is showing inf. ischemia. her METS are 6/3. I have asked Bindu to set up a cardiology appt for risk assessment.  Patient called and left her a VMX  Margaret Cortés APRN-CNP          "

## 2024-08-22 NOTE — CPM/PAT H&P
CPM/PAT Evaluation       Name: Tatyana Mckeon (Tatyana Mckeon)  /Age: 1955/69 y.o.     In-Person       Chief Complaint: atypical ductal hyperplasia of left breast     HPI    Pt is a 69 year old female with atypical  ductal hyperplasia of left breast . Pt was diagnosed and treated for right breast cancer in . Pt completed the breast biopsy and the pathology report showed: ATYPICAL DUCTAL HYPERPLASIA .  Pt discussed the results with her surgeon and has been scheduled for left breast lumpectomy with Magseed localization. Pt denies CP, SOB, or dizziness.     Past Medical History:   Diagnosis Date    Acquired lymphedema     right breast    Bipolar 1 disorder (Multi)     Breast cancer (Multi) 2023    Right Breast - IDC and DCIS    Depression     Diabetes mellitus (Multi)     Type 2    Endometrial cancer (Multi)     Hyperlipidemia     Hypertension     Osteoarthritis of right knee     Peripheral edema     Psoriasis     Toe fracture, left      Past Surgical History:   Procedure Laterality Date    AXILLARY NODE DISSECTION  2023    Right Axillary Dissection and Evacuation of a right breast hematoma    BI MAMMO GUIDED BREAST RIGHT LOCALIZATION Right 2023    Wire localization lumpectomy right breast with sentinel node biopsy    BREAST BIOPSY Left 2024    Sterotactic    BREAST LUMPECTOMY  2000    Left breast    HYSTERECTOMY  2014    LAPAROTOMY OOPHERECTOMY  2014    US GUIDED BIOPSY LYMPH NODE SUPERFICIAL  2023    Ultrasound guided right breast and axillary lymph node biopsy     Social History     Tobacco Use    Smoking status: Every Day     Current packs/day: 0.50     Average packs/day: 0.5 packs/day for 40.0 years (20.0 ttl pk-yrs)     Types: Cigarettes     Passive exposure: Current    Smokeless tobacco: Never   Substance Use Topics    Alcohol use: Yes     Comment: 2x a year     Social History     Substance and Sexual Activity   Drug Use Not Currently    Types: Marijuana    Comment: SMOKED MJ 40  YEARS AGO       Patient Sexual activity questions deferred to the physician.    Family History   Problem Relation Name Age of Onset    Stroke Mother      Other (high blood pressure) Father      Prostate cancer Father      Heart disease Father          with MI at age 71    Diabetes Father      Hypertension Father      Diabetes Sister      Hypertension Sister      Other (oral cancer) Brother      Hypertension Brother         Allergies   Allergen Reactions    Pollen Extracts Itching    Cephalexin Diarrhea    Naproxen Unknown     Current Outpatient Medications   Medication Sig Dispense Refill    acetaminophen (Tylenol) 500 mg tablet Take 1 tablet (500 mg) by mouth 2 times a day.      amLODIPine (Norvasc) 10 mg tablet TAKE ONE TABLET BY MOUTH DAILY 90 tablet 1    aspirin 81 mg EC tablet Take 1 tablet (81 mg) by mouth once daily.      atorvastatin (Lipitor) 10 mg tablet TAKE ONE TABLET BY MOUTH EVERY OTHER DAY 45 tablet 1    famotidine (Pepcid) 20 mg tablet Take 1 tablet (20 mg) by mouth once daily.      furosemide (Lasix) 20 mg tablet TAKE 1-2 TABLETS (20-40 MG) BY MOUTH 2 TIMES A DAY (Patient taking differently: Take 1-2 tablets (20-40 mg) by mouth if needed.) 90 tablet 1    ibuprofen 200 mg tablet Take 1 tablet (200 mg) by mouth 2 times a day.      ketoconazole (NIZOral) 2 % shampoo Apply topically.      LaMICtal 100 mg tablet Take 1 tablet (100 mg) by mouth once daily.      lisinopril 20 mg tablet TAKE ONE TABLET BY MOUTH EVERY DAY 90 tablet 1    loratadine (Claritin) 10 mg tablet Take 1 tablet (10 mg) by mouth once daily.      metFORMIN (Glucophage) 500 mg tablet TAKE ONE TABLET BY MOUTH THREE TIMES A  tablet 1    multivitamin capsule Take 1 capsule by mouth once daily.      Neurontin 400 mg capsule Take 1 capsule (400 mg) by mouth once daily.      nystatin (Mycostatin) cream APPLY EXTERNALLY TWICE DAILY 60 g 1    omega 3-dha-epa-fish oil (Fish OiL) 1,000 mg (120 mg-180 mg) capsule Take 1 capsule (1,000 mg) by  "mouth once daily.      PaxiL 20 mg tablet Take 1 tablet (20 mg) by mouth once daily.      QUEtiapine (SEROquel) 25 mg tablet Take 0.5 tablets (12.5 mg) by mouth once daily at bedtime.      Topamax 25 mg tablet Take 1 tablet (25 mg) by mouth once daily.      Wellbutrin  mg 24 hr tablet Take 1 tablet (300 mg) by mouth once daily in the morning.      blood sugar diagnostic (Accu-Chek Guide test strips) strip 3 times a day.      blood-glucose meter misc 3 times a day.      triamcinolone (Kenalog) 0.1 % cream Apply to affected area twice daily as needed (Patient not taking: Reported on 8/22/2024) 30 g 0     No current facility-administered medications for this visit.          Review of Systems   Constitutional: Negative.    HENT: Negative.     Eyes: Negative.    Respiratory: Negative.     Cardiovascular: Negative.    Gastrointestinal: Negative.    Endocrine: Negative.    Genitourinary: Negative.    Musculoskeletal:         Right knee pain. Left toe fracture. Pt reports she has had recent falls d/t right knee pain and balance. Ambulates with a walker   Skin:         Multiple psoriasis patches on bilateral arms, legs, and inner thigh.    Neurological: Negative.    Hematological: Negative.    Psychiatric/Behavioral: Negative.       /66   Pulse 97   Temp 36.2 °C (97.2 °F) (Temporal)   Resp 16   Ht 1.499 m (4' 11\")   Wt 97.1 kg (214 lb)   SpO2 98%   BMI 43.22 kg/m²     Physical Exam  Vitals reviewed.   Constitutional:       Appearance: She is morbidly obese.   HENT:      Head: Normocephalic and atraumatic.      Nose: Nose normal.      Mouth/Throat:      Mouth: Mucous membranes are moist.      Pharynx: Oropharynx is clear.   Eyes:      Extraocular Movements: Extraocular movements intact.      Conjunctiva/sclera: Conjunctivae normal.      Pupils: Pupils are equal, round, and reactive to light.   Cardiovascular:      Rate and Rhythm: Normal rate and regular rhythm.      Pulses: Normal pulses.      Heart " sounds: Normal heart sounds.   Pulmonary:      Effort: Pulmonary effort is normal.      Breath sounds: Normal breath sounds.   Abdominal:      General: Bowel sounds are normal.      Palpations: Abdomen is soft.   Musculoskeletal:      Cervical back: Normal range of motion and neck supple.      Comments: Right knee pain. Pt ambulates with a walker   Skin:     General: Skin is warm and dry.      Comments: Multiple psoriasis patches on her bilateral arms   Neurological:      General: No focal deficit present.      Mental Status: She is alert and oriented to person, place, and time. Mental status is at baseline.   Psychiatric:         Mood and Affect: Mood normal.         Behavior: Behavior normal.         Thought Content: Thought content normal.         Judgment: Judgment normal.        PAT AIRWAY:   Airway:     Mallampati::  III    TM distance::  >3 FB    Neck ROM::  Full   Missing a few teeth    ASA: 3  DASI: 28.7  METS: 6.3  CHADS: 4%  RCRI: 0.4%  Ariscat: 1.6%    Assessment and Plan:     Atypical ductal hyperplasia of left breast: lumpectomy with Magseed localization left.  HTN: managed with amlodipine and lisinopril.   Hyperlipidemia: managed with atorvastatin.   H/O right breast cancer: s/p right breast wire localization lumpectomy with sentinel node biopsy 7/2023, 8/16/2023 axillary lymph node dissection and evacuation of right breast hematoma. Pt completed radiation treatment 12/2023.   DM2: 5/22/2024 HgbA1C: 6.5; Pt is taking Metformin.   H/O endometrial cancer s/p hysterectomy  Bipolar/ Depression: Pt is taking Paxil, Seroquel, Wellbutrin XL, and lamictal.   Psoriasis: Multiple Psoriasis patches   GERD: pt is taking famotidine  OA: right knee  Peripheral edema: Pt takes as needed furosemide.   BMI: 43.22  Daily cigarette smoking   Falls: pt ambulates with a walker d/t having falls because of her right knee pain.     CBC and BMP collected in PAT.   EKG performed in PAT.     Dr Churchill Cardiac clearance note in  "Epic:  \"Preop cardiovascular exam  The patient is stable from a cardiac standpoint.  She exhibits no chest pain or anginal symptoms.  He had no episodes of heart failure.  She has an average to good functional capacity.  Her twelve-lead EKG reveals a sinus rhythm with a right bundle branch block pattern and nonspecific T wave abnormality.  Based on these factors I believe she may proceed with the lumpectomy procedure at acceptable low cardiovascular risk.\"    MIGUEL Ortiz-CNP    [facility]:  Secure messaging 8/26/24  Dr Boone, I am following up on Tatyana Mckeon who was seen in [facility] on 8/22 in advance of her breast surgery. Her confirmed EKG is showing inf. ischemia. her METS are 6/3. I have asked Bindu to set up a cardiology appt for risk assessment.  Patient called and left her a VMX  Margaret Cortés APRN-CNP          "

## 2024-08-22 NOTE — PREPROCEDURE INSTRUCTIONS
Medication List            Accurate as of August 22, 2024  1:31 PM. Always use your most recent med list.                Accu-Chek Guide test strips strip  Generic drug: blood sugar diagnostic     acetaminophen 500 mg tablet  Commonly known as: Tylenol  Medication Adjustments for Surgery: Other (Comment)  Notes to patient: CAN USE THE MORNING OF SURGERY IF NEEDED     amLODIPine 10 mg tablet  Commonly known as: Norvasc  TAKE ONE TABLET BY MOUTH DAILY  Medication Adjustments for Surgery: Take morning of surgery with sip of water, no other fluids     aspirin 81 mg EC tablet  Medication Adjustments for Surgery: Stop 7 days before surgery     atorvastatin 10 mg tablet  Commonly known as: Lipitor  TAKE ONE TABLET BY MOUTH EVERY OTHER DAY  Medication Adjustments for Surgery: Other (Comment)  Notes to patient: CAN TAKE THE NIGHT BEFORE SURGERY     blood-glucose meter misc     famotidine 20 mg tablet  Commonly known as: Pepcid  Medication Adjustments for Surgery: Take morning of surgery with sip of water, no other fluids     Fish OiL 1,000 mg (120 mg-180 mg) capsule  Generic drug: omega 3-dha-epa-fish oil  Medication Adjustments for Surgery: Stop 7 days before surgery     furosemide 20 mg tablet  Commonly known as: Lasix  TAKE 1-2 TABLETS (20-40 MG) BY MOUTH 2 TIMES A DAY  Medication Adjustments for Surgery: Other (Comment)  Notes to patient: HOLD THE MORNING OF SURGERY     ibuprofen 200 mg tablet  Medication Adjustments for Surgery: Stop 7 days before surgery     ketoconazole 2 % shampoo  Commonly known as: NIZOral  Medication Adjustments for Surgery: Other (Comment)  Notes to patient: HOLD THE MORNING OF SURGERY     LaMICtal 100 mg tablet  Generic drug: lamoTRIgine  Medication Adjustments for Surgery: Other (Comment)  Notes to patient: CAN TAKE THE NIGHT BEFORE SURGERY     lisinopril 20 mg tablet  TAKE ONE TABLET BY MOUTH EVERY DAY  Medication Adjustments for Surgery: Other (Comment)  Notes to patient: HOLD THE MORNING  OF SURGERY     loratadine 10 mg tablet  Commonly known as: Claritin  Medication Adjustments for Surgery: Take morning of surgery with sip of water, no other fluids     metFORMIN 500 mg tablet  Commonly known as: Glucophage  TAKE ONE TABLET BY MOUTH THREE TIMES A DAY  Medication Adjustments for Surgery: Other (Comment)  Notes to patient: HOLD THE MORNING OF SURGERY     multivitamin capsule  Medication Adjustments for Surgery: Stop 7 days before surgery     Neurontin 400 mg capsule  Generic drug: gabapentin  Medication Adjustments for Surgery: Other (Comment)  Notes to patient: CAN TAKE THE NIGHT BEFORE SURGERY     nystatin cream  Commonly known as: Mycostatin  APPLY EXTERNALLY TWICE DAILY  Medication Adjustments for Surgery: Other (Comment)  Notes to patient: HOLD THE MORNING OF SURGERY     PaxiL 20 mg tablet  Generic drug: PARoxetine  Medication Adjustments for Surgery: Take morning of surgery with sip of water, no other fluids     QUEtiapine 25 mg tablet  Commonly known as: SEROquel  Medication Adjustments for Surgery: Other (Comment)  Notes to patient: CAN TAKE THE NIGHT BEFORE SURGERY     Topamax 25 mg tablet  Generic drug: topiramate  Medication Adjustments for Surgery: Other (Comment)  Notes to patient: CAN TAKE THE NIGHT BEFORE SURGERY     triamcinolone 0.1 % cream  Commonly known as: Kenalog  Apply to affected area twice daily as needed  Medication Adjustments for Surgery: Other (Comment)  Notes to patient: HOLD THE MORNING OF SURGERY     Wellbutrin  mg 24 hr tablet  Generic drug: buPROPion XL  Medication Adjustments for Surgery: Take morning of surgery with sip of water, no other fluids                 Preoperative Fasting Guidelines    Why must I stop eating and drinking near surgery time?  With sedation, food or liquid in your stomach can enter your lungs causing serious complications  Increases nausea and vomiting    When do I need to stop eating and drinking before my surgery?  Do not eat any food  or drink any liquids after midnight the night before your surgery/procedure.  You may have sips of water to take medications.    PAT DISCHARGE INSTRUCTIONS    Please call the Same Day Surgery (SDS) Department of the hospital where your procedure will be performed after 2:00 PM the day before your surgery. If you are scheduled on a Monday, or a Tuesday following a Monday holiday, you will need to call on the last business day prior to your surgery.    [facility]  [address] [place] [address] [phone]  [facility]  [address] [facility], [id number] [phone]  [facility]  [address]  [place] [id number] [phone]    Please let your surgeon know if:      You develop any open sores, shingles, burning or painful urination as these may increase your risk of an infection.   You no longer wish to have the surgery.   Any other personal circumstances change that may lead to the need to cancel or defer this surgery-such as being sick or getting admitted to any hospital within one week of your planned procedure.    Your contact details change, such as a change of address or phone number.    Starting now:     Please DO NOT drink alcohol or smoke for 24 hours before surgery. It is well known that quitting smoking can make a huge difference to your health and recovery from surgery. The longer you abstain from smoking, the better your chances of a healthy recovery. If you need help with quitting, call 6-867-QUIT-NOW to be connected to a trained counselor who will discuss the best methods to help you quit.     Before your surgery:    Please stop all supplements 7 days prior to surgery. Or as directed by your surgeon.   Please stop taking NSAID pain medicine such as Advil and Motrin 7 days before surgery.    If you develop any fever, cough, cold, rashes, cuts,  scratches, scrapes, urinary symptoms or infection anywhere on your body (including teeth and gums) prior to surgery, please call your surgeon’s office as soon as possible. This may require treatment to reduce the chance of cancellation on the day of surgery.    The day before your surgery:   DIET- Please follow the diet instructions at the top of your packet.   Get a good night’s rest.  Use the special soap for bathing if you have been instructed to use one.    Scheduled surgery times may change and you will be notified if this occurs - please check your personal voicemail for any updates.     On the morning of surgery:   Wear comfortable, loose fitting clothes which open in the front. Please do not wear moisturizers, creams, lotions, makeup or perfume.    Please bring with you to surgery:   Photo ID and insurance card   Current list of medicines and allergies   Pacemaker/ Defibrillator/Heart stent cards   CPAP machine and mask    Slings/ splints/ crutches   A copy of your complete advanced directive/DHPOA.    Please do NOT bring with you to surgery:   All jewelry and valuables should be left at home.   Prosthetic devices such as contact lenses, hearing aids, dentures, eyelash extensions, hairpins and body piercings must be removed prior to going in to the surgical suite.    After outpatient surgery:   A responsible adult MUST accompany you at the time of discharge and stay with you for 24 hours after your surgery. You may NOT drive yourself home after surgery.    Do not drive, operate machinery, make critical decisions or do activities that require co-ordination or balance until after a night’s sleep.   Do not drink alcoholic beverages for 24 hours.   Instructions for resuming your medications will be provided by your surgeon.    CALL YOUR DOCTOR AFTER SURGERY IF YOU HAVE:     Chills and/or a fever of 101° F or higher.    Redness, swelling, pus or drainage from your surgical wound or a bad smell from the wound.     Lightheadedness, fainting or confusion.    Persistent vomiting (throwing up) and are not able to eat or drink for 12 hours.    Three or more loose, watery bowel movements in 24 hours (diarrhea).   Difficulty or pain while urinating( after non-urological surgery)    Pain and swelling in your legs, especially if it is only on one side.    Difficulty breathing or are breathing faster than normal.    Any new concerning symptoms.

## 2024-08-23 ENCOUNTER — APPOINTMENT (OUTPATIENT)
Dept: PREADMISSION TESTING | Facility: HOSPITAL | Age: 69
End: 2024-08-23
Payer: MEDICARE

## 2024-08-23 LAB — BUN SERPL-MCNC: 20 MG/DL (ref 8–25)

## 2024-08-24 LAB
ATRIAL RATE: 61 BPM
P AXIS: 86 DEGREES
P OFFSET: 178 MS
P ONSET: 119 MS
PR INTERVAL: 202 MS
Q ONSET: 220 MS
QRS COUNT: 10 BEATS
QRS DURATION: 164 MS
QT INTERVAL: 480 MS
QTC CALCULATION(BAZETT): 483 MS
QTC FREDERICIA: 482 MS
R AXIS: 32 DEGREES
T AXIS: -24 DEGREES
T OFFSET: 460 MS
VENTRICULAR RATE: 61 BPM

## 2024-08-27 ENCOUNTER — APPOINTMENT (OUTPATIENT)
Dept: CARDIOLOGY | Facility: CLINIC | Age: 69
End: 2024-08-27
Payer: MEDICARE

## 2024-08-28 ENCOUNTER — TREATMENT (OUTPATIENT)
Dept: OCCUPATIONAL THERAPY | Facility: CLINIC | Age: 69
End: 2024-08-28
Payer: MEDICARE

## 2024-08-28 ENCOUNTER — APPOINTMENT (OUTPATIENT)
Dept: PRIMARY CARE | Facility: CLINIC | Age: 69
End: 2024-08-28
Payer: MEDICARE

## 2024-08-28 DIAGNOSIS — I89.0 LYMPHEDEMA OF BREAST: ICD-10-CM

## 2024-08-28 DIAGNOSIS — I89.0 LYMPHEDEMA: ICD-10-CM

## 2024-08-28 PROCEDURE — 97530 THERAPEUTIC ACTIVITIES: CPT | Mod: GO,59

## 2024-08-28 PROCEDURE — 97140 MANUAL THERAPY 1/> REGIONS: CPT | Mod: GO

## 2024-08-28 PROCEDURE — 97535 SELF CARE MNGMENT TRAINING: CPT | Mod: GO

## 2024-08-28 ASSESSMENT — PAIN SCALES - GENERAL: PAINLEVEL_OUTOF10: 4

## 2024-08-28 ASSESSMENT — ACTIVITIES OF DAILY LIVING (ADL): HOME_MANAGEMENT_TIME_ENTRY: 11

## 2024-08-28 ASSESSMENT — PAIN - FUNCTIONAL ASSESSMENT: PAIN_FUNCTIONAL_ASSESSMENT: 0-10

## 2024-08-28 ASSESSMENT — PAIN DESCRIPTION - DESCRIPTORS: DESCRIPTORS: TENDER

## 2024-08-28 NOTE — PROGRESS NOTES
Occupational Therapy    Occupational Therapy Treatment    Name: Tatyana Mckeon  MRN: [id number]  : 1955  Date: 24    Time Entry:  Time Calculation  Start Time: 1301  Stop Time: 1357  Time Calculation (min): 56 min        OT Therapeutic Procedures Time Entry  Manual Therapy Time Entry: 16  Self Care/Home Management (ADLs) Time Entry: 11  Therapeutic Activity Time Entry: 29                Assessment:  Pt reporting having lumpectomy next week, but requires additional cardiac testing prior to sx.  Will follow cardiology for any medical status changes      Plan: Continue with POC as tolerated, monitor home program, assess new garments,           Subjective   General:  OT Last Visit  OT Received On: 24  General  General Comment: visit 3, no auth onset 2023  Precautions:  Precautions Comment: none  Pain Assessment:  Pain Assessment  Pain Assessment: 0-10  0-10 (Numeric) Pain Score: 4  Pain Type: Chronic pain  Pain Location: Breast  Pain Orientation: Right  Pain Descriptors: Tender  Pain Frequency: Intermittent  Pain Onset: Ongoing    Objective       Therapy/Activity:      Therapeutic Activity  Therapeutic Activity Performed: Yes  Therapeutic Activity 1: Pt reporting [employer] requiring new garment/gauntlet script for fitting at store.  Spoke with Emily pertaining to poor velcro applying on garment fabric of compression bra per fitter and discussed pt will need a new bra due to break down of medical compression threading  Therapeutic Activity 2: pump trial completed with basic pump and pt reporting pain with Entre.  stopped and transitioned to advanced pump with no pain on proper MMhg.  trial completed    Manual Therapy  Manual Therapy Performed: Yes  Manual Therapy Activity 1: girth measurements with volume increased noted at RUE and nipple site     Lymphedema Assessment    Lymphedema Assessments:  Lymphedema Assessments: Upper extremities  Right Upper Extremity:  R Thumb Distal Phalange  Girth: 6 cm  R Thumb Proximal Phalange Girth: 7 cm  R Metacarpals (cm): 18 cm  R Palm (cm): 0 cm  R Wrist (cm): 16.5 cm  R 10 cm Above Wrist (cm): 23 cm  R 15 cm Above Wrist (cm): 26 cm  R 20 cm Above Wrist (cm): 26.7 cm  R Elbow (cm): 27 cm  R 5 cm Above Elbow: 31 cm  R 10 cm Above Elbow: 35 cm  R 20 cm Above Elbow: 44 cm  R Axilla: 0 cm  R Upper Extremity Total: 260.2     Pain Assessment:  Pain Assessment: 0-10  0-10 (Numeric) Pain Score: 4  Pain Type: Chronic pain  Pain Location: Breast  Pain Orientation: Right  Pain Descriptors: Tender  Pain Frequency: Intermittent  Pain Onset: Ongoing  Therapy Precautions:  Precautions Comment: none    OP EDUCATION:       Goals:  Active       Lymphedema       Pt will demo volume reduction in R breast in order for proper fitting of medical compression garments and increase ease in transfers and IADLs        Start:  07/19/24    Expected End:  10/25/24            Pt will I perform skin care for infection prevention and integrity of skin        Start:  07/19/24    Expected End:  10/25/24            Pt will be I with stating and demonstrating home exercise program in order to improve patients ability to perform self-care, household, work and/or leisure tasks.        Start:  07/19/24    Expected End:  10/25/24            Pt will complete self  mld in order to promote increased tissue pliability for maintenance with chronic lymphedema        Start:  07/19/24    Expected End:  10/25/24

## 2024-08-29 DIAGNOSIS — I89.0 LYMPHEDEMA OF BREAST: ICD-10-CM

## 2024-08-29 DIAGNOSIS — I89.0 LYMPHEDEMA: ICD-10-CM

## 2024-08-29 DIAGNOSIS — C50.911 INFILTRATING DUCTAL CARCINOMA OF RIGHT BREAST, STAGE 2 (MULTI): Primary | ICD-10-CM

## 2024-08-30 ENCOUNTER — OFFICE VISIT (OUTPATIENT)
Dept: CARDIOLOGY | Facility: CLINIC | Age: 69
End: 2024-08-30
Payer: MEDICARE

## 2024-08-30 VITALS
DIASTOLIC BLOOD PRESSURE: 70 MMHG | SYSTOLIC BLOOD PRESSURE: 110 MMHG | WEIGHT: 210 LBS | BODY MASS INDEX: 42.41 KG/M2 | HEART RATE: 72 BPM

## 2024-08-30 DIAGNOSIS — E78.00 HIGH CHOLESTEROL: ICD-10-CM

## 2024-08-30 DIAGNOSIS — Z01.810 PREOP CARDIOVASCULAR EXAM: ICD-10-CM

## 2024-08-30 DIAGNOSIS — E78.5 HYPERLIPIDEMIA, UNSPECIFIED HYPERLIPIDEMIA TYPE: ICD-10-CM

## 2024-08-30 DIAGNOSIS — Z82.49 FAMILY HISTORY OF ISCHEMIC HEART DISEASE: Primary | ICD-10-CM

## 2024-08-30 DIAGNOSIS — R94.31 ABNORMAL EKG: ICD-10-CM

## 2024-08-30 DIAGNOSIS — I10 PRIMARY HYPERTENSION: ICD-10-CM

## 2024-08-30 DIAGNOSIS — Z01.818 PREOP TESTING: ICD-10-CM

## 2024-08-30 DIAGNOSIS — F17.210 CIGARETTE SMOKER: ICD-10-CM

## 2024-08-30 PROCEDURE — 4004F PT TOBACCO SCREEN RCVD TLK: CPT | Performed by: INTERNAL MEDICINE

## 2024-08-30 PROCEDURE — 99214 OFFICE O/P EST MOD 30 MIN: CPT | Performed by: INTERNAL MEDICINE

## 2024-08-30 PROCEDURE — 1126F AMNT PAIN NOTED NONE PRSNT: CPT | Performed by: INTERNAL MEDICINE

## 2024-08-30 PROCEDURE — 99204 OFFICE O/P NEW MOD 45 MIN: CPT | Performed by: INTERNAL MEDICINE

## 2024-08-30 PROCEDURE — 4010F ACE/ARB THERAPY RXD/TAKEN: CPT | Performed by: INTERNAL MEDICINE

## 2024-08-30 PROCEDURE — 1159F MED LIST DOCD IN RCRD: CPT | Performed by: INTERNAL MEDICINE

## 2024-08-30 PROCEDURE — 3074F SYST BP LT 130 MM HG: CPT | Performed by: INTERNAL MEDICINE

## 2024-08-30 PROCEDURE — 3078F DIAST BP <80 MM HG: CPT | Performed by: INTERNAL MEDICINE

## 2024-08-30 PROCEDURE — 1157F ADVNC CARE PLAN IN RCRD: CPT | Performed by: INTERNAL MEDICINE

## 2024-08-30 ASSESSMENT — COLUMBIA-SUICIDE SEVERITY RATING SCALE - C-SSRS: 1. IN THE PAST MONTH, HAVE YOU WISHED YOU WERE DEAD OR WISHED YOU COULD GO TO SLEEP AND NOT WAKE UP?: NO

## 2024-08-30 ASSESSMENT — ENCOUNTER SYMPTOMS
LOSS OF SENSATION IN FEET: 0
OCCASIONAL FEELINGS OF UNSTEADINESS: 1
DEPRESSION: 0

## 2024-08-30 ASSESSMENT — PAIN SCALES - GENERAL: PAINLEVEL: 0-NO PAIN

## 2024-08-30 NOTE — ASSESSMENT & PLAN NOTE
With the patient's family history of ischemic heart disease will continue standard risk factor modification and will follow on a clinical basis.  Will also obtain coronary artery calcium score for further risk stratification.

## 2024-08-30 NOTE — ASSESSMENT & PLAN NOTE
Will continue the atorvastatin therapy.  Would like to check a lipid panel, and the results of the calcium score will help with deciding how aggressive we should be in lowering LDL cholesterol.

## 2024-08-30 NOTE — PROGRESS NOTES
Referred by Dr. Cortés for Cardiac Eval-  (Surgery Clearance- Lumpectomy- - [place] )     History Of Present Illness:    Tatyana Mckeon is a 69 y.o. female presenting with preoperative risk assessment.  Patient has been found to have an abnormal breast biopsy procedure is now being considered for lumpectomy.  She has no prior history of cardiac disease.  She describes no chest pain or anginal symptoms.  She has a relatively good functional capacity with limitations secondary to a fractured metatarsal bone in her foot.  She did have an EKG which had a right bundle branch block pattern and nonspecific T wave abnormality.  They have abnormal EKG along with her multiple risk factors prompted today's evaluation.      Past Medical History:  She has a past medical history of Acquired lymphedema, Bipolar 1 disorder (Multi), Breast cancer (Multi) (2023), Depression, Diabetes mellitus (Multi), Endometrial cancer (Multi), Hyperlipidemia, Hypertension, Osteoarthritis of right knee, Peripheral edema, Psoriasis, and Toe fracture, left.    Past Surgical History:  She has a past surgical history that includes US guided biopsy lymph node superficial (2023); BI mammo guided breast right localization (Right, 2023); Axillary node dissection (2023); Hysterectomy (); Laparotomy oopherectomy (); Breast lumpectomy (); and Breast biopsy (Left, 2024).      Social History:  She reports that she has been smoking cigarettes. She has a 20 pack-year smoking history. She has been exposed to tobacco smoke. She has never used smokeless tobacco. She reports current alcohol use. She reports that she does not currently use drugs after having used the following drugs: Marijuana.    Family History:  Mother  age 98 from complications of a stroke.  Father  age 75 with congestive heart failure.     Allergies:  Pollen extracts, Cephalexin, and Naproxen    Outpatient Medications:  Current Outpatient Medications    Medication Instructions    acetaminophen (TYLENOL) 500 mg, oral, 2 times daily    amLODIPine (NORVASC) 10 mg, oral, Daily    aspirin 81 mg, oral, Daily    atorvastatin (LIPITOR) 10 mg, oral, Every other day    blood sugar diagnostic (Accu-Chek Guide test strips) strip 3 times daily    blood-glucose meter misc 3 times daily    famotidine (PEPCID) 20 mg, oral, Daily    furosemide (LASIX) 20-40 mg, oral, 2 times daily    ibuprofen 200 mg, oral, 2 times daily    ketoconazole (NIZOral) 2 % shampoo Topical    LaMICtal 100 mg, oral, Daily    lisinopril 20 mg, oral, Daily    loratadine (CLARITIN) 10 mg, oral, Daily    metFORMIN (GLUCOPHAGE) 500 mg, oral, 3 times daily    multivitamin capsule 1 capsule, oral, Daily    Neurontin 400 mg capsule 1 capsule, oral, Daily    nystatin (Mycostatin) cream APPLY EXTERNALLY TWICE DAILY    omega 3-dha-epa-fish oil (Fish OiL) 1,000 mg (120 mg-180 mg) capsule 1 capsule, oral, Daily    PaxiL 20 mg, oral, Daily    QUEtiapine (SEROQUEL) 12.5 mg, oral, Nightly    Topamax 25 mg, oral, Daily    triamcinolone (Kenalog) 0.1 % cream Apply to affected area twice daily as needed    Wellbutrin  mg, oral, Every morning        Last Recorded Vitals:  Vitals:    08/30/24 1007   BP: 110/70   Pulse: 72   Weight: 95.3 kg (210 lb)       Physical Exam:  Constitutional:       Appearance: Not in distress.   Eyes:      Conjunctiva/sclera: Conjunctivae normal.   HENT:    Mouth/Throat:      Pharynx: Oropharynx is clear.   Neck:      Vascular: No carotid bruit. JVD normal.   Pulmonary:      Breath sounds: Normal breath sounds. No wheezing. No rales.   Cardiovascular:      Regular rhythm.      Murmurs: There is no murmur.      No gallop.  No click. No rub.   Abdominal:      Palpations: Abdomen is soft.      Tenderness: There is no abdominal tenderness.   Musculoskeletal:         General: No deformity. Neurological:      General: No focal deficit present.             Last Labs:  CBC -  Lab Results   Component  "Value Date    WBC 10.1 08/22/2024    HGB 13.3 08/22/2024    HCT 40.1 08/22/2024    MCV 94 08/22/2024     08/22/2024       CMP -  Lab Results   Component Value Date    CALCIUM 9.2 08/22/2024    PROT 7.0 04/26/2024    ALBUMIN 4.3 04/26/2024    AST 13 04/26/2024    ALT 10 04/26/2024    ALKPHOS 70 04/26/2024    BILITOT 0.3 04/26/2024       LIPID PANEL -   Lab Results   Component Value Date    CHOL 184 04/22/2022    TRIG 173 (H) 04/22/2022    HDL 48 (L) 04/22/2022    CHHDL 3.8 04/22/2022       RENAL FUNCTION PANEL -   Lab Results   Component Value Date    GLUCOSE 115 (H) 08/22/2024     08/22/2024    K 4.3 08/22/2024     08/22/2024    CO2 24 08/22/2024    ANIONGAP 11 08/22/2024    ANIONGAP 13 04/26/2024    BUN  08/22/2024      Comment:      Test Pending. To be performed by [facility]    BUN 20 08/22/2024    CREATININE 0.70 08/22/2024    CALCIUM 9.2 08/22/2024    ALBUMIN 4.3 04/26/2024        Lab Results   Component Value Date    HGBA1C 6.5 05/22/2024       Last Cardiology Tests:  ECG:  ECG 12 lead (Clinic Performed) 08/22/2024  Normal sinus rhythm  Right bundle branch block pattern  Nonspecific T wave abnormality    Echo:  No results found for this or any previous visit from the past 1095 days.      Ejection Fractions:  No results found for: \"EF\"    Cath:  No results found for this or any previous visit from the past 1095 days.      Stress Test:  No results found for this or any previous visit from the past 1095 days.      Cardiac Imaging:  No results found for this or any previous visit from the past 1095 days.            Assessment/Plan     Family history of ischemic heart disease  With the patient's family history of ischemic heart disease will continue standard risk factor modification and will follow on a clinical basis.  Will also obtain coronary artery calcium score for further risk stratification.    High blood pressure  Blood pressure well-controlled on current regimen, no changes will be " made.    High cholesterol  Will continue the atorvastatin therapy.  Would like to check a lipid panel, and the results of the calcium score will help with deciding how aggressive we should be in lowering LDL cholesterol.    Cigarette smoker  Smoking cessation urged.    Preop cardiovascular exam  The patient is stable from a cardiac standpoint.  She exhibits no chest pain or anginal symptoms.  He had no episodes of heart failure.  She has an average to good functional capacity.  Her twelve-lead EKG reveals a sinus rhythm with a right bundle branch block pattern and nonspecific T wave abnormality.  Based on these factors I believe she may proceed with the lumpectomy procedure at acceptable low cardiovascular risk.       Skyler Churchill, DO

## 2024-08-30 NOTE — ASSESSMENT & PLAN NOTE
The patient is stable from a cardiac standpoint.  She exhibits no chest pain or anginal symptoms.  He had no episodes of heart failure.  She has an average to good functional capacity.  Her twelve-lead EKG reveals a sinus rhythm with a right bundle branch block pattern and nonspecific T wave abnormality.  Based on these factors I believe she may proceed with the lumpectomy procedure at acceptable low cardiovascular risk.

## 2024-09-04 ENCOUNTER — TREATMENT (OUTPATIENT)
Dept: OCCUPATIONAL THERAPY | Facility: CLINIC | Age: 69
End: 2024-09-04
Payer: MEDICARE

## 2024-09-04 ENCOUNTER — APPOINTMENT (OUTPATIENT)
Dept: PRIMARY CARE | Facility: CLINIC | Age: 69
End: 2024-09-04
Payer: MEDICARE

## 2024-09-04 DIAGNOSIS — I89.0 LYMPHEDEMA: ICD-10-CM

## 2024-09-04 DIAGNOSIS — I89.0 LYMPHEDEMA OF BREAST: ICD-10-CM

## 2024-09-04 PROCEDURE — 97110 THERAPEUTIC EXERCISES: CPT | Mod: GO

## 2024-09-04 PROCEDURE — 97140 MANUAL THERAPY 1/> REGIONS: CPT | Mod: GO

## 2024-09-04 NOTE — PROGRESS NOTES
Occupational Therapy    Occupational Therapy Treatment    Name: Tatyana Mckeon  MRN: [id number]  : 1955  Date: 24    Time Entry:  Time Calculation  Start Time: 1104  Stop Time: 1158  Time Calculation (min): 54 min        OT Therapeutic Procedures Time Entry  Manual Therapy Time Entry: 42  Therapeutic Exercise Time Entry: 12                Assessment:  increased ROM in BUE, increased tissue pliabilty      Plan: Continue with POC as tolerated, monitor home program, assess new garments,           Subjective   General:  OT Last Visit  OT Received On: 24  General  General Comment: visit 4, no auth onset 2023  Precautions:  Precautions Comment: none  Pain Assessment:       Objective       Therapy/Activity: Therapeutic Exercise  Therapeutic Exercise Performed: Yes  Therapeutic Exercise Activity 1: general shoulder exercises to promote ROM and flexibility prior to next surgery 2024         Manual Therapy  Manual Therapy Performed: Yes  Manual Therapy Activity 1: girth measurements with volume reduction noted this date  Manual Therapy Activity 2: hivamat with lymph flow and MLD on R breast and RUE     Lymphedema Assessment    Lymphedema Assessments:  Lymphedema Assessments: Upper extremities  Right Upper Extremity:  R Thumb Distal Phalange Girth: 6 cm  R Thumb Proximal Phalange Girth: 6 cm  R Metacarpals (cm): 18 cm  R Palm (cm): 0 cm  R Wrist (cm): 16.5 cm  R 10 cm Above Wrist (cm): 21.5 cm  R 15 cm Above Wrist (cm): 25 cm  R 20 cm Above Wrist (cm): 26.5 cm  R Elbow (cm): 27 cm  R 5 cm Above Elbow: 30 cm  R 10 cm Above Elbow: 34 cm  R 20 cm Above Elbow: 41 cm  R Axilla: 0 cm  R Upper Extremity Total: 251.5  Left Upper Extremity:  L Thumb Distal Phalange Girth: 6 cm  L Thumb Proximal Phalange Girth: 6.3 cm  L Metacarpals (cm): 17 cm  L Palm (cm): 0 cm  L Wrist (cm): 17.5 cm  L 10 cm Above Wrist (cm): 23 cm  L 15 cm Above Wrist (cm): 26.5 cm  L 20 cm Above Wrist (cm): 27.5 cm  L Elbow (cm): 28 cm  L 5  cm Above Elbow: 31 cm  L 10cm Above Elbow: 35.5  L 20 cm Above Elbow: 40  L Axilla: 0 cm  Left upper extremity total: 258.3  UE Skin Appearance/Condition and Girth:  Other (comment): axillary 113.5 cm no change ; 124.5 nipple cm decreased 1.5 cm       Pain Assessment:     Therapy Precautions:  Precautions Comment: none      OP EDUCATION:       Goals:  Active       Lymphedema       Pt will demo volume reduction in R breast in order for proper fitting of medical compression garments and increase ease in transfers and IADLs        Start:  07/19/24    Expected End:  10/25/24            Pt will I perform skin care for infection prevention and integrity of skin        Start:  07/19/24    Expected End:  10/25/24            Pt will be I with stating and demonstrating home exercise program in order to improve patients ability to perform self-care, household, work and/or leisure tasks.        Start:  07/19/24    Expected End:  10/25/24            Pt will complete self  mld in order to promote increased tissue pliability for maintenance with chronic lymphedema        Start:  07/19/24    Expected End:  10/25/24

## 2024-09-06 ENCOUNTER — ANESTHESIA EVENT (OUTPATIENT)
Dept: OPERATING ROOM | Facility: HOSPITAL | Age: 69
End: 2024-09-06
Payer: MEDICARE

## 2024-09-06 ENCOUNTER — PHARMACY VISIT (OUTPATIENT)
Dept: PHARMACY | Facility: CLINIC | Age: 69
End: 2024-09-06
Payer: COMMERCIAL

## 2024-09-06 ENCOUNTER — HOSPITAL ENCOUNTER (OUTPATIENT)
Dept: RADIOLOGY | Facility: HOSPITAL | Age: 69
Discharge: HOME | End: 2024-09-06
Payer: MEDICARE

## 2024-09-06 ENCOUNTER — HOSPITAL ENCOUNTER (OUTPATIENT)
Facility: HOSPITAL | Age: 69
Setting detail: OUTPATIENT SURGERY
Discharge: HOME | End: 2024-09-06
Attending: SURGERY | Admitting: SURGERY
Payer: MEDICARE

## 2024-09-06 ENCOUNTER — ANESTHESIA (OUTPATIENT)
Dept: OPERATING ROOM | Facility: HOSPITAL | Age: 69
End: 2024-09-06
Payer: MEDICARE

## 2024-09-06 VITALS
OXYGEN SATURATION: 100 % | SYSTOLIC BLOOD PRESSURE: 119 MMHG | BODY MASS INDEX: 41.5 KG/M2 | HEART RATE: 74 BPM | RESPIRATION RATE: 17 BRPM | DIASTOLIC BLOOD PRESSURE: 69 MMHG | WEIGHT: 205.47 LBS | TEMPERATURE: 96.8 F

## 2024-09-06 DIAGNOSIS — N60.92 ATYPICAL DUCTAL HYPERPLASIA OF LEFT BREAST: Primary | ICD-10-CM

## 2024-09-06 DIAGNOSIS — N60.92 UNSPECIFIED BENIGN MAMMARY DYSPLASIA OF LEFT BREAST: ICD-10-CM

## 2024-09-06 LAB — GLUCOSE BLD MANUAL STRIP-MCNC: 104 MG/DL (ref 74–99)

## 2024-09-06 PROCEDURE — 2500000004 HC RX 250 GENERAL PHARMACY W/ HCPCS (ALT 636 FOR OP/ED): Performed by: SURGERY

## 2024-09-06 PROCEDURE — 7100000010 HC PHASE TWO TIME - EACH INCREMENTAL 1 MINUTE: Performed by: SURGERY

## 2024-09-06 PROCEDURE — 19301 PARTIAL MASTECTOMY: CPT | Performed by: SURGERY

## 2024-09-06 PROCEDURE — 82947 ASSAY GLUCOSE BLOOD QUANT: CPT

## 2024-09-06 PROCEDURE — 3700000002 HC GENERAL ANESTHESIA TIME - EACH INCREMENTAL 1 MINUTE: Performed by: SURGERY

## 2024-09-06 PROCEDURE — 7100000002 HC RECOVERY ROOM TIME - EACH INCREMENTAL 1 MINUTE: Performed by: SURGERY

## 2024-09-06 PROCEDURE — 2720000007 HC OR 272 NO HCPCS: Performed by: SURGERY

## 2024-09-06 PROCEDURE — A4649 SURGICAL SUPPLIES: HCPCS | Performed by: SURGERY

## 2024-09-06 PROCEDURE — 7100000009 HC PHASE TWO TIME - INITIAL BASE CHARGE: Performed by: SURGERY

## 2024-09-06 PROCEDURE — 76098 X-RAY EXAM SURGICAL SPECIMEN: CPT

## 2024-09-06 PROCEDURE — 2500000005 HC RX 250 GENERAL PHARMACY W/O HCPCS: Performed by: SURGERY

## 2024-09-06 PROCEDURE — 88305 TISSUE EXAM BY PATHOLOGIST: CPT | Mod: TC | Performed by: SURGERY

## 2024-09-06 PROCEDURE — 2500000004 HC RX 250 GENERAL PHARMACY W/ HCPCS (ALT 636 FOR OP/ED): Performed by: ANESTHESIOLOGIST ASSISTANT

## 2024-09-06 PROCEDURE — 7100000001 HC RECOVERY ROOM TIME - INITIAL BASE CHARGE: Performed by: SURGERY

## 2024-09-06 PROCEDURE — 2500000005 HC RX 250 GENERAL PHARMACY W/O HCPCS: Performed by: ANESTHESIOLOGIST ASSISTANT

## 2024-09-06 PROCEDURE — 3700000001 HC GENERAL ANESTHESIA TIME - INITIAL BASE CHARGE: Performed by: SURGERY

## 2024-09-06 PROCEDURE — 3600000009 HC OR TIME - EACH INCREMENTAL 1 MINUTE - PROCEDURE LEVEL FOUR: Performed by: SURGERY

## 2024-09-06 PROCEDURE — 3600000004 HC OR TIME - INITIAL BASE CHARGE - PROCEDURE LEVEL FOUR: Performed by: SURGERY

## 2024-09-06 PROCEDURE — RXMED WILLOW AMBULATORY MEDICATION CHARGE

## 2024-09-06 RX ORDER — FENTANYL CITRATE 50 UG/ML
25 INJECTION, SOLUTION INTRAMUSCULAR; INTRAVENOUS EVERY 5 MIN PRN
Status: DISCONTINUED | OUTPATIENT
Start: 2024-09-06 | End: 2024-09-06 | Stop reason: HOSPADM

## 2024-09-06 RX ORDER — TRAMADOL HYDROCHLORIDE 50 MG/1
50 TABLET ORAL EVERY 8 HOURS PRN
Qty: 10 TABLET | Refills: 0 | Status: SHIPPED | OUTPATIENT
Start: 2024-09-06

## 2024-09-06 RX ORDER — LIDOCAINE HYDROCHLORIDE AND EPINEPHRINE 10; 10 MG/ML; UG/ML
INJECTION, SOLUTION INFILTRATION; PERINEURAL AS NEEDED
Status: DISCONTINUED | OUTPATIENT
Start: 2024-09-06 | End: 2024-09-06 | Stop reason: HOSPADM

## 2024-09-06 RX ORDER — BUPIVACAINE HYDROCHLORIDE 5 MG/ML
INJECTION, SOLUTION PERINEURAL AS NEEDED
Status: DISCONTINUED | OUTPATIENT
Start: 2024-09-06 | End: 2024-09-06 | Stop reason: HOSPADM

## 2024-09-06 RX ORDER — PHENYLEPHRINE HYDROCHLORIDE 10 MG/ML
INJECTION INTRAVENOUS AS NEEDED
Status: DISCONTINUED | OUTPATIENT
Start: 2024-09-06 | End: 2024-09-06

## 2024-09-06 RX ORDER — LIDOCAINE HYDROCHLORIDE 10 MG/ML
INJECTION INFILTRATION; PERINEURAL AS NEEDED
Status: DISCONTINUED | OUTPATIENT
Start: 2024-09-06 | End: 2024-09-06

## 2024-09-06 RX ORDER — CEFAZOLIN SODIUM 2 G/100ML
2 INJECTION, SOLUTION INTRAVENOUS ONCE
Status: COMPLETED | OUTPATIENT
Start: 2024-09-06 | End: 2024-09-06

## 2024-09-06 RX ORDER — LIDOCAINE HYDROCHLORIDE 10 MG/ML
0.1 INJECTION INFILTRATION; PERINEURAL ONCE
Status: DISCONTINUED | OUTPATIENT
Start: 2024-09-06 | End: 2024-09-06 | Stop reason: HOSPADM

## 2024-09-06 RX ORDER — SODIUM CHLORIDE, SODIUM LACTATE, POTASSIUM CHLORIDE, CALCIUM CHLORIDE 600; 310; 30; 20 MG/100ML; MG/100ML; MG/100ML; MG/100ML
100 INJECTION, SOLUTION INTRAVENOUS CONTINUOUS
Status: DISCONTINUED | OUTPATIENT
Start: 2024-09-06 | End: 2024-09-06 | Stop reason: HOSPADM

## 2024-09-06 RX ORDER — PROPOFOL 10 MG/ML
INJECTION, EMULSION INTRAVENOUS AS NEEDED
Status: DISCONTINUED | OUTPATIENT
Start: 2024-09-06 | End: 2024-09-06

## 2024-09-06 RX ORDER — FENTANYL CITRATE 50 UG/ML
50 INJECTION, SOLUTION INTRAMUSCULAR; INTRAVENOUS EVERY 5 MIN PRN
Status: DISCONTINUED | OUTPATIENT
Start: 2024-09-06 | End: 2024-09-06 | Stop reason: HOSPADM

## 2024-09-06 RX ORDER — ONDANSETRON HYDROCHLORIDE 2 MG/ML
INJECTION, SOLUTION INTRAVENOUS AS NEEDED
Status: DISCONTINUED | OUTPATIENT
Start: 2024-09-06 | End: 2024-09-06

## 2024-09-06 RX ORDER — MIDAZOLAM HYDROCHLORIDE 1 MG/ML
INJECTION, SOLUTION INTRAMUSCULAR; INTRAVENOUS AS NEEDED
Status: DISCONTINUED | OUTPATIENT
Start: 2024-09-06 | End: 2024-09-06

## 2024-09-06 RX ORDER — FENTANYL CITRATE 50 UG/ML
INJECTION, SOLUTION INTRAMUSCULAR; INTRAVENOUS AS NEEDED
Status: DISCONTINUED | OUTPATIENT
Start: 2024-09-06 | End: 2024-09-06

## 2024-09-06 SDOH — HEALTH STABILITY: MENTAL HEALTH: CURRENT SMOKER: 0

## 2024-09-06 ASSESSMENT — PAIN - FUNCTIONAL ASSESSMENT
PAIN_FUNCTIONAL_ASSESSMENT: 0-10

## 2024-09-06 ASSESSMENT — PAIN SCALES - GENERAL
PAINLEVEL_OUTOF10: 0 - NO PAIN
PAINLEVEL_OUTOF10: 3
PAINLEVEL_OUTOF10: 0 - NO PAIN
PAIN_LEVEL: 3
PAINLEVEL_OUTOF10: 3
PAINLEVEL_OUTOF10: 3

## 2024-09-06 NOTE — ANESTHESIA PROCEDURE NOTES
Airway  Date/Time: 9/6/2024 12:22 PM  Urgency: elective    Airway not difficult    Staffing  Performed: ROZINA   Authorized by: Skyler Otoole MD    Performed by: ROZINA Jones  Patient location during procedure: OR    Indications and Patient Condition  Indications for airway management: anesthesia  Spontaneous ventilation: present  Sedation level: deep  Preoxygenated: yes  Patient position: sniffing  MILS not maintained throughout  Mask difficulty assessment: 0 - not attempted    Final Airway Details  Final airway type: supraglottic airway      Successful airway: classic  Size 4     Number of attempts at approach: 1  Number of other approaches attempted: 0

## 2024-09-06 NOTE — ANESTHESIA POSTPROCEDURE EVALUATION
Patient: Tatyana Mckeon    Procedure Summary       Date: 09/06/24 Room / Location: [facility] / [facility]    Anesthesia Start: 1213 Anesthesia Stop: 1331    Procedure: Lumpectomy w/ Magseed Localization (Left: Breast) Diagnosis:       Atypical ductal hyperplasia of left breast      (Atypical ductal hyperplasia of left breast [N60.92])    Surgeons: Olivia Boone MD Responsible Provider: Skyler Otoole MD    Anesthesia Type: general ASA Status: 3            Anesthesia Type: general    Vitals Value Taken Time   /80 09/06/24 1400   Temp 36 °C (96.8 °F) 09/06/24 1350   Pulse 79 09/06/24 1400   Resp 15 09/06/24 1400   SpO2 92 % 09/06/24 1400   Vitals shown include unfiled device data.    Anesthesia Post Evaluation    Patient location during evaluation: bedside  Patient participation: complete - patient participated  Level of consciousness: awake  Pain score: 3  Pain management: adequate  Multimodal analgesia pain management approach  Airway patency: patent  Two or more strategies used to mitigate risk of obstructive sleep apnea  Cardiovascular status: acceptable  Respiratory status: acceptable  Hydration status: acceptable  Postoperative Nausea and Vomiting: none        There were no known notable events for this encounter.

## 2024-09-06 NOTE — ANESTHESIA PREPROCEDURE EVALUATION
Patient: Tatyana Mckeon    Procedure Information       Date/Time: 09/06/24 1215    Procedure: Lumpectomy w/ Magseed Localization (Left)    Location: [facility] / [facility]    Surgeons: Olivia Boone MD            Visit Vitals  /67   Pulse 93   Temp 36 °C (96.8 °F) (Temporal)   Resp 17   Wt 93.2 kg (205 lb 7.5 oz)   SpO2 100%   BMI 41.50 kg/m²   OB Status Hysterectomy   Smoking Status Every Day   BSA 1.97 m²        Current Outpatient Medications   Medication Instructions    acetaminophen (TYLENOL) 500 mg, oral, 2 times daily    amLODIPine (NORVASC) 10 mg, oral, Daily    aspirin 81 mg, oral, Daily    atorvastatin (LIPITOR) 10 mg, oral, Every other day    blood sugar diagnostic (Accu-Chek Guide test strips) strip 3 times daily    blood-glucose meter misc 3 times daily    famotidine (PEPCID) 20 mg, oral, Daily    furosemide (LASIX) 20-40 mg, oral, 2 times daily    ibuprofen 200 mg, oral, 2 times daily    ketoconazole (NIZOral) 2 % shampoo Topical    LaMICtal 100 mg, oral, Daily    lisinopril 20 mg, oral, Daily    loratadine (CLARITIN) 10 mg, oral, Daily    metFORMIN (GLUCOPHAGE) 500 mg, oral, 3 times daily    multivitamin capsule 1 capsule, oral, Daily    Neurontin 400 mg capsule 1 capsule, oral, Daily    nystatin (Mycostatin) cream APPLY EXTERNALLY TWICE DAILY    omega 3-dha-epa-fish oil (Fish OiL) 1,000 mg (120 mg-180 mg) capsule 1 capsule, oral, Daily    PaxiL 20 mg, oral, Daily    QUEtiapine (SEROQUEL) 12.5 mg, oral, Nightly    Topamax 25 mg, oral, Daily    triamcinolone (Kenalog) 0.1 % cream Apply to affected area twice daily as needed    Wellbutrin  mg, oral, Every morning        Allergies   Allergen Reactions    Pollen Extracts Itching    Cephalexin Diarrhea    Naproxen Unknown        Past Surgical History:   Procedure Laterality Date    AXILLARY NODE DISSECTION  08/16/2023    Right Axillary Dissection and Evacuation of a right breast hematoma    BI MAMMO GUIDED BREAST RIGHT LOCALIZATION Right  07/21/2023    Wire localization lumpectomy right breast with sentinel node biopsy    BREAST BIOPSY Left 07/17/2024    Sterotactic    BREAST LUMPECTOMY  2000    Left breast    HYSTERECTOMY  2014    LAPAROTOMY OOPHERECTOMY  2014    US GUIDED BIOPSY LYMPH NODE SUPERFICIAL  06/07/2023    Ultrasound guided right breast and axillary lymph node biopsy        Relevant Problems   Cardiac   (+) Breast pain, right   (+) High blood pressure   (+) High cholesterol      Neuro   (+) Anxiety   (+) Bipolar 1 disorder (Multi)      GI   (+) Gastroesophageal reflux disease      Endocrine   (+) Type II diabetes mellitus (Multi)      Musculoskeletal   (+) Chronic bilateral low back pain without sciatica   (+) Chronic right-sided low back pain with sciatica   (+) Primary osteoarthritis of both knees      ID   (+) Candidiasis of skin and nails      GYN   (+) Breast cancer (Multi)   (+) Endometrial cancer (Multi)   (+) Infiltrating ductal carcinoma of right breast, stage 2 (Multi)       Active Ambulatory Problems     Diagnosis Date Noted    Allergic rhinitis 08/19/2023    Anxiety 08/19/2023    Bipolar 1 disorder (Multi) 08/19/2023    Candidiasis of skin and nails 08/19/2023    Gastroesophageal reflux disease 08/19/2023    High blood pressure 08/19/2023    High cholesterol 08/19/2023    Insomnia 08/19/2023    Psoriasis 08/19/2023    Type II diabetes mellitus (Multi) 08/19/2023    Cigarette smoker 10/04/2023    Obesity with body mass index 30 or greater 10/04/2023    Breast cancer (Multi) 10/04/2023    Chronic right-sided low back pain with sciatica 11/08/2023    Endometrial cancer (Multi) 11/22/2023    Infiltrating ductal carcinoma of right breast, stage 2 (Multi) 03/14/2024    Primary osteoarthritis of both knees 03/20/2024    Breast pain, right 05/03/2024    Lymphedema 05/03/2024    Alopecia 05/07/2024    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) 07/03/2024    Microcalcifications of the breast 07/09/2024     Atypical ductal hyperplasia of left breast 07/30/2024    Chronic bilateral low back pain without sciatica 08/21/2024    Chronic pain of right knee 08/21/2024    Family history of ischemic heart disease 08/30/2024    Preop cardiovascular exam 08/30/2024     Resolved Ambulatory Problems     Diagnosis Date Noted    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) 08/19/2023    Malignant neoplasm of lower-inner quadrant of female breast (Multi) 08/19/2023    Malignant neoplasm of overlapping sites of right breast in female, estrogen receptor positive (Multi) 10/18/2023     Past Medical History:   Diagnosis Date    Acquired lymphedema     Depression     Diabetes mellitus (Multi)     Hyperlipidemia     Hypertension     Osteoarthritis of right knee     Peripheral edema     Toe fracture, left        Clinical information reviewed:   Tobacco  Allergies  Meds   Med Hx  Surg Hx  OB Status  Fam Hx  Soc   Hx        NPO Detail:    No data recorded     Physical Exam    Airway  Mallampati: II  TM distance: >3 FB  Neck ROM: full     Cardiovascular - normal exam     Dental - normal exam     Pulmonary - normal exam     Abdominal - normal exam             Anesthesia Plan    History of general anesthesia?: yes  History of complications of general anesthesia?: no    ASA 3     general   (LMA)  The patient is not a current smoker.    intravenous induction   Anesthetic plan and risks discussed with patient.    Plan discussed with [facility].

## 2024-09-06 NOTE — POST-PROCEDURE NOTE
1400- pt brought back from pacu,verbal report received. Pt is alert and awake. Snack and drink given.    1410- family brought back from waiting room.    1445- vss. Discharge instructions given and explained to pt and family. Both verbally understood.     1450- pt getting dressed at this time with assistance of family.    1500- pt ambulated to BR with steady gait.     1506- Dr. Boone at bedside.    1507- transport at bedside for discharge.

## 2024-09-06 NOTE — OP NOTE
Lumpectomy w/ Magseed Localization (L) Operative Note     Date: 2024  OR Location: [facility]    Name: Tatyana Mckeon, : 1955, Age: 69 y.o., MRN: [id number], Sex: female    Diagnosis  Pre-op Diagnosis      * Atypical ductal hyperplasia of left breast [N60.92] Post-op Diagnosis     * Atypical ductal hyperplasia of left breast [N60.92]     Procedures  Lumpectomy w/ Magseed Localization  [id number] - [place] MASTECTOMY PARTIAL      Surgeons      * Olivia Boone - Primary    Resident/Fellow/Other Assistant:  Surgeons and Role:  * No surgeons found with a matching role *    Procedure Summary  Anesthesia: Anesthesia type not filed in the log.  ASA: ASA status not filed in the log.  Anesthesia Staff: Anesthesiologist: Skyler Otoole MD  C-AA: ROZINA Jones  Estimated Blood Loss: 2mL  Intra-op Medications:   Administrations occurring from 1215 to 1350 on 24:   Medication Name Total Dose   BUPivacaine HCl (Marcaine) 0.5 % (5 mg/mL) injection 5 mL   lidocaine-epinephrine (Xylocaine W/EPI) 1 %-1:100,000 injection 5 mL   ceFAZolin (Ancef) 2 g in dextrose (iso)  mL 2 g              Anesthesia Record               Intraprocedure I/O Totals       None           Specimen:   ID Type Source Tests Collected by Time   1 : L lumpectomy; long stitch=deep medial, short=anterior, blue=superior Tissue BREAST LUMPECTOMY LEFT SURGICAL PATHOLOGY EXAM Olivia Boone MD 2024 1240   2 : L breast skin Tissue SKIN EXCISION SURGICAL PATHOLOGY EXAM Olivia Boone MD 2024 1308        Staff:   Circulator: Julianna Ruvalcaba Person: Lila  Circulator: Rajendra         Drains and/or Catheters: * None in log *    Tourniquet Times:         Implants:     Findings: Radiographic confirmation of excision of the affected area in the central location of the specimen    Indications: Tatyana Mckeon is an 69 y.o. female who is having surgery for Atypical ductal hyperplasia of left breast [N60.92].     The patient was seen in the preoperative  area. The risks, benefits, complications, treatment options, non-operative alternatives, expected recovery and outcomes were discussed with the patient. The possibilities of reaction to medication, pulmonary aspiration, injury to surrounding structures, bleeding, recurrent infection, the need for additional procedures, failure to diagnose a condition, and creating a complication requiring transfusion or operation were discussed with the patient. The patient concurred with the proposed plan, giving informed consent.  The site of surgery was properly noted/marked if necessary per policy. The patient has been actively warmed in preoperative area. Preoperative antibiotics have been ordered and given within 1 hours of incision. Venous thrombosis prophylaxis have been ordered including bilateral sequential compression devices    Procedure Details: Patient was brought to Room in the supine position after undergoing magseed placement  into the  left breast on a previous encounter. General anesthesia was obtained with LMA. Breast was prepped and draped in a sterile fashion.  Operation then continued with lumpectomy.  Mag seed probe was placed on the breast.  We are able to localize the area of the mag seed in the lateral breast .  Marking pen was used to delineate the line of incision in the outer slightly upper quadrant of the left breast. Local was infiltrated in the tissue.A first assistant needed to achieve adequate exposure during the dissection.  Fifteen blade scalpel was used to make a 4 centimeter incision in the skin.  The mag seed probe was placed into the incision and the direction of the seed was determined.  A short stay suture was placed on the anterior aspect where the seed was closest to the specimen surface.  Lesion was excised using electrocautery.  The mag seed probe was used quite often to check the location of the seed during the excision.  After it was excised it was marked appropriately with a short  stitch anterior previously placed, and long stitch medial deep and blue stitch superior.  It was sent to the Radiology Department who confirmed the presence of the affected area. magseed was in the central aspect of the specimen.  the lumpectomy cavity was then irrigated with water. Hemostasis maintained with electrocautery.  I did please ask patient had dimpling from previous lumpectomy.  This was a little obliterated using lateral dissection.  Little excess skin was also excised to create a closure without skin dimpling and with good viable skin.  Incision was then closed using interrupted 3-0 Vicryl followed by a running 4-0 Monocryl. Steri-Strips and sterile dressings were applied patient tolerated procedure well LMA was removed in the operating room and she was transferred to recovery room with rails up all counts were good this concludes this dictation please send a copy to myself and to the patients primary care physician.    Complications:  None; patient tolerated the procedure well.    Disposition: PACU - hemodynamically stable.  Condition: stable     This procedure was not performed to treat breast cancer through sentinel node biopsy      Additional Details:     Attending Attestation: I was present and scrubbed for the entire procedure.    Olivia Boone  Phone Number: [phone]       Valtrex Pregnancy And Lactation Text: this medication is Pregnancy Category B and is considered safe during pregnancy. This medication is not directly found in breast milk but it's metabolite acyclovir is present. Sarecycline Pregnancy And Lactation Text: This medication is Pregnancy Category D and not consider safe during pregnancy. It is also excreted in breast milk. Metronidazole Counseling:  I discussed with the patient the risks of metronidazole including but not limited to seizures, nausea/vomiting, a metallic taste in the mouth, nausea/vomiting and severe allergy. Azathioprine Counseling:  I discussed with the patient the risks of azathioprine including but not limited to myelosuppression, immunosuppression, hepatotoxicity, lymphoma, and infections.  The patient understands that monitoring is required including baseline LFTs, Creatinine, possible TPMP genotyping and weekly CBCs for the first month and then every 2 weeks thereafter.  The patient verbalized understanding of the proper use and possible adverse effects of azathioprine.  All of the patient's questions and concerns were addressed. Solaraze Pregnancy And Lactation Text: This medication is Pregnancy Category B and is considered safe. There is some data to suggest avoiding during the third trimester. It is unknown if this medication is excreted in breast milk. Dapsone Pregnancy And Lactation Text: This medication is Pregnancy Category C and is not considered safe during pregnancy or breast feeding. Hydroxyzine Counseling: Patient advised that the medication is sedating and not to drive a car after taking this medication.  Patient informed of potential adverse effects including but not limited to dry mouth, urinary retention, and blurry vision.  The patient verbalized understanding of the proper use and possible adverse effects of hydroxyzine.  All of the patient's questions and concerns were addressed. Sotyktu Counseling:  I discussed the most common side effects of Sotyktu including: common cold, sore throat, sinus infections, cold sores, canker sores, folliculitis, and acne.  I also discussed more serious side effects of Sotyktu including but not limited to: serious allergic reactions; increased risk for infections such as TB; cancers such as lymphomas; rhabdomyolysis and elevated CPK; and elevated triglycerides and liver enzymes.  Niacinamide Counseling: I recommended taking niacin or niacinamide, also know as vitamin B3, twice daily. Recent evidence suggests that taking vitamin B3 (500 mg twice daily) can reduce the risk of actinic keratoses and non-melanoma skin cancers. Side effects of vitamin B3 include flushing and headache. Topical Ketoconazole Counseling: Patient counseled that this medication may cause skin irritation or allergic reactions.  In the event of skin irritation, the patient was advised to reduce the amount of the drug applied or use it less frequently.   The patient verbalized understanding of the proper use and possible adverse effects of ketoconazole.  All of the patient's questions and concerns were addressed. Protopic Counseling: Patient may experience a mild burning sensation during topical application. Protopic is not approved in children less than 2 years of age. There have been case reports of hematologic and skin malignancies in patients using topical calcineurin inhibitors although causality is questionable. Klisyri Pregnancy And Lactation Text: It is unknown if this medication can harm a developing fetus or if it is excreted in breast milk. Opioid Counseling: I discussed with the patient the potential side effects of opioids including but not limited to addiction, altered mental status, and depression. I stressed avoiding alcohol, benzodiazepines, muscle relaxants and sleep aids unless specifically okayed by a physician. The patient verbalized understanding of the proper use and possible adverse effects of opioids. All of the patient's questions and concerns were addressed. They were instructed to flush the remaining pills down the toilet if they did not need them for pain. Otezla Pregnancy And Lactation Text: This medication is Pregnancy Category C and it isn't known if it is safe during pregnancy. It is unknown if it is excreted in breast milk. Bactrim Pregnancy And Lactation Text: This medication is Pregnancy Category D and is known to cause fetal risk.  It is also excreted in breast milk. Opioid Pregnancy And Lactation Text: These medications can lead to premature delivery and should be avoided during pregnancy. These medications are also present in breast milk in small amounts. Niacinamide Pregnancy And Lactation Text: These medications are considered safe during pregnancy. Topical Ketoconazole Pregnancy And Lactation Text: This medication is Pregnancy Category B and is considered safe during pregnancy. It is unknown if it is excreted in breast milk. Ketoconazole Counseling:   Patient counseled regarding improving absorption with orange juice.  Adverse effects include but are not limited to breast enlargement, headache, diarrhea, nausea, upset stomach, liver function test abnormalities, taste disturbance, and stomach pain.  There is a rare possibility of liver failure that can occur when taking ketoconazole. The patient understands that monitoring of LFTs may be required, especially at baseline. The patient verbalized understanding of the proper use and possible adverse effects of ketoconazole.  All of the patient's questions and concerns were addressed. Skyrizi Pregnancy And Lactation Text: The risk during pregnancy and breastfeeding is uncertain with this medication. Winlevi Counseling:  I discussed with the patient the risks of topical clascoterone including but not limited to erythema, scaling, itching, and stinging. Patient voiced their understanding. Oxybutynin Counseling:  I discussed with the patient the risks of oxybutynin including but not limited to skin rash, drowsiness, dry mouth, difficulty urinating, and blurred vision. Adbry Counseling: I discussed with the patient the risks of tralokinumab including but not limited to eye infection and irritation, cold sores, injection site reactions, worsening of asthma, allergic reactions and increased risk of parasitic infection.  Live vaccines should be avoided while taking tralokinumab. The patient understands that monitoring is required and they must alert us or the primary physician if symptoms of infection or other concerning signs are noted. Dutasteride Pregnancy And Lactation Text: This medication is absolutely contraindicated in women, especially during pregnancy and breast feeding. Feminization of male fetuses is possible if taking while pregnant. Dupixent Pregnancy And Lactation Text: This medication likely crosses the placenta but the risk for the fetus is uncertain. This medication is excreted in breast milk. Elidel Counseling: Patient may experience a mild burning sensation during topical application. Elidel is not approved in children less than 2 years of age. There have been case reports of hematologic and skin malignancies in patients using topical calcineurin inhibitors although causality is questionable. Infliximab Counseling:  I discussed with the patient the risks of infliximab including but not limited to myelosuppression, immunosuppression, autoimmune hepatitis, demyelinating diseases, lymphoma, and serious infections.  The patient understands that monitoring is required including a PPD at baseline and must alert us or the primary physician if symptoms of infection or other concerning signs are noted. Benzoyl Peroxide Pregnancy And Lactation Text: This medication is Pregnancy Category C. It is unknown if benzoyl peroxide is excreted in breast milk. Isotretinoin Pregnancy And Lactation Text: This medication is Pregnancy Category X and is considered extremely dangerous during pregnancy. It is unknown if it is excreted in breast milk. Carac Counseling:  I discussed with the patient the risks of Carac including but not limited to erythema, scaling, itching, weeping, crusting, and pain. Finasteride Male Counseling: Finasteride Counseling:  I discussed with the patient the risks of use of finasteride including but not limited to decreased libido, decreased ejaculate volume, gynecomastia, and depression. Women should not handle medication.  All of the patient's questions and concerns were addressed. Adbry Pregnancy And Lactation Text: It is unknown if this medication will adversely affect pregnancy or breast feeding. Cibinqo Counseling: I discussed with the patient the risks of Cibinqo therapy including but not limited to common cold, nausea, headache, cold sores, increased blood CPK levels, dizziness, UTIs, fatigue, acne, and vomitting. Live vaccines should be avoided.  This medication has been linked to serious infections; higher rate of mortality; malignancy and lymphoproliferative disorders; major adverse cardiovascular events; thrombosis; thrombocytopenia and lymphopenia; lipid elevations; and retinal detachment. Elidel Pregnancy And Lactation Text: This medication is Pregnancy Category C. It is unknown if this medication is excreted in breast milk. Metronidazole Pregnancy And Lactation Text: This medication is Pregnancy Category B and considered safe during pregnancy.  It is also excreted in breast milk. Soolantra Counseling: I discussed with the patients the risks of topial Soolantra. This is a medicine which decreases the number of mites and inflammation in the skin. You experience burning, stinging, eye irritation or allergic reactions.  Please call our office if you develop any problems from using this medication. Gabapentin Counseling: I discussed with the patient the risks of gabapentin including but not limited to dizziness, somnolence, fatigue and ataxia. Protopic Pregnancy And Lactation Text: This medication is Pregnancy Category C. It is unknown if this medication is excreted in breast milk when applied topically. Cephalexin Counseling: I counseled the patient regarding use of cephalexin as an antibiotic for prophylactic and/or therapeutic purposes. Cephalexin (commonly prescribed under brand name Keflex) is a cephalosporin antibiotic which is active against numerous classes of bacteria, including most skin bacteria. Side effects may include nausea, diarrhea, gastrointestinal upset, rash, hives, yeast infections, and in rare cases, hepatitis, kidney disease, seizures, fever, confusion, neurologic symptoms, and others. Patients with severe allergies to penicillin medications are cautioned that there is about a 10% incidence of cross-reactivity with cephalosporins. When possible, patients with penicillin allergies should use alternatives to cephalosporins for antibiotic therapy. Erivedge Counseling- I discussed with the patient the risks of Erivedge including but not limited to nausea, vomiting, diarrhea, constipation, weight loss, changes in the sense of taste, decreased appetite, muscle spasms, and hair loss.  The patient verbalized understanding of the proper use and possible adverse effects of Erivedge.  All of the patient's questions and concerns were addressed. Minoxidil Counseling: Minoxidil is a topical medication which can increase blood flow where it is applied. It is uncertain how this medication increases hair growth. Side effects are uncommon and include stinging and allergic reactions. Hydroxyzine Pregnancy And Lactation Text: This medication is not safe during pregnancy and should not be taken. It is also excreted in breast milk and breast feeding isn't recommended. Sotyktu Pregnancy And Lactation Text: There is insufficient data to evaluate whether or not Sotyktu is safe to use during pregnancy.   It is not known if Sotyktu passes into breast milk and whether or not it is safe to use when breastfeeding.   Azathioprine Pregnancy And Lactation Text: This medication is Pregnancy Category D and isn't considered safe during pregnancy. It is unknown if this medication is excreted in breast milk. Tetracycline Counseling: Patient counseled regarding possible photosensitivity and increased risk for sunburn.  Patient instructed to avoid sunlight, if possible.  When exposed to sunlight, patients should wear protective clothing, sunglasses, and sunscreen.  The patient was instructed to call the office immediately if the following severe adverse effects occur:  hearing changes, easy bruising/bleeding, severe headache, or vision changes.  The patient verbalized understanding of the proper use and possible adverse effects of tetracycline.  All of the patient's questions and concerns were addressed. Patient understands to avoid pregnancy while on therapy due to potential birth defects. Use Enhanced Medication Counseling?: No Cellcept Counseling:  I discussed with the patient the risks of mycophenolate mofetil including but not limited to infection/immunosuppression, GI upset, hypokalemia, hypercholesterolemia, bone marrow suppression, lymphoproliferative disorders, malignancy, GI ulceration/bleed/perforation, colitis, interstitial lung disease, kidney failure, progressive multifocal leukoencephalopathy, and birth defects.  The patient understands that monitoring is required including a baseline creatinine and regular CBC testing. In addition, patient must alert us immediately if symptoms of infection or other concerning signs are noted. Detail Level: Zone Soolantra Pregnancy And Lactation Text: This medication is Pregnancy Category C. This medication is considered safe during breast feeding. Topical Metronidazole Counseling: Metronidazole is a topical antibiotic medication. You may experience burning, stinging, redness, or allergic reactions.  Please call our office if you develop any problems from using this medication. Qbrexza Counseling:  I discussed with the patient the risks of Qbrexza including but not limited to headache, mydriasis, blurred vision, dry eyes, nasal dryness, dry mouth, dry throat, dry skin, urinary hesitation, and constipation.  Local skin reactions including erythema, burning, stinging, and itching can also occur. Nsaids Counseling: NSAID Counseling: I discussed with the patient that NSAIDs should be taken with food. Prolonged use of NSAIDs can result in the development of stomach ulcers.  Patient advised to stop taking NSAIDs if abdominal pain occurs.  The patient verbalized understanding of the proper use and possible adverse effects of NSAIDs.  All of the patient's questions and concerns were addressed. Winlevi Pregnancy And Lactation Text: This medication is considered safe during pregnancy and breastfeeding. Ketoconazole Pregnancy And Lactation Text: This medication is Pregnancy Category C and it isn't know if it is safe during pregnancy. It is also excreted in breast milk and breast feeding isn't recommended. Enbrel Counseling:  I discussed with the patient the risks of etanercept including but not limited to myelosuppression, immunosuppression, autoimmune hepatitis, demyelinating diseases, lymphoma, and infections.  The patient understands that monitoring is required including a PPD at baseline and must alert us or the primary physician if symptoms of infection or other concerning signs are noted. High Dose Vitamin A Counseling: Side effects reviewed, pt to contact office should one occur. Infliximab Pregnancy And Lactation Text: This medication is Pregnancy Category B and is considered safe during pregnancy. It is unknown if this medication is excreted in breast milk. Stelara Counseling:  I discussed with the patient the risks of ustekinumab including but not limited to immunosuppression, malignancy, posterior leukoencephalopathy syndrome, and serious infections.  The patient understands that monitoring is required including a PPD at baseline and must alert us or the primary physician if symptoms of infection or other concerning signs are noted. Rituxan Counseling:  I discussed with the patient the risks of Rituxan infusions. Side effects can include infusion reactions, severe drug rashes including mucocutaneous reactions, reactivation of latent hepatitis and other infections and rarely progressive multifocal leukoencephalopathy.  All of the patient's questions and concerns were addressed. Carac Pregnancy And Lactation Text: This medication is Pregnancy Category X and contraindicated in pregnancy and in women who may become pregnant. It is unknown if this medication is excreted in breast milk. Xeljanz Counseling: I discussed with the patient the risks of Xeljanz therapy including increased risk of infection, liver issues, headache, diarrhea, or cold symptoms. Live vaccines should be avoided. They were instructed to call if they have any problems. Finasteride Female Counseling: Finasteride Counseling:  I discussed with the patient the risks of use of finasteride including but not limited to decreased libido and sexual dysfunction. Explained the teratogenic nature of the medication and stressed the importance of not getting pregnant during treatment. All of the patient's questions and concerns were addressed. Propranolol Counseling:  I discussed with the patient the risks of propranolol including but not limited to low heart rate, low blood pressure, low blood sugar, restlessness and increased cold sensitivity. They should call the office if they experience any of these side effects. VTAMA Counseling: I discussed with the patient that VTAMA is not for use in the eyes, mouth or mouth. They should call the office if they develop any signs of allergic reactions to VTAMA. The patient verbalized understanding of the proper use and possible adverse effects of VTAMA.  All of the patient's questions and concerns were addressed. Bimzelx Counseling:  I discussed with the patient the risks of Bimzelx including but not limited to depression, immunosuppression, allergic reactions and infections.  The patient understands that monitoring is required including a PPD at baseline and must alert us or the primary physician if symptoms of infection or other concerning signs are noted. Arava Counseling:  Patient counseled regarding adverse effects of Arava including but not limited to nausea, vomiting, abnormalities in liver function tests. Patients may develop mouth sores, rash, diarrhea, and abnormalities in blood counts. The patient understands that monitoring is required including LFTs and blood counts.  There is a rare possibility of scarring of the liver and lung problems that can occur when taking methotrexate. Persistent nausea, loss of appetite, pale stools, dark urine, cough, and shortness of breath should be reported immediately. Patient advised to discontinue Arava treatment and consult with a physician prior to attempting conception. The patient will have to undergo a treatment to eliminate Arava from the body prior to conception. Cephalexin Pregnancy And Lactation Text: This medication is Pregnancy Category B and considered safe during pregnancy.  It is also excreted in breast milk but can be used safely for shorter doses. Minocycline Counseling: Patient advised regarding possible photosensitivity and discoloration of the teeth, skin, lips, tongue and gums.  Patient instructed to avoid sunlight, if possible.  When exposed to sunlight, patients should wear protective clothing, sunglasses, and sunscreen.  The patient was instructed to call the office immediately if the following severe adverse effects occur:  hearing changes, easy bruising/bleeding, severe headache, or vision changes.  The patient verbalized understanding of the proper use and possible adverse effects of minocycline.  All of the patient's questions and concerns were addressed. Gabapentin Pregnancy And Lactation Text: This medication is Pregnancy Category C and isn't considered safe during pregnancy. It is excreted in breast milk. Minoxidil Pregnancy And Lactation Text: This medication has not been assigned a Pregnancy Risk Category but animal studies failed to show danger with the topical medication. It is unknown if the medication is excreted in breast milk. Erivedge Pregnancy And Lactation Text: This medication is Pregnancy Category X and is absolutely contraindicated during pregnancy. It is unknown if it is excreted in breast milk. Eucrisa Counseling: Patient may experience a mild burning sensation during topical application. Eucrisa is not approved in children less than 2 years of age. Cibinqo Pregnancy And Lactation Text: It is unknown if this medication will adversely affect pregnancy or breast feeding.  You should not take this medication if you are currently pregnant or planning a pregnancy or while breastfeeding. Glycopyrrolate Counseling:  I discussed with the patient the risks of glycopyrrolate including but not limited to skin rash, drowsiness, dry mouth, difficulty urinating, and blurred vision. Topical Retinoid counseling:  Patient advised to apply a pea-sized amount only at bedtime and wait 30 minutes after washing their face before applying.  If too drying, patient may add a non-comedogenic moisturizer. The patient verbalized understanding of the proper use and possible adverse effects of retinoids.  All of the patient's questions and concerns were addressed. Libtayo Counseling- I discussed with the patient the risks of Libtayo including but not limited to nausea, vomiting, diarrhea, and bone or muscle pain.  The patient verbalized understanding of the proper use and possible adverse effects of Libtayo.  All of the patient's questions and concerns were addressed. Clindamycin Counseling: I counseled the patient regarding use of clindamycin as an antibiotic for prophylactic and/or therapeutic purposes. Clindamycin is active against numerous classes of bacteria, including skin bacteria. Side effects may include nausea, diarrhea, gastrointestinal upset, rash, hives, yeast infections, and in rare cases, colitis. Mirvaso Counseling: Mirvaso is a topical medication which can decrease superficial blood flow where applied. Side effects are uncommon and include stinging, redness and allergic reactions. Nsaids Pregnancy And Lactation Text: These medications are considered safe up to 30 weeks gestation. It is excreted in breast milk. Terbinafine Counseling: Patient counseling regarding adverse effects of terbinafine including but not limited to headache, diarrhea, rash, upset stomach, liver function test abnormalities, itching, taste/smell disturbance, nausea, abdominal pain, and flatulence.  There is a rare possibility of liver failure that can occur when taking terbinafine.  The patient understands that a baseline LFT and kidney function test may be required. The patient verbalized understanding of the proper use and possible adverse effects of terbinafine.  All of the patient's questions and concerns were addressed. Topical Metronidazole Pregnancy And Lactation Text: This medication is Pregnancy Category B and considered safe during pregnancy.  It is also considered safe to use while breastfeeding. High Dose Vitamin A Pregnancy And Lactation Text: High dose vitamin A therapy is contraindicated during pregnancy and breast feeding. Qbrexza Pregnancy And Lactation Text: There is no available data on Qbrexza use in pregnant women.  There is no available data on Qbrexza use in lactation. Albendazole Counseling:  I discussed with the patient the risks of albendazole including but not limited to cytopenia, kidney damage, nausea/vomiting and severe allergy.  The patient understands that this medication is being used in an off-label manner. Rituxan Pregnancy And Lactation Text: This medication is Pregnancy Category C and it isn't know if it is safe during pregnancy. It is unknown if this medication is excreted in breast milk but similar antibodies are known to be excreted. Litfulo Counseling: I discussed with the patient the risks of Litfulo therapy including but not limited to upper respiratory tract infections, shingles, cold sores, and nausea. Live vaccines should be avoided.  This medication has been linked to serious infections; higher rate of mortality; malignancy and lymphoproliferative disorders; major adverse cardiovascular events; thrombosis; gastrointestinal perforations; neutropenia; lymphopenia; anemia; liver enzyme elevations; and lipid elevations. Albendazole Pregnancy And Lactation Text: This medication is Pregnancy Category C and it isn't known if it is safe during pregnancy. It is also excreted in breast milk. Vtama Pregnancy And Lactation Text: It is unknown if this medication can cause problems during pregnancy and breastfeeding. Terbinafine Pregnancy And Lactation Text: This medication is Pregnancy Category B and is considered safe during pregnancy. It is also excreted in breast milk and breast feeding isn't recommended. Bimzelx Pregnancy And Lactation Text: This medication crosses the placenta and the safety is uncertain during pregnancy. It is unknown if this medication is present in breast milk. Finasteride Pregnancy And Lactation Text: This medication is absolutely contraindicated during pregnancy. It is unknown if it is excreted in breast milk. Fluconazole Counseling:  Patient counseled regarding adverse effects of fluconazole including but not limited to headache, diarrhea, nausea, upset stomach, liver function test abnormalities, taste disturbance, and stomach pain.  There is a rare possibility of liver failure that can occur when taking fluconazole.  The patient understands that monitoring of LFTs and kidney function test may be required, especially at baseline. The patient verbalized understanding of the proper use and possible adverse effects of fluconazole.  All of the patient's questions and concerns were addressed. Humira Counseling:  I discussed with the patient the risks of adalimumab including but not limited to myelosuppression, immunosuppression, autoimmune hepatitis, demyelinating diseases, lymphoma, and serious infections.  The patient understands that monitoring is required including a PPD at baseline and must alert us or the primary physician if symptoms of infection or other concerning signs are noted. Xelcristinaz Pregnancy And Lactation Text: This medication is Pregnancy Category D and is not considered safe during pregnancy.  The risk during breast feeding is also uncertain. Taltz Counseling: I discussed with the patient the risks of ixekizumab including but not limited to immunosuppression, serious infections, worsening of inflammatory bowel disease and drug reactions.  The patient understands that monitoring is required including a PPD at baseline and must alert us or the primary physician if symptoms of infection or other concerning signs are noted. Calcipotriene Counseling:  I discussed with the patient the risks of calcipotriene including but not limited to erythema, scaling, itching, and irritation. Hydroquinone Counseling:  Patient advised that medication may result in skin irritation, lightening (hypopigmentation), dryness, and burning.  In the event of skin irritation, the patient was advised to reduce the amount of the drug applied or use it less frequently.  Rarely, spots that are treated with hydroquinone can become darker (pseudoochronosis).  Should this occur, patient instructed to stop medication and call the office. The patient verbalized understanding of the proper use and possible adverse effects of hydroquinone.  All of the patient's questions and concerns were addressed. Clofazimine Counseling:  I discussed with the patient the risks of clofazimine including but not limited to skin and eye pigmentation, liver damage, nausea/vomiting, gastrointestinal bleeding and allergy. Quinolones Counseling:  I discussed with the patient the risks of fluoroquinolones including but not limited to GI upset, allergic reaction, drug rash, diarrhea, dizziness, photosensitivity, yeast infections, liver function test abnormalities, tendonitis/tendon rupture. Cyclophosphamide Counseling:  I discussed with the patient the risks of cyclophosphamide including but not limited to hair loss, hormonal abnormalities, decreased fertility, abdominal pain, diarrhea, nausea and vomiting, bone marrow suppression and infection. The patient understands that monitoring is required while taking this medication. Mirvaso Pregnancy And Lactation Text: This medication has not been assigned a Pregnancy Risk Category. It is unknown if the medication is excreted in breast milk. Glycopyrrolate Pregnancy And Lactation Text: This medication is Pregnancy Category B and is considered safe during pregnancy. It is unknown if it is excreted breast milk. Olanzapine Counseling- I discussed with the patient the common side effects of olanzapine including but are not limited to: lack of energy, dry mouth, increased appetite, sleepiness, tremor, constipation, dizziness, changes in behavior, or restlessness.  Explained that teenagers are more likely to experience headaches, abdominal pain, pain in the arms or legs, tiredness, and sleepiness.  Serious side effects include but are not limited: increased risk of death in elderly patients who are confused, have memory loss, or dementia-related psychosis; hyperglycemia; increased cholesterol and triglycerides; and weight gain. Calcipotriene Pregnancy And Lactation Text: The use of this medication during pregnancy or lactation is not recommended as there is insufficient data. Topical Steroids Counseling: I discussed with the patient that prolonged use of topical steroids can result in the increased appearance of superficial blood vessels (telangiectasias), lightening (hypopigmentation) and thinning of the skin (atrophy).  Patient understands to avoid using high potency steroids in skin folds, the groin or the face.  The patient verbalized understanding of the proper use and possible adverse effects of topical steroids.  All of the patient's questions and concerns were addressed. Rhofade Counseling: Rhofade is a topical medication which can decrease superficial blood flow where applied. Side effects are uncommon and include stinging, redness and allergic reactions. Libtayo Pregnancy And Lactation Text: This medication is contraindicated in pregnancy and when breast feeding. Clindamycin Pregnancy And Lactation Text: This medication can be used in pregnancy if certain situations. Clindamycin is also present in breast milk. Propranolol Pregnancy And Lactation Text: This medication is Pregnancy Category C and it isn't known if it is safe during pregnancy. It is excreted in breast milk. Acitretin Counseling:  I discussed with the patient the risks of acitretin including but not limited to hair loss, dry lips/skin/eyes, liver damage, hyperlipidemia, depression/suicidal ideation, photosensitivity.  Serious rare side effects can include but are not limited to pancreatitis, pseudotumor cerebri, bony changes, clot formation/stroke/heart attack.  Patient understands that alcohol is contraindicated since it can result in liver toxicity and significantly prolong the elimination of the drug by many years. Olanzapine Pregnancy And Lactation Text: This medication is pregnancy category C.   There are no adequate and well controlled trials with olanzapine in pregnant females.  Olanzapine should be used during pregnancy only if the potential benefit justifies the potential risk to the fetus.   In a study in lactating healthy women, olanzapine was excreted in breast milk.  It is recommended that women taking olanzapine should not breast feed. Topical Steroids Applications Pregnancy And Lactation Text: Most topical steroids are considered safe to use during pregnancy and lactation.  Any topical steroid applied to the breast or nipple should be washed off before breastfeeding. Cantharidin Pregnancy And Lactation Text: This medication has not been proven safe during pregnancy. It is unknown if this medication is excreted in breast milk. SSKI Counseling:  I discussed with the patient the risks of SSKI including but not limited to thyroid abnormalities, metallic taste, GI upset, fever, headache, acne, arthralgias, paraesthesias, lymphadenopathy, easy bleeding, arrhythmias, and allergic reaction. Litfulo Pregnancy And Lactation Text: Based on animal studies, Lifulo may cause embryo-fetal harm when administered to pregnant women.  The medication should not be used in pregnancy.  Breastfeeding is not recommended during treatment. Cimzia Counseling:  I discussed with the patient the risks of Cimzia including but not limited to immunosuppression, allergic reactions and infections.  The patient understands that monitoring is required including a PPD at baseline and must alert us or the primary physician if symptoms of infection or other concerning signs are noted. Zoryve Counseling:  I discussed with the patient that Zoryve is not for use in the eyes, mouth or vagina. The most commonly reported side effects include diarrhea, headache, insomnia, application site pain, upper respiratory tract infections, and urinary tract infections.  All of the patient's questions and concerns were addressed. Birth Control Pills Counseling: Birth Control Pill Counseling: I discussed with the patient the potential side effects of OCPs including but not limited to increased risk of stroke, heart attack, thrombophlebitis, deep venous thrombosis, hepatic adenomas, breast changes, GI upset, headaches, and depression.  The patient verbalized understanding of the proper use and possible adverse effects of OCPs. All of the patient's questions and concerns were addressed. Cantharidin Counseling:  I discussed with the patient the risks of Cantharidin including but not limited to pain, redness, burning, itching, and blistering. Siliq Counseling:  I discussed with the patient the risks of Siliq including but not limited to new or worsening depression, suicidal thoughts and behavior, immunosuppression, malignancy, posterior leukoencephalopathy syndrome, and serious infections.  The patient understands that monitoring is required including a PPD at baseline and must alert us or the primary physician if symptoms of infection or other concerning signs are noted. There is also a special program designed to monitor depression which is required with Siliq. Fluconazole Pregnancy And Lactation Text: This medication is Pregnancy Category C and it isn't know if it is safe during pregnancy. It is also excreted in breast milk. Aklief counseling:  Patient advised to apply a pea-sized amount only at bedtime and wait 30 minutes after washing their face before applying.  If too drying, patient may add a non-comedogenic moisturizer.  The most commonly reported side effects including irritation, redness, scaling, dryness, stinging, burning, itching, and increased risk of sunburn.  The patient verbalized understanding of the proper use and possible adverse effects of retinoids.  All of the patient's questions and concerns were addressed. Birth Control Pills Pregnancy And Lactation Text: This medication should be avoided if pregnant and for the first 30 days post-partum. Doxycycline Counseling:  Patient counseled regarding possible photosensitivity and increased risk for sunburn.  Patient instructed to avoid sunlight, if possible.  When exposed to sunlight, patients should wear protective clothing, sunglasses, and sunscreen.  The patient was instructed to call the office immediately if the following severe adverse effects occur:  hearing changes, easy bruising/bleeding, severe headache, or vision changes.  The patient verbalized understanding of the proper use and possible adverse effects of doxycycline.  All of the patient's questions and concerns were addressed. Cimzia Pregnancy And Lactation Text: This medication crosses the placenta but can be considered safe in certain situations. Cimzia may be excreted in breast milk. Cimetidine Counseling:  I discussed with the patient the risks of Cimetidine including but not limited to gynecomastia, headache, diarrhea, nausea, drowsiness, arrhythmias, pancreatitis, skin rashes, psychosis, bone marrow suppression and kidney toxicity. Olumiant Counseling: I discussed with the patient the risks of Olumiant therapy including but not limited to upper respiratory tract infections, shingles, cold sores, and nausea. Live vaccines should be avoided.  This medication has been linked to serious infections; higher rate of mortality; malignancy and lymphoproliferative disorders; major adverse cardiovascular events; thrombosis; gastrointestinal perforations; neutropenia; lymphopenia; anemia; liver enzyme elevations; and lipid elevations. Tazorac Counseling:  Patient advised that medication is irritating and drying.  Patient may need to apply sparingly and wash off after an hour before eventually leaving it on overnight.  The patient verbalized understanding of the proper use and possible adverse effects of tazorac.  All of the patient's questions and concerns were addressed. Hydroxychloroquine Counseling:  I discussed with the patient that a baseline ophthalmologic exam is needed at the start of therapy and every year thereafter while on therapy. A CBC may also be warranted for monitoring.  The side effects of this medication were discussed with the patient, including but not limited to agranulocytosis, aplastic anemia, seizures, rashes, retinopathy, and liver toxicity. Patient instructed to call the office should any adverse effect occur.  The patient verbalized understanding of the proper use and possible adverse effects of Plaquenil.  All the patient's questions and concerns were addressed. Odomzo Counseling- I discussed with the patient the risks of Odomzo including but not limited to nausea, vomiting, diarrhea, constipation, weight loss, changes in the sense of taste, decreased appetite, muscle spasms, and hair loss.  The patient verbalized understanding of the proper use and possible adverse effects of Odomzo.  All of the patient's questions and concerns were addressed. Opzelura Counseling:  I discussed with the patient the risks of Opzelura including but not limited to nasopharngitis, bronchitis, ear infection, eosinophila, hives, diarrhea, folliculitis, tonsillitis, and rhinorrhea.  Taken orally, this medication has been linked to serious infections; higher rate of mortality; malignancy and lymphoproliferative disorders; major adverse cardiovascular events; thrombosis; thrombocytopenia, anemia, and neutropenia; and lipid elevations. Cyclophosphamide Pregnancy And Lactation Text: This medication is Pregnancy Category D and it isn't considered safe during pregnancy. This medication is excreted in breast milk. Hydroxychloroquine Pregnancy And Lactation Text: This medication has been shown to cause fetal harm but it isn't assigned a Pregnancy Risk Category. There are small amounts excreted in breast milk. Tazorac Pregnancy And Lactation Text: This medication is not safe during pregnancy. It is unknown if this medication is excreted in breast milk. Griseofulvin Counseling:  I discussed with the patient the risks of griseofulvin including but not limited to photosensitivity, cytopenia, liver damage, nausea/vomiting and severe allergy.  The patient understands that this medication is best absorbed when taken with a fatty meal (e.g., ice cream or french fries). Methotrexate Counseling:  Patient counseled regarding adverse effects of methotrexate including but not limited to nausea, vomiting, abnormalities in liver function tests. Patients may develop mouth sores, rash, diarrhea, and abnormalities in blood counts. The patient understands that monitoring is required including LFT's and blood counts.  There is a rare possibility of scarring of the liver and lung problems that can occur when taking methotrexate. Persistent nausea, loss of appetite, pale stools, dark urine, cough, and shortness of breath should be reported immediately. Patient advised to discontinue methotrexate treatment at least three months before attempting to become pregnant.  I discussed the need for folate supplements while taking methotrexate.  These supplements can decrease side effects during methotrexate treatment. The patient verbalized understanding of the proper use and possible adverse effects of methotrexate.  All of the patient's questions and concerns were addressed. Topical Sulfur Applications Counseling: Topical Sulfur Counseling: Patient counseled that this medication may cause skin irritation or allergic reactions.  In the event of skin irritation, the patient was advised to reduce the amount of the drug applied or use it less frequently.   The patient verbalized understanding of the proper use and possible adverse effects of topical sulfur application.  All of the patient's questions and concerns were addressed. Oral Minoxidil Counseling- I discussed with the patient the risks of oral minoxidil including but not limited to shortness of breath, swelling of the feet or ankles, dizziness, lightheadedness, unwanted hair growth and allergic reaction.  The patient verbalized understanding of the proper use and possible adverse effects of oral minoxidil.  All of the patient's questions and concerns were addressed. Sski Pregnancy And Lactation Text: This medication is Pregnancy Category D and isn't considered safe during pregnancy. It is excreted in breast milk. 5-Fu Counseling: 5-Fluorouracil Counseling:  I discussed with the patient the risks of 5-fluorouracil including but not limited to erythema, scaling, itching, weeping, crusting, and pain. Aklief Pregnancy And Lactation Text: It is unknown if this medication is safe to use during pregnancy.  It is unknown if this medication is excreted in breast milk.  Breastfeeding women should use the topical cream on the smallest area of the skin for the shortest time needed while breastfeeding.  Do not apply to nipple and areola. Hyrimoz Counseling:  I discussed with the patient the risks of adalimumab including but not limited to myelosuppression, immunosuppression, autoimmune hepatitis, demyelinating diseases, lymphoma, and serious infections.  The patient understands that monitoring is required including a PPD at baseline and must alert us or the primary physician if symptoms of infection or other concerning signs are noted. Acitretin Pregnancy And Lactation Text: This medication is Pregnancy Category X and should not be given to women who are pregnant or may become pregnant in the future. This medication is excreted in breast milk. Tremfya Counseling: I discussed with the patient the risks of guselkumab including but not limited to immunosuppression, serious infections, worsening of inflammatory bowel disease and drug reactions.  The patient understands that monitoring is required including a PPD at baseline and must alert us or the primary physician if symptoms of infection or other concerning signs are noted. Tranexamic Acid Counseling:  Patient advised of the small risk of bleeding problems with tranexamic acid. They were also instructed to call if they developed any nausea, vomiting or diarrhea. All of the patient's questions and concerns were addressed. Thalidomide Counseling: I discussed with the patient the risks of thalidomide including but not limited to birth defects, anxiety, weakness, chest pain, dizziness, cough and severe allergy. Zyclara Counseling:  I discussed with the patient the risks of imiquimod including but not limited to erythema, scaling, itching, weeping, crusting, and pain.  Patient understands that the inflammatory response to imiquimod is variable from person to person and was educated regarded proper titration schedule.  If flu-like symptoms develop, patient knows to discontinue the medication and contact us. Colchicine Counseling:  Patient counseled regarding adverse effects including but not limited to stomach upset (nausea, vomiting, stomach pain, or diarrhea).  Patient instructed to limit alcohol consumption while taking this medication.  Colchicine may reduce blood counts especially with prolonged use.  The patient understands that monitoring of kidney function and blood counts may be required, especially at baseline. The patient verbalized understanding of the proper use and possible adverse effects of colchicine.  All of the patient's questions and concerns were addressed. Cyclosporine Counseling:  I discussed with the patient the risks of cyclosporine including but not limited to hypertension, gingival hyperplasia,myelosuppression, immunosuppression, liver damage, kidney damage, neurotoxicity, lymphoma, and serious infections. The patient understands that monitoring is required including baseline blood pressure, CBC, CMP, lipid panel and uric acid, and then 1-2 times monthly CMP and blood pressure. Opzelura Pregnancy And Lactation Text: There is insufficient data to evaluate drug-associated risk for major birth defects, miscarriage, or other adverse maternal or fetal outcomes.  There is a pregnancy registry that monitors pregnancy outcomes in pregnant persons exposed to the medication during pregnancy.  It is unknown if this medication is excreted in breast milk.  Do not breastfeed during treatment and for about 4 weeks after the last dose. Doxycycline Pregnancy And Lactation Text: This medication is Pregnancy Category D and not consider safe during pregnancy. It is also excreted in breast milk but is considered safe for shorter treatment courses. Azithromycin Counseling:  I discussed with the patient the risks of azithromycin including but not limited to GI upset, allergic reaction, drug rash, diarrhea, and yeast infections. Imiquimod Counseling:  I discussed with the patient the risks of imiquimod including but not limited to erythema, scaling, itching, weeping, crusting, and pain.  Patient understands that the inflammatory response to imiquimod is variable from person to person and was educated regarded proper titration schedule.  If flu-like symptoms develop, patient knows to discontinue the medication and contact us. Olumiant Pregnancy And Lactation Text: Based on animal studies, Olumiant may cause embryo-fetal harm when administered to pregnant women.  The medication should not be used in pregnancy.  Breastfeeding is not recommended during treatment. Rifampin Counseling: I discussed with the patient the risks of rifampin including but not limited to liver damage, kidney damage, red-orange body fluids, nausea/vomiting and severe allergy. Methotrexate Pregnancy And Lactation Text: This medication is Pregnancy Category X and is known to cause fetal harm. This medication is excreted in breast milk. Cyclosporine Pregnancy And Lactation Text: This medication is Pregnancy Category C and it isn't know if it is safe during pregnancy. This medication is excreted in breast milk. Topical Clindamycin Counseling: Patient counseled that this medication may cause skin irritation or allergic reactions.  In the event of skin irritation, the patient was advised to reduce the amount of the drug applied or use it less frequently.   The patient verbalized understanding of the proper use and possible adverse effects of clindamycin.  All of the patient's questions and concerns were addressed. Rifampin Pregnancy And Lactation Text: This medication is Pregnancy Category C and it isn't know if it is safe during pregnancy. It is also excreted in breast milk and should not be used if you are breast feeding. Erythromycin Counseling:  I discussed with the patient the risks of erythromycin including but not limited to GI upset, allergic reaction, drug rash, diarrhea, increase in liver enzymes, and yeast infections. Azithromycin Pregnancy And Lactation Text: This medication is considered safe during pregnancy and is also secreted in breast milk. Picato Counseling:  I discussed with the patient the risks of Picato including but not limited to erythema, scaling, itching, weeping, crusting, and pain. Ivermectin Counseling:  Patient instructed to take medication on an empty stomach with a full glass of water.  Patient informed of potential adverse effects including but not limited to nausea, diarrhea, dizziness, itching, and swelling of the extremities or lymph nodes.  The patient verbalized understanding of the proper use and possible adverse effects of ivermectin.  All of the patient's questions and concerns were addressed. Topical Sulfur Applications Pregnancy And Lactation Text: This medication is Pregnancy Category C and has an unknown safety profile during pregnancy. It is unknown if this topical medication is excreted in breast milk. Griseofulvin Pregnancy And Lactation Text: This medication is Pregnancy Category X and is known to cause serious birth defects. It is unknown if this medication is excreted in breast milk but breast feeding should be avoided. Oral Minoxidil Pregnancy And Lactation Text: This medication should only be used when clearly needed if you are pregnant, attempting to become pregnant or breast feeding. Azelaic Acid Counseling: Patient counseled that medicine may cause skin irritation and to avoid applying near the eyes.  In the event of skin irritation, the patient was advised to reduce the amount of the drug applied or use it less frequently.   The patient verbalized understanding of the proper use and possible adverse effects of azelaic acid.  All of the patient's questions and concerns were addressed. Cosentyx Counseling:  I discussed with the patient the risks of Cosentyx including but not limited to worsening of Crohn's disease, immunosuppression, allergic reactions and infections.  The patient understands that monitoring is required including a PPD at baseline and must alert us or the primary physician if symptoms of infection or other concerning signs are noted. Bexarotene Counseling:  I discussed with the patient the risks of bexarotene including but not limited to hair loss, dry lips/skin/eyes, liver abnormalities, hyperlipidemia, pancreatitis, depression/suicidal ideation, photosensitivity, drug rash/allergic reactions, hypothyroidism, anemia, leukopenia, infection, cataracts, and teratogenicity.  Patient understands that they will need regular blood tests to check lipid profile, liver function tests, white blood cell count, thyroid function tests and pregnancy test if applicable. Spironolactone Counseling: Patient advised regarding risks of diarrhea, abdominal pain, hyperkalemia, birth defects (for female patients), liver toxicity and renal toxicity. The patient may need blood work to monitor liver and kidney function and potassium levels while on therapy. The patient verbalized understanding of the proper use and possible adverse effects of spironolactone.  All of the patient's questions and concerns were addressed. Simponi Counseling:  I discussed with the patient the risks of golimumab including but not limited to myelosuppression, immunosuppression, autoimmune hepatitis, demyelinating diseases, lymphoma, and serious infections.  The patient understands that monitoring is required including a PPD at baseline and must alert us or the primary physician if symptoms of infection or other concerning signs are noted. Azelaic Acid Pregnancy And Lactation Text: This medication is considered safe during pregnancy and breast feeding. Xolair Counseling:  Patient informed of potential adverse effects including but not limited to fever, muscle aches, rash and allergic reactions.  The patient verbalized understanding of the proper use and possible adverse effects of Xolair.  All of the patient's questions and concerns were addressed. Rinvoq Counseling: I discussed with the patient the risks of Rinvoq therapy including but not limited to upper respiratory tract infections, shingles, cold sores, bronchitis, nausea, cough, fever, acne, and headache. Live vaccines should be avoided.  This medication has been linked to serious infections; higher rate of mortality; malignancy and lymphoproliferative disorders; major adverse cardiovascular events; thrombosis; thrombocytopenia, anemia, and neutropenia; lipid elevations; liver enzyme elevations; and gastrointestinal perforations. Wartpeel Counseling:  I discussed with the patient the risks of Wartpeel including but not limited to erythema, scaling, itching, weeping, crusting, and pain. Dutasteride Male Counseling: Dustasteride Counseling:  I discussed with the patient the risks of use of dutasteride including but not limited to decreased libido, decreased ejaculate volume, and gynecomastia. Women who can become pregnant should not handle medication.  All of the patient's questions and concerns were addressed. Itraconazole Counseling:  I discussed with the patient the risks of itraconazole including but not limited to liver damage, nausea/vomiting, neuropathy, and severe allergy.  The patient understands that this medication is best absorbed when taken with acidic beverages such as non-diet cola or ginger ale.  The patient understands that monitoring is required including baseline LFTs and repeat LFTs at intervals.  The patient understands that they are to contact us or the primary physician if concerning signs are noted. Ilumya Counseling: I discussed with the patient the risks of tildrakizumab including but not limited to immunosuppression, malignancy, posterior leukoencephalopathy syndrome, and serious infections.  The patient understands that monitoring is required including a PPD at baseline and must alert us or the primary physician if symptoms of infection or other concerning signs are noted. Spironolactone Pregnancy And Lactation Text: This medication can cause feminization of the male fetus and should be avoided during pregnancy. The active metabolite is also found in breast milk. Doxepin Counseling:  Patient advised that the medication is sedating and not to drive a car after taking this medication. Patient informed of potential adverse effects including but not limited to dry mouth, urinary retention, and blurry vision.  The patient verbalized understanding of the proper use and possible adverse effects of doxepin.  All of the patient's questions and concerns were addressed. Low Dose Naltrexone Counseling- I discussed with the patient the potential risks and side effects of low dose naltrexone including but not limited to: more vivid dreams, headaches, nausea, vomiting, abdominal pain, fatigue, dizziness, and anxiety. Drysol Counseling:  I discussed with the patient the risks of drysol/aluminum chloride including but not limited to skin rash, itching, irritation, burning. Tranexamic Acid Pregnancy And Lactation Text: It is unknown if this medication is safe during pregnancy or breast feeding. Doxepin Pregnancy And Lactation Text: This medication is Pregnancy Category C and it isn't known if it is safe during pregnancy. It is also excreted in breast milk and breast feeding isn't recommended. Solaraze Counseling:  I discussed with the patient the risks of Solaraze including but not limited to erythema, scaling, itching, weeping, crusting, and pain. Dapsone Counseling: I discussed with the patient the risks of dapsone including but not limited to hemolytic anemia, agranulocytosis, rashes, methemoglobinemia, kidney failure, peripheral neuropathy, headaches, GI upset, and liver toxicity.  Patients who start dapsone require monitoring including baseline LFTs and weekly CBCs for the first month, then every month thereafter.  The patient verbalized understanding of the proper use and possible adverse effects of dapsone.  All of the patient's questions and concerns were addressed. Sarecycline Counseling: Patient advised regarding possible photosensitivity and discoloration of the teeth, skin, lips, tongue and gums.  Patient instructed to avoid sunlight, if possible.  When exposed to sunlight, patients should wear protective clothing, sunglasses, and sunscreen.  The patient was instructed to call the office immediately if the following severe adverse effects occur:  hearing changes, easy bruising/bleeding, severe headache, or vision changes.  The patient verbalized understanding of the proper use and possible adverse effects of sarecycline.  All of the patient's questions and concerns were addressed. Erythromycin Pregnancy And Lactation Text: This medication is Pregnancy Category B and is considered safe during pregnancy. It is also excreted in breast milk. Bactrim Counseling:  I discussed with the patient the risks of sulfa antibiotics including but not limited to GI upset, allergic reaction, drug rash, diarrhea, dizziness, photosensitivity, and yeast infections.  Rarely, more serious reactions can occur including but not limited to aplastic anemia, agranulocytosis, methemoglobinemia, blood dyscrasias, liver or kidney failure, lung infiltrates or desquamative/blistering drug rashes. Klisyri Counseling:  I discussed with the patient the risks of Klisyri including but not limited to erythema, scaling, itching, weeping, crusting, and pain. Low Dose Naltrexone Pregnancy And Lactation Text: Naltrexone is pregnancy category C.  There have been no adequate and well-controlled studies in pregnant women.  It should be used in pregnancy only if the potential benefit justifies the potential risk to the fetus.   Limited data indicates that naltrexone is minimally excreted into breastmilk. Otezla Counseling: The side effects of Otezla were discussed with the patient, including but not limited to worsening or new depression, weight loss, diarrhea, nausea, upper respiratory tract infection, and headache. Patient instructed to call the office should any adverse effect occur.  The patient verbalized understanding of the proper use and possible adverse effects of Otezla.  All the patient's questions and concerns were addressed. Bexarotene Pregnancy And Lactation Text: This medication is Pregnancy Category X and should not be given to women who are pregnant or may become pregnant. This medication should not be used if you are breast feeding. Prednisone Counseling:  I discussed with the patient the risks of prolonged use of prednisone including but not limited to weight gain, insomnia, osteoporosis, mood changes, diabetes, susceptibility to infection, glaucoma and high blood pressure.  In cases where prednisone use is prolonged, patients should be monitored with blood pressure checks, serum glucose levels and an eye exam.  Additionally, the patient may need to be placed on GI prophylaxis, PCP prophylaxis, and calcium and vitamin D supplementation and/or a bisphosphonate.  The patient verbalized understanding of the proper use and the possible adverse effects of prednisone.  All of the patient's questions and concerns were addressed. Isotretinoin Counseling: Patient should get monthly blood tests, not donate blood, not drive at night if vision affected, not share medication, and not undergo elective surgery for 6 months after tx completed. Side effects reviewed, pt to contact office should one occur. Xolair Pregnancy And Lactation Text: This medication is Pregnancy Category B and is considered safe during pregnancy. This medication is excreted in breast milk. Dutasteride Female Counseling: Dutasteride Counseling:  I discussed with the patient the risks of use of dutasteride including but not limited to decreased libido and sexual dysfunction. Explained the teratogenic nature of the medication and stressed the importance of not getting pregnant during treatment. All of the patient's questions and concerns were addressed. Rinvoq Pregnancy And Lactation Text: Based on animal studies, Rinvoq may cause embryo-fetal harm when administered to pregnant women.  The medication should not be used in pregnancy.  Breastfeeding is not recommended during treatment and for 6 days after the last dose. Dupixent Counseling: I discussed with the patient the risks of dupilumab including but not limited to eye infection and irritation, cold sores, injection site reactions, worsening of asthma, allergic reactions and increased risk of parasitic infection.  Live vaccines should be avoided while taking dupilumab. Dupilumab will also interact with certain medications such as warfarin and cyclosporine. The patient understands that monitoring is required and they must alert us or the primary physician if symptoms of infection or other concerning signs are noted. Valtrex Counseling: I discussed with the patient the risks of valacyclovir including but not limited to kidney damage, nausea, vomiting and severe allergy.  The patient understands that if the infection seems to be worsening or is not improving, they are to call. Skyrizi Counseling: I discussed with the patient the risks of risankizumab-rzaa including but not limited to immunosuppression, and serious infections.  The patient understands that monitoring is required including a PPD at baseline and must alert us or the primary physician if symptoms of infection or other concerning signs are noted. Benzoyl Peroxide Counseling: Patient counseled that medicine may cause skin irritation and bleach clothing.  In the event of skin irritation, the patient was advised to reduce the amount of the drug applied or use it less frequently.   The patient verbalized understanding of the proper use and possible adverse effects of benzoyl peroxide.  All of the patient's questions and concerns were addressed.

## 2024-09-07 DIAGNOSIS — E78.00 HIGH CHOLESTEROL: ICD-10-CM

## 2024-09-09 ENCOUNTER — OFFICE VISIT (OUTPATIENT)
Dept: SURGERY | Facility: CLINIC | Age: 69
End: 2024-09-09
Payer: MEDICARE

## 2024-09-09 VITALS
DIASTOLIC BLOOD PRESSURE: 74 MMHG | WEIGHT: 205 LBS | BODY MASS INDEX: 41.33 KG/M2 | TEMPERATURE: 97.5 F | SYSTOLIC BLOOD PRESSURE: 131 MMHG | OXYGEN SATURATION: 96 % | HEART RATE: 102 BPM | HEIGHT: 59 IN | RESPIRATION RATE: 17 BRPM

## 2024-09-09 DIAGNOSIS — N60.92 ATYPICAL DUCTAL HYPERPLASIA OF LEFT BREAST: Primary | ICD-10-CM

## 2024-09-09 PROCEDURE — 1126F AMNT PAIN NOTED NONE PRSNT: CPT | Performed by: SURGERY

## 2024-09-09 PROCEDURE — 99211 OFF/OP EST MAY X REQ PHY/QHP: CPT | Performed by: SURGERY

## 2024-09-09 PROCEDURE — 3075F SYST BP GE 130 - 139MM HG: CPT | Performed by: SURGERY

## 2024-09-09 PROCEDURE — 3078F DIAST BP <80 MM HG: CPT | Performed by: SURGERY

## 2024-09-09 PROCEDURE — 3008F BODY MASS INDEX DOCD: CPT | Performed by: SURGERY

## 2024-09-09 PROCEDURE — 4010F ACE/ARB THERAPY RXD/TAKEN: CPT | Performed by: SURGERY

## 2024-09-09 PROCEDURE — 1157F ADVNC CARE PLAN IN RCRD: CPT | Performed by: SURGERY

## 2024-09-09 PROCEDURE — 1159F MED LIST DOCD IN RCRD: CPT | Performed by: SURGERY

## 2024-09-09 ASSESSMENT — ENCOUNTER SYMPTOMS
DEPRESSION: 0
LOSS OF SENSATION IN FEET: 0
OCCASIONAL FEELINGS OF UNSTEADINESS: 0

## 2024-09-09 ASSESSMENT — LIFESTYLE VARIABLES
SKIP TO QUESTIONS 9-10: 1
HOW OFTEN DO YOU HAVE A DRINK CONTAINING ALCOHOL: MONTHLY OR LESS
HOW MANY STANDARD DRINKS CONTAINING ALCOHOL DO YOU HAVE ON A TYPICAL DAY: 1 OR 2
HOW OFTEN DO YOU HAVE SIX OR MORE DRINKS ON ONE OCCASION: NEVER
AUDIT-C TOTAL SCORE: 1

## 2024-09-09 ASSESSMENT — PAIN SCALES - GENERAL
PAINLEVEL: 0-NO PAIN
PAINLEVEL_OUTOF10: 1

## 2024-09-09 NOTE — PROGRESS NOTES
Subjective   Patient ID: Tatyana Mckeon is a 69 y.o. female who presents for S/P Left Breast Lumpectomy with Magseed Localization 9/6/2024.  HPI  Patient returns to clinic for S/P Left Breast Lumpectomy with Magseed Localization 9/6/2024 concerns.  She is concerned about the development of a seroma with some sloshing in her left breast.  Of note patient had a significant seroma and hematoma on the right side remotely  PATHOLOGY PENDING  Patient last seen in office on 7/30/2024 for breast biopsy results and surgical discussion.    Social History:  Tobacco Use - yes  Do you use any recreational drugs - no  Alcohol Use - rare  Caffeine Intake - occasional  Working - employed  Marital status -   Living with - alone    Past Medical History:   Diagnosis Date    Acquired lymphedema     right breast    Bipolar 1 disorder (Multi)     Breast cancer (Multi) 06/2023    Right Breast - IDC and DCIS    Depression     Diabetes mellitus (Multi)     Type 2    Endometrial cancer (Multi)     Hyperlipidemia     Hypertension     Osteoarthritis of right knee     Peripheral edema     Psoriasis     Toe fracture, left      Past Surgical History:   Procedure Laterality Date    AXILLARY NODE DISSECTION  08/16/2023    Right Axillary Dissection and Evacuation of a right breast hematoma    BI MAMMO GUIDED BREAST RIGHT LOCALIZATION Right 07/21/2023    Wire localization lumpectomy right breast with sentinel node biopsy    BREAST BIOPSY Left 07/17/2024    Sterotactic    BREAST LUMPECTOMY  2000    Left breast    BREAST LUMPECTOMY W/ NEEDLE LOCALIZATION Left 09/06/2024    HYSTERECTOMY  2014    LAPAROTOMY OOPHERECTOMY  2014    US GUIDED BIOPSY LYMPH NODE SUPERFICIAL  06/07/2023    Ultrasound guided right breast and axillary lymph node biopsy     Family History   Problem Relation Name Age of Onset    Stroke Mother      Other (high blood pressure) Father      Prostate cancer Father      Heart disease Father          with MI at age 71    Diabetes  "Father      Hypertension Father      Diabetes Sister      Hypertension Sister      Other (oral cancer) Brother      Hypertension Brother       Allergies   Allergen Reactions    Pollen Extracts Itching    Cephalexin Diarrhea    Naproxen Unknown       Review of Systems  /74   Pulse 102   Temp 36.4 °C (97.5 °F)   Resp 17   Ht 1.499 m (4' 11\")   Wt 93 kg (205 lb)   SpO2 96%   BMI 41.40 kg/m²       Objective   Physical Exam  Left breast with Steri-Strips intact on incision.  Minimal seroma formation.  No overlying skin changes.  No signs of infection.  Assessment/Plan   Problem List Items Addressed This Visit             ICD-10-CM    Atypical ductal hyperplasia of left breast - Primary N60.92     Status post lumpectomy.  Patient overly concerned about the development of a seroma.  Reassurance given.  Patient is to keep her follow-up appointment as scheduled.  Pathology still pending.          Breast History:    Patient presents to office today for evaluation of axillary wound.   Patient saw Dr. Tineo on 09/13/2023, Dr. Tineo recommended she come back into office to have area evaluated and consider drainage of the breast.   Dr. Tineo recommends radiation treatment to the breast alone to a dose of 42.56 Gy followed by consideration of a 10 Gy in 4 fractions lumpectomy bed boost.  Patient had a diagnostic bilateral mammogram with 3D HOWIE on 05/26/2023, with no previous imaging it demonstrated; scattered fibroglandular densities of the breasts seen bilaterally. Within the right breast there is a spiculated mass measuring about 2.6 cm seen at the 6 o'clock position of the right breast at the inframammary fold, about 11 cm from the nipple appears intimately associated with the chest wall, skin dimpling is noted within this region.   There is an area of skin dimpling along the upper-outer quadrant of the left breast with associated 5 cm focal asymmetry with areas of heterogenerous calcifications about 12 cm from " nipple, likely fat necrosis from patient's previous surgery. Bilateral breast ultrasound recommended.  Patient had ultrasound of bilateral breasts on 0526/2023 it demonstrated;   Right breast 6 o'clock position there is 2.5 X 1.9 X 1.8 cm hypoechoic mass with angulated margins and posterior acoustic shadowing, appears to extend to the dermis. In addition, there is an abnormal right axillary lymph node seen with asymmetric cortex measuring 4mm.  Left breast reveals no suspicious breast masses idenifted, some normal breast tissue can be seen, likely an area of fat necrosis.   Category 5: highly suggestive of malignancy. Recommend ultrasound guided biopsy of right breast mass and right axillary lymph node.   Patient 1st felt the mass on mother's Day of this year. She denies any nipple discharge. She has a history of open biopsy remotely for benign disease in the left breast  Patient is status post ultrasound guided biopsy of right breast and axillary lymph node on 06/07/2023.   Pathology revealing right breast biopsy; invasive ductal carcinoma grade 2 of 3 with asscoicated microcalifications. ER-positive, DC - positive and HER2/katie- equivocal 2+.   Right axillary lymph node biopsy pathology revealing; portion of lymph node, negative of carcinoma.   Imaging is concordant.  Patient is status post wire localization lumpectomy right breast with sentinel node biopsy on 07/21/2023.   Pathology revealed right breast lumpectomy at 6 o'clock position; invasive ductal carcinoma measuring 3 cm grade 3 of 3, margin is 1 mm from the closest anterior margin. DCIS present, cribriform and solid types, intermediate to high nuclear grade, margins negative.  Right breast sentinel node biopsy revealed; one lymph node positive for metastasis, tumor measures 8 mm.  Patient presents to office today for her post operative appointment and drain removal.  Patient is status post right axillary regional axillary dissection and evacuation of a  right breast hematoma with KELSEY drain placement 08/16/2023.  Pathology revealed right axillary contents; fifteen lymph nodes, negative for metastasis.   Patient saw Dr. Tineo on 09/13/2023, Dr. Tineo recommended she come back into office to have area evaluated and consider drainage of the breast. Ultrasound ordered- seroma present; drain placement scheduled but not completed due to small size of seroma.   Adjuvant RT completed 12/15/23 with .  Anastrozole initiated 10/2023 with Dr. Hannon.  Last seen on 5/3/24 with lymphedema of the right breast mild erythema and peau d'orange. Recommended massage therapy; referral was placed.  At appointment on 5/3/2024 patient with lump associated with lumpectomy scar slightly more pronounced. Likely a seroma. May be scar tissue. Recent imaging called for a biopsy, so she is also here today for biopsy discussion.  Patient's recent imaging specifically showed microcalcifications in the area of the previous lumpectomy of the left breast.  This lumpectomy was done in the year 2000 and was not cancer. .  Patient with a seroma in the right breast corresponding to the palpable area of concern.  Recommending biopsy of the left breast calcifications.  Started  Exemestane but stopped due to depression symptoms     BI MAMMO BILATERAL DIAGNOSTIC TOMOSYNTHESIS; BI US BREAST LIMITED  RIGHT;  6/19/2024   INDICATION:  History of a right lumpectomy with radiation therapy in 2023. History  of a left lumpectomy.      COMPARISON:  05/26/2023, 06/07/2023, 07/21/2023      FINDINGS:  MAMMOGRAPHY: 2D and tomosynthesis images were reviewed at 1 mm slice  thickness.      Density:  There are areas of scattered fibroglandular tissue.      Scarring is noted in the central medial right breast at posterior  depth. There is skin and trabecular thickening of the right breast  which is most consistent with the patient's history of radiation  therapy. Scarring is seen in the central lateral left breast  at  middle depth. In the central lateral left breast posterior to the  lumpectomy site there are pleomorphic grouped calcifications. The  group measures 2.4 cm.      ULTRASOUND: Targeted ultrasound was performed of the palpable area of  concern at the right lumpectomy site. In the right breast at the 5  o'clock position 7 cm from the nipple there is a seroma. The seroma  and lumpectomy site measure 5.9 x 3.3 x 4.5 cm. Edema is noted.      IMPRESSION:  1. Indeterminate left breast calcifications. A stereotactic biopsy is  recommended. This was discussed with the patient. A preprocedure form  has been completed.  2. Seroma at the palpable area of concern at the lumpectomy site in  the right breast. Clinical follow-up is recommended.          BI-RADS CATEGORY:  BI-RADS Category:  4 Suspicious.  Recommendation:  Surgical Consultation and Biopsy.  Recommended Date:  Immediate.  Laterality:  Left.  Patient to be scheduled for stereotactic biopsy of the left breast in the radiology department. Risk, benefits and alternatives to this plan were thoroughly discussed with the patient who agrees to proceed.  The procedure was also thoroughly discussed as well as her follow-up. She is to see me in the office in one week but I also will call as soon as I see the pathology which is usually 4 working days from procedure.   Previous Biopsy: Yes, Left, Breast, Benign. Menarche age: 11. Age of first delivery: Nulliparous. Breastfeed: N/A. Menopause age: hysterectomy at 2014 at 59. HRT: No. Family history of breast cancer: No. Family history of ovarian cancer: No. Family history of pancreatic cancer: No.        Olivia Boone MD 09/10/24 5:38 PM

## 2024-09-09 NOTE — TELEPHONE ENCOUNTER
Last HTN/DM appt 5/22  No future HTN/DM appt scheduled  Last refill 1/31  #45, 1 refill  Patient has open Lipid Panel lab - patient reminded to get lab work completed

## 2024-09-10 NOTE — ASSESSMENT & PLAN NOTE
Status post lumpectomy.  Patient overly concerned about the development of a seroma.  Reassurance given.  Patient is to keep her follow-up appointment as scheduled.  Pathology still pending.

## 2024-09-11 ENCOUNTER — OFFICE VISIT (OUTPATIENT)
Dept: PRIMARY CARE | Facility: CLINIC | Age: 69
End: 2024-09-11

## 2024-09-11 ENCOUNTER — TREATMENT (OUTPATIENT)
Dept: OCCUPATIONAL THERAPY | Facility: CLINIC | Age: 69
End: 2024-09-11
Payer: MEDICARE

## 2024-09-11 VITALS — SYSTOLIC BLOOD PRESSURE: 128 MMHG | DIASTOLIC BLOOD PRESSURE: 64 MMHG | HEART RATE: 88 BPM

## 2024-09-11 DIAGNOSIS — G89.29 CHRONIC BILATERAL LOW BACK PAIN WITHOUT SCIATICA: Primary | ICD-10-CM

## 2024-09-11 DIAGNOSIS — I89.0 LYMPHEDEMA OF BREAST: ICD-10-CM

## 2024-09-11 DIAGNOSIS — I89.0 LYMPHEDEMA: ICD-10-CM

## 2024-09-11 DIAGNOSIS — M54.50 CHRONIC BILATERAL LOW BACK PAIN WITHOUT SCIATICA: Primary | ICD-10-CM

## 2024-09-11 LAB
LABORATORY COMMENT REPORT: NORMAL
PATH REPORT.FINAL DX SPEC: NORMAL
PATH REPORT.GROSS SPEC: NORMAL
PATH REPORT.RELEVANT HX SPEC: NORMAL
PATH REPORT.TOTAL CANCER: NORMAL

## 2024-09-11 PROCEDURE — 97810 ACUP 1/> WO ESTIM 1ST 15 MIN: CPT | Performed by: FAMILY MEDICINE

## 2024-09-11 PROCEDURE — 1123F ACP DISCUSS/DSCN MKR DOCD: CPT | Performed by: FAMILY MEDICINE

## 2024-09-11 PROCEDURE — 3078F DIAST BP <80 MM HG: CPT | Performed by: FAMILY MEDICINE

## 2024-09-11 PROCEDURE — 97140 MANUAL THERAPY 1/> REGIONS: CPT | Mod: GO

## 2024-09-11 PROCEDURE — 4010F ACE/ARB THERAPY RXD/TAKEN: CPT | Performed by: FAMILY MEDICINE

## 2024-09-11 PROCEDURE — 3074F SYST BP LT 130 MM HG: CPT | Performed by: FAMILY MEDICINE

## 2024-09-11 PROCEDURE — 1158F ADVNC CARE PLAN TLK DOCD: CPT | Performed by: FAMILY MEDICINE

## 2024-09-11 PROCEDURE — 1157F ADVNC CARE PLAN IN RCRD: CPT | Performed by: FAMILY MEDICINE

## 2024-09-11 PROCEDURE — 97811 ACUP 1/> W/O ESTIM EA ADD 15: CPT | Performed by: FAMILY MEDICINE

## 2024-09-11 PROCEDURE — 1125F AMNT PAIN NOTED PAIN PRSNT: CPT | Performed by: FAMILY MEDICINE

## 2024-09-11 PROCEDURE — 4004F PT TOBACCO SCREEN RCVD TLK: CPT | Performed by: FAMILY MEDICINE

## 2024-09-11 PROCEDURE — 1159F MED LIST DOCD IN RCRD: CPT | Performed by: FAMILY MEDICINE

## 2024-09-11 RX ORDER — ATORVASTATIN CALCIUM 10 MG/1
10 TABLET, FILM COATED ORAL EVERY OTHER DAY
Qty: 45 TABLET | Refills: 1 | Status: SHIPPED | OUTPATIENT
Start: 2024-09-11

## 2024-09-11 ASSESSMENT — PAIN DESCRIPTION - DESCRIPTORS: DESCRIPTORS: TENDER

## 2024-09-11 ASSESSMENT — PAIN SCALES - GENERAL
PAINLEVEL_OUTOF10: 4
PAINLEVEL: 5

## 2024-09-11 ASSESSMENT — PAIN - FUNCTIONAL ASSESSMENT: PAIN_FUNCTIONAL_ASSESSMENT: 0-10

## 2024-09-11 NOTE — PROGRESS NOTES
Subjective   Patient ID: Tatyana Mckeon is a 69 y.o. female who presents for Acupuncture.    HPI   She presents today for her acupuncture treatment.  She does have a seroma in her left breast after the biopsy.  She states she is been doing pretty well overall.    Review of Systems    Objective   /64   Pulse 88     Physical Exam  She is alert, no parishes, her affect is pleasant.  She does have a hollow sounding percussion on her breast that demonstrated to me.  No edema.    Assessment/Plan   Diagnoses and all orders for this visit:  Chronic bilateral low back pain without sciatica  She continues to do well overall it has been happening with her.    Acupuncture treatment: He is following points bilaterally LI 4, SJ 4, ST 36, ST 40, ST 41, though the 2 and 3.  These retained for 20 minutes.  She tolerated this well.

## 2024-09-11 NOTE — PROGRESS NOTES
"Occupational Therapy    Occupational Therapy Treatment    Name: Tatyana Mckeon  MRN: [id number]  : 1955  Date: 24    Time Entry:  Time Calculation  Start Time: 1300  Stop Time: 1357  Time Calculation (min): 57 min        OT Therapeutic Procedures Time Entry  Manual Therapy Time Entry: 57                Assessment:  lymph flow, k taping with no issues     Plan: pt wearing [employer] Amoena bra from DME.  Rec to pt to continue wear and maybe need additional support to address new fluid swelling in L breast        Subjective  \" I had my lumpectomy last week and there is fluid filled in my fluid. I can feel it in my L breast\"    General:  OT Last Visit  OT Received On: 24  General  Reason for Referral: lymphedema of breast  Referred By: MD Lis  General Comment: visit 5, no auth onset 2023  Precautions:     Pain Assessment:  Pain Assessment  Pain Assessment: 0-10  0-10 (Numeric) Pain Score: 4  Pain Type: Surgical pain, Chronic pain  Pain Location: Breast  Pain Orientation: Left  Pain Descriptors: Tender  Pain Frequency: Intermittent  Pain Onset: Ongoing    Objective       Therapy/Activity: Therapeutic Exercise  Therapeutic Exercise Performed: Yes  Therapeutic Exercise Activity 1: directed pt 90 degree shoulder flexion restriction to maintain healing of scar site    Therapeutic Activity  Therapeutic Activity Performed: Yes  Therapeutic Activity 1: applied k taping with fanning tech with on L breast above nipple and clavicle.  lateral breast to posterior scapular lymph nodes    Manual Therapy  Manual Therapy Performed: Yes  Manual Therapy Activity 1: girth measurements with no change in axillary and nipple site  Manual Therapy Activity 2: hivamat on L breast in order to promote fluid reduction     Lymphedema Assessment    Lymphedema Assessments:  Lymphedema Assessments: Upper extremities  Right Upper Extremity:     Left Upper Extremity:     UE Skin Appearance/Condition and Girth:  Other (comment): " axillary 113.5 cm no change ; 124.5 nipple cm no change cm     Pain Assessment:  Pain Assessment: 0-10  0-10 (Numeric) Pain Score: 4  Pain Type: Surgical pain, Chronic pain  Pain Location: Breast  Pain Orientation: Left  Pain Descriptors: Tender  Pain Frequency: Intermittent  Pain Onset: Ongoing  Therapy Precautions:       OP EDUCATION:       Goals:  Active       Lymphedema       Pt will demo volume reduction in R breast in order for proper fitting of medical compression garments and increase ease in transfers and IADLs        Start:  07/19/24    Expected End:  10/25/24            Pt will I perform skin care for infection prevention and integrity of skin        Start:  07/19/24    Expected End:  10/25/24            Pt will be I with stating and demonstrating home exercise program in order to improve patients ability to perform self-care, household, work and/or leisure tasks.        Start:  07/19/24    Expected End:  10/25/24            Pt will complete self  mld in order to promote increased tissue pliability for maintenance with chronic lymphedema        Start:  07/19/24    Expected End:  10/25/24

## 2024-09-12 ENCOUNTER — LAB (OUTPATIENT)
Dept: LAB | Facility: LAB | Age: 69
End: 2024-09-12
Payer: MEDICARE

## 2024-09-12 DIAGNOSIS — R60.0 LOCALIZED EDEMA: ICD-10-CM

## 2024-09-12 DIAGNOSIS — R94.31 ABNORMAL EKG: ICD-10-CM

## 2024-09-12 DIAGNOSIS — E78.5 HYPERLIPIDEMIA, UNSPECIFIED HYPERLIPIDEMIA TYPE: ICD-10-CM

## 2024-09-12 LAB
ANION GAP SERPL CALC-SCNC: 13 MMOL/L (ref 10–20)
BUN SERPL-MCNC: 21 MG/DL (ref 6–23)
CALCIUM SERPL-MCNC: 9 MG/DL (ref 8.6–10.3)
CHLORIDE SERPL-SCNC: 106 MMOL/L (ref 98–107)
CHOLEST SERPL-MCNC: 160 MG/DL (ref 0–199)
CHOLESTEROL/HDL RATIO: 2.8
CO2 SERPL-SCNC: 24 MMOL/L (ref 21–32)
CREAT SERPL-MCNC: 0.65 MG/DL (ref 0.5–1.05)
EGFRCR SERPLBLD CKD-EPI 2021: >90 ML/MIN/1.73M*2
GLUCOSE SERPL-MCNC: 117 MG/DL (ref 74–99)
HDLC SERPL-MCNC: 57.2 MG/DL
LDLC SERPL CALC-MCNC: 78 MG/DL
NON HDL CHOLESTEROL: 103 MG/DL (ref 0–149)
POTASSIUM SERPL-SCNC: 4 MMOL/L (ref 3.5–5.3)
SODIUM SERPL-SCNC: 139 MMOL/L (ref 136–145)
TRIGL SERPL-MCNC: 124 MG/DL (ref 0–149)
VLDL: 25 MG/DL (ref 0–40)

## 2024-09-12 PROCEDURE — 36415 COLL VENOUS BLD VENIPUNCTURE: CPT

## 2024-09-13 ENCOUNTER — TELEPHONE (OUTPATIENT)
Dept: SURGERY | Facility: CLINIC | Age: 69
End: 2024-09-13
Payer: MEDICARE

## 2024-09-13 NOTE — PROGRESS NOTES
Subjective   Patient ID: Tatyana Mckeon is a 69 y.o. female who presents for No chief complaint on file..  HPI  Last seen on 5/3/24 with lymphedema of the right breast mild erythema and peau d'orange. Recommended massage therapy; referral was placed.  At appointment on 5/3/2024 patient with lump associated with lumpectomy scar slightly more pronounced. Likely a seroma. May be scar tissue. Recent imaging called for a biopsy, so she is also here today for biopsy discussion.  Patient's recent imaging specifically showed microcalcifications in the area of the previous lumpectomy of the left breast.  This lumpectomy was done in the year 2000 and was not cancer. .  Patient with a seroma in the right breast corresponding to the palpable area of concern.  Recommending biopsy of the left breast calcifications.  Started  Exemestane but stopped due to depression symptoms    BI MAMMO BILATERAL DIAGNOSTIC TOMOSYNTHESIS; BI US BREAST LIMITED  RIGHT;  6/19/2024   INDICATION:  History of a right lumpectomy with radiation therapy in 2023. History  of a left lumpectomy.      COMPARISON:  05/26/2023, 06/07/2023, 07/21/2023      FINDINGS:  MAMMOGRAPHY: 2D and tomosynthesis images were reviewed at 1 mm slice  thickness.      Density:  There are areas of scattered fibroglandular tissue.      Scarring is noted in the central medial right breast at posterior  depth. There is skin and trabecular thickening of the right breast  which is most consistent with the patient's history of radiation  therapy. Scarring is seen in the central lateral left breast at  middle depth. In the central lateral left breast posterior to the  lumpectomy site there are pleomorphic grouped calcifications. The  group measures 2.4 cm.      ULTRASOUND: Targeted ultrasound was performed of the palpable area of  concern at the right lumpectomy site. In the right breast at the 5  o'clock position 7 cm from the nipple there is a seroma. The seroma  and lumpectomy site  "measure 5.9 x 3.3 x 4.5 cm. Edema is noted.      IMPRESSION:  1. Indeterminate left breast calcifications. A stereotactic biopsy is  recommended. This was discussed with the patient. A preprocedure form  has been completed.  2. Seroma at the palpable area of concern at the lumpectomy site in  the right breast. Clinical follow-up is recommended.          BI-RADS CATEGORY:  BI-RADS Category:  4 Suspicious.  Recommendation:  Surgical Consultation and Biopsy.  Recommended Date:  Immediate.  Laterality:  Left.      Social History:  Tobacco Use - yes  Do you use any recreational drugs - no  Alcohol Use - rare  Caffeine Intake - occasional  Working - employed  Marital status -   Living with - alone    Review of Systems   Constitutional:  Negative for appetite change, fever and unexpected weight change.   HENT:  Negative for congestion and trouble swallowing.    Respiratory:  Negative for cough and shortness of breath.    Cardiovascular:  Negative for chest pain and palpitations.   Gastrointestinal:  Negative for abdominal pain, diarrhea and nausea.   Genitourinary:  Negative for difficulty urinating and dysuria.   Musculoskeletal:  Negative for back pain and gait problem.   Skin:  Negative for color change and rash.   Neurological:  Negative for dizziness, speech difficulty and headaches.   Hematological:  Does not bruise/bleed easily.   Psychiatric/Behavioral:  Negative for confusion and suicidal ideas.        Objective   Physical Exam  Physical Exam:  /90 (BP Location: Left arm, Patient Position: Sitting, BP Cuff Size: Adult)   Pulse 85   Temp 36.6 °C (97.8 °F) (Temporal)   Ht 1.575 m (5' 2\")   Wt 95.7 kg (211 lb)   SpO2 95%   PF 86 L/min   BMI 38.59 kg/m²    GENERAL APPEARANCE: Patient appears in no acute distress.   EYES: Sclera non-icteric, PERRLA.   ENT Normal appearance of ears and nose.   NECK/THYROID: Neck: no masses. Thyroid: no masses.   LYMPH NODES: No cervical or supraclavicular " Alert-The patient is alert, awake and responds to voice. The patient is oriented to time, place, and person. The triage nurse is able to obtain subjective information. lymphadenopathy.   CARDIOVASCULAR Heart: RRR, no murmurs; Carotid bruits: none; Peripheral edema: none.   RESPIRATORY: Lungs: Bilateral inspiratory and expiratory wheezing; no respiratory distress.   BREASTS: Exam done both in upright and supine position.  Patient with ongoing mild peau d'orange and mild erythema right breast.  Lumpectomy scar from remote surgery has mild dimpling on the left. Masses: Patient with palpable thickening associated with the lumpectomy scar on the right.  Axillary lymphadenopathy: none.   GI (ABDOMEN) No intraabdominal mass appreciated, no hepatosplenomegaly; Hernia: none; Tenderness: none.   PSYCH: Patient oriented to time, place and person, normal affect.    Assessment/Plan   Problem List Items Addressed This Visit             ICD-10-CM    Infiltrating ductal carcinoma of right breast, stage 2 (Multi) - Primary C50.911     Patient with normal right breast imaging and exam.  Patient did come off endocrine therapy due to depression.  Patient has an appointment with her oncologist coming up to discuss endocrine therapy options         Lymphedema I89.0     Patient has an appointment this month for massage therapy for the lymphedema of the breast.  She is going to be getting fitted for a bra as well.         Microcalcifications of the breast R92.0     Patient to be scheduled for stereotactic biopsy of the left breast in the radiology department. Risk, benefits and alternatives to this plan were thoroughly discussed with the patient who agrees to proceed.  The procedure was also thoroughly discussed as well as her follow-up. She is to see me in the office in one week but I also will call as soon as I see the pathology which is usually 4 working days from procedure.              Breast History:    Patient presents to office today for evaluation of axillary wound.   Patient saw Dr. Tineo on 09/13/2023, Dr. Tineo recommended she come back into office to have area evaluated and consider drainage  of the breast.   Dr. Tineo recommends radiation treatment to the breast alone to a dose of 42.56 Gy followed by consideration of a 10 Gy in 4 fractions lumpectomy bed boost.  Patient had a diagnostic bilateral mammogram with 3D HOWIE on 05/26/2023, with no previous imaging it demonstrated; scattered fibroglandular densities of the breasts seen bilaterally. Within the right breast there is a spiculated mass measuring about 2.6 cm seen at the 6 o'clock position of the right breast at the inframammary fold, about 11 cm from the nipple appears intimately associated with the chest wall, skin dimpling is noted within this region.   There is an area of skin dimpling along the upper-outer quadrant of the left breast with associated 5 cm focal asymmetry with areas of heterogenerous calcifications about 12 cm from nipple, likely fat necrosis from patient's previous surgery. Bilateral breast ultrasound recommended.  Patient had ultrasound of bilateral breasts on 0526/2023 it demonstrated;   Right breast 6 o'clock position there is 2.5 X 1.9 X 1.8 cm hypoechoic mass with angulated margins and posterior acoustic shadowing, appears to extend to the dermis. In addition, there is an abnormal right axillary lymph node seen with asymmetric cortex measuring 4mm.  Left breast reveals no suspicious breast masses idenifted, some normal breast tissue can be seen, likely an area of fat necrosis.   Category 5: highly suggestive of malignancy. Recommend ultrasound guided biopsy of right breast mass and right axillary lymph node.   Patient 1st felt the mass on mother's Day of this year. She denies any nipple discharge. She has a history of open biopsy remotely for benign disease in the left breast  Patient is status post ultrasound guided biopsy of right breast and axillary lymph node on 06/07/2023.   Pathology revealing right breast biopsy; invasive ductal carcinoma grade 2 of 3 with asscoicated microcalifications. ER-positive, IN - positive  and HER2/katie- equivocal 2+.   Right axillary lymph node biopsy pathology revealing; portion of lymph node, negative of carcinoma.   Imaging is concordant.  Patient is status post wire localization lumpectomy right breast with sentinel node biopsy on 07/21/2023.   Pathology revealed right breast lumpectomy at 6 o'clock position; invasive ductal carcinoma measuring 3 cm grade 3 of 3, margin is 1 mm from the closest anterior margin. DCIS present, cribriform and solid types, intermediate to high nuclear grade, margins negative.  Right breast sentinel node biopsy revealed; one lymph node positive for metastasis, tumor measures 8 mm.  Patient presents to office today for her post operative appointment and drain removal.  Patient is status post right axillary regional axillary dissection and evacuation of a right breast hematoma with KELSEY drain placement 08/16/2023.  Pathology revealed right axillary contents; fifteen lymph nodes, negative for metastasis.   Patient saw Dr. Tineo on 09/13/2023, Dr. Tineo recommended she come back into office to have area evaluated and consider drainage of the breast. Ultrasound ordered- seroma present; drain placement scheduled but not completed due to small size of seroma.   Adjuvant RT completed 12/15/23 with .  Anastrozole initiated 10/2023 with Dr. Hannon.    Previous Biopsy: Yes, Left, Breast, Benign. Menarche age: 11. Age of first delivery: Nulliparous. Breastfeed: N/A. Menopause age: hysterectomy at 2014 at 59. HRT: No. Family history of breast cancer: No. Family history of ovarian cancer: No. Family history of pancreatic cancer: No.     Olivia Boone MD 07/09/24 2:06 PM

## 2024-09-13 NOTE — TELEPHONE ENCOUNTER
Called and left patient a message regarding results. Patient understood and agreed to keeping appointment on 9/20/24    ----- Message from Tami Lopez sent at 9/13/2024  1:05 PM EDT -----  Please call patient and inform her that her pathology came back benign, no cancer found.  Please inform patient to keep her appointment with Dr. Boone.  ----- Message -----  From: Olivia Boone MD  Sent: 9/11/2024  11:46 AM EDT  To: Tami Lopez    Call aptient with ressults .  ----- Message -----  From: Lab, Background User  Sent: 9/11/2024  10:52 AM EDT  To: Olivia Boone MD

## 2024-09-18 ENCOUNTER — TREATMENT (OUTPATIENT)
Dept: OCCUPATIONAL THERAPY | Facility: CLINIC | Age: 69
End: 2024-09-18
Payer: MEDICARE

## 2024-09-18 DIAGNOSIS — I89.0 LYMPHEDEMA OF BREAST: ICD-10-CM

## 2024-09-18 DIAGNOSIS — I89.0 LYMPHEDEMA: ICD-10-CM

## 2024-09-18 PROCEDURE — 97535 SELF CARE MNGMENT TRAINING: CPT | Mod: GO

## 2024-09-18 PROCEDURE — 97140 MANUAL THERAPY 1/> REGIONS: CPT | Mod: GO

## 2024-09-18 ASSESSMENT — PAIN DESCRIPTION - DESCRIPTORS: DESCRIPTORS: TENDER

## 2024-09-18 ASSESSMENT — PAIN SCALES - GENERAL: PAINLEVEL_OUTOF10: 4

## 2024-09-18 ASSESSMENT — PAIN - FUNCTIONAL ASSESSMENT: PAIN_FUNCTIONAL_ASSESSMENT: 0-10

## 2024-09-18 ASSESSMENT — ACTIVITIES OF DAILY LIVING (ADL): HOME_MANAGEMENT_TIME_ENTRY: 10

## 2024-09-19 ENCOUNTER — TELEPHONE (OUTPATIENT)
Facility: HOSPITAL | Age: 69
End: 2024-09-19
Payer: MEDICARE

## 2024-09-19 DIAGNOSIS — I89.0 LYMPHEDEMA: Primary | ICD-10-CM

## 2024-09-19 DIAGNOSIS — I89.0 LYMPHEDEMA OF BREAST: ICD-10-CM

## 2024-09-19 DIAGNOSIS — C50.911 INFILTRATING DUCTAL CARCINOMA OF RIGHT BREAST, STAGE 2 (MULTI): ICD-10-CM

## 2024-09-19 NOTE — TELEPHONE ENCOUNTER
Patient called and according t patient her OT Philly Ramirez would like you to order a compression sleeve for her left arm.  Patient already has a sleeve for right arm.  OT feels she will also benefit from one for her left arm as well.  Please advise.

## 2024-09-19 NOTE — PROGRESS NOTES
Subjective   Patient ID: Tatyana Mckeon is a 69 y.o. female who presents for post operative Left Breast Magseed Localization Lumpectomy.  HPI  Patient returns to clinic for S/P Left Breast Lumpectomy with Magseed Localization 9/6/2024.  Patient last seen in office on 9/9/2024 for concern over post op seroma of left breast.  FINAL DIAGNOSIS      A.  LEFT BREAST, LUMPECTOMY:  --Focal atypical ductal hyperplasia.  --Flat epithelial atypia.  --Columnar cell change.  --Organizing fat necrosis and foreign body reaction consistent with previous biopsy site.  --Fibrocystic changes with microcalcifications, sclerosing adenosis, and mammary fibrosis.     B.  SKIN, LEFT BREAST, EXCISION:  --Segments of skin, no evidence of malignancy.       Vitals:    09/20/24 1124   BP: 109/59   Pulse: 61   Temp: 36.4 °C (97.6 °F)   SpO2: 94%     Allergies   Allergen Reactions    Pollen Extracts Itching    Cephalexin Diarrhea    Naproxen Unknown     Current Outpatient Medications on File Prior to Visit   Medication Sig Dispense Refill    acetaminophen (Tylenol) 500 mg tablet Take 1 tablet (500 mg) by mouth 2 times a day.      amLODIPine (Norvasc) 10 mg tablet TAKE ONE TABLET BY MOUTH DAILY 90 tablet 1    aspirin 81 mg EC tablet Take 1 tablet (81 mg) by mouth once daily.      atorvastatin (Lipitor) 10 mg tablet TAKE ONE TABLET BY MOUTH EVERY OTHER DAY 45 tablet 1    blood sugar diagnostic (Accu-Chek Guide test strips) strip 3 times a day.      blood-glucose meter misc 3 times a day.      famotidine (Pepcid) 20 mg tablet Take 1 tablet (20 mg) by mouth once daily.      furosemide (Lasix) 20 mg tablet TAKE 1-2 TABLETS (20-40 MG) BY MOUTH 2 TIMES A DAY (Patient taking differently: Take 1-2 tablets (20-40 mg) by mouth if needed.) 90 tablet 1    ibuprofen 200 mg tablet Take 1 tablet (200 mg) by mouth 2 times a day.      ketoconazole (NIZOral) 2 % shampoo Apply topically.      LaMICtal 100 mg tablet Take 1 tablet (100 mg) by mouth once daily.       lisinopril 20 mg tablet TAKE ONE TABLET BY MOUTH EVERY DAY 90 tablet 1    loratadine (Claritin) 10 mg tablet Take 1 tablet (10 mg) by mouth once daily.      metFORMIN (Glucophage) 500 mg tablet TAKE ONE TABLET BY MOUTH THREE TIMES A  tablet 1    multivitamin capsule Take 1 capsule by mouth once daily.      Neurontin 400 mg capsule Take 1 capsule (400 mg) by mouth once daily.      nystatin (Mycostatin) cream APPLY EXTERNALLY TWICE DAILY 60 g 1    omega 3-dha-epa-fish oil (Fish OiL) 1,000 mg (120 mg-180 mg) capsule Take 1 capsule (1,000 mg) by mouth once daily.      PaxiL 20 mg tablet Take 1 tablet (20 mg) by mouth once daily.      QUEtiapine (SEROquel) 25 mg tablet Take 0.5 tablets (12.5 mg) by mouth once daily at bedtime.      Topamax 25 mg tablet Take 1 tablet (25 mg) by mouth once daily.      traMADol (Ultram) 50 mg tablet Take 1 tablet (50 mg) by mouth every 8 hours if needed for severe pain (7 - 10). 10 tablet 0    triamcinolone (Kenalog) 0.1 % cream Apply to affected area twice daily as needed 30 g 0    Wellbutrin  mg 24 hr tablet Take 1 tablet (300 mg) by mouth once daily in the morning.       No current facility-administered medications on file prior to visit.     Family History   Problem Relation Name Age of Onset    Stroke Mother      Other (high blood pressure) Father      Prostate cancer Father      Heart disease Father          with MI at age 71    Diabetes Father      Hypertension Father      Diabetes Sister      Hypertension Sister      Other (oral cancer) Brother      Hypertension Brother       Past Surgical History:   Procedure Laterality Date    AXILLARY NODE DISSECTION  08/16/2023    Right Axillary Dissection and Evacuation of a right breast hematoma    BI MAMMO GUIDED BREAST RIGHT LOCALIZATION Right 07/21/2023    Wire localization lumpectomy right breast with sentinel node biopsy    BREAST BIOPSY Left 07/17/2024    Sterotactic    BREAST LUMPECTOMY  2000    Left breast    BREAST  LUMPECTOMY W/ NEEDLE LOCALIZATION Left 09/06/2024    HYSTERECTOMY  2014    LAPAROTOMY OOPHERECTOMY  2014    US GUIDED BIOPSY LYMPH NODE SUPERFICIAL  06/07/2023    Ultrasound guided right breast and axillary lymph node biopsy       Social History:  Tobacco Use - yes  Do you use any recreational drugs - no  Alcohol Use - rare  Caffeine Intake - occasional  Working - employed  Marital status -   Living with - alone  Review of Systems    Objective   Physical Exam  Incision with escar, minimal seroma on the left. No appreciable  lymphedema of LUE without measurements.   Assessment/Plan   Problem List Items Addressed This Visit             ICD-10-CM    Infiltrating ductal carcinoma of right breast, stage 2 (Multi) - Primary C50.911     Patient due for follow-up mammogram of the right breast as she is within 2 years of her breast cancer diagnosis.  This will be for December.  Recommend repeat exam by me in January.  Incidentally the patient also with atypical ductal hyperplasia of the left breast therefore I am advocating bilateral mammogram in December and follow-up for both lesions.         Relevant Orders    BI mammo bilateral diagnostic    Lymphedema I89.0     Edema of right breast and right upper extremity improving with lymphedema clinic treatment.  Minimal lymphedema reported in the left upper extremity per measurements per the clinic.  This may resolve after she is further out from her surgery.  No significant seroma at this time on the left side.         Atypical ductal hyperplasia of left breast N60.92     Status post seed localization lumpectomy for atypical hyperplasia of the left breast.  Patient will need a mammogram and follow-up of the right breast in December.  Will also do bilateral at that time to reevaluate this area.  Patient is to see me for another exam in January.         Relevant Orders    BI mammo bilateral diagnostic     Other Visit Diagnoses         Codes    Malignant neoplasm of  overlapping sites of right female breast (Multi)     C50.811    Relevant Orders    BI mammo bilateral diagnostic          Breast History:    Patient presents to office today for evaluation of axillary wound.   Patient saw Dr. Tineo on 09/13/2023, Dr. Tineo recommended she come back into office to have area evaluated and consider drainage of the breast.   Dr. Tineo recommends radiation treatment to the breast alone to a dose of 42.56 Gy followed by consideration of a 10 Gy in 4 fractions lumpectomy bed boost.  Patient had a diagnostic bilateral mammogram with 3D HOWIE on 05/26/2023, with no previous imaging it demonstrated; scattered fibroglandular densities of the breasts seen bilaterally. Within the right breast there is a spiculated mass measuring about 2.6 cm seen at the 6 o'clock position of the right breast at the inframammary fold, about 11 cm from the nipple appears intimately associated with the chest wall, skin dimpling is noted within this region.   There is an area of skin dimpling along the upper-outer quadrant of the left breast with associated 5 cm focal asymmetry with areas of heterogenerous calcifications about 12 cm from nipple, likely fat necrosis from patient's previous surgery. Bilateral breast ultrasound recommended.  Patient had ultrasound of bilateral breasts on 0526/2023 it demonstrated;   Right breast 6 o'clock position there is 2.5 X 1.9 X 1.8 cm hypoechoic mass with angulated margins and posterior acoustic shadowing, appears to extend to the dermis. In addition, there is an abnormal right axillary lymph node seen with asymmetric cortex measuring 4mm.  Left breast reveals no suspicious breast masses idenifted, some normal breast tissue can be seen, likely an area of fat necrosis.   Category 5: highly suggestive of malignancy. Recommend ultrasound guided biopsy of right breast mass and right axillary lymph node.   Patient 1st felt the mass on mother's Day of this year. She denies any nipple  discharge. She has a history of open biopsy remotely for benign disease in the left breast  Patient is status post ultrasound guided biopsy of right breast and axillary lymph node on 06/07/2023.   Pathology revealing right breast biopsy; invasive ductal carcinoma grade 2 of 3 with asscoicated microcalifications. ER-positive, OH - positive and HER2/katie- equivocal 2+.   Right axillary lymph node biopsy pathology revealing; portion of lymph node, negative of carcinoma.   Imaging is concordant.  Patient is status post wire localization lumpectomy right breast with sentinel node biopsy on 07/21/2023.   Pathology revealed right breast lumpectomy at 6 o'clock position; invasive ductal carcinoma measuring 3 cm grade 3 of 3, margin is 1 mm from the closest anterior margin. DCIS present, cribriform and solid types, intermediate to high nuclear grade, margins negative.  Right breast sentinel node biopsy revealed; one lymph node positive for metastasis, tumor measures 8 mm.  Patient presents to office today for her post operative appointment and drain removal.  Patient is status post right axillary regional axillary dissection and evacuation of a right breast hematoma with KELSEY drain placement 08/16/2023.  Pathology revealed right axillary contents; fifteen lymph nodes, negative for metastasis.   Patient saw Dr. Tineo on 09/13/2023, Dr. Tineo recommended she come back into office to have area evaluated and consider drainage of the breast. Ultrasound ordered- seroma present; drain placement scheduled but not completed due to small size of seroma.   Adjuvant RT completed 12/15/23 with .  Anastrozole initiated 10/2023 with Dr. Hannon.  Last seen on 5/3/24 with lymphedema of the right breast mild erythema and peau d'orange. Recommended massage therapy; referral was placed.  At appointment on 5/3/2024 patient with lump associated with lumpectomy scar slightly more pronounced. Likely a seroma. May be scar tissue. Recent imaging  called for a biopsy, so she is also here today for biopsy discussion.  Patient's recent imaging specifically showed microcalcifications in the area of the previous lumpectomy of the left breast.  This lumpectomy was done in the year 2000 and was not cancer. .  Patient with a seroma in the right breast corresponding to the palpable area of concern.  Recommending biopsy of the left breast calcifications.  Started  Exemestane but stopped due to depression symptoms     BI MAMMO BILATERAL DIAGNOSTIC TOMOSYNTHESIS; BI US BREAST LIMITED  RIGHT;  6/19/2024   INDICATION:  History of a right lumpectomy with radiation therapy in 2023. History  of a left lumpectomy.      COMPARISON:  05/26/2023, 06/07/2023, 07/21/2023      FINDINGS:  MAMMOGRAPHY: 2D and tomosynthesis images were reviewed at 1 mm slice  thickness.      Density:  There are areas of scattered fibroglandular tissue.      Scarring is noted in the central medial right breast at posterior  depth. There is skin and trabecular thickening of the right breast  which is most consistent with the patient's history of radiation  therapy. Scarring is seen in the central lateral left breast at  middle depth. In the central lateral left breast posterior to the  lumpectomy site there are pleomorphic grouped calcifications. The  group measures 2.4 cm.      ULTRASOUND: Targeted ultrasound was performed of the palpable area of  concern at the right lumpectomy site. In the right breast at the 5  o'clock position 7 cm from the nipple there is a seroma. The seroma  and lumpectomy site measure 5.9 x 3.3 x 4.5 cm. Edema is noted.      IMPRESSION:  1. Indeterminate left breast calcifications. A stereotactic biopsy is  recommended. This was discussed with the patient. A preprocedure form  has been completed.  2. Seroma at the palpable area of concern at the lumpectomy site in  the right breast. Clinical follow-up is recommended.          BI-RADS CATEGORY:  BI-RADS Category:  4  Suspicious.  Recommendation:  Surgical Consultation and Biopsy.  Recommended Date:  Immediate.  Laterality:  Left.  Patient to be scheduled for stereotactic biopsy of the left breast in the radiology department. Risk, benefits and alternatives to this plan were thoroughly discussed with the patient who agrees to proceed.  The procedure was also thoroughly discussed as well as her follow-up. She is to see me in the office in one week but I also will call as soon as I see the pathology which is usually 4 working days from procedure.     Status post lumpectomy.  Patient overly concerned about the development of a seroma.  Reassurance given.  Patient is to keep her follow-up appointment as scheduled.  Pathology still pending.   Previous Biopsy: Yes, Left, Breast, Benign. Menarche age: 11. Age of first delivery: Nulliparous. Breastfeed: N/A. Menopause age: hysterectomy at 2014 at 59. HRT: No. Family history of breast cancer: No. Family history of ovarian cancer: No. Family history of pancreatic cancer: No.        Olivia Boone MD 09/20/24 12:10 PM

## 2024-09-20 ENCOUNTER — OFFICE VISIT (OUTPATIENT)
Dept: SURGERY | Facility: CLINIC | Age: 69
End: 2024-09-20
Payer: MEDICARE

## 2024-09-20 VITALS
HEIGHT: 59 IN | HEART RATE: 61 BPM | WEIGHT: 205 LBS | OXYGEN SATURATION: 94 % | TEMPERATURE: 97.6 F | DIASTOLIC BLOOD PRESSURE: 59 MMHG | SYSTOLIC BLOOD PRESSURE: 109 MMHG | BODY MASS INDEX: 41.33 KG/M2

## 2024-09-20 DIAGNOSIS — I89.0 LYMPHEDEMA: ICD-10-CM

## 2024-09-20 DIAGNOSIS — C50.811 MALIGNANT NEOPLASM OF OVERLAPPING SITES OF RIGHT FEMALE BREAST: ICD-10-CM

## 2024-09-20 DIAGNOSIS — N60.92 ATYPICAL DUCTAL HYPERPLASIA OF LEFT BREAST: ICD-10-CM

## 2024-09-20 DIAGNOSIS — C50.911 INFILTRATING DUCTAL CARCINOMA OF RIGHT BREAST, STAGE 2 (MULTI): Primary | ICD-10-CM

## 2024-09-20 PROCEDURE — 4010F ACE/ARB THERAPY RXD/TAKEN: CPT | Performed by: SURGERY

## 2024-09-20 PROCEDURE — 3008F BODY MASS INDEX DOCD: CPT | Performed by: SURGERY

## 2024-09-20 PROCEDURE — 3078F DIAST BP <80 MM HG: CPT | Performed by: SURGERY

## 2024-09-20 PROCEDURE — 1126F AMNT PAIN NOTED NONE PRSNT: CPT | Performed by: SURGERY

## 2024-09-20 PROCEDURE — 1159F MED LIST DOCD IN RCRD: CPT | Performed by: SURGERY

## 2024-09-20 PROCEDURE — 3074F SYST BP LT 130 MM HG: CPT | Performed by: SURGERY

## 2024-09-20 PROCEDURE — 99211 OFF/OP EST MAY X REQ PHY/QHP: CPT | Performed by: SURGERY

## 2024-09-20 PROCEDURE — 4004F PT TOBACCO SCREEN RCVD TLK: CPT | Performed by: SURGERY

## 2024-09-20 PROCEDURE — 1157F ADVNC CARE PLAN IN RCRD: CPT | Performed by: SURGERY

## 2024-09-20 PROCEDURE — 3048F LDL-C <100 MG/DL: CPT | Performed by: SURGERY

## 2024-09-20 ASSESSMENT — LIFESTYLE VARIABLES
HOW OFTEN DURING THE LAST YEAR HAVE YOU FOUND THAT YOU WERE NOT ABLE TO STOP DRINKING ONCE YOU HAD STARTED: NEVER
HOW OFTEN DO YOU HAVE SIX OR MORE DRINKS ON ONE OCCASION: LESS THAN MONTHLY
SKIP TO QUESTIONS 9-10: 0
HAVE YOU OR SOMEONE ELSE BEEN INJURED AS A RESULT OF YOUR DRINKING: NO
HOW MANY STANDARD DRINKS CONTAINING ALCOHOL DO YOU HAVE ON A TYPICAL DAY: 1 OR 2
HOW OFTEN DURING THE LAST YEAR HAVE YOU NEEDED AN ALCOHOLIC DRINK FIRST THING IN THE MORNING TO GET YOURSELF GOING AFTER A NIGHT OF HEAVY DRINKING: NEVER
HOW OFTEN DO YOU HAVE A DRINK CONTAINING ALCOHOL: MONTHLY OR LESS
AUDIT TOTAL SCORE: 2
HOW OFTEN DURING THE LAST YEAR HAVE YOU FAILED TO DO WHAT WAS NORMALLY EXPECTED FROM YOU BECAUSE OF DRINKING: NEVER
HOW OFTEN DURING THE LAST YEAR HAVE YOU BEEN UNABLE TO REMEMBER WHAT HAPPENED THE NIGHT BEFORE BECAUSE YOU HAD BEEN DRINKING: NEVER
HOW OFTEN DURING THE LAST YEAR HAVE YOU HAD A FEELING OF GUILT OR REMORSE AFTER DRINKING: NEVER
AUDIT-C TOTAL SCORE: 2
HAS A RELATIVE, FRIEND, DOCTOR, OR ANOTHER HEALTH PROFESSIONAL EXPRESSED CONCERN ABOUT YOUR DRINKING OR SUGGESTED YOU CUT DOWN: NO

## 2024-09-20 ASSESSMENT — PATIENT HEALTH QUESTIONNAIRE - PHQ9
2. FEELING DOWN, DEPRESSED OR HOPELESS: NOT AT ALL
SUM OF ALL RESPONSES TO PHQ9 QUESTIONS 1 & 2: 0
1. LITTLE INTEREST OR PLEASURE IN DOING THINGS: NOT AT ALL

## 2024-09-20 ASSESSMENT — ENCOUNTER SYMPTOMS
DEPRESSION: 0
OCCASIONAL FEELINGS OF UNSTEADINESS: 1
LOSS OF SENSATION IN FEET: 1

## 2024-09-20 ASSESSMENT — PAIN SCALES - GENERAL: PAINLEVEL: 0-NO PAIN

## 2024-09-20 NOTE — ASSESSMENT & PLAN NOTE
Status post seed localization lumpectomy for atypical hyperplasia of the left breast.  Patient will need a mammogram and follow-up of the right breast in December.  Will also do bilateral at that time to reevaluate this area.  Patient is to see me for another exam in January.

## 2024-09-20 NOTE — ASSESSMENT & PLAN NOTE
Patient due for follow-up mammogram of the right breast as she is within 2 years of her breast cancer diagnosis.  This will be for December.  Recommend repeat exam by me in January.  Incidentally the patient also with atypical ductal hyperplasia of the left breast therefore I am advocating bilateral mammogram in December and follow-up for both lesions.

## 2024-09-20 NOTE — ASSESSMENT & PLAN NOTE
Edema of right breast and right upper extremity improving with lymphedema clinic treatment.  Minimal lymphedema reported in the left upper extremity per measurements per the clinic.  This may resolve after she is further out from her surgery.  No significant seroma at this time on the left side.

## 2024-09-25 ENCOUNTER — TREATMENT (OUTPATIENT)
Dept: OCCUPATIONAL THERAPY | Facility: CLINIC | Age: 69
End: 2024-09-25
Payer: MEDICARE

## 2024-09-25 ENCOUNTER — APPOINTMENT (OUTPATIENT)
Dept: PRIMARY CARE | Facility: CLINIC | Age: 69
End: 2024-09-25
Payer: MEDICARE

## 2024-09-25 DIAGNOSIS — I89.0 LYMPHEDEMA: ICD-10-CM

## 2024-09-25 DIAGNOSIS — I89.0 LYMPHEDEMA OF BREAST: ICD-10-CM

## 2024-09-25 PROCEDURE — 97535 SELF CARE MNGMENT TRAINING: CPT | Mod: GO

## 2024-09-25 PROCEDURE — 97140 MANUAL THERAPY 1/> REGIONS: CPT | Mod: GO

## 2024-09-25 ASSESSMENT — PAIN SCALES - GENERAL: PAINLEVEL_OUTOF10: 2

## 2024-09-25 ASSESSMENT — PAIN DESCRIPTION - DESCRIPTORS: DESCRIPTORS: ACHING

## 2024-09-25 ASSESSMENT — PAIN - FUNCTIONAL ASSESSMENT: PAIN_FUNCTIONAL_ASSESSMENT: 0-10

## 2024-09-25 ASSESSMENT — ACTIVITIES OF DAILY LIVING (ADL): HOME_MANAGEMENT_TIME_ENTRY: 30

## 2024-09-25 NOTE — PROGRESS NOTES
Occupational Therapy    Occupational Therapy Treatment    Name: Tatyana Mckeon  MRN: [id number]  : 1955  Date: 24    Time Entry:  Time Calculation  Start Time: 1300  Stop Time: 1357  Time Calculation (min): 57 min        OT Therapeutic Procedures Time Entry  Manual Therapy Time Entry: 27  Self Care/Home Management (ADLs) Time Entry: 30                Assessment: garment edu.      Plan: Continue with POC as tolerated, monitor home program, assess new garments,           Subjective   General:  OT Last Visit  OT Received On: 24  General  Reason for Referral: lymphedema of breast  Referred By: MD Lis  General Comment: visit 7, no auth onset 2023  Precautions:  Precautions Comment: none  Pain Assessment:  Pain Assessment  Pain Assessment: 0-10  0-10 (Numeric) Pain Score: 2  Pain Type: Surgical pain  Pain Location: Breast  Pain Orientation: Left  Pain Descriptors: Aching  Pain Frequency: Intermittent  Pain Onset: Ongoing    Objective       Therapy/Activity:      Therapeutic Activity  Therapeutic Activity Performed: Yes  Therapeutic Activity 2: Disccused with website for [employer] .  Pt contacted DME in session and brand not covered.  will  use DME brand    Manual Therapy  Manual Therapy Performed: Yes  Manual Therapy Activity 1: girth measurements with mild reduction on BUE.  reduction at nipple site       Lymphedema Assessment    Lymphedema Assessments:  Lymphedema Assessments: Upper extremities  Right Upper Extremity:  R Thumb Distal Phalange Girth: 6 cm  R Thumb Proximal Phalange Girth: 6 cm  R Metacarpals (cm): 18 cm  R Palm (cm): 0 cm  R Wrist (cm): 17 cm  R 10 cm Above Wrist (cm): 23 cm  R 15 cm Above Wrist (cm): 26.5 cm  R 20 cm Above Wrist (cm): 27 cm  R Elbow (cm): 27.5 cm  R 5 cm Above Elbow: 32 cm  R 10 cm Above Elbow: 36 cm  R 20 cm Above Elbow: 42 cm  R Axilla: 0 cm  R Upper Extremity Total: 261  Left Upper Extremity:  L Thumb Distal Phalange Girth: 5.8 cm  L Thumb Proximal Phalange  Girth: 6 cm  L Metacarpals (cm): 17.3 cm  L Palm (cm): 0 cm  L Wrist (cm): 17.3 cm  L 10 cm Above Wrist (cm): 23.5 cm  L 15 cm Above Wrist (cm): 27 cm  L 20 cm Above Wrist (cm): 29 cm  L Elbow (cm): 30 cm  L 5 cm Above Elbow: 33 cm  L 10cm Above Elbow: 37  L 20 cm Above Elbow: 42.5  L Axilla: 0 cm  Left upper extremity total: 268.4  UE Skin Appearance/Condition and Girth:  Other (comment): axillary 111 cm decreased 2.5 cm  ; 120.5 nipple cm ndecreased 4 cm from last session     Prior Function:     Pain Assessment:  Pain Assessment: 0-10  0-10 (Numeric) Pain Score: 2  Pain Type: Surgical pain  Pain Location: Breast  Pain Orientation: Left  Pain Descriptors: Aching  Pain Frequency: Intermittent  Pain Onset: Ongoing  Therapy Precautions:  Precautions Comment: none      OP EDUCATION:       Goals:  Active       Lymphedema       Pt will demo volume reduction in R breast in order for proper fitting of medical compression garments and increase ease in transfers and IADLs        Start:  07/19/24    Expected End:  10/25/24            Pt will I perform skin care for infection prevention and integrity of skin        Start:  07/19/24    Expected End:  10/25/24            Pt will be I with stating and demonstrating home exercise program in order to improve patients ability to perform self-care, household, work and/or leisure tasks.        Start:  07/19/24    Expected End:  10/25/24            Pt will complete self  mld in order to promote increased tissue pliability for maintenance with chronic lymphedema        Start:  07/19/24    Expected End:  10/25/24

## 2024-09-26 NOTE — TELEPHONE ENCOUNTER
Patient needs a new order for Right upper extremity compression garment.  Already has an order for Left.

## 2024-09-30 DIAGNOSIS — I10 PRIMARY HYPERTENSION: ICD-10-CM

## 2024-09-30 RX ORDER — AMLODIPINE BESYLATE 10 MG/1
10 TABLET ORAL DAILY
Qty: 90 TABLET | Refills: 1 | Status: SHIPPED | OUTPATIENT
Start: 2024-09-30

## 2024-10-02 ENCOUNTER — TREATMENT (OUTPATIENT)
Dept: OCCUPATIONAL THERAPY | Facility: CLINIC | Age: 69
End: 2024-10-02
Payer: MEDICARE

## 2024-10-02 DIAGNOSIS — I89.0 LYMPHEDEMA OF BREAST: ICD-10-CM

## 2024-10-02 DIAGNOSIS — I89.0 LYMPHEDEMA: ICD-10-CM

## 2024-10-02 PROCEDURE — 97535 SELF CARE MNGMENT TRAINING: CPT | Mod: GO

## 2024-10-02 PROCEDURE — 97140 MANUAL THERAPY 1/> REGIONS: CPT | Mod: GO

## 2024-10-02 ASSESSMENT — ACTIVITIES OF DAILY LIVING (ADL): HOME_MANAGEMENT_TIME_ENTRY: 13

## 2024-10-02 NOTE — PROGRESS NOTES
Occupational Therapy    Occupational Therapy Treatment    Name: Tatyana Mckeon  MRN: [id number]  : 1955  Date: 10/02/24    Time Entry:  Time Calculation  Start Time: 1103  Stop Time: 1158  Time Calculation (min): 55 min        OT Therapeutic Procedures Time Entry  Manual Therapy Time Entry: 42  Self Care/Home Management (ADLs) Time Entry: 13                Assessment: garment training, hivamat with MLD      Plan: Continue with POC as tolerated, monitor home program, assess new garments,    Pt with 4 weeks of compression daily, exercise and elevation with persistent swelling and continued truncal swelling.         Subjective   General:  OT Last Visit  OT Received On: 24  General  Reason for Referral: lymphedema of breast  Referred By: MD Lis  General Comment: visit 8,  no auth onset 2023  Precautions:  Precautions Comment: none  Pain Assessment:       Objective       Therapy/Activity:      Therapeutic Activity  Therapeutic Activity Performed: Yes  Therapeutic Activity 1: pt arrived with Jada compression bra with training for donning/doffing with wear instructions pt in agreement    Manual Therapy  Manual Therapy Performed: Yes  Manual Therapy Activity 1: girth measurements with volume increased at truncal area.  volume reduction noted at LUE  Manual Therapy Activity 2: hivamat on truncal area and RUE with MLD in order to promote lymphatic pathways     Lymphedema Assessment    Lymphedema Assessments:  Lymphedema Assessments: Upper extremities  Right Upper Extremity:  R Thumb Distal Phalange Girth: 6 cm  R Thumb Proximal Phalange Girth: 6.5 cm  R Metacarpals (cm): 18 cm  R Palm (cm): 0 cm  R Wrist (cm): 17 cm  R 10 cm Above Wrist (cm): 23 cm  R 15 cm Above Wrist (cm): 26 cm  R 20 cm Above Wrist (cm): 27 cm  R Elbow (cm): 27.5 cm  R 5 cm Above Elbow: 32 cm  R 10 cm Above Elbow: 35 cm  R 20 cm Above Elbow: 44 cm  R Axilla: 0 cm  R Upper Extremity Total: 262  Left Upper Extremity:  L Thumb Distal  Phalange Girth: 5.5 cm  L Thumb Proximal Phalange Girth: 6 cm  L Metacarpals (cm): 17.3 cm  L Palm (cm): 0 cm  L Wrist (cm): 17.4 cm  L 10 cm Above Wrist (cm): 24 cm  L 15 cm Above Wrist (cm): 27 cm  L 20 cm Above Wrist (cm): 28 cm  L Elbow (cm): 28.5 cm  L 5 cm Above Elbow: 33 cm  L 10cm Above Elbow: 37  L 20 cm Above Elbow: 42  L Axilla: 0 cm  Left upper extremity total: 265.7  UE Skin Appearance/Condition and Girth:  Other (comment): axillary 113.5 cm increased  2.5 cm ; 123 nipple increased 2.5 cm from last session  Pain Assessment:     Therapy Precautions:  Precautions Comment: none    OP EDUCATION:       Goals:  Active       Lymphedema       Pt will demo volume reduction in R breast in order for proper fitting of medical compression garments and increase ease in transfers and IADLs        Start:  07/19/24    Expected End:  10/25/24            Pt will I perform skin care for infection prevention and integrity of skin        Start:  07/19/24    Expected End:  10/25/24            Pt will be I with stating and demonstrating home exercise program in order to improve patients ability to perform self-care, household, work and/or leisure tasks.        Start:  07/19/24    Expected End:  10/25/24            Pt will complete self  mld in order to promote increased tissue pliability for maintenance with chronic lymphedema        Start:  07/19/24    Expected End:  10/25/24

## 2024-10-07 ENCOUNTER — TELEPHONE (OUTPATIENT)
Dept: PHYSICAL MEDICINE AND REHAB | Facility: CLINIC | Age: 69
End: 2024-10-07
Payer: MEDICARE

## 2024-10-07 DIAGNOSIS — I89.0 LYMPHEDEMA: Primary | ICD-10-CM

## 2024-10-07 DIAGNOSIS — I89.0 LYMPHEDEMA OF BREAST: ICD-10-CM

## 2024-10-07 NOTE — TELEPHONE ENCOUNTER
Please write new order for right arm 20-30% compression sleeve with band.  Patient stated the garment without the band is not staying up on her left arm and wants the right arm to have a band.  Need a new order to fax to facility.

## 2024-10-08 NOTE — PROGRESS NOTES
DIVISION OF MEDICAL ONCOLOGY    Patient ID: Tatyana Mckeon is a 69 y.o. female.  MRN: [id number]  : 1955  The patient presents to clinic today for her history of breast cancer.     Cancer Staging   Breast cancer (Multi)  Staging form: Breast, AJCC 8th Edition  - Pathologic stage from 2023: Stage IIA (pT2, pN1a, cM0, G3, ER+, IN+, HER2-, Oncotype DX score: 22) - Signed by Neo Hannon MD on 2024    Infiltrating ductal carcinoma of right breast, stage 2 (Multi)  Staging form: Breast, AJCC 8th Edition  - Clinical: Stage IIB (cT2, cN1, cM0, G3, ER+, IN+, HER2-) - Signed by Olivia Boone MD on 3/14/2024    Diagnostic/Therapeutic History:  -23: US-guided CNB showed IDC grade 2, LN was negative. ER >95% IN 90% Her2-negative (2+ IHC; LUIS ratio 1.7).  -23: Right lumpectomy/SLNBx performed. 3 cm of grade 3 IDC noted. / SLN was positive for carcinoma. LVI indeterminate, +LOC measuring 2 mm.  -23: Right ALND: 0/15 LN's involved.  -PET/CT 23: 8 mm focus of soft tissue density (SUV 4.1) in lower right axilla, unclear etiology (?reactive). Otherwise negative. Follow-up US was unremarkable.  pT2 pN1a G3 [stage IIA]  Oncotype 22, no chemotherapy recommended.  Anastrozole initiated 10/2023 (RT was delayed for seroma).  Adjuvant RT completed 12/15/23.  2024 right lumpectomy scar has associate mass. - was a seroma   2024 also showed left breast calcifications that correlated to atypical ductal hyperplasia. Underwent a lumpectomy 24 which confirmed this      History of Present Illness (HPI)/Interval History:  Tatyana Mckeon presents today for routine FUV. Despite discussing letrozole, never started. Interested in maybe restarting anastrozole.   Following with Dr. Boone regarding imaging, next due in Dec.   Since stopping her exemestane her depression is better.  Lymphedema in the right breast is improving. She has noticed from mild left axilla and arm has a sleeve ordered. Though since  "wearing it has had a lot of petechiae in the left arm.   She does endorse bilateral lower extremity dependent edema - working with PCP regarding possible venous insuffiencey. Has lasix at home takes if 2+ weight gain in a day.   She has some orthopedic issues including arthritis in knee. She plans to have a joint replacement soon.   She broke her 5th metatarsal on her left foot - healing well.   No new bone pain, dyspnea, cough, unintentional weight loss, abdominal pain.  She continues to smoke but it continuing to cut back.   She has been more \"edgy\" and anxious and does have some more depressive symptoms. Continues to work with psych NP for medication adjustments. Going back up on Paxil from 20 mg > 30 mg.     Review of Systems:  14-point ROS otherwise negative, as per HPI/Interval History.    Past Medical History:   Diagnosis Date    Acquired lymphedema     right breast    Bipolar 1 disorder (Multi)     Breast cancer (Multi) 06/2023    Right Breast - IDC and DCIS    Depression     Diabetes mellitus (Multi)     Type 2    Endometrial cancer (Multi)     Hyperlipidemia     Hypertension     Osteoarthritis of right knee     Peripheral edema     Psoriasis     Toe fracture, left      Patient Active Problem List   Diagnosis    Allergic rhinitis    Anxiety    Bipolar 1 disorder (Multi)    Candidiasis of skin and nails    Gastroesophageal reflux disease    High blood pressure    High cholesterol    Insomnia    Psoriasis    Type II diabetes mellitus (Multi)    Cigarette smoker    Obesity with body mass index 30 or greater    Breast cancer (Multi)    Chronic right-sided low back pain with sciatica    Endometrial cancer (Multi)    Infiltrating ductal carcinoma of right breast, stage 2 (Multi)    Primary osteoarthritis of both knees    Breast pain, right    Lymphedema    Alopecia    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)    Microcalcifications of the breast    Atypical ductal hyperplasia of left " breast    Chronic bilateral low back pain without sciatica    Chronic pain of right knee    Family history of ischemic heart disease    Preop cardiovascular exam        Past Surgical History:   Procedure Laterality Date    AXILLARY NODE DISSECTION  08/16/2023    Right Axillary Dissection and Evacuation of a right breast hematoma    BI MAMMO GUIDED BREAST RIGHT LOCALIZATION Right 07/21/2023    Wire localization lumpectomy right breast with sentinel node biopsy    BREAST BIOPSY Left 07/17/2024    Sterotactic    BREAST LUMPECTOMY  2000    Left breast    BREAST LUMPECTOMY W/ NEEDLE LOCALIZATION Left 09/06/2024    HYSTERECTOMY  2014    LAPAROTOMY OOPHERECTOMY  2014    US GUIDED BIOPSY LYMPH NODE SUPERFICIAL  06/07/2023    Ultrasound guided right breast and axillary lymph node biopsy       Social History     Socioeconomic History    Marital status:    Occupational History    Occupation: Retired   Tobacco Use    Smoking status: Every Day     Current packs/day: 0.50     Average packs/day: 0.5 packs/day for 40.0 years (20.0 ttl pk-yrs)     Types: Cigarettes     Passive exposure: Current    Smokeless tobacco: Never   Vaping Use    Vaping status: Never Used   Substance and Sexual Activity    Alcohol use: Yes     Comment: 2x a year    Drug use: Not Currently     Types: Marijuana     Comment: SMOKED MJ 40 YEARS AGO    Sexual activity: Defer     Social Determinants of Health     Food Insecurity: No Food Insecurity (11/16/2023)    Hunger Vital Sign     Worried About Running Out of Food in the Last Year: Never true     Ran Out of Food in the Last Year: Never true       Family History   Problem Relation Name Age of Onset    Stroke Mother      Other (high blood pressure) Father      Prostate cancer Father      Heart disease Father          with MI at age 71    Diabetes Father      Hypertension Father      Diabetes Sister      Hypertension Sister      Other (oral cancer) Brother      Hypertension Brother         Allergies:    Allergies   Allergen Reactions    Pollen Extracts Itching    Cephalexin Diarrhea    Naproxen Unknown         Current Outpatient Medications:     acetaminophen (Tylenol) 500 mg tablet, Take 1 tablet (500 mg) by mouth 2 times a day., Disp: , Rfl:     amLODIPine (Norvasc) 10 mg tablet, TAKE ONE TABLET BY MOUTH DAILY, Disp: 90 tablet, Rfl: 1    aspirin 81 mg EC tablet, Take 1 tablet (81 mg) by mouth once daily., Disp: , Rfl:     atorvastatin (Lipitor) 10 mg tablet, TAKE ONE TABLET BY MOUTH EVERY OTHER DAY, Disp: 45 tablet, Rfl: 1    blood sugar diagnostic (Accu-Chek Guide test strips) strip, 3 times a day., Disp: , Rfl:     blood-glucose meter misc, 3 times a day., Disp: , Rfl:     famotidine (Pepcid) 20 mg tablet, Take 1 tablet (20 mg) by mouth once daily., Disp: , Rfl:     furosemide (Lasix) 20 mg tablet, TAKE 1-2 TABLETS (20-40 MG) BY MOUTH 2 TIMES A DAY (Patient taking differently: Take 1-2 tablets (20-40 mg) by mouth if needed.), Disp: 90 tablet, Rfl: 1    ibuprofen 200 mg tablet, Take 1 tablet (200 mg) by mouth 2 times a day., Disp: , Rfl:     ketoconazole (NIZOral) 2 % shampoo, Apply topically., Disp: , Rfl:     LaMICtal 100 mg tablet, Take 1 tablet (100 mg) by mouth once daily., Disp: , Rfl:     lisinopril 20 mg tablet, TAKE ONE TABLET BY MOUTH EVERY DAY, Disp: 90 tablet, Rfl: 1    loratadine (Claritin) 10 mg tablet, Take 1 tablet (10 mg) by mouth once daily., Disp: , Rfl:     metFORMIN (Glucophage) 500 mg tablet, TAKE ONE TABLET BY MOUTH THREE TIMES A DAY, Disp: 270 tablet, Rfl: 1    multivitamin capsule, Take 1 capsule by mouth once daily., Disp: , Rfl:     Neurontin 400 mg capsule, Take 1 capsule (400 mg) by mouth once daily., Disp: , Rfl:     nystatin (Mycostatin) cream, APPLY EXTERNALLY TWICE DAILY, Disp: 60 g, Rfl: 1    omega 3-dha-epa-fish oil (Fish OiL) 1,000 mg (120 mg-180 mg) capsule, Take 1 capsule (1,000 mg) by mouth once daily., Disp: , Rfl:     PaxiL 20 mg tablet, Take 1 tablet (20 mg) by mouth  once daily., Disp: , Rfl:     QUEtiapine (SEROquel) 25 mg tablet, Take 0.5 tablets (12.5 mg) by mouth once daily at bedtime., Disp: , Rfl:     Topamax 25 mg tablet, Take 1 tablet (25 mg) by mouth once daily., Disp: , Rfl:     traMADol (Ultram) 50 mg tablet, Take 1 tablet (50 mg) by mouth every 8 hours if needed for severe pain (7 - 10)., Disp: 10 tablet, Rfl: 0    triamcinolone (Kenalog) 0.1 % cream, Apply to affected area twice daily as needed, Disp: 30 g, Rfl: 0    Wellbutrin  mg 24 hr tablet, Take 1 tablet (300 mg) by mouth once daily in the morning., Disp: , Rfl:      Objective    BSA: There is no height or weight on file to calculate BSA.  There were no vitals taken for this visit.    ECOG Performance Status:  The ECOG performance scale today is ECO- Restricted in physically strenuous activity.  Carries out light duty.    Physical Exam  Vitals reviewed.   Constitutional:       General: She is awake. She is not in acute distress.     Appearance: Normal appearance. She is not toxic-appearing.   HENT:      Head: Normocephalic and atraumatic.      Mouth/Throat:      Mouth: Mucous membranes are moist.      Pharynx: Oropharynx is clear.   Eyes:      General: No scleral icterus.     Extraocular Movements: Extraocular movements intact.      Conjunctiva/sclera: Conjunctivae normal.      Pupils: Pupils are equal, round, and reactive to light.   Neck:      Trachea: Trachea and phonation normal. No tracheal tenderness.   Cardiovascular:      Rate and Rhythm: Normal rate and regular rhythm.      Pulses: Normal pulses.      Heart sounds: Normal heart sounds. No murmur heard.  Pulmonary:      Effort: Pulmonary effort is normal. No respiratory distress.      Breath sounds: Normal breath sounds.   Chest:      Chest wall: No mass or deformity.   Breasts:     Right: No swelling, mass, nipple discharge, skin change or tenderness.      Left: No swelling, mass, nipple discharge, skin change or tenderness.      Comments:  S/p bilateral lumpectomies with mild lymphedema in the breast   Abdominal:      General: Bowel sounds are normal. There is no distension.      Palpations: Abdomen is soft. There is no mass.      Tenderness: There is no abdominal tenderness. There is no guarding.   Musculoskeletal:         General: No swelling or tenderness. Normal range of motion.      Cervical back: Normal range of motion.   Lymphadenopathy:      Upper Body:      Right upper body: No supraclavicular, axillary or pectoral adenopathy.      Left upper body: No supraclavicular, axillary or pectoral adenopathy.   Skin:     General: Skin is warm and dry.      Capillary Refill: Capillary refill takes less than 2 seconds.      Coloration: Skin is not jaundiced.      Findings: Petechiae (left arm) present. No rash.   Neurological:      General: No focal deficit present.      Mental Status: She is alert and oriented to person, place, and time.      Motor: No weakness.   Psychiatric:         Mood and Affect: Mood normal.         Behavior: Behavior normal.         Thought Content: Thought content normal.         Judgment: Judgment normal.         Laboratory Data:  Lab Results   Component Value Date    WBC 10.1 08/22/2024    HGB 13.3 08/22/2024    HCT 40.1 08/22/2024    MCV 94 08/22/2024     08/22/2024       Chemistry    Lab Results   Component Value Date/Time     09/12/2024 0933    K 4.0 09/12/2024 0933     09/12/2024 0933    CO2 24 09/12/2024 0933    BUN 21 09/12/2024 0933    CREATININE 0.65 09/12/2024 0933    Lab Results   Component Value Date/Time    CALCIUM 9.0 09/12/2024 0933    ALKPHOS 70 04/26/2024 1250    AST 13 04/26/2024 1250    ALT 10 04/26/2024 1250    BILITOT 0.3 04/26/2024 1250        Radiology: N/A    Pathology: N/A      Assessment/Plan:  Tatyana Mckeon is a 69 y.o. female with a history of right-sided breast cancer, who presents today for follow-up.     Breast cancer (CMS/HCC)  - Never started letrozole, considering going back  on anastrozole. Will discuss further after her upcoming ortho surgery.   - Suspect the petechiae on her left arm may be trauma induced from compression sleeve vs manifestation of her psoriasis. PLT within normal limits  - She will consider adjuvant Zometa. Potential toxicities discussed- declined for now.   - Will follow up with Dr. Boone in Dec/Jan 2025    There is no evidence of breast cancer recurrence based on her clinical exam today.        Disposition.  - RTC ~ 3 months with Carrie Osorio PA-C   - She has our contact information and was instructed to call with concerns/questions in the interim.    No orders of the defined types were placed in this encounter.     Carrie Osorio PA-C  Hematology and Medical Oncology  [facility]

## 2024-10-09 ENCOUNTER — APPOINTMENT (OUTPATIENT)
Dept: OCCUPATIONAL THERAPY | Facility: CLINIC | Age: 69
End: 2024-10-09
Payer: MEDICARE

## 2024-10-09 ENCOUNTER — OFFICE VISIT (OUTPATIENT)
Dept: HEMATOLOGY/ONCOLOGY | Facility: CLINIC | Age: 69
End: 2024-10-09
Payer: MEDICARE

## 2024-10-09 VITALS
DIASTOLIC BLOOD PRESSURE: 64 MMHG | TEMPERATURE: 97.5 F | WEIGHT: 209 LBS | HEART RATE: 84 BPM | RESPIRATION RATE: 18 BRPM | OXYGEN SATURATION: 96 % | BODY MASS INDEX: 42.21 KG/M2 | SYSTOLIC BLOOD PRESSURE: 106 MMHG

## 2024-10-09 DIAGNOSIS — C50.919 MALIGNANT NEOPLASM OF BREAST IN FEMALE, ESTROGEN RECEPTOR POSITIVE, UNSPECIFIED LATERALITY, UNSPECIFIED SITE OF BREAST: ICD-10-CM

## 2024-10-09 DIAGNOSIS — Z17.0 MALIGNANT NEOPLASM OF BREAST IN FEMALE, ESTROGEN RECEPTOR POSITIVE, UNSPECIFIED LATERALITY, UNSPECIFIED SITE OF BREAST: ICD-10-CM

## 2024-10-09 PROCEDURE — 1157F ADVNC CARE PLAN IN RCRD: CPT

## 2024-10-09 PROCEDURE — 99215 OFFICE O/P EST HI 40 MIN: CPT

## 2024-10-09 PROCEDURE — 3074F SYST BP LT 130 MM HG: CPT

## 2024-10-09 PROCEDURE — 4010F ACE/ARB THERAPY RXD/TAKEN: CPT

## 2024-10-09 PROCEDURE — 1125F AMNT PAIN NOTED PAIN PRSNT: CPT

## 2024-10-09 PROCEDURE — 3078F DIAST BP <80 MM HG: CPT

## 2024-10-09 PROCEDURE — 3048F LDL-C <100 MG/DL: CPT

## 2024-10-09 ASSESSMENT — PAIN SCALES - GENERAL: PAINLEVEL: 4

## 2024-10-09 ASSESSMENT — PATIENT HEALTH QUESTIONNAIRE - PHQ9
10. IF YOU CHECKED OFF ANY PROBLEMS, HOW DIFFICULT HAVE THESE PROBLEMS MADE IT FOR YOU TO DO YOUR WORK, TAKE CARE OF THINGS AT HOME, OR GET ALONG WITH OTHER PEOPLE: NOT DIFFICULT AT ALL
1. LITTLE INTEREST OR PLEASURE IN DOING THINGS: SEVERAL DAYS
SUM OF ALL RESPONSES TO PHQ9 QUESTIONS 1 AND 2: 2
2. FEELING DOWN, DEPRESSED OR HOPELESS: SEVERAL DAYS

## 2024-10-09 ASSESSMENT — ENCOUNTER SYMPTOMS
OCCASIONAL FEELINGS OF UNSTEADINESS: 1
DEPRESSION: 0
LOSS OF SENSATION IN FEET: 0

## 2024-10-11 ENCOUNTER — APPOINTMENT (OUTPATIENT)
Dept: PRIMARY CARE | Facility: CLINIC | Age: 69
End: 2024-10-11

## 2024-10-11 DIAGNOSIS — B37.31 VAGINAL CANDIDIASIS: ICD-10-CM

## 2024-10-11 NOTE — TELEPHONE ENCOUNTER
LOV:         9/11/24  NEXT OV:  10/23/24                          LAST FILL:  7/23/24                         LABS:

## 2024-10-12 RX ORDER — NYSTATIN 100000 U/G
CREAM TOPICAL
Qty: 60 G | Refills: 0 | Status: SHIPPED | OUTPATIENT
Start: 2024-10-12

## 2024-10-21 ENCOUNTER — OFFICE VISIT (OUTPATIENT)
Dept: PHYSICAL MEDICINE AND REHAB | Facility: CLINIC | Age: 69
End: 2024-10-21
Payer: MEDICARE

## 2024-10-21 VITALS
HEART RATE: 95 BPM | SYSTOLIC BLOOD PRESSURE: 142 MMHG | DIASTOLIC BLOOD PRESSURE: 61 MMHG | RESPIRATION RATE: 20 BRPM | TEMPERATURE: 97.9 F

## 2024-10-21 DIAGNOSIS — I89.0 LYMPHEDEMA OF BREAST: ICD-10-CM

## 2024-10-21 DIAGNOSIS — I89.0 LYMPHEDEMA: Primary | ICD-10-CM

## 2024-10-21 DIAGNOSIS — C50.911 INFILTRATING DUCTAL CARCINOMA OF RIGHT BREAST, STAGE 2 (MULTI): ICD-10-CM

## 2024-10-21 PROCEDURE — 1159F MED LIST DOCD IN RCRD: CPT | Performed by: PHYSICAL MEDICINE & REHABILITATION

## 2024-10-21 PROCEDURE — 4004F PT TOBACCO SCREEN RCVD TLK: CPT | Performed by: PHYSICAL MEDICINE & REHABILITATION

## 2024-10-21 PROCEDURE — 99214 OFFICE O/P EST MOD 30 MIN: CPT | Performed by: PHYSICAL MEDICINE & REHABILITATION

## 2024-10-21 PROCEDURE — 3048F LDL-C <100 MG/DL: CPT | Performed by: PHYSICAL MEDICINE & REHABILITATION

## 2024-10-21 PROCEDURE — 1157F ADVNC CARE PLAN IN RCRD: CPT | Performed by: PHYSICAL MEDICINE & REHABILITATION

## 2024-10-21 PROCEDURE — 3078F DIAST BP <80 MM HG: CPT | Performed by: PHYSICAL MEDICINE & REHABILITATION

## 2024-10-21 PROCEDURE — 4010F ACE/ARB THERAPY RXD/TAKEN: CPT | Performed by: PHYSICAL MEDICINE & REHABILITATION

## 2024-10-21 PROCEDURE — 3077F SYST BP >= 140 MM HG: CPT | Performed by: PHYSICAL MEDICINE & REHABILITATION

## 2024-10-21 PROCEDURE — 1125F AMNT PAIN NOTED PAIN PRSNT: CPT | Performed by: PHYSICAL MEDICINE & REHABILITATION

## 2024-10-21 PROCEDURE — G2211 COMPLEX E/M VISIT ADD ON: HCPCS | Performed by: PHYSICAL MEDICINE & REHABILITATION

## 2024-10-21 ASSESSMENT — PAIN SCALES - GENERAL: PAINLEVEL_OUTOF10: 4

## 2024-10-21 NOTE — PROGRESS NOTES
[facility]  Cl[facility]  \     HPI   Ms. Mckeon is a 69 y.o.  woman with right Breast cancer Pathologic: Stage IIA (pT2, pN1a, cM0, G3, ER+, [place]+, HER2-,  status post status post lumpectomy and sentinel node biopsy 7/2023, followed by axillary dissection and evacuation  of hematoma of the breast 8/2023.  She was started on arimidex but stopped due to hair loss/weight gain. Now on exemestane but taking a break due to worsening mood. She then completed adjuvant radiation to breast completed 12/2024.       Recall  States in march she is noticed a tight lenny on right breast below. Feels hard but not tender. Especially under breast, no swelling in the arm that she noticed,  Some swelling on side of breast.    Has not done any lymphedema therapy.     Tried to push on the breast.   [place]ler rom intact.  Regular bra and not wear wire. Does not wear.      She was last seen in June 2024. At that time we discussed:  1. referral to OT:  mld, compression garments, pump, shoulder rom  2. Compression bra  3, discussed shoulder rom, posture  4. Fu 8 weeks  5. Will discuss [facility] at follow up    She broke left 5th metatarsal and now is a boot. Had pt eval, needs garment.  Has seroma around surgery site.  Now pending biopsy left.   Last hgba1c from [facility] was 8 in July 2024.        Overall discouraged by progress w everything, frustrated. Biopsy was negative.  States measurements were improving.   She is Wearease compression bra which is helping. Fibrosis helps. Was not covered by insurance.  She got fitted for compression sleeve bilateral, Juzo 20-30 mmhg in elegant essentials.  Silicone band does help. Multiple delays.   Had pump trial w tactile but feels might be cumbersome.      Imaging  Reviewed w patient and personally 10/21/24    Mammo 2024  IMPRESSION:  1. Indeterminate left breast calcifications. A stereotactic biopsy is  recommended. This was discussed with the patient. A preprocedure form  has been  completed.  2. Seroma at the palpable area of concern at the lumpectomy site in  the right breast. Clinical follow-up is recommended.  Mammo 2023  FINDINGS  Patient had right lumpectomy 07/21/2023.  On the PET-CT scan from 09/14/2023 a large, 9.1 x 6.3 cm fluid collection  was seen in the region of lumpectomy in the right breast. On today's exam a  large fluid collection is seen extending from the 3 o'clock to the 7 o'clock  position of the right breast, measuring 7.9 x 4.9 cm, containing small  septations/minimal debris. On Doppler ultrasound there is no internal blood  flow.  This corresponds to an improving postsurgical seroma.     IMPRESSION:  No sonographic  evidence of malignancy.  Past Medical History:   Diagnosis Date    Acquired lymphedema     right breast    Bipolar 1 disorder (Multi)     Breast cancer (Multi) 06/2023    Right Breast - IDC and DCIS    Depression     Diabetes mellitus (Multi)     Type 2    Endometrial cancer (Multi)     Hyperlipidemia     Hypertension     Osteoarthritis of right knee     Peripheral edema     Psoriasis     Toe fracture, left     Endometrial cancer sp hysterectomy  Past Surgical History:   Procedure Laterality Date    AXILLARY NODE DISSECTION  08/16/2023    Right Axillary Dissection and Evacuation of a right breast hematoma    BI MAMMO GUIDED BREAST RIGHT LOCALIZATION Right 07/21/2023    Wire localization lumpectomy right breast with sentinel node biopsy    BREAST BIOPSY Left 07/17/2024    Sterotactic    BREAST LUMPECTOMY  2000    Left breast    BREAST LUMPECTOMY W/ NEEDLE LOCALIZATION Left 09/06/2024    HYSTERECTOMY  2014    LAPAROTOMY OOPHERECTOMY  2014    US GUIDED BIOPSY LYMPH NODE SUPERFICIAL  06/07/2023    Ultrasound guided right breast and axillary lymph node biopsy     Past Medical History:   Diagnosis Date    Acquired lymphedema     right breast    Bipolar 1 disorder (Multi)     Breast cancer (Multi) 06/2023    Right Breast - IDC and DCIS    Depression     Diabetes  mellitus (Multi)     Type 2    Endometrial cancer (Multi)     Hyperlipidemia     Hypertension     Osteoarthritis of right knee     Peripheral edema     Psoriasis     Toe fracture, left      Family History   Problem Relation Name Age of Onset    Stroke Mother      Other (high blood pressure) Father      Prostate cancer Father      Heart disease Father          with MI at age 71    Diabetes Father      Hypertension Father      Diabetes Sister      Hypertension Sister      Other (oral cancer) Brother      Hypertension Brother       Social History     Socioeconomic History    Marital status:      Spouse name: Not on file    Number of children: Not on file    Years of education: Not on file    Highest education level: Not on file   Occupational History    Occupation: Retired   Tobacco Use    Smoking status: Every Day     Current packs/day: 0.50     Average packs/day: 0.5 packs/day for 40.0 years (20.0 ttl pk-yrs)     Types: Cigarettes     Passive exposure: Current    Smokeless tobacco: Never   Vaping Use    Vaping status: Never Used   Substance and Sexual Activity    Alcohol use: Yes     Comment: 2x a year    Drug use: Not Currently     Types: Marijuana     Comment: SMOKED MJ 40 YEARS AGO    Sexual activity: Defer   Other Topics Concern    Not on file   Social History Narrative    Not on file     Social Drivers of Health     Financial Resource Strain: Not on file   Food Insecurity: No Food Insecurity (11/16/2023)    Hunger Vital Sign     Worried About Running Out of Food in the Last Year: Never true     Ran Out of Food in the Last Year: Never true   Transportation Needs: Not on file   Physical Activity: Not on file   Stress: Not on file   Social Connections: Not on file   Intimate Partner Violence: Not on file   Housing Stability: Not on file     Lives alone  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      Affect: Appropriate  Skin: No redness or streaking, no open areas. No drainage. No discoloration.  HEENT: WNL.  Heart: RRR  Lungs: Respirations unlabored.  Extremities: Normal contour. Stemmer's negative.     Arm Circumferences (cm):     5/2024 10/2024   Index L 6.5 6.5   R 6.5 6.5   MCP's L 18.5 18   R 18.5 18   Below elbow L(10 cm) 25 26   R 23.5 24   Below elbow L (5cm) 26.5 26   R 26.5 26   Above elbow L (10 cm) 35.5 35   R 34.5 35           Wrist nbl 17                                        16.5     R flexion abduction 170     Neck: Supple  Back: Normal contour  Motor: 5/5 strength all extremities  R breast lymphedema, also lateral chest wall         Reflexes: 1-2+ bilaterally  Coordination: Intact rapid alternating movements  Gait: WNL    Promis screen 7/2024  PF: 14/130  Fatigue: 12/15  Social' 7/15  Xochilt; 5  Distress 5     IMPRESSION Ms. Mckeon is a 69 y.o.  woman with right Breast cancer Pathologic: Stage IIA (pT2, pN1a, cM0, G3, ER+, [place]+, HER2-,  status post status post lumpectomy and sentinel node biopsy 7/2023, followed by axillary dissection and evacuation  of hematoma of the breast 8/2023.  She was started on arimidex but stopped due to hair loss/weight gain. Now on exemestane but taking a break due to worsening mood and other side effects. She then completed adjuvant radiation to breast completed 12/2024. She is presenting with R breast and mild arm lymphedema whcihis overall stable     PLAN      1. continue to OT:  mld, compression garments, pump, shoulder rom, had eval, will also give a compression sleeve and glove  order  2. Received Compression bra and compression sleeves  3, discussed shoulder rom, posture  4. Fu 12 weeks  5. Will discuss [facility] at follow up   6. Encouraged to make sure to get the compression pneumatic pump    Charline Hays MD  Physical Medicine and Rehabilitation

## 2024-10-23 ENCOUNTER — APPOINTMENT (OUTPATIENT)
Dept: PRIMARY CARE | Facility: CLINIC | Age: 69
End: 2024-10-23
Payer: MEDICARE

## 2024-10-23 DIAGNOSIS — Z23 ENCOUNTER FOR IMMUNIZATION: Primary | ICD-10-CM

## 2024-10-25 ENCOUNTER — TREATMENT (OUTPATIENT)
Dept: OCCUPATIONAL THERAPY | Facility: CLINIC | Age: 69
End: 2024-10-25
Payer: MEDICARE

## 2024-10-25 DIAGNOSIS — I89.0 LYMPHEDEMA: Primary | ICD-10-CM

## 2024-10-25 DIAGNOSIS — I89.0 LYMPHEDEMA OF BREAST: ICD-10-CM

## 2024-10-25 PROCEDURE — 97110 THERAPEUTIC EXERCISES: CPT | Mod: GO

## 2024-10-25 PROCEDURE — 97140 MANUAL THERAPY 1/> REGIONS: CPT | Mod: GO

## 2024-10-25 ASSESSMENT — PAIN SCALES - GENERAL: PAINLEVEL_OUTOF10: 0 - NO PAIN

## 2024-10-25 ASSESSMENT — PAIN - FUNCTIONAL ASSESSMENT: PAIN_FUNCTIONAL_ASSESSMENT: 0-10

## 2024-10-25 NOTE — PROGRESS NOTES
Occupational Therapy    Occupational Therapy Treatment    Name: Tatyana Mckeon  MRN: [id number]  : 1955  Date: 10/25/24    Time Entry:  Time Calculation  Start Time: 1302  Stop Time: 1357  Time Calculation (min): 55 min        OT Therapeutic Procedures Time Entry  Manual Therapy Time Entry: 42  Therapeutic Exercise Time Entry: 13                Assessment: MLD, good tissue pliability      Plan: Continue with POC as tolerated, monitor home program, assess new garments,           Subjective   General:  OT Last Visit  OT Received On: 10/02/24  General  Reason for Referral: lymphedema of breast  Referred By: MD Lis  General Comment: visit 9,  no auth onset 2023  Precautions:  Precautions Comment: none  Pain Assessment:  Pain Assessment  Pain Assessment: 0-10  0-10 (Numeric) Pain Score: 0 - No pain    Objective       Therapy/Activity: Therapeutic Exercise  Therapeutic Exercise Performed: Yes  Therapeutic Exercise Activity 1: shoulder flexion 10 reps  Therapeutic Exercise Activity 2: scapular retraction  Therapeutic Exercise Activity 3: shoulder extension    Therapeutic Activity  Therapeutic Activity Performed: Yes  Therapeutic Activity 1: Pt arrived wiht Wear Ease Jada bra with break down of fibrotic tissue noted.    Manual Therapy  Manual Therapy Performed: Yes  Manual Therapy Activity 1: girth measurements with volume reduction with RUE and truncal area  Manual Therapy Activity 2: Pt completed MLD tech with demo and edu for light sustained touch, in placed circles with skin traction with orientation for hand movements to increase lymph flow.  Pt required hand over hand cuing with VCs for increased accuracy of tech     Lymphedema Assessment    Lymphedema Assessments:  Lymphedema Assessments: Upper extremities  Right Upper Extremity:  R Thumb Distal Phalange Girth: 6 cm  R Thumb Proximal Phalange Girth: 6 cm  R Metacarpals (cm): 18 cm  R Palm (cm): 0 cm  R Wrist (cm): 17.5 cm  R 10 cm Above Wrist (cm):  22.5 cm  R 15 cm Above Wrist (cm): 26 cm  R 20 cm Above Wrist (cm): 26.5 cm  R Elbow (cm): 27 cm  R 5 cm Above Elbow: 30.5 cm  R 10 cm Above Elbow: 35 cm  R 20 cm Above Elbow: 42 cm  R Axilla: 0 cm  R Upper Extremity Total: 257  Left Upper Extremity:  L Thumb Distal Phalange Girth: 5.5 cm  L Thumb Proximal Phalange Girth: 6 cm  L Metacarpals (cm): 17.5 cm  L Palm (cm): 0 cm  L Wrist (cm): 17.5 cm  L 10 cm Above Wrist (cm): 24 cm  L 15 cm Above Wrist (cm): 27 cm  L 20 cm Above Wrist (cm): 28 cm  L Elbow (cm): 28 cm  L 5 cm Above Elbow: 31.5 cm  L 10cm Above Elbow: 37  L 20 cm Above Elbow: 42  L Axilla: 0 cm  Left upper extremity total: 264  UE Skin Appearance/Condition and Girth:  Other (comment): axillary 111.5 cm decreased  2 cm ; 124.5 nipple increased 1.5 cm from last session  Upper Extremity Evaluation:      Right Lower Extremity:     Left Lower Extremity:     LE Skin Appearance/Condition and Girth:     Lower Extremity Evaluation:     Head and Neck Measurements:     Head and Neck Appearance:     Trunk Skin Appearance/Condition and Girth:      Genital Skin Appearance/Condition and Girth:     Home Living:     ADL:     Prior Function:     Pain Assessment:  Pain Assessment: 0-10  0-10 (Numeric) Pain Score: 0 - No pain  Therapy Precautions:  Precautions Comment: none      OP EDUCATION:       Goals:  Active       Lymphedema       Pt will demo volume reduction in R breast in order for proper fitting of medical compression garments and increase ease in transfers and IADLs        Start:  07/19/24    Expected End:  10/25/24            Pt will I perform skin care for infection prevention and integrity of skin        Start:  07/19/24    Expected End:  10/25/24            Pt will be I with stating and demonstrating home exercise program in order to improve patients ability to perform self-care, household, work and/or leisure tasks.        Start:  07/19/24    Expected End:  10/25/24            Pt will complete self  mld in  order to promote increased tissue pliability for maintenance with chronic lymphedema        Start:  07/19/24    Expected End:  10/25/24

## 2024-11-06 NOTE — ASSESSMENT & PLAN NOTE
Lipid panel checked in September.  Total cholesterol 160, triglycerides 124, HDL 57 and LDL 78.  We ordered CT coronary calcium score as well but results not available.

## 2024-11-08 ENCOUNTER — APPOINTMENT (OUTPATIENT)
Dept: CARDIOLOGY | Facility: CLINIC | Age: 69
End: 2024-11-08
Payer: MEDICARE

## 2024-11-08 ENCOUNTER — APPOINTMENT (OUTPATIENT)
Dept: PRIMARY CARE | Facility: CLINIC | Age: 69
End: 2024-11-08
Payer: MEDICARE

## 2024-11-08 DIAGNOSIS — F17.210 CIGARETTE SMOKER: ICD-10-CM

## 2024-11-08 DIAGNOSIS — I10 PRIMARY HYPERTENSION: ICD-10-CM

## 2024-11-08 DIAGNOSIS — E78.00 HIGH CHOLESTEROL: ICD-10-CM

## 2024-11-08 DIAGNOSIS — Z82.49 FAMILY HISTORY OF ISCHEMIC HEART DISEASE: Primary | ICD-10-CM

## 2024-11-11 ENCOUNTER — APPOINTMENT (OUTPATIENT)
Dept: OCCUPATIONAL THERAPY | Facility: CLINIC | Age: 69
End: 2024-11-11
Payer: MEDICARE

## 2024-11-12 DIAGNOSIS — E11.9 TYPE 2 DIABETES MELLITUS WITHOUT COMPLICATION, WITHOUT LONG-TERM CURRENT USE OF INSULIN (MULTI): ICD-10-CM

## 2024-11-13 RX ORDER — LISINOPRIL 20 MG/1
20 TABLET ORAL DAILY
Qty: 90 TABLET | Refills: 1 | Status: SHIPPED | OUTPATIENT
Start: 2024-11-13

## 2024-11-15 ENCOUNTER — OFFICE VISIT (OUTPATIENT)
Dept: PRIMARY CARE | Facility: CLINIC | Age: 69
End: 2024-11-15

## 2024-11-15 VITALS
WEIGHT: 208 LBS | DIASTOLIC BLOOD PRESSURE: 60 MMHG | SYSTOLIC BLOOD PRESSURE: 100 MMHG | HEART RATE: 84 BPM | BODY MASS INDEX: 42.01 KG/M2

## 2024-11-15 DIAGNOSIS — G89.29 CHRONIC BILATERAL LOW BACK PAIN WITHOUT SCIATICA: Primary | ICD-10-CM

## 2024-11-15 DIAGNOSIS — M54.50 CHRONIC BILATERAL LOW BACK PAIN WITHOUT SCIATICA: Primary | ICD-10-CM

## 2024-11-15 DIAGNOSIS — Z00.00 ROUTINE GENERAL MEDICAL EXAMINATION AT HEALTH CARE FACILITY: Primary | ICD-10-CM

## 2024-11-15 DIAGNOSIS — C50.812 MALIGNANT NEOPLASM OF OVERLAPPING SITES OF LEFT BREAST IN FEMALE, ESTROGEN RECEPTOR POSITIVE: ICD-10-CM

## 2024-11-15 DIAGNOSIS — Z17.0 MALIGNANT NEOPLASM OF OVERLAPPING SITES OF LEFT BREAST IN FEMALE, ESTROGEN RECEPTOR POSITIVE: ICD-10-CM

## 2024-11-15 DIAGNOSIS — K21.9 GASTROESOPHAGEAL REFLUX DISEASE, UNSPECIFIED WHETHER ESOPHAGITIS PRESENT: ICD-10-CM

## 2024-11-15 DIAGNOSIS — F51.01 PRIMARY INSOMNIA: ICD-10-CM

## 2024-11-15 DIAGNOSIS — F31.9 BIPOLAR 1 DISORDER (MULTI): ICD-10-CM

## 2024-11-15 DIAGNOSIS — E11.9 TYPE 2 DIABETES MELLITUS WITHOUT COMPLICATION, WITHOUT LONG-TERM CURRENT USE OF INSULIN (MULTI): ICD-10-CM

## 2024-11-15 DIAGNOSIS — I10 PRIMARY HYPERTENSION: ICD-10-CM

## 2024-11-15 DIAGNOSIS — L40.9 PSORIASIS: ICD-10-CM

## 2024-11-15 DIAGNOSIS — J30.9 ALLERGIC RHINITIS, UNSPECIFIED SEASONALITY, UNSPECIFIED TRIGGER: ICD-10-CM

## 2024-11-15 DIAGNOSIS — R60.0 LOWER EXTREMITY EDEMA: ICD-10-CM

## 2024-11-15 PROBLEM — Z01.810 PREOP CARDIOVASCULAR EXAM: Status: RESOLVED | Noted: 2024-08-30 | Resolved: 2024-11-15

## 2024-11-15 LAB — POC HEMOGLOBIN A1C: 5.9 % (ref 4.2–6.5)

## 2024-11-15 PROCEDURE — 4010F ACE/ARB THERAPY RXD/TAKEN: CPT | Performed by: FAMILY MEDICINE

## 2024-11-15 PROCEDURE — 3048F LDL-C <100 MG/DL: CPT | Performed by: FAMILY MEDICINE

## 2024-11-15 PROCEDURE — G0439 PPPS, SUBSEQ VISIT: HCPCS | Performed by: FAMILY MEDICINE

## 2024-11-15 PROCEDURE — 3078F DIAST BP <80 MM HG: CPT | Performed by: FAMILY MEDICINE

## 2024-11-15 PROCEDURE — 3074F SYST BP LT 130 MM HG: CPT | Performed by: FAMILY MEDICINE

## 2024-11-15 PROCEDURE — 1157F ADVNC CARE PLAN IN RCRD: CPT | Performed by: FAMILY MEDICINE

## 2024-11-15 PROCEDURE — 1170F FXNL STATUS ASSESSED: CPT | Performed by: FAMILY MEDICINE

## 2024-11-15 PROCEDURE — 1125F AMNT PAIN NOTED PAIN PRSNT: CPT | Performed by: FAMILY MEDICINE

## 2024-11-15 PROCEDURE — 4004F PT TOBACCO SCREEN RCVD TLK: CPT | Performed by: FAMILY MEDICINE

## 2024-11-15 PROCEDURE — 83036 HEMOGLOBIN GLYCOSYLATED A1C: CPT | Performed by: FAMILY MEDICINE

## 2024-11-15 PROCEDURE — 1159F MED LIST DOCD IN RCRD: CPT | Performed by: FAMILY MEDICINE

## 2024-11-15 RX ORDER — PAROXETINE 30 MG/1
1 TABLET, FILM COATED ORAL
COMMUNITY
Start: 2024-10-07

## 2024-11-15 ASSESSMENT — ACTIVITIES OF DAILY LIVING (ADL)
GROCERY_SHOPPING: INDEPENDENT
DOING_HOUSEWORK: INDEPENDENT
DRESSING: INDEPENDENT
TAKING_MEDICATION: INDEPENDENT
BATHING: INDEPENDENT
MANAGING_FINANCES: INDEPENDENT

## 2024-11-15 ASSESSMENT — ENCOUNTER SYMPTOMS
HEADACHES: 0
UNEXPECTED WEIGHT CHANGE: 0
FATIGUE: 0
SLEEP DISTURBANCE: 0
BLOOD IN STOOL: 0
DIFFICULTY URINATING: 0
DIZZINESS: 0
TROUBLE SWALLOWING: 0
ABDOMINAL PAIN: 0
CONSTIPATION: 1
NERVOUS/ANXIOUS: 0
DYSPHORIC MOOD: 0
CHEST TIGHTNESS: 0
PALPITATIONS: 0
DIARRHEA: 0
POLYDIPSIA: 0
SHORTNESS OF BREATH: 0

## 2024-11-15 ASSESSMENT — PAIN SCALES - GENERAL: PAINLEVEL_OUTOF10: 5

## 2024-11-15 NOTE — PROGRESS NOTES
Subjective   Reason for Visit: Tatyana Mckeon is an 69 y.o. female here for a Medicare Wellness visit.     Past Medical, Surgical, and Family History reviewed and updated in chart.    Reviewed all medications by prescribing practitioner or clinical pharmacist (such as prescriptions, OTCs, herbal therapies and supplements) and documented in the medical record.    HPI  She did get her flu shot.   Increased her paxil due to mood swings. Lymphedema improving,  but is a big stress for her.  A few weeks ago she fractured the 4th metatarsal on the left.   Still hold arimitase inhibitor.   Saw Dr. Churchill in September. I reviewed the labs, which look great. She has a calcium score scheduled.   Doing lymph massage on herself.   Still with back pain and right knee pain. Working toward knee surgery for the February.     Patient Care Team:  Osvaldo Wilcox MD as PCP - General (Family Medicine)  Osvaldo iWlcox MD as PCP - O Medicare Advantage PCP  Osvaldo Wilcox MD as Primary Care Provider  Neo Hannon MD as Consulting Physician (Hematology and Oncology)  Carrie Osorio PA-C as Physician Assistant (Hematology and Oncology)     Review of Systems   Constitutional:  Negative for fatigue and unexpected weight change (between 206 and 208).   HENT:  Negative for congestion and trouble swallowing.    Respiratory:  Negative for chest tightness and shortness of breath.    Cardiovascular:  Negative for chest pain, palpitations and leg swelling.   Gastrointestinal:  Positive for constipation (just a little lately. adding more fiber.). Negative for abdominal pain, blood in stool and diarrhea.   Endocrine: Negative for polydipsia and polyuria.   Genitourinary:  Negative for difficulty urinating.   Neurological:  Negative for dizziness and headaches.   Psychiatric/Behavioral:  Negative for dysphoric mood and sleep disturbance. The patient is not nervous/anxious.    Having some swelling in the left foot especially, a bit more than  right.     Objective   Vitals:  /60   Pulse 84   Wt 94.3 kg (208 lb)   BMI 42.01 kg/m²       Physical Exam  Vitals reviewed.   Constitutional:       Appearance: Normal appearance.   HENT:      Right Ear: Tympanic membrane, ear canal and external ear normal.      Left Ear: Tympanic membrane, ear canal and external ear normal.      Nose: Nose normal.      Mouth/Throat:      Mouth: Mucous membranes are moist.      Pharynx: Oropharynx is clear.   Eyes:      Conjunctiva/sclera: Conjunctivae normal.   Neck:      Thyroid: No thyromegaly or thyroid tenderness.      Vascular: No carotid bruit.   Cardiovascular:      Rate and Rhythm: Normal rate and regular rhythm.      Heart sounds: No murmur heard.  Pulmonary:      Effort: Pulmonary effort is normal.      Breath sounds: Normal breath sounds.   Abdominal:      General: Abdomen is flat.      Palpations: Abdomen is soft. There is no mass.      Tenderness: There is no abdominal tenderness.   Musculoskeletal:      Cervical back: Neck supple.      Right lower leg: No edema.      Left lower leg: No edema.   Lymphadenopathy:      Cervical: No cervical adenopathy.   Skin:     General: Skin is warm and dry.   Neurological:      Mental Status: She is alert.   Psychiatric:         Mood and Affect: Mood normal.       Assessment & Plan  Type 2 diabetes mellitus without complication, without long-term current use of insulin (Multi)    Orders:    POCT glycosylated hemoglobin (Hb A1C) manually resulted    Routine general medical examination at health care facility  She will do the Cologuard, which she has at home.   She will add in bone broth.  Continue her current medications.  Continue her follow-up care with her breast cancer doctors.  Orders:    1 Year Follow Up In Primary Care - Wellness Exam; Future    Primary hypertension         Allergic rhinitis, unspecified seasonality, unspecified trigger         Gastroesophageal reflux disease, unspecified whether esophagitis  present         Malignant neoplasm of overlapping sites of left breast in female, estrogen receptor positive         Bipolar 1 disorder (Multi)         Primary insomnia         Psoriasis         Lower extremity edema                Advance Directives Discussion  16 - 20 minutes were spent discussing Advanced Care Planning (including a Living Will, Medical Power Of , as well as specific end of life choices and/or directives). The details of that discussion were documented in Advanced Directives Discussion section of the medical record.   POMANGO is Catarina Osullivan.

## 2024-11-15 NOTE — PATIENT INSTRUCTIONS
"Much of what you have is related to Kidney meridian. Strengthening this will help. Go to www.[employer].org and look under the \"Health\" tab for the \"Recipe\" tab. Here you can search under meridian (ie, Kidney) for recipes that can help. Reading the descriptions above each recipe will help you learn what foods are good for what organs/meridians.      Do the 3rd energy gate for sure twice a day.     Can be helpful to lay down flat midday to help get rid of the fluid.   "

## 2024-11-16 NOTE — PROGRESS NOTES
Subjective   Patient ID: Tatyana Mckeon is a 69 y.o. female who presents for No chief complaint on file..    HPI   She presents today for her acupuncture treatment following her wellness exam.    Review of Systems    Objective   There were no vitals taken for this visit.    Physical Exam  She is alert, no apparent stress, her affect is pleasant.  Respirations are easy.    Assessment/Plan   Diagnoses and all orders for this visit:  Chronic bilateral low back pain without sciatica  She will continue with her regular acupuncture treatments.    Acupuncture treatment Colie is a following points bilaterally LI 4, PC 6, ST 36, ST 40, LV 2.  These retained for 15 minutes then placed her feet closer to her with bent knees and we were able to place needles at BL 40, 57 and 60 bilaterally.  These retained for 15 minutes.  She tolerated this well.

## 2024-11-20 ENCOUNTER — APPOINTMENT (OUTPATIENT)
Dept: PRIMARY CARE | Facility: CLINIC | Age: 69
End: 2024-11-20
Payer: MEDICARE

## 2024-11-20 ENCOUNTER — TREATMENT (OUTPATIENT)
Dept: OCCUPATIONAL THERAPY | Facility: CLINIC | Age: 69
End: 2024-11-20
Payer: MEDICARE

## 2024-11-20 DIAGNOSIS — I89.0 LYMPHEDEMA OF BREAST: ICD-10-CM

## 2024-11-20 DIAGNOSIS — I89.0 LYMPHEDEMA: ICD-10-CM

## 2024-11-20 PROCEDURE — 97110 THERAPEUTIC EXERCISES: CPT | Mod: GO

## 2024-11-20 PROCEDURE — 97140 MANUAL THERAPY 1/> REGIONS: CPT | Mod: GO

## 2024-11-20 PROCEDURE — 97535 SELF CARE MNGMENT TRAINING: CPT | Mod: GO

## 2024-11-20 ASSESSMENT — ACTIVITIES OF DAILY LIVING (ADL): HOME_MANAGEMENT_TIME_ENTRY: 14

## 2024-11-20 ASSESSMENT — PAIN SCALES - GENERAL: PAINLEVEL_OUTOF10: 0 - NO PAIN

## 2024-11-20 ASSESSMENT — PAIN - FUNCTIONAL ASSESSMENT: PAIN_FUNCTIONAL_ASSESSMENT: 0-10

## 2024-11-20 NOTE — PROGRESS NOTES
"Occupational Therapy/  Re Check     Occupational Therapy Treatment    Name: Tatyana Mckeon  MRN: [id number]  : 1955  Date: 24    Time Entry:  Time Calculation  Start Time: 1104  Stop Time: 1159  Time Calculation (min): 55 min        OT Therapeutic Procedures Time Entry  Manual Therapy Time Entry: 25  Self Care/Home Management (ADLs) Time Entry: 14  Therapeutic Exercise Time Entry: 16                Assessment:  garment edu, muscle fatigue, lymph flow      Plan: Continue with POC as tolerated, monitor home program, assess new garments,    Pt is phase 2 stage 2 with Right breast and RUE lymphedema , recommending compression Jada bra size 1X color beige , quantity 2 to breakdown fibrotic tissue on R breast and L breast post biopsy this 2024 , 20-30 mmHg for long term maintenance of the chronic condition of lymphedema.        Subjective  \" I feel this past Monday in the [employer] and fell on my left side and I had my arm abrasions\"    General:  OT Last Visit  OT Received On: 10/25/24  General  Reason for Referral: lymphedema of breast  Referred By: MD Lis  General Comment: visit 10,  no auth onset 2023  Precautions:  Precautions Comment: none  Pain Assessment:  Pain Assessment  Pain Assessment: 0-10  0-10 (Numeric) Pain Score: 0 - No pain    Objective    Therapy/Activity: Therapeutic Exercise  Therapeutic Exercise Performed: Yes  Therapeutic Exercise Activity 1: supine shoulder flexion 10 reps with dowel leonardo  Therapeutic Exercise Activity 2: shoulder horizontal adduction/abduction in supine 10 reps  Therapeutic Exercise Activity 3: sitting at EOC, shoulder extension for falls prevention    Therapeutic Activity  Therapeutic Activity Performed: Yes  Therapeutic Activity 1: Discussed with pt about compression bra to address breaking up fibrotic tissue.  pt in agreement to order Jada bra size 1x.  showed online various brands. pt will continue with wearease this date    Manual Therapy  Manual " Therapy Performed: Yes  Manual Therapy Activity 1: girth measurements with reduction in axillary area, LUE  Sensory:        Lymphedema Assessment    Lymphedema Assessments:  Lymphedema Assessments: Upper extremities  Right Upper Extremity:  R Thumb Distal Phalange Girth: 6 cm  R Thumb Proximal Phalange Girth: 6 cm  R Metacarpals (cm): 18.5 cm  R Palm (cm): 0 cm  R Wrist (cm): 17.5 cm  R 10 cm Above Wrist (cm): 23.5 cm  R 15 cm Above Wrist (cm): 26.5 cm  R 20 cm Above Wrist (cm): 27 cm  R Elbow (cm): 28 cm  R 5 cm Above Elbow: 32 cm  R 10 cm Above Elbow: 34.5 cm  R 20 cm Above Elbow: 41 cm  R Axilla: 0 cm  R Upper Extremity Total: 260.5  Left Upper Extremity:  L Thumb Distal Phalange Girth: 5.5 cm  L Thumb Proximal Phalange Girth: 6 cm  L Metacarpals (cm): 18 cm  L Palm (cm): 0 cm  L Wrist (cm): 17.3 cm  L 10 cm Above Wrist (cm): 23.5 cm  L 15 cm Above Wrist (cm): 27 cm  L 20 cm Above Wrist (cm): 28 cm  L Elbow (cm): 28.3 cm  L 5 cm Above Elbow: 31.5 cm  L 10cm Above Elbow: 35.5  L 20 cm Above Elbow: 40  L Axilla: 0 cm  Left upper extremity total: 260.6  UE Skin Appearance/Condition and Girth:  Other (comment): axillary 110.8 cm .7 cm ; 125 nipple no change cm from last session     Pain Assessment:  Pain Assessment: 0-10  0-10 (Numeric) Pain Score: 0 - No pain  Therapy Precautions:  Precautions Comment: none    OP EDUCATION:       Goals:  Active       Lymphedema       Pt will demo volume reduction in R breast in order for proper fitting of medical compression garments and increase ease in transfers and IADLs  (Progressing)       Start:  07/19/24    Expected End:  12/27/24            Pt will I perform skin care for infection prevention and integrity of skin  (Met)       Start:  07/19/24    Expected End:  10/25/24    Resolved:  11/20/24         Pt will be I with stating and demonstrating home exercise program in order to improve patients ability to perform self-care, household, work and/or leisure tasks.  (Progressing)        Start:  07/19/24    Expected End:  12/27/24            Pt will complete self  mld in order to promote increased tissue pliability for maintenance with chronic lymphedema  (Met)       Start:  07/19/24    Expected End:  10/25/24    Resolved:  11/20/24

## 2024-11-27 ENCOUNTER — TREATMENT (OUTPATIENT)
Dept: OCCUPATIONAL THERAPY | Facility: CLINIC | Age: 69
End: 2024-11-27
Payer: MEDICARE

## 2024-11-27 ENCOUNTER — TELEPHONE (OUTPATIENT)
Dept: SURGERY | Facility: CLINIC | Age: 69
End: 2024-11-27
Payer: MEDICARE

## 2024-11-27 DIAGNOSIS — I89.0 LYMPHEDEMA: ICD-10-CM

## 2024-11-27 DIAGNOSIS — I89.0 LYMPHEDEMA OF BREAST: ICD-10-CM

## 2024-11-27 PROCEDURE — 97110 THERAPEUTIC EXERCISES: CPT | Mod: GO

## 2024-11-27 PROCEDURE — 97140 MANUAL THERAPY 1/> REGIONS: CPT | Mod: GO

## 2024-11-27 NOTE — PROGRESS NOTES
Occupational Therapy    Occupational Therapy Treatment    Name: Tatyana Mckeon  MRN: [id number]  : 1955  Date: 24    Time Entry:  Time Calculation  Start Time: 1504  Stop Time: 1603  Time Calculation (min): 59 min        OT Therapeutic Procedures Time Entry  Manual Therapy Time Entry: 40  Therapeutic Exercise Time Entry: 19                Assessment: hivamat, lymph flow, muscle fatigue      Plan: Continue with POC as tolerated, monitor home program, assess new garments,           Subjective   General:  OT Last Visit  OT Received On: 24  General  Reason for Referral: lymphedema of breast  Referred By: MD Lis  General Comment: visit 11,  no auth onset 2023  Precautions:  Precautions Comment: none  Pain Assessment:       Objective       Therapy/Activity: Therapeutic Exercise  Therapeutic Exercise Performed: Yes  Therapeutic Exercise Activity 1: supine shoulder flexion 10 reps x 2 sets with dowel leonardo  Therapeutic Exercise Activity 2: supine scapular strengthening  palm down horizontal abduction 90 degree shoulder 10 reps orange theraband  Therapeutic Exercise Activity 3: supine scapular strengthening pal up  horizontal abduction 90 degree shoulder 10 reps orange theraband    Therapeutic Activity  Therapeutic Activity Performed: Yes  Therapeutic Activity 1: k taping with R breast to break up fibrotic tissue    Manual Therapy  Manual Therapy Performed: Yes  Manual Therapy Activity 1: girth measurements with volume reduction noted on BUE. marginal increased noted on axillary area.  Manual Therapy Activity 2: hivamat on R breast and RUE with MLD to promote lymph flow     Lymphedema Assessment    Lymphedema Assessments:  Lymphedema Assessments: Upper extremities  Right Upper Extremity:  R Thumb Distal Phalange Girth: 5.8 cm  R Thumb Proximal Phalange Girth: 6.2 cm  R Metacarpals (cm): 18 cm  R Palm (cm): 0 cm  R Wrist (cm): 17 cm  R 10 cm Above Wrist (cm): 23.8 cm  R 15 cm Above Wrist (cm): 26 cm  R  20 cm Above Wrist (cm): 27 cm  R Elbow (cm): 27 cm  R 5 cm Above Elbow: 31.5 cm  R 10 cm Above Elbow: 34 cm  R 20 cm Above Elbow: 41.5 cm  R Axilla: 0 cm  R Upper Extremity Total: 257.8  Left Upper Extremity:  L Thumb Distal Phalange Girth: 5.6 cm  L Thumb Proximal Phalange Girth: 6 cm  L Metacarpals (cm): 17.6 cm  L Palm (cm): 0 cm  L Wrist (cm): 17.6 cm  L 10 cm Above Wrist (cm): 24 cm  L 15 cm Above Wrist (cm): 27 cm  L 20 cm Above Wrist (cm): 28 cm  L Elbow (cm): 28 cm  L 5 cm Above Elbow: 32 cm  L 10cm Above Elbow: 34  L 20 cm Above Elbow: 40  L Axilla: 0 cm  Left upper extremity total: 259.8  UE Skin Appearance/Condition and Girth:  Other (comment): axillary 112 cm increased 2.2  cm ; 126 nipple increased 1 cm from last session     Therapy Precautions:  Precautions Comment: none  Outcome Measures:    OP EDUCATION: taping removal by three days of application        Goals:  Active       Lymphedema       Pt will demo volume reduction in R breast in order for proper fitting of medical compression garments and increase ease in transfers and IADLs  (Progressing)       Start:  07/19/24    Expected End:  12/27/24            Pt will I perform skin care for infection prevention and integrity of skin  (Met)       Start:  07/19/24    Expected End:  10/25/24    Resolved:  11/20/24         Pt will be I with stating and demonstrating home exercise program in order to improve patients ability to perform self-care, household, work and/or leisure tasks.  (Progressing)       Start:  07/19/24    Expected End:  12/27/24            Pt will complete self  mld in order to promote increased tissue pliability for maintenance with chronic lymphedema  (Met)       Start:  07/19/24    Expected End:  10/25/24    Resolved:  11/20/24

## 2024-11-27 NOTE — TELEPHONE ENCOUNTER
Called patient today in regards to OV scheduled with Dr. Boone on 1/24/2024. This day is not available, she is not seeing patients in the office. Patient at this time did not answer the phone, left message. Let her know on message we can schedule her for the day before 1/23 at 2:15 PM for OV. Informed her to call the office back to confirm is this would work for her or not.    Sydney Guillermo MA

## 2024-12-04 ENCOUNTER — APPOINTMENT (OUTPATIENT)
Dept: PRIMARY CARE | Facility: CLINIC | Age: 69
End: 2024-12-04

## 2024-12-11 ENCOUNTER — APPOINTMENT (OUTPATIENT)
Dept: OCCUPATIONAL THERAPY | Facility: CLINIC | Age: 69
End: 2024-12-11
Payer: MEDICARE

## 2024-12-11 DIAGNOSIS — I89.0 LYMPHEDEMA: ICD-10-CM

## 2024-12-11 DIAGNOSIS — I89.0 LYMPHEDEMA OF BREAST: ICD-10-CM

## 2024-12-11 DIAGNOSIS — B37.31 VAGINAL CANDIDIASIS: ICD-10-CM

## 2024-12-11 PROCEDURE — 97110 THERAPEUTIC EXERCISES: CPT | Mod: GO

## 2024-12-11 PROCEDURE — 97535 SELF CARE MNGMENT TRAINING: CPT | Mod: GO

## 2024-12-11 PROCEDURE — 97140 MANUAL THERAPY 1/> REGIONS: CPT | Mod: GO

## 2024-12-11 ASSESSMENT — ACTIVITIES OF DAILY LIVING (ADL): HOME_MANAGEMENT_TIME_ENTRY: 21

## 2024-12-11 ASSESSMENT — PAIN - FUNCTIONAL ASSESSMENT: PAIN_FUNCTIONAL_ASSESSMENT: 0-10

## 2024-12-11 ASSESSMENT — PAIN SCALES - GENERAL: PAINLEVEL_OUTOF10: 0 - NO PAIN

## 2024-12-11 NOTE — PROGRESS NOTES
Occupational Therapy    Occupational Therapy Treatment    Name: Tatyana Mckeon  MRN: [id number]  : 1955  Date: 24    Time Entry:  Time Calculation  Start Time: 1402  Stop Time: 1509  Time Calculation (min): 67 min        OT Therapeutic Procedures Time Entry  Manual Therapy Time Entry: 23  Self Care/Home Management (ADLs) Time Entry:   Therapeutic Exercise Time Entry: 13                Assessment:  garment edu and instruction with good carry over, muscle fatigue      Plan: Continue with POC as tolerated, monitor home program, assess new garments,           Subjective   General:  OT Last Visit  OT Received On: 24  General  Reason for Referral: lymphedema of breast  Referred By: MD Lis  General Comment: visit 12,  no auth onset 2023  Precautions:  Precautions Comment: none  Pain Assessment:  Pain Assessment  Pain Assessment: 0-10  0-10 (Numeric) Pain Score: 0 - No pain    Objective       Therapy/Activity: Therapeutic Exercise  Therapeutic Exercise Performed: Yes  Therapeutic Exercise Activity 1: ER with orange theraband 10 reps 3 sec hold  Therapeutic Exercise Activity 2: bicep curls orange theraband 10 reps 3 sec hold  Therapeutic Exercise Activity 3: tricep with self anchoring each UE with 10 reps 3 sec hold    Therapeutic Activity  Therapeutic Activity Performed: Yes  Therapeutic Activity 1: pt arrived with new medical compression sleeve with edu for donning/doffing using donning gloves and walking tech for ease of management.  Therapeutic Activity 2: Pt talked with pump rep about pump decline and discussion with sunmed approval garments.   not billed time    Manual Therapy  Manual Therapy Performed: Yes  Manual Therapy Activity 1: girth measurements with volume reduction noted in axillary and truncal area  Sensory:     Cognitive Skill Development:     Community/Work Reintegration Training:     Community Mobility:     Vision:     Strength:     Other Activity:     Home environment:      Lymphedema Assessment    Lymphedema Assessments:  Lymphedema Assessments: Upper extremities  Right Upper Extremity:  R Thumb Distal Phalange Girth: 6 cm  R Thumb Proximal Phalange Girth: 6 cm  R Metacarpals (cm): 18.5 cm  R Palm (cm): 0 cm  R Wrist (cm): 17 cm  R 10 cm Above Wrist (cm): 23.5 cm  R 15 cm Above Wrist (cm): 26.3 cm  R 20 cm Above Wrist (cm): 27 cm  R Elbow (cm): 27 cm  R 5 cm Above Elbow: 32 cm  R 10 cm Above Elbow: 34 cm  R 20 cm Above Elbow: 41.5 cm  R Axilla: 0 cm  R Upper Extremity Total: 258.8  Left Upper Extremity:  L Thumb Distal Phalange Girth: 5.8 cm  L Thumb Proximal Phalange Girth: 6 cm  L Metacarpals (cm): 18 cm  L Palm (cm): 0 cm  L Wrist (cm): 18 cm  L 10 cm Above Wrist (cm): 24 cm  L 15 cm Above Wrist (cm): 27.5 cm  L 20 cm Above Wrist (cm): 28.5 cm  L Elbow (cm): 29 cm  L 5 cm Above Elbow: 32.5 cm  L 10cm Above Elbow: 35.5  L 20 cm Above Elbow: 42  L Axilla: 0 cm  Left upper extremity total: 266.8  UE Skin Appearance/Condition and Girth:  Other (comment): axillary 111 cm decreased 1 cm ; 125.5  nipple decreased .5  cm from last session  Prior Function:     Pain Assessment:  Pain Assessment: 0-10  0-10 (Numeric) Pain Score: 0 - No pain  Therapy Precautions:  Precautions Comment: none      OP EDUCATION:       Goals:  Active       Lymphedema       Pt will demo volume reduction in R breast in order for proper fitting of medical compression garments and increase ease in transfers and IADLs  (Progressing)       Start:  07/19/24    Expected End:  12/27/24            Pt will I perform skin care for infection prevention and integrity of skin  (Met)       Start:  07/19/24    Expected End:  10/25/24    Resolved:  11/20/24         Pt will be I with stating and demonstrating home exercise program in order to improve patients ability to perform self-care, household, work and/or leisure tasks.  (Progressing)       Start:  07/19/24    Expected End:  12/27/24            Pt will complete self  mld in  order to promote increased tissue pliability for maintenance with chronic lymphedema  (Met)       Start:  07/19/24    Expected End:  10/25/24    Resolved:  11/20/24

## 2024-12-17 RX ORDER — NYSTATIN 100000 U/G
CREAM TOPICAL
Qty: 60 G | Refills: 1 | Status: SHIPPED | OUTPATIENT
Start: 2024-12-17

## 2024-12-18 ENCOUNTER — APPOINTMENT (OUTPATIENT)
Dept: PRIMARY CARE | Facility: CLINIC | Age: 69
End: 2024-12-18
Payer: MEDICARE

## 2024-12-24 ENCOUNTER — HOSPITAL ENCOUNTER (OUTPATIENT)
Dept: RADIOLOGY | Facility: HOSPITAL | Age: 69
Discharge: HOME | End: 2024-12-24
Payer: MEDICARE

## 2024-12-24 DIAGNOSIS — R94.31 ABNORMAL EKG: ICD-10-CM

## 2024-12-24 PROCEDURE — 75571 CT HRT W/O DYE W/CA TEST: CPT

## 2024-12-30 ENCOUNTER — HOSPITAL ENCOUNTER (OUTPATIENT)
Dept: RADIOLOGY | Facility: HOSPITAL | Age: 69
Discharge: HOME | End: 2024-12-30
Payer: MEDICARE

## 2024-12-30 VITALS — HEIGHT: 60 IN | WEIGHT: 207 LBS | BODY MASS INDEX: 40.64 KG/M2

## 2024-12-30 DIAGNOSIS — C50.911 INFILTRATING DUCTAL CARCINOMA OF RIGHT BREAST, STAGE 2 (MULTI): ICD-10-CM

## 2024-12-30 DIAGNOSIS — N60.92 ATYPICAL DUCTAL HYPERPLASIA OF LEFT BREAST: ICD-10-CM

## 2024-12-30 DIAGNOSIS — C50.811 MALIGNANT NEOPLASM OF OVERLAPPING SITES OF RIGHT FEMALE BREAST: ICD-10-CM

## 2024-12-30 PROCEDURE — G0279 TOMOSYNTHESIS, MAMMO: HCPCS | Performed by: RADIOLOGY

## 2024-12-30 PROCEDURE — 77066 DX MAMMO INCL CAD BI: CPT | Performed by: RADIOLOGY

## 2024-12-30 PROCEDURE — 77062 BREAST TOMOSYNTHESIS BI: CPT

## 2025-01-07 NOTE — PROGRESS NOTES
DIVISION OF MEDICAL ONCOLOGY    Patient ID: Tatyana Mckeon is a 69 y.o. female.  MRN: [id number]  : 1955  The patient presents to clinic today for her history of breast cancer.     Cancer Staging   Breast cancer (Multi)  Staging form: Breast, AJCC 8th Edition  - Pathologic stage from 2023: Stage IIA (pT2, pN1a, cM0, G3, ER+, LA+, HER2-, Oncotype DX score: 22) - Signed by Neo Hannon MD on 2024    Infiltrating ductal carcinoma of right breast, stage 2 (Multi)  Staging form: Breast, AJCC 8th Edition  - Clinical: Stage IIB (cT2, cN1, cM0, G3, ER+, LA+, HER2-) - Signed by Olivia Boone MD on 3/14/2024    Diagnostic/Therapeutic History:  -23: US-guided CNB showed IDC grade 2, LN was negative. ER >95% LA 90% Her2-negative (2+ IHC; LUIS ratio 1.7).  -23: Right lumpectomy/SLNBx performed. 3 cm of grade 3 IDC noted. 1/ SLN was positive for carcinoma. LVI indeterminate, +LOC measuring 2 mm.  -23: Right ALND: 0/15 LN's involved.  -PET/CT 23: 8 mm focus of soft tissue density (SUV 4.1) in lower right axilla, unclear etiology (?reactive). Otherwise negative. Follow-up US was unremarkable.  pT2 pN1a G3 [stage IIA]  Oncotype 22, no chemotherapy recommended.  Anastrozole initiated 10/2023 (RT was delayed for seroma). Switched to exemestane then stopped due to side effects   Adjuvant RT completed 12/15/23.  2024 right lumpectomy scar has associate mass. - was a seroma   2024 also showed left breast calcifications that correlated to atypical ductal hyperplasia. Underwent a lumpectomy 24 which confirmed this      History of Present Illness (HPI)/Interval History:  Tatyana Mckeon presents today for routine FUV. Despite discussing letrozole, never started. Interested in maybe restarting anastrozole.   Following with Dr. Boone regarding imaging in Feb. Next imaging in 2025   Since stopping her exemestane her depression is better.  Lymphedema in the right breast is improving.  Though  "since wearing it has had a lot of petechiae in the left arm.   She does endorse bilateral lower extremity dependent edema - working with PCP regarding possible venous insuffiencey. Has lasix at home takes if 2+ weight gain in a day.   She has some orthopedic issues including arthritis in knee. She plans to have a joint replacement soon.   She broke another metatarsal recently, healing well.   No new bone pain, dyspnea, cough, unintentional weight loss, abdominal pain.  She continues to smoke but it continuing to cut back.   She had been more \"edgy\" and anxious and does have some more depressive symptoms. Continues to work with psych NP for medication adjustments. Now on paxil 40 mg, does feel better with this increase.     Review of Systems:  14-point ROS otherwise negative, as per HPI/Interval History.    Past Medical History:   Diagnosis Date    Acquired lymphedema     right breast    Atypical ductal hyperplasia, breast     Lt breast 2024    Bipolar 1 disorder (Multi)     Breast cancer (Multi) 06/2023    Right Breast - IDC and DCIS    Depression     Diabetes mellitus (Multi)     Type 2    Endometrial cancer (Multi)     Hyperlipidemia     Hypertension     Osteoarthritis of right knee     Peripheral edema     Personal history of irradiation     Psoriasis     Toe fracture, left      Patient Active Problem List   Diagnosis    Allergic rhinitis    Anxiety    Bipolar 1 disorder (Multi)    Candidiasis of skin and nails    Gastroesophageal reflux disease    High blood pressure    High cholesterol    Insomnia    Psoriasis    Type II diabetes mellitus (Multi)    Cigarette smoker    Obesity with body mass index 30 or greater    Breast cancer (Multi)    Chronic right-sided low back pain with sciatica    Endometrial cancer (Multi)    Infiltrating ductal carcinoma of right breast, stage 2 (Multi)    Primary osteoarthritis of both knees    Breast pain, right    Lymphedema    Alopecia    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 (Multi)    Microcalcifications of the breast    Atypical ductal hyperplasia of left breast    Chronic bilateral low back pain without sciatica    Chronic pain of right knee    Family history of ischemic heart disease        Past Surgical History:   Procedure Laterality Date    AXILLARY NODE DISSECTION  08/16/2023    Right Axillary Dissection and Evacuation of a right breast hematoma    BI MAMMO GUIDED BREAST RIGHT LOCALIZATION Right 07/21/2023    Wire localization lumpectomy right breast with sentinel node biopsy    BREAST BIOPSY Left 07/17/2024    Sterotactic    BREAST LUMPECTOMY  2000    Left breast    BREAST LUMPECTOMY W/ NEEDLE LOCALIZATION Left 09/06/2024    HYSTERECTOMY  2014    LAPAROTOMY OOPHERECTOMY  2014    US GUIDED BIOPSY LYMPH NODE SUPERFICIAL  06/07/2023    Ultrasound guided right breast and axillary lymph node biopsy       Social History     Socioeconomic History    Marital status:    Occupational History    Occupation: Retired   Tobacco Use    Smoking status: Every Day     Current packs/day: 0.50     Average packs/day: 0.5 packs/day for 40.0 years (20.0 ttl pk-yrs)     Types: Cigarettes     Passive exposure: Current    Smokeless tobacco: Never   Vaping Use    Vaping status: Never Used   Substance and Sexual Activity    Alcohol use: Yes     Comment: 2x a year    Drug use: Not Currently     Comment: SMOKED MJ 40 YEARS AGO    Sexual activity: Defer     Social Drivers of Health     Food Insecurity: No Food Insecurity (11/16/2023)    Hunger Vital Sign     Worried About Running Out of Food in the Last Year: Never true     Ran Out of Food in the Last Year: Never true       Family History   Problem Relation Name Age of Onset    Stroke Mother      Other (high blood pressure) Father      Prostate cancer Father      Heart disease Father          with MI at age 71    Diabetes Father      Hypertension Father      Diabetes Sister      Hypertension Sister      Other (oral cancer)  Brother      Hypertension Brother         Allergies:   Allergies   Allergen Reactions    Pollen Extracts Itching    Cephalexin Diarrhea    Naproxen Unknown         Current Outpatient Medications:     acetaminophen (Tylenol) 500 mg tablet, Take 1 tablet (500 mg) by mouth 2 times a day., Disp: , Rfl:     amLODIPine (Norvasc) 10 mg tablet, TAKE ONE TABLET BY MOUTH DAILY, Disp: 90 tablet, Rfl: 1    aspirin 81 mg EC tablet, Take 1 tablet (81 mg) by mouth once daily., Disp: , Rfl:     atorvastatin (Lipitor) 10 mg tablet, TAKE ONE TABLET BY MOUTH EVERY OTHER DAY, Disp: 45 tablet, Rfl: 1    blood sugar diagnostic (Accu-Chek Guide test strips) strip, 3 times a day., Disp: , Rfl:     blood-glucose meter misc, 3 times a day., Disp: , Rfl:     famotidine (Pepcid) 20 mg tablet, Take 1 tablet (20 mg) by mouth once daily., Disp: , Rfl:     furosemide (Lasix) 20 mg tablet, TAKE 1-2 TABLETS (20-40 MG) BY MOUTH 2 TIMES A DAY (Patient taking differently: Take 1-2 tablets (20-40 mg) by mouth 2 times a day as needed.), Disp: 90 tablet, Rfl: 1    ibuprofen 200 mg tablet, Take 1 tablet (200 mg) by mouth 2 times a day., Disp: , Rfl:     ketoconazole (NIZOral) 2 % shampoo, Apply topically., Disp: , Rfl:     LaMICtal 100 mg tablet, Take 1 tablet (100 mg) by mouth once daily., Disp: , Rfl:     lisinopril 20 mg tablet, TAKE ONE TABLET BY MOUTH DAILY, Disp: 90 tablet, Rfl: 1    loratadine (Claritin) 10 mg tablet, Take 1 tablet (10 mg) by mouth once daily., Disp: , Rfl:     metFORMIN (Glucophage) 500 mg tablet, TAKE ONE TABLET BY MOUTH THREE TIMES A DAY, Disp: 270 tablet, Rfl: 1    multivitamin capsule, Take 1 capsule by mouth once daily., Disp: , Rfl:     Neurontin 400 mg capsule, Take 1 capsule (400 mg) by mouth once daily., Disp: , Rfl:     nystatin (Mycostatin) cream, APPLY EXTERNALLY TWICE A DAILY, Disp: 60 g, Rfl: 1    omega 3-dha-epa-fish oil (Fish OiL) 1,000 mg (120 mg-180 mg) capsule, Take 1 capsule (1,000 mg) by mouth once daily.,  Disp: , Rfl:     PARoxetine (Paxil) 30 mg tablet, Take 1 tablet (30 mg) by mouth early in the morning.., Disp: , Rfl:     QUEtiapine (SEROquel) 25 mg tablet, Take 0.5 tablets (12.5 mg) by mouth once daily at bedtime., Disp: , Rfl:     Topamax 25 mg tablet, Take 1 tablet (25 mg) by mouth once daily., Disp: , Rfl:     triamcinolone (Kenalog) 0.1 % cream, Apply to affected area twice daily as needed, Disp: 30 g, Rfl: 0    Wellbutrin  mg 24 hr tablet, Take 1 tablet (300 mg) by mouth once daily in the morning., Disp: , Rfl:      Objective    BSA: There is no height or weight on file to calculate BSA.  There were no vitals taken for this visit.    ECOG Performance Status:  The ECOG performance scale today is ECO- Restricted in physically strenuous activity.  Carries out light duty.    Physical Exam  Vitals reviewed.   Constitutional:       General: She is awake. She is not in acute distress.     Appearance: Normal appearance. She is not toxic-appearing.   HENT:      Head: Normocephalic and atraumatic.      Mouth/Throat:      Mouth: Mucous membranes are moist.      Pharynx: Oropharynx is clear.   Eyes:      General: No scleral icterus.     Extraocular Movements: Extraocular movements intact.      Conjunctiva/sclera: Conjunctivae normal.      Pupils: Pupils are equal, round, and reactive to light.   Neck:      Trachea: Trachea and phonation normal. No tracheal tenderness.   Cardiovascular:      Rate and Rhythm: Normal rate and regular rhythm.      Pulses: Normal pulses.      Heart sounds: Normal heart sounds. No murmur heard.  Pulmonary:      Effort: Pulmonary effort is normal. No respiratory distress.      Breath sounds: Normal breath sounds.   Chest:      Chest wall: No mass or deformity.   Breasts:     Right: No swelling, mass, nipple discharge, skin change or tenderness.      Left: No swelling, mass, nipple discharge, skin change or tenderness.      Comments: S/p bilateral lumpectomies with mild lymphedema in  the breast   Abdominal:      General: Bowel sounds are normal. There is no distension.      Palpations: Abdomen is soft. There is no mass.      Tenderness: There is no abdominal tenderness. There is no guarding.   Musculoskeletal:         General: No swelling or tenderness. Normal range of motion.      Cervical back: Normal range of motion.   Lymphadenopathy:      Upper Body:      Right upper body: No supraclavicular, axillary or pectoral adenopathy.      Left upper body: No supraclavicular, axillary or pectoral adenopathy.   Skin:     General: Skin is warm and dry.      Capillary Refill: Capillary refill takes less than 2 seconds.      Coloration: Skin is not jaundiced.      Findings: No petechiae or rash.   Neurological:      General: No focal deficit present.      Mental Status: She is alert and oriented to person, place, and time.      Motor: No weakness.   Psychiatric:         Mood and Affect: Mood normal.         Behavior: Behavior normal.         Thought Content: Thought content normal.         Judgment: Judgment normal.         Laboratory Data:  Lab Results   Component Value Date    WBC 10.1 08/22/2024    HGB 13.3 08/22/2024    HCT 40.1 08/22/2024    MCV 94 08/22/2024     08/22/2024       Chemistry    Lab Results   Component Value Date/Time     09/12/2024 0933    K 4.0 09/12/2024 0933     09/12/2024 0933    CO2 24 09/12/2024 0933    BUN 21 09/12/2024 0933    CREATININE 0.65 09/12/2024 0933    Lab Results   Component Value Date/Time    CALCIUM 9.0 09/12/2024 0933    ALKPHOS 70 04/26/2024 1250    AST 13 04/26/2024 1250    ALT 10 04/26/2024 1250    BILITOT 0.3 04/26/2024 1250        Radiology: N/A    Pathology: N/A      Assessment/Plan:  Tatyana Mckeon is a 69 y.o. female with a history of right-sided breast cancer, who presents today for follow-up.     Breast cancer (CMS/HCC)  - Never started letrozole due to side effect concerns. Agreebale to retry anastrozole, script sent. Encouraged biotin  use for hair loss  - Mammogram 12/30/2024: cat 3, due for bilateral dx mammogram in 6 months. Seeing Dr. Boone next month  - She will consider adjuvant Zometa. Potential toxicities discussed- declined for now.    There is no evidence of breast cancer recurrence based on her clinical exam today.      Disposition.  - RTC ~ 4 months with Carrie Osorio PA-C   - She has our contact information and was instructed to call with concerns/questions in the interim.    No orders of the defined types were placed in this encounter.     Carrie Osorio PA-C  Hematology and Medical Oncology  [facility]

## 2025-01-08 ENCOUNTER — TREATMENT (OUTPATIENT)
Dept: OCCUPATIONAL THERAPY | Facility: CLINIC | Age: 70
End: 2025-01-08
Payer: MEDICARE

## 2025-01-08 ENCOUNTER — OFFICE VISIT (OUTPATIENT)
Dept: HEMATOLOGY/ONCOLOGY | Facility: CLINIC | Age: 70
End: 2025-01-08
Payer: MEDICARE

## 2025-01-08 VITALS
BODY MASS INDEX: 41.31 KG/M2 | DIASTOLIC BLOOD PRESSURE: 65 MMHG | HEART RATE: 99 BPM | OXYGEN SATURATION: 98 % | SYSTOLIC BLOOD PRESSURE: 116 MMHG | TEMPERATURE: 94.8 F | WEIGHT: 211.53 LBS | RESPIRATION RATE: 18 BRPM

## 2025-01-08 DIAGNOSIS — Z17.0 MALIGNANT NEOPLASM OF BREAST IN FEMALE, ESTROGEN RECEPTOR POSITIVE, UNSPECIFIED LATERALITY, UNSPECIFIED SITE OF BREAST: ICD-10-CM

## 2025-01-08 DIAGNOSIS — C50.919 MALIGNANT NEOPLASM OF BREAST IN FEMALE, ESTROGEN RECEPTOR POSITIVE, UNSPECIFIED LATERALITY, UNSPECIFIED SITE OF BREAST: ICD-10-CM

## 2025-01-08 DIAGNOSIS — I89.0 LYMPHEDEMA: Primary | ICD-10-CM

## 2025-01-08 PROCEDURE — 3078F DIAST BP <80 MM HG: CPT

## 2025-01-08 PROCEDURE — 1125F AMNT PAIN NOTED PAIN PRSNT: CPT

## 2025-01-08 PROCEDURE — 4010F ACE/ARB THERAPY RXD/TAKEN: CPT

## 2025-01-08 PROCEDURE — 97140 MANUAL THERAPY 1/> REGIONS: CPT | Mod: GO

## 2025-01-08 PROCEDURE — 97110 THERAPEUTIC EXERCISES: CPT | Mod: GO

## 2025-01-08 PROCEDURE — 3074F SYST BP LT 130 MM HG: CPT

## 2025-01-08 PROCEDURE — 1157F ADVNC CARE PLAN IN RCRD: CPT

## 2025-01-08 PROCEDURE — 1159F MED LIST DOCD IN RCRD: CPT

## 2025-01-08 PROCEDURE — 99215 OFFICE O/P EST HI 40 MIN: CPT

## 2025-01-08 RX ORDER — ANASTROZOLE 1 MG/1
1 TABLET ORAL DAILY
Qty: 90 TABLET | Refills: 3 | Status: SHIPPED | OUTPATIENT
Start: 2025-01-08

## 2025-01-08 ASSESSMENT — PAIN - FUNCTIONAL ASSESSMENT: PAIN_FUNCTIONAL_ASSESSMENT: 0-10

## 2025-01-08 ASSESSMENT — PAIN SCALES - GENERAL: PAINLEVEL_OUTOF10: 5

## 2025-01-08 NOTE — PROGRESS NOTES
Occupational Therapy/ DISCHARGE       Occupational Therapy Treatment    Name: Tatyana Mckeon  MRN: [id number]  : 1955  Date: 25    Time Entry:  Time Calculation  Start Time:   Stop Time: 1057  Time Calculation (min): 50 min        OT Therapeutic Procedures Time Entry  Manual Therapy Time Entry: 24  Therapeutic Activity Time Entry: 8  Therapeutic Exercise Time Entry: 18                Assessment:  Pt I with self MLD and HEP, but not consistent on daily basis.  Rec to pt start at least 5x/week to assist with reduction of chronic swelling and increase strengthening of BUE.       Plan:  DC pt. Rec to see pt in 6 months for continued assessment of fibrotic tissue with breasts and arms.  Pt in agreement        Subjective   General:  OT Last Visit  OT Received On: 24  General  Reason for Referral: lymphedema of breast  Referred By: MD Lis  General Comment: visit 13,  no auth onset 2023  Precautions:  Precautions Comment: none  Pain Assessment:  Pain Assessment  Pain Assessment: 0-10    Objective       Therapy/Activity: Therapeutic Exercise  Therapeutic Exercise Performed: Yes  Therapeutic Exercise Activity 1: ER with green  theraband 10 reps 3 sec hold  Therapeutic Exercise Activity 2: bicep neutral curls green theraband 10 reps 3 sec hold  Therapeutic Exercise Activity 3: palm up green band 10 reps  Therapeutic Exercise Activity 4: palm down green band 10 reps  Therapeutic Exercise Activity 5: triceps with self anchor    Therapeutic Activity  Therapeutic Activity Performed: Yes  Therapeutic Activity 1: reviewed POC goals with pt    Manual Therapy  Manual Therapy Performed: Yes  Manual Therapy Activity 1: girth measurements with marginal changes with BUE  Manual Therapy Activity 2: volume reduction at truncal area     Lymphedema Assessment    Lymphedema Assessments:  Lymphedema Assessments: Upper extremities  Right Upper Extremity:  R Thumb Distal Phalange Girth: 6.3 cm  R Thumb Proximal  Phalange Girth: 6.3 cm  R Metacarpals (cm): 18.5 cm  R Palm (cm): 0 cm  R Wrist (cm): 17 cm  R 10 cm Above Wrist (cm): 23.5 cm  R 15 cm Above Wrist (cm): 26.5 cm  R 20 cm Above Wrist (cm): 27.5 cm  R Elbow (cm): 28 cm  R 5 cm Above Elbow: 31 cm  R 10 cm Above Elbow: 34.5 cm  R 20 cm Above Elbow: 41 cm  R Axilla: 0 cm  R Upper Extremity Total: 260.1  Left Upper Extremity:  L Thumb Distal Phalange Girth: 6 cm  L Thumb Proximal Phalange Girth: 6 cm  L Metacarpals (cm): 18 cm  L Palm (cm): 0 cm  L Wrist (cm): 17 cm  L 10 cm Above Wrist (cm): 23 cm  L 15 cm Above Wrist (cm): 27 cm  L 20 cm Above Wrist (cm): 28.5 cm  L Elbow (cm): 29 cm  L 5 cm Above Elbow: 33 cm  L 10cm Above Elbow: 36.5  L 20 cm Above Elbow: 43.5  L Axilla: 0 cm  Left upper extremity total: 267.5  UE Skin Appearance/Condition and Girth:  Other (comment): axillary 111 cm decreased 1 cm ; 124.5 nipple decreased 1 cm from last session        Pain Assessment:  Pain Assessment: 0-10  Therapy Precautions:  Precautions Comment: none    OP EDUCATION:       Goals:  Resolved       Lymphedema       Pt will demo volume reduction in R breast in order for proper fitting of medical compression garments and increase ease in transfers and IADLs  (Met)       Start:  07/19/24    Expected End:  12/27/24    Resolved:  01/08/25         Pt will I perform skin care for infection prevention and integrity of skin  (Met)       Start:  07/19/24    Expected End:  10/25/24    Resolved:  11/20/24         Pt will be I with stating and demonstrating home exercise program in order to improve patients ability to perform self-care, household, work and/or leisure tasks.  (Met)       Start:  07/19/24    Expected End:  12/27/24    Resolved:  01/08/25         Pt will complete self  mld in order to promote increased tissue pliability for maintenance with chronic lymphedema  (Met)       Start:  07/19/24    Expected End:  10/25/24    Resolved:  11/20/24

## 2025-01-08 NOTE — PROGRESS NOTES
Patient here for follow up with Carrie BOND. Medications and allergies reviewed with patient.     Patient reports that she is feeling better mentally since discontinuing the exemestane. She has not started the letrozole as she is worried about side effects. Tatyana states that she is interested in restarting the anastrozole. She reports lower extremity pain and edema. She denies nausea, vomiting, diarrhea, constipation or difficulty eating.     Per orders patient to restart Anastrozole, refill sent to pharmacy,  and follow up in 4 months.     Patient verbalized understanding, no further questions at this time.    pt is intelligent, creative  pt motivated to succeed in school, work and with family , friends

## 2025-01-10 ENCOUNTER — OFFICE VISIT (OUTPATIENT)
Dept: PRIMARY CARE | Facility: CLINIC | Age: 70
End: 2025-01-10

## 2025-01-10 VITALS — BODY MASS INDEX: 40.43 KG/M2 | DIASTOLIC BLOOD PRESSURE: 82 MMHG | SYSTOLIC BLOOD PRESSURE: 120 MMHG | WEIGHT: 207 LBS

## 2025-01-10 DIAGNOSIS — M25.561 CHRONIC PAIN OF BOTH KNEES: Primary | ICD-10-CM

## 2025-01-10 DIAGNOSIS — G89.29 CHRONIC PAIN OF BOTH KNEES: Primary | ICD-10-CM

## 2025-01-10 DIAGNOSIS — E11.9 TYPE 2 DIABETES MELLITUS WITHOUT COMPLICATION, WITHOUT LONG-TERM CURRENT USE OF INSULIN (MULTI): ICD-10-CM

## 2025-01-10 DIAGNOSIS — L40.9 PSORIASIS: ICD-10-CM

## 2025-01-10 DIAGNOSIS — M25.562 CHRONIC PAIN OF BOTH KNEES: Primary | ICD-10-CM

## 2025-01-10 PROCEDURE — 1123F ACP DISCUSS/DSCN MKR DOCD: CPT | Performed by: FAMILY MEDICINE

## 2025-01-10 PROCEDURE — 4010F ACE/ARB THERAPY RXD/TAKEN: CPT | Performed by: FAMILY MEDICINE

## 2025-01-10 PROCEDURE — 1157F ADVNC CARE PLAN IN RCRD: CPT | Performed by: FAMILY MEDICINE

## 2025-01-10 PROCEDURE — 97811 ACUP 1/> W/O ESTIM EA ADD 15: CPT | Performed by: FAMILY MEDICINE

## 2025-01-10 PROCEDURE — 1125F AMNT PAIN NOTED PAIN PRSNT: CPT | Performed by: FAMILY MEDICINE

## 2025-01-10 PROCEDURE — 97810 ACUP 1/> WO ESTIM 1ST 15 MIN: CPT | Performed by: FAMILY MEDICINE

## 2025-01-10 PROCEDURE — 1158F ADVNC CARE PLAN TLK DOCD: CPT | Performed by: FAMILY MEDICINE

## 2025-01-10 PROCEDURE — 3079F DIAST BP 80-89 MM HG: CPT | Performed by: FAMILY MEDICINE

## 2025-01-10 PROCEDURE — 3074F SYST BP LT 130 MM HG: CPT | Performed by: FAMILY MEDICINE

## 2025-01-10 RX ORDER — BLOOD SUGAR DIAGNOSTIC
1 STRIP MISCELLANEOUS DAILY
Qty: 100 STRIP | Refills: 2 | Status: SHIPPED | OUTPATIENT
Start: 2025-01-10

## 2025-01-10 RX ORDER — DEXTROSE 4 G
1 TABLET,CHEWABLE ORAL 3 TIMES DAILY
Qty: 1 EACH | Refills: 0 | Status: SHIPPED | OUTPATIENT
Start: 2025-01-10

## 2025-01-10 RX ORDER — TRIAMCINOLONE ACETONIDE 1 MG/G
CREAM TOPICAL 2 TIMES DAILY PRN
Qty: 30 G | Refills: 1 | Status: SHIPPED | OUTPATIENT
Start: 2025-01-10

## 2025-01-10 RX ORDER — PAROXETINE HYDROCHLORIDE 40 MG/1
40 TABLET, FILM COATED ORAL EVERY MORNING
COMMUNITY

## 2025-01-10 ASSESSMENT — PAIN SCALES - GENERAL: PAINLEVEL_OUTOF10: 4

## 2025-01-10 NOTE — PROGRESS NOTES
Subjective   Patient ID: Tatyana Mckeon is a 69 y.o. female who presents for Acupuncture.    HPI   She presents today for her regular acupuncture treatment.  She has her surgery tentatively scheduled for March on the right knee.  She still getting injections in the left knee.  Right knee seems to be getting worse.  She wants to go to [place] eventually but has trouble walking so she does not think she will be able to walk on the beach so she wants to get these knees replaced so that she can get out and do that.    Still has some lymphedema on the right side at times but this is much better.  She has less edema in the feet, she states.    Review of Systems    Objective   /82   Wt 93.9 kg (207 lb)   BMI 40.43 kg/m²     Physical Exam  Alert, no apparent distress, her affect is pleasant.  Minimal edema of the feet.  She is little bit weaker getting up on the table.    Assessment/Plan   Diagnoses and all orders for this visit:  Psoriasis  -     triamcinolone (Kenalog) 0.1 % cream; Apply topically 2 times a day as needed for rash.  Type 2 diabetes mellitus without complication, without long-term current use of insulin (Multi)  -     blood sugar diagnostic (Accu-Chek Guide test strips) strip; Inject 1 strip under the skin once daily.  -     blood-glucose meter misc; Inject 1 m under the skin 3 times a day. Accu-Check Guide    Puncture treatment Colie is the following points bilaterally LI 4, LV 2, ST 34 and 36, SP 9 and 10.  These retained for 15 minutes.  Then placed needles at BL 40 and 57 with her knees bent in a supine position.  These were retained for 15 minutes.  She tolerated that well.

## 2025-01-21 ENCOUNTER — APPOINTMENT (OUTPATIENT)
Dept: PHYSICAL MEDICINE AND REHAB | Facility: CLINIC | Age: 70
End: 2025-01-21
Payer: MEDICARE

## 2025-01-23 ENCOUNTER — APPOINTMENT (OUTPATIENT)
Dept: SURGERY | Facility: CLINIC | Age: 70
End: 2025-01-23
Payer: MEDICARE

## 2025-01-23 NOTE — PROGRESS NOTES
Subjective   Patient ID: Tatyana cMkeon is a 69 y.o. female who presents for BE and Mammogram follow up.  HPI  Patient returns to clinic and presents for BE and Mammogram follow up.  Patient was last seen in clinic on 9/20/2024 for  post operative Left Breast Magseed Localization Lumpectomy..  Patient seen Carrie Peña in the office on 1/8/2025 for follow up exam.  Start anastrozole.  Patient had BI mammogram Bilateral diagnostic tomosynthesis completed on 12/30/2024. Impression was No mammographic or targeted sonographic evidence of malignancy. Bilateral six-month follow-up mammography is suggested.    BI MAMMO BILATERAL DIAGNOSTIC TOMOSYNTHESIS;  12/30/2024 12:09 pm      ACCESSION NUMBER(S):  [id number]      ORDERING CLINICIAN:  NADINE AGUILLON      INDICATION:  2024 left-sided lumpectomy revealed ADH.  Right lumpectomy 2023 without chemotherapy. Treated with radiation.      ,C50.911 Malignant neoplasm of unspecified site of right female  breast,N60.92 Unspecified benign mammary dysplasia of left  breast,C50.811 Malignant neoplasm of overlapping sites of right  female breast      COMPARISON:  2024, 2023      FINDINGS:  MAMMOGRAPHY: 2D and tomosynthesis images were reviewed at 1 mm slice  thickness.      Density:  The breasts are heterogeneously dense, which may obscure  small masses.      There is bilateral postsurgical architectural distortion.  Postradiation changes are present on the right. No suspicious masses  or calcifications are identified.      IMPRESSION:  No mammographic or targeted sonographic evidence of malignancy.  Bilateral six-month follow-up mammography is suggested.      BI-RADS CATEGORY:  BI-RADS Category:  3 Probably Benign.  Recommendation:  Short-term Interval Follow-up Imaging.  Recommended Date:  6 Months.  Laterality:  Bilateral.      MACRO:  None    Social History:  Tobacco Use - yes  Do you use any recreational drugs - no  Alcohol Use - rare  Caffeine Intake -  occasional  Working - employed  Marital status -   Living with - alone    Review of Systems    Objective   Physical Exam    Assessment/Plan   {Assess/PlanSmartLinks:86319}     Breast History:    Patient presents to office today for evaluation of axillary wound.   Patient saw Dr. Tineo on 09/13/2023, Dr. Tineo recommended she come back into office to have area evaluated and consider drainage of the breast.   Dr. Tineo recommends radiation treatment to the breast alone to a dose of 42.56 Gy followed by consideration of a 10 Gy in 4 fractions lumpectomy bed boost.  Patient had a diagnostic bilateral mammogram with 3D HOWIE on 05/26/2023, with no previous imaging it demonstrated; scattered fibroglandular densities of the breasts seen bilaterally. Within the right breast there is a spiculated mass measuring about 2.6 cm seen at the 6 o'clock position of the right breast at the inframammary fold, about 11 cm from the nipple appears intimately associated with the chest wall, skin dimpling is noted within this region.   There is an area of skin dimpling along the upper-outer quadrant of the left breast with associated 5 cm focal asymmetry with areas of heterogenerous calcifications about 12 cm from nipple, likely fat necrosis from patient's previous surgery. Bilateral breast ultrasound recommended.  Patient had ultrasound of bilateral breasts on 0526/2023 it demonstrated;   Right breast 6 o'clock position there is 2.5 X 1.9 X 1.8 cm hypoechoic mass with angulated margins and posterior acoustic shadowing, appears to extend to the dermis. In addition, there is an abnormal right axillary lymph node seen with asymmetric cortex measuring 4mm.  Left breast reveals no suspicious breast masses idenifted, some normal breast tissue can be seen, likely an area of fat necrosis.   Category 5: highly suggestive of malignancy. Recommend ultrasound guided biopsy of right breast mass and right axillary lymph node.   Patient 1st felt  the mass on mother's Day of this year. She denies any nipple discharge. She has a history of open biopsy remotely for benign disease in the left breast  Patient is status post ultrasound guided biopsy of right breast and axillary lymph node on 06/07/2023.   Pathology revealing right breast biopsy; invasive ductal carcinoma grade 2 of 3 with asscoicated microcalifications. ER-positive, IN - positive and HER2/katie- equivocal 2+.   Right axillary lymph node biopsy pathology revealing; portion of lymph node, negative of carcinoma.   Imaging is concordant.  Patient is status post wire localization lumpectomy right breast with sentinel node biopsy on 07/21/2023.   Pathology revealed right breast lumpectomy at 6 o'clock position; invasive ductal carcinoma measuring 3 cm grade 3 of 3, margin is 1 mm from the closest anterior margin. DCIS present, cribriform and solid types, intermediate to high nuclear grade, margins negative.  Right breast sentinel node biopsy revealed; one lymph node positive for metastasis, tumor measures 8 mm.  Patient presents to office today for her post operative appointment and drain removal.  Patient is status post right axillary regional axillary dissection and evacuation of a right breast hematoma with KELSEY drain placement 08/16/2023.  Pathology revealed right axillary contents; fifteen lymph nodes, negative for metastasis.   Patient saw Dr. Tineo on 09/13/2023, Dr. Tineo recommended she come back into office to have area evaluated and consider drainage of the breast. Ultrasound ordered- seroma present; drain placement scheduled but not completed due to small size of seroma.   Adjuvant RT completed 12/15/23 with .  Anastrozole initiated 10/2023 with Dr. Hannon.  Last seen on 5/3/24 with lymphedema of the right breast mild erythema and peau d'orange. Recommended massage therapy; referral was placed.  At appointment on 5/3/2024 patient with lump associated with lumpectomy scar slightly more  pronounced. Likely a seroma. May be scar tissue. Recent imaging called for a biopsy, so she is also here today for biopsy discussion.  Patient's recent imaging specifically showed microcalcifications in the area of the previous lumpectomy of the left breast.  This lumpectomy was done in the year 2000 and was not cancer. .  Patient with a seroma in the right breast corresponding to the palpable area of concern.  Recommending biopsy of the left breast calcifications.  Started  Exemestane but stopped due to depression symptoms     BI MAMMO BILATERAL DIAGNOSTIC TOMOSYNTHESIS; BI US BREAST LIMITED  RIGHT;  6/19/2024   INDICATION:  History of a right lumpectomy with radiation therapy in 2023. History  of a left lumpectomy.      COMPARISON:  05/26/2023, 06/07/2023, 07/21/2023      FINDINGS:  MAMMOGRAPHY: 2D and tomosynthesis images were reviewed at 1 mm slice  thickness.      Density:  There are areas of scattered fibroglandular tissue.      Scarring is noted in the central medial right breast at posterior  depth. There is skin and trabecular thickening of the right breast  which is most consistent with the patient's history of radiation  therapy. Scarring is seen in the central lateral left breast at  middle depth. In the central lateral left breast posterior to the  lumpectomy site there are pleomorphic grouped calcifications. The  group measures 2.4 cm.      ULTRASOUND: Targeted ultrasound was performed of the palpable area of  concern at the right lumpectomy site. In the right breast at the 5  o'clock position 7 cm from the nipple there is a seroma. The seroma  and lumpectomy site measure 5.9 x 3.3 x 4.5 cm. Edema is noted.      IMPRESSION:  1. Indeterminate left breast calcifications. A stereotactic biopsy is  recommended. This was discussed with the patient. A preprocedure form  has been completed.  2. Seroma at the palpable area of concern at the lumpectomy site in  the right breast. Clinical follow-up is  recommended.          BI-RADS CATEGORY:  BI-RADS Category:  4 Suspicious.  Recommendation:  Surgical Consultation and Biopsy.  Recommended Date:  Immediate.  Laterality:  Left.  Patient to be scheduled for stereotactic biopsy of the left breast in the radiology department. Risk, benefits and alternatives to this plan were thoroughly discussed with the patient who agrees to proceed.  The procedure was also thoroughly discussed as well as her follow-up. She is to see me in the office in one week but I also will call as soon as I see the pathology which is usually 4 working days from procedure.     Status post lumpectomy.  Patient overly concerned about the development of a seroma.  Reassurance given.  Patient is to keep her follow-up appointment as scheduled.  Pathology still pending.   Patient returns to clinic for S/P Left Breast Lumpectomy with Magseed Localization 9/6/2024.  Patient last seen in office on 9/9/2024 for concern over post op seroma of left breast.  FINAL DIAGNOSIS      A.  LEFT BREAST, LUMPECTOMY:  --Focal atypical ductal hyperplasia.  --Flat epithelial atypia.  --Columnar cell change.  --Organizing fat necrosis and foreign body reaction consistent with previous biopsy site.  --Fibrocystic changes with microcalcifications, sclerosing adenosis, and mammary fibrosis.     B.  SKIN, LEFT BREAST, EXCISION:  --Segments of skin, no evidence of malignancy.   Patient due for follow-up mammogram of the right breast as she is within 2 years of her breast cancer diagnosis. This will be for December. Recommend repeat exam by me in January. Incidentally the patient also with atypical ductal hyperplasia of the left breast therefore I am advocating bilateral mammogram in December and follow-up for both lesions.   Edema of right breast and right upper extremity improving with lymphedema clinic treatment. Minimal lymphedema reported in the left upper extremity per measurements per the clinic. This may resolve after  she is further out from her surgery. No significant seroma at this time on the left side.   Status post seed localization lumpectomy for atypical hyperplasia of the left breast. Patient will need a mammogram and follow-up of the right breast in December. Will also do bilateral at that time to reevaluate this area. Patient is to see me for another exam in January.   Previous Biopsy: Yes, Left, Breast, Benign. Menarche age: 11. Age of first delivery: Nulliparous. Breastfeed: N/A. Menopause age: hysterectomy at 2014 at 59. HRT: No. Family history of breast cancer: No. Family history of ovarian cancer: No. Family history of pancreatic cancer: No.     KALEY DIAZ MA 01/23/25 3:45 PM

## 2025-01-24 ENCOUNTER — APPOINTMENT (OUTPATIENT)
Dept: SURGERY | Facility: CLINIC | Age: 70
End: 2025-01-24
Payer: MEDICARE

## 2025-01-29 ENCOUNTER — OFFICE VISIT (OUTPATIENT)
Dept: PRIMARY CARE | Facility: CLINIC | Age: 70
End: 2025-01-29

## 2025-01-29 VITALS
BODY MASS INDEX: 40.52 KG/M2 | DIASTOLIC BLOOD PRESSURE: 82 MMHG | HEART RATE: 100 BPM | WEIGHT: 207.5 LBS | SYSTOLIC BLOOD PRESSURE: 122 MMHG

## 2025-01-29 DIAGNOSIS — G89.29 CHRONIC PAIN OF BOTH KNEES: Primary | ICD-10-CM

## 2025-01-29 DIAGNOSIS — M25.562 CHRONIC PAIN OF BOTH KNEES: Primary | ICD-10-CM

## 2025-01-29 DIAGNOSIS — M25.561 CHRONIC PAIN OF BOTH KNEES: Primary | ICD-10-CM

## 2025-01-29 PROBLEM — R93.1 ELEVATED CORONARY ARTERY CALCIUM SCORE: Status: ACTIVE | Noted: 2025-01-29

## 2025-01-29 PROCEDURE — 1157F ADVNC CARE PLAN IN RCRD: CPT | Performed by: FAMILY MEDICINE

## 2025-01-29 PROCEDURE — 97810 ACUP 1/> WO ESTIM 1ST 15 MIN: CPT | Performed by: FAMILY MEDICINE

## 2025-01-29 PROCEDURE — 4010F ACE/ARB THERAPY RXD/TAKEN: CPT | Performed by: FAMILY MEDICINE

## 2025-01-29 PROCEDURE — 3079F DIAST BP 80-89 MM HG: CPT | Performed by: FAMILY MEDICINE

## 2025-01-29 PROCEDURE — 1126F AMNT PAIN NOTED NONE PRSNT: CPT | Performed by: FAMILY MEDICINE

## 2025-01-29 PROCEDURE — 1159F MED LIST DOCD IN RCRD: CPT | Performed by: FAMILY MEDICINE

## 2025-01-29 PROCEDURE — 97811 ACUP 1/> W/O ESTIM EA ADD 15: CPT | Performed by: FAMILY MEDICINE

## 2025-01-29 PROCEDURE — 3074F SYST BP LT 130 MM HG: CPT | Performed by: FAMILY MEDICINE

## 2025-01-29 ASSESSMENT — PAIN SCALES - GENERAL: PAINLEVEL_OUTOF10: 0-NO PAIN

## 2025-01-29 NOTE — ASSESSMENT & PLAN NOTE
Lipid panel done in September: Total cholesterol 160, triglycerides 127, HDL cholesterol 57 and LDL cholesterol 78.

## 2025-01-29 NOTE — PROGRESS NOTES
Subjective   Patient ID: Tatyana Mckeon is a 69 y.o. female who presents for Acupuncture.    HPI   She presents for her acupuncture treatment.  She states that she started the anastrozole again.  She wants to see if she can tolerate this.  She can give it 6 months.  She has discussing the knee replacement with her orthopedist but somewhere on her chart she listed nickel as an allergy so now she has to go for allergy testing in March, which is going to put the surgery off little bit.  Otherwise, he has been feeling better since the injection.  She overall feels better.      Review of Systems    Objective   /82   Pulse 100   Wt 94.1 kg (207 lb 8 oz)   BMI 40.52 kg/m²     Physical Exam  She is alert, no apparent stress, her affect is pleasant.  She gets up on the table or easily today.  Usually she has some trouble getting up on the stool and into the table but did well today.  No edema.    Assessment/Plan   Diagnoses and all orders for this visit:  Chronic pain of both knees      Acupuncture treatment Colie is the following points bilaterally LI 4, XL heart, GB 43, LV 2, ST 36.  These retained for 15 minutes with heating lamp over her lower abdomen.  I then placed needles at BL 40, 57 and 60 as well as within the indurated part on the inside of her leg.  These retained for 15 minutes.  She tolerated this well.

## 2025-01-29 NOTE — PATIENT INSTRUCTIONS
You may use the cold prevention tea, once or twice a day, to help strengthen immune system and prevent respiratory infections and also support the kidney meridian.     [facility].  If you are going there, also  Alec Boyle, which is an herb he can take if you feel respiratory infection coming on.  If you do, you take 15 of those twice a day until you feel better.

## 2025-01-31 ENCOUNTER — APPOINTMENT (OUTPATIENT)
Dept: SURGERY | Facility: CLINIC | Age: 70
End: 2025-01-31
Payer: MEDICARE

## 2025-02-04 ENCOUNTER — OFFICE VISIT (OUTPATIENT)
Dept: PHYSICAL MEDICINE AND REHAB | Facility: CLINIC | Age: 70
End: 2025-02-04
Payer: MEDICARE

## 2025-02-04 ENCOUNTER — APPOINTMENT (OUTPATIENT)
Dept: CARDIOLOGY | Facility: CLINIC | Age: 70
End: 2025-02-04
Payer: MEDICARE

## 2025-02-04 VITALS
DIASTOLIC BLOOD PRESSURE: 64 MMHG | SYSTOLIC BLOOD PRESSURE: 97 MMHG | HEART RATE: 91 BPM | RESPIRATION RATE: 22 BRPM | TEMPERATURE: 98.2 F

## 2025-02-04 DIAGNOSIS — F17.210 CIGARETTE SMOKER: ICD-10-CM

## 2025-02-04 DIAGNOSIS — I10 PRIMARY HYPERTENSION: ICD-10-CM

## 2025-02-04 DIAGNOSIS — I89.0 LYMPHEDEMA OF BREAST: ICD-10-CM

## 2025-02-04 DIAGNOSIS — C50.911 INFILTRATING DUCTAL CARCINOMA OF RIGHT BREAST, STAGE 2 (MULTI): ICD-10-CM

## 2025-02-04 DIAGNOSIS — Z82.49 FAMILY HISTORY OF ISCHEMIC HEART DISEASE: Primary | ICD-10-CM

## 2025-02-04 DIAGNOSIS — E78.00 HIGH CHOLESTEROL: ICD-10-CM

## 2025-02-04 DIAGNOSIS — R93.1 ELEVATED CORONARY ARTERY CALCIUM SCORE: ICD-10-CM

## 2025-02-04 DIAGNOSIS — I89.0 LYMPHEDEMA: Primary | ICD-10-CM

## 2025-02-04 PROCEDURE — 1159F MED LIST DOCD IN RCRD: CPT | Performed by: PHYSICAL MEDICINE & REHABILITATION

## 2025-02-04 PROCEDURE — 99213 OFFICE O/P EST LOW 20 MIN: CPT | Performed by: PHYSICAL MEDICINE & REHABILITATION

## 2025-02-04 PROCEDURE — 1157F ADVNC CARE PLAN IN RCRD: CPT | Performed by: PHYSICAL MEDICINE & REHABILITATION

## 2025-02-04 PROCEDURE — 3074F SYST BP LT 130 MM HG: CPT | Performed by: PHYSICAL MEDICINE & REHABILITATION

## 2025-02-04 PROCEDURE — 1125F AMNT PAIN NOTED PAIN PRSNT: CPT | Performed by: PHYSICAL MEDICINE & REHABILITATION

## 2025-02-04 PROCEDURE — G2211 COMPLEX E/M VISIT ADD ON: HCPCS | Performed by: PHYSICAL MEDICINE & REHABILITATION

## 2025-02-04 PROCEDURE — 4010F ACE/ARB THERAPY RXD/TAKEN: CPT | Performed by: PHYSICAL MEDICINE & REHABILITATION

## 2025-02-04 PROCEDURE — 3078F DIAST BP <80 MM HG: CPT | Performed by: PHYSICAL MEDICINE & REHABILITATION

## 2025-02-04 RX ORDER — DOCUSATE SODIUM 100 MG/1
100 CAPSULE, LIQUID FILLED ORAL DAILY
COMMUNITY

## 2025-02-04 ASSESSMENT — PAIN SCALES - GENERAL: PAINLEVEL_OUTOF10: 4

## 2025-02-04 NOTE — PROGRESS NOTES
[facility]  Clinic  \     HPI   Ms. Mckeon is a 69 y.o.  woman with right Breast cancer Pathologic: Stage IIA (pT2, pN1a, cM0, G3, ER+, MO+, HER2-,  status post status post lumpectomy and sentinel node biopsy 7/2023, followed by axillary dissection and evacuation  of hematoma of the breast 8/2023.  She was started on arimidex but stopped due to hair loss/weight gain. Now on exemestane but taking a break due to worsening mood. She then completed adjuvant radiation to breast completed 12/2024.       Recall  States in march she is noticed a tight lenny on right breast below. Feels hard but not tender. Especially under breast, no swelling in the arm that she noticed,  Some swelling on side of breast.    Has not done any lymphedema therapy.     Tried to push on the breast.   Shoudler rom intact.  Regular bra and not wear wire. Does not wear.      She was last seen in June 2024. At that time we discussed:  1. referral to OT:  mld, compression garments, pump, shoulder rom  2. Compression bra  3, discussed shoulder rom, posture  4. Fu 8 weeks  5. Will discuss [facility] at follow up    She broke left 5th metatarsal and now is a boot. Had pt eval, needs garment.  Has seroma around surgery site.  Now pending biopsy left.   Last hgba1c from [facility] was 8 in July 2024.        Overall discouraged by progress w everything, frustrated. Biopsy was negative.  States measurements were improving.   She is Wearease compression bra which is helping. Fibrosis helps. Was not covered by insurance.  She got fitted for compression sleeve bilateral, Juzo 20-30 mmhg in [employer].  Silicone band does help. Multiple delays.   Had pump trial w tactile but feels might be cumbersome.      She was last seen in October 2024. At that time we discussed:   continue to OT:  mld, compression garments, pump, shoulder rom, had eval, will also give a compression sleeve and glove order  2. Received Compression bra and compression  sleeves  3, discussed shoulder rom, posture  4. Fu 12 weeks  5. Will discuss [facility] Program at follow up   6. Encouraged to make sure to get the compression pneumatic pump    She is having R knee replacement soon. Feeling more sleepy. Could be related to increase in paxil. Occasionally has some pain in middle of chest, dpes    She has the compression bra w ribbed material, wear ease. Mabel bra. Bought it on [employer].   Also got them [employer] and now has 3 bras.   Difficulty w remembering to wear the sleeves.  States that she decided not to do the pump because she would not be complaint feels sleeve and bra is enough w the exercise.  Continues w arimidex.     Reviewed ot notes, heme onc notes  Imaging  Reviewed w patient and personally 02/04/25    Mammo 2024  IMPRESSION:  1. Indeterminate left breast calcifications. A stereotactic biopsy is  recommended. This was discussed with the patient. A preprocedure form  has been completed.  2. Seroma at the palpable area of concern at the lumpectomy site in  the right breast. Clinical follow-up is recommended.  Mammo 2023  FINDINGS  Patient had right lumpectomy 07/21/2023.  On the PET-CT scan from 09/14/2023 a large, 9.1 x 6.3 cm fluid collection  was seen in the region of lumpectomy in the right breast. On today's exam a  large fluid collection is seen extending from the 3 o'clock to the 7 o'clock  position of the right breast, measuring 7.9 x 4.9 cm, containing small  septations/minimal debris. On Doppler ultrasound there is no internal blood  flow.  This corresponds to an improving postsurgical seroma.     IMPRESSION:  No sonographic  evidence of malignancy.  Past Medical History:   Diagnosis Date    Acquired lymphedema     right breast    Atypical ductal hyperplasia, breast     Lt breast 2024    Bipolar 1 disorder (Multi)     Breast cancer (Multi) 06/2023    Right Breast - IDC and DCIS    Depression     Diabetes mellitus (Multi)     Type 2    Endometrial cancer  (Multi)     Hyperlipidemia     Hypertension     Osteoarthritis of right knee     Peripheral edema     Personal history of irradiation     Psoriasis     Toe fracture, left     Endometrial cancer sp hysterectomy  Past Surgical History:   Procedure Laterality Date    AXILLARY NODE DISSECTION  08/16/2023    Right Axillary Dissection and Evacuation of a right breast hematoma    BI MAMMO GUIDED BREAST RIGHT LOCALIZATION Right 07/21/2023    Wire localization lumpectomy right breast with sentinel node biopsy    BREAST BIOPSY Left 07/17/2024    Sterotactic    BREAST LUMPECTOMY  2000    Left breast    BREAST LUMPECTOMY W/ NEEDLE LOCALIZATION Left 09/06/2024    HYSTERECTOMY  2014    LAPAROTOMY OOPHERECTOMY  2014    US GUIDED BIOPSY LYMPH NODE SUPERFICIAL  06/07/2023    Ultrasound guided right breast and axillary lymph node biopsy     Past Medical History:   Diagnosis Date    Acquired lymphedema     right breast    Atypical ductal hyperplasia, breast     Lt breast 2024    Bipolar 1 disorder (Multi)     Breast cancer (Multi) 06/2023    Right Breast - IDC and DCIS    Depression     Diabetes mellitus (Multi)     Type 2    Endometrial cancer (Multi)     Hyperlipidemia     Hypertension     Osteoarthritis of right knee     Peripheral edema     Personal history of irradiation     Psoriasis     Toe fracture, left      Family History   Problem Relation Name Age of Onset    Stroke Mother      Other (high blood pressure) Father      Prostate cancer Father      Heart disease Father          with MI at age 71    Diabetes Father      Hypertension Father      Diabetes Sister      Hypertension Sister      Other (oral cancer) Brother      Hypertension Brother       Social History     Socioeconomic History    Marital status:      Spouse name: Not on file    Number of children: Not on file    Years of education: Not on file    Highest education level: Not on file   Occupational History    Occupation: Retired   Tobacco Use    Smoking  status: Every Day     Current packs/day: 0.50     Average packs/day: 0.5 packs/day for 40.0 years (20.0 ttl pk-yrs)     Types: Cigarettes     Passive exposure: Current    Smokeless tobacco: Never   Vaping Use    Vaping status: Never Used   Substance and Sexual Activity    Alcohol use: Yes     Comment: 2x a year    Drug use: Not Currently     Comment: SMOKED MJ 40 YEARS AGO    Sexual activity: Defer   Other Topics Concern    Not on file   Social History Narrative    Not on file     Social Drivers of Health     Financial Resource Strain: Not on file   Food Insecurity: No Food Insecurity (11/16/2023)    Hunger Vital Sign     Worried About Running Out of Food in the Last Year: Never true     Ran Out of Food in the Last Year: Never true   Transportation Needs: Not on file   Physical Activity: Not on file   Stress: Not on file   Social Connections: Not on file   Intimate Partner Violence: Not on file   Housing Stability: Not on file     Lives alone  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  Denies alcohol or drug use         REVIEW OF SYSTEMS      Pain: Denies  Sleep: WNL in a regular bed  Mood: WNL  Appetite/Nutrition: WNL  Motor: Denies weakness  Sensory: Denies numbness or tingling  Skin: No ulcerations, infection or drainage  Chest Pain: breast swelling pain  Dyspnea: Denies  Nausea/Vomiting: Denies  Fatigue (include 1-10 rating if present): Denies        PHYSICAL EXAMINATION      General: Alert, normal build, no acute distress  BP 97/64 (BP Location: Left arm, Patient Position: Sitting)   Pulse 91   Temp 36.8 °C (98.2 °F)   Resp 22       Affect: Appropriate  Skin: No redness or streaking, no open areas. No drainage. No discoloration.  HEENT: WNL.  Heart: RRR  Lungs: Respirations unlabored.  Extremities: Normal contour. Stemmer's negative.     Arm Circumferences (cm):     5/2024 10/2024   Index L 6.5 6.5   R 6.5 6.5   MCP's L 18.5 18   R 18.5 18   Below elbow L(10 cm) 25 26   R 23.5 24   Below elbow L (5cm) 26.5 26   R 26.5 26    Above elbow L (10 cm) 35.5 35   R 34.5 35           Wrist nbl 17                                        16.5     R flexion abduction 170     Neck: Supple  Back: Normal contour  Motor: 5/5 strength all extremities  R breast lymphedema, also lateral chest wall         Reflexes: 1-2+ bilaterally  Coordination: Intact rapid alternating movements  Gait: WNL    Promis screen 7/2024 2/2025  PF: 14/130                        15/30  Fatigue: 12/15                9/15  Social' 7/15                    9/15  Xochilt; 5                                  1  Distress 5                             3     IMPRESSION Ms. Mckeon is a 69 y.o.  woman with right Breast cancer Pathologic: Stage IIA (pT2, pN1a, cM0, G3, ER+, DE+, HER2-,  status post status post lumpectomy and sentinel node biopsy 7/2023, followed by axillary dissection and evacuation  of hematoma of the breast 8/2023.  She was started on arimidex but stopped due to hair loss/weight gain. Now on exemestane but taking a break due to worsening mood and other side effects. She then completed adjuvant radiation to breast completed 12/2024. She is presenting with R breast and mild arm lymphedema which is overall stable.     PLAN      1. continue to OT:  mld, compression garments, pump, shoulder rom, had eval, will also give a compression sleeve and glove order  2. Received Compression bra from [employer] and compression sleeves; wear ease. Mabel bra  3, discussed shoulder rom, posture  4. Fu 12 weeks  5. Will discuss [facility]ness Program at follow up   6.decided against pneumatic pump since feels can control w exercises and compression garments    Charline Hays MD  Physical Medicine and Rehabilitation

## 2025-02-07 ENCOUNTER — APPOINTMENT (OUTPATIENT)
Dept: SURGERY | Facility: CLINIC | Age: 70
End: 2025-02-07
Payer: MEDICARE

## 2025-02-12 ENCOUNTER — OFFICE VISIT (OUTPATIENT)
Dept: PRIMARY CARE | Facility: CLINIC | Age: 70
End: 2025-02-12

## 2025-02-12 VITALS
HEART RATE: 88 BPM | WEIGHT: 207.3 LBS | DIASTOLIC BLOOD PRESSURE: 72 MMHG | SYSTOLIC BLOOD PRESSURE: 132 MMHG | BODY MASS INDEX: 40.49 KG/M2

## 2025-02-12 DIAGNOSIS — C50.812 MALIGNANT NEOPLASM OF OVERLAPPING SITES OF LEFT BREAST IN FEMALE, ESTROGEN RECEPTOR POSITIVE: Primary | ICD-10-CM

## 2025-02-12 DIAGNOSIS — Z17.0 MALIGNANT NEOPLASM OF OVERLAPPING SITES OF LEFT BREAST IN FEMALE, ESTROGEN RECEPTOR POSITIVE: Primary | ICD-10-CM

## 2025-02-12 DIAGNOSIS — B07.9 VIRAL WARTS, UNSPECIFIED TYPE: ICD-10-CM

## 2025-02-12 PROCEDURE — 1126F AMNT PAIN NOTED NONE PRSNT: CPT | Performed by: FAMILY MEDICINE

## 2025-02-12 PROCEDURE — 3078F DIAST BP <80 MM HG: CPT | Performed by: FAMILY MEDICINE

## 2025-02-12 PROCEDURE — 3075F SYST BP GE 130 - 139MM HG: CPT | Performed by: FAMILY MEDICINE

## 2025-02-12 PROCEDURE — 1157F ADVNC CARE PLAN IN RCRD: CPT | Performed by: FAMILY MEDICINE

## 2025-02-12 PROCEDURE — 97810 ACUP 1/> WO ESTIM 1ST 15 MIN: CPT | Performed by: FAMILY MEDICINE

## 2025-02-12 PROCEDURE — 97811 ACUP 1/> W/O ESTIM EA ADD 15: CPT | Performed by: FAMILY MEDICINE

## 2025-02-12 PROCEDURE — 4010F ACE/ARB THERAPY RXD/TAKEN: CPT | Performed by: FAMILY MEDICINE

## 2025-02-12 PROCEDURE — 1159F MED LIST DOCD IN RCRD: CPT | Performed by: FAMILY MEDICINE

## 2025-02-12 ASSESSMENT — PAIN SCALES - GENERAL: PAINLEVEL_OUTOF10: 0-NO PAIN

## 2025-02-12 NOTE — PROGRESS NOTES
Subjective   Patient ID: Tatyana Mckeon is a 69 y.o. female who presents for acupuncture.    HPI   She presents today for acupuncture treatment.  States she has been doing pretty well.  She wants to know what herbs that she should take.  She is the anastrozole states has been doing pretty well with that.  Energy has been pretty good.  She is going to get yourself a new mattress because she states her other 1 is very old.    She did get growth on her right cheek wonders if I can treat that.  She states it has been a like a wart from the past.  She has had these before which have been somewhat troublesome to treat.    Review of Systems    Objective   /72   Pulse 88   Wt 94 kg (207 lb 4.8 oz)   BMI 40.49 kg/m²     Physical Exam  Constitutional:       Appearance: Normal appearance. She is not ill-appearing (Energy appears so much better today.).   Pulmonary:      Effort: Pulmonary effort is normal.   Skin:     Comments: On the left lower cheek she has a white raised almost filiform hard lesio ohn   Neurological:      Mental Status: She is alert.         Assessment/Plan   Diagnoses and all orders for this visit:  Malignant neoplasm of overlapping sites of left breast in female, estrogen receptor positive  Viral warts, unspecified type  She will continue anastrozole.  She can also add green dragon and imperial Chi to see how that does.  She can come in anytime for liquid nitrogen treatment of her wart on the face.    Acupuncture treatment Colie is following points bilaterally LI 4, LV 2, ST 36, GB 40 and 43.  These retained for 15 minutes.  Then placed needles at BL 40, 57 and 60 bilaterally and these were retained for 15 minutes.  She tolerated this well.

## 2025-02-12 NOTE — PATIENT INSTRUCTIONS
You may use Green Dragon and [place] Qi, 3 of each twice a day, and see how you do with that.  As we discussed last time, he can always add cold prevention tea as well to help support the immune system.

## 2025-02-17 DIAGNOSIS — E11.9 TYPE 2 DIABETES MELLITUS WITHOUT COMPLICATION, WITHOUT LONG-TERM CURRENT USE OF INSULIN (MULTI): ICD-10-CM

## 2025-02-17 RX ORDER — METFORMIN HYDROCHLORIDE 500 MG/1
500 TABLET ORAL 3 TIMES DAILY
Qty: 270 TABLET | Refills: 0 | Status: SHIPPED | OUTPATIENT
Start: 2025-02-17

## 2025-02-18 NOTE — ASSESSMENT & PLAN NOTE
Lipid panel drawn in September: Total cholesterol 160, triglycerides 124, HDL 57 and LDL 78.  With the elevated coronary calcium score of 724 I believe we should target an LDL cholesterol of less than 70.  She is taking atorvastatin 10 mg every other day and I will increase it to every day and recheck fasting lipid panel on return.  She was taking it every other day secondary to some type of leg pains in the past.  Will obviously need to monitor symptoms with the increase in the frequency.

## 2025-02-20 ENCOUNTER — OFFICE VISIT (OUTPATIENT)
Dept: CARDIOLOGY | Facility: CLINIC | Age: 70
End: 2025-02-20
Payer: MEDICARE

## 2025-02-20 VITALS
OXYGEN SATURATION: 96 % | HEART RATE: 108 BPM | WEIGHT: 209 LBS | DIASTOLIC BLOOD PRESSURE: 60 MMHG | BODY MASS INDEX: 40.82 KG/M2 | SYSTOLIC BLOOD PRESSURE: 100 MMHG

## 2025-02-20 DIAGNOSIS — Z82.49 FAMILY HISTORY OF ISCHEMIC HEART DISEASE: Primary | ICD-10-CM

## 2025-02-20 DIAGNOSIS — F17.210 CIGARETTE SMOKER: ICD-10-CM

## 2025-02-20 DIAGNOSIS — R93.1 ELEVATED CORONARY ARTERY CALCIUM SCORE: ICD-10-CM

## 2025-02-20 DIAGNOSIS — E78.00 HIGH CHOLESTEROL: ICD-10-CM

## 2025-02-20 DIAGNOSIS — I10 PRIMARY HYPERTENSION: ICD-10-CM

## 2025-02-20 DIAGNOSIS — R94.31 ABNORMAL ELECTROCARDIOGRAM (ECG) (EKG): ICD-10-CM

## 2025-02-20 PROCEDURE — 99214 OFFICE O/P EST MOD 30 MIN: CPT | Performed by: INTERNAL MEDICINE

## 2025-02-20 PROCEDURE — 1159F MED LIST DOCD IN RCRD: CPT | Performed by: INTERNAL MEDICINE

## 2025-02-20 PROCEDURE — 3074F SYST BP LT 130 MM HG: CPT | Performed by: INTERNAL MEDICINE

## 2025-02-20 PROCEDURE — 1125F AMNT PAIN NOTED PAIN PRSNT: CPT | Performed by: INTERNAL MEDICINE

## 2025-02-20 PROCEDURE — 4010F ACE/ARB THERAPY RXD/TAKEN: CPT | Performed by: INTERNAL MEDICINE

## 2025-02-20 PROCEDURE — 3078F DIAST BP <80 MM HG: CPT | Performed by: INTERNAL MEDICINE

## 2025-02-20 PROCEDURE — 1157F ADVNC CARE PLAN IN RCRD: CPT | Performed by: INTERNAL MEDICINE

## 2025-02-20 RX ORDER — ATORVASTATIN CALCIUM 10 MG/1
10 TABLET, FILM COATED ORAL DAILY
Qty: 90 TABLET | Refills: 3 | Status: SHIPPED | OUTPATIENT
Start: 2025-02-20 | End: 2026-02-20

## 2025-02-20 ASSESSMENT — ENCOUNTER SYMPTOMS
PALPITATIONS: 0
DYSPNEA ON EXERTION: 0
HEMATURIA: 0
COUGH: 0
ABDOMINAL PAIN: 0
LOSS OF SENSATION IN FEET: 0
NUMBNESS: 0
DEPRESSION: 1
SHORTNESS OF BREATH: 0
PARESTHESIAS: 0
OCCASIONAL FEELINGS OF UNSTEADINESS: 1
DYSURIA: 0
BLURRED VISION: 0

## 2025-02-20 ASSESSMENT — LIFESTYLE VARIABLES: TOTAL SCORE: 0

## 2025-02-20 ASSESSMENT — PAIN SCALES - GENERAL: PAINLEVEL_OUTOF10: 4

## 2025-02-20 NOTE — ASSESSMENT & PLAN NOTE
Smoking cessation has been urged.  She states after her next birthday she thinks she is going to give it a good try of stopping.

## 2025-02-20 NOTE — ASSESSMENT & PLAN NOTE
Cardiac wise stable.  Will continue standard risk factor modification and follow on a clinical basis.

## 2025-02-20 NOTE — ASSESSMENT & PLAN NOTE
Blood pressure adequately controlled on current regimen and no changes necessary at this time.  Will recheck blood pressure on return visit.

## 2025-02-20 NOTE — PROGRESS NOTES
Subjective   Tatyana Mckeon is a 69 y.o. female.    Chief Complaint:  6 month f/u    HPI  Cardiac wise patient is doing well.  Describes no chest pain or anginal symptoms.  She does have significant knee pain and is being considered for a total knee arthroplasty surgery with Dr. Juares.      Review of Systems   Constitutional: Negative for malaise/fatigue.   HENT:  Negative for congestion.    Eyes:  Negative for blurred vision.   Cardiovascular:  Negative for chest pain, dyspnea on exertion and palpitations.   Respiratory:  Negative for cough and shortness of breath.    Musculoskeletal:  Positive for arthritis and joint pain.   Gastrointestinal:  Negative for abdominal pain.   Genitourinary:  Negative for dysuria and hematuria.   Neurological:  Negative for numbness and paresthesias.       Objective   Constitutional:       Appearance: Not in distress.   Eyes:      Conjunctiva/sclera: Conjunctivae normal.   Neck:      Vascular: JVD normal.   Pulmonary:      Breath sounds: Normal breath sounds. No wheezing. No rhonchi. No rales.   Cardiovascular:      Normal rate. Regular rhythm.      Murmurs: There is no murmur.      No gallop.  No click. No rub.   Abdominal:      Palpations: Abdomen is soft.   Neurological:      General: No focal deficit present.      Mental Status: Alert.         Lab Review:   No visits with results within 2 Month(s) from this visit.   Latest known visit with results is:   Office Visit on 11/15/2024   Component Date Value    POC HEMOGLOBIN A1c 11/15/2024 5.9        Assessment/Plan   The primary encounter diagnosis was Family history of ischemic heart disease. Diagnoses of Primary hypertension, High cholesterol, Elevated coronary artery calcium score, and Cigarette smoker were also pertinent to this visit.  High cholesterol  Lipid panel drawn in September: Total cholesterol 160, triglycerides 124, HDL 57 and LDL 78.  With the elevated coronary calcium score of 724 I believe we should target an LDL  cholesterol of less than 70.  She is taking atorvastatin 10 mg every other day and I will increase it to every day and recheck fasting lipid panel on return.  She was taking it every other day secondary to some type of leg pains in the past.  Will obviously need to monitor symptoms with the increase in the frequency.    Elevated coronary artery calcium score  Reviewed coronary artery calcium score which is elevated into the severe range at 724.  Will order a myocardial perfusion stress test to look for evidence of ischemia.  The patient has significant arthritic knee pain unable to exercise on a treadmill thus a pharmacologic stress test will be needed.  The patient is also being considered for total knee arthroplasty surgery as well and would like the results of the stress test prior to clearing.  Will bring the patient back to the office after the stress test to review the results with her.    Family history of ischemic heart disease  Cardiac wise stable.  Will continue standard risk factor modification and follow on a clinical basis.    High blood pressure  Blood pressure adequately controlled on current regimen and no changes necessary at this time.  Will recheck blood pressure on return visit.    Cigarette smoker  Smoking cessation has been urged.  She states after her next birthday she thinks she is going to give it a good try of stopping.

## 2025-02-20 NOTE — ASSESSMENT & PLAN NOTE
Reviewed coronary artery calcium score which is elevated into the severe range at 724.  Will order a myocardial perfusion stress test to look for evidence of ischemia.  The patient has significant arthritic knee pain unable to exercise on a treadmill thus a pharmacologic stress test will be needed.  The patient is also being considered for total knee arthroplasty surgery as well and would like the results of the stress test prior to clearing.  Will bring the patient back to the office after the stress test to review the results with her.

## 2025-02-24 NOTE — PROGRESS NOTES
Subjective   Patient ID: Tatyana Mckeon is a 70 y.o. female who presents for breast exam and mammogram follow up.  HPI  Patient returns to clinic and presents for breast exam and mammogram follow up.  Patient was last seen in clinic on 9/20/2024 for post operative appointment.  Patient seen Carrie Osorio PA-C Hem Onc on 1/8/2025 for follow up exam.  Never started letrozole due to side effect concerns. Agreebale to retry anastrozole. She will consider adjuvant Zometa. Potential toxicities discussed- declined for now.  Start anastrozole 1 mg daily.  Patient had BI mammogram bilateral diagnostic tomosynthesis completed on 12/30/2024. Impression was no mammographic or targeted sonographic evidence of malignancy. Bilateral six-month follow-up mammography is suggested.    BI MAMMO BILATERAL DIAGNOSTIC TOMOSYNTHESIS;  12/30/2024 12:09 pm      ACCESSION NUMBER(S):  [id number]      ORDERING CLINICIAN:  NADINE AGUILLON      INDICATION:  2024 left-sided lumpectomy revealed ADH.  Right lumpectomy 2023 without chemotherapy. Treated with radiation.      ,C50.911 Malignant neoplasm of unspecified site of right female  breast,N60.92 Unspecified benign mammary dysplasia of left  breast,C50.811 Malignant neoplasm of overlapping sites of right  female breast      COMPARISON:  2024, 2023      FINDINGS:  MAMMOGRAPHY: 2D and tomosynthesis images were reviewed at 1 mm slice  thickness.      Density:  The breasts are heterogeneously dense, which may obscure  small masses.      There is bilateral postsurgical architectural distortion.  Postradiation changes are present on the right. No suspicious masses  or calcifications are identified.      IMPRESSION:  No mammographic or targeted sonographic evidence of malignancy.  Bilateral six-month follow-up mammography is suggested.      BI-RADS CATEGORY:  BI-RADS Category:  3 Probably Benign.  Recommendation:  Short-term Interval Follow-up Imaging.  Recommended Date:  6 Months.  Laterality:   Bilateral.    MACRO:  None    Social History:  Tobacco Use - yes  Do you use any recreational drugs - no  Alcohol Use - rare  Caffeine Intake - occasional  Working - employed  Marital status -   Living with - alone    Past Medical History:   Diagnosis Date    Acquired lymphedema     right breast    Atypical ductal hyperplasia, breast     Lt breast 2024    Bipolar 1 disorder (Multi)     Breast cancer (Multi) 06/2023    Right Breast - IDC and DCIS    Depression     Diabetes mellitus (Multi)     Type 2    Endometrial cancer (Multi)     Hyperlipidemia     Hypertension     Osteoarthritis of right knee     Peripheral edema     Personal history of irradiation     Psoriasis     Toe fracture, left      Family History   Problem Relation Name Age of Onset    Stroke Mother      Other (high blood pressure) Father      Prostate cancer Father      Heart disease Father          with MI at age 71    Diabetes Father      Hypertension Father      Diabetes Sister      Hypertension Sister      Other (oral cancer) Brother      Hypertension Brother       Past Surgical History:   Procedure Laterality Date    AXILLARY NODE DISSECTION  08/16/2023    Right Axillary Dissection and Evacuation of a right breast hematoma    BI MAMMO GUIDED BREAST RIGHT LOCALIZATION Right 07/21/2023    Wire localization lumpectomy right breast with sentinel node biopsy    BREAST BIOPSY Left 07/17/2024    Sterotactic    BREAST LUMPECTOMY  2000    Left breast    BREAST LUMPECTOMY W/ NEEDLE LOCALIZATION Left 09/06/2024    HYSTERECTOMY  2014    LAPAROTOMY OOPHERECTOMY  2014    US GUIDED BIOPSY LYMPH NODE SUPERFICIAL  06/07/2023    Ultrasound guided right breast and axillary lymph node biopsy     Allergies   Allergen Reactions    Pollen Extracts Itching    Cephalexin Diarrhea    Naproxen Unknown       Review of Systems  /74 (BP Location: Left arm, Patient Position: Sitting, BP Cuff Size: Adult)   Pulse 91   Temp 36.4 °C (97.5 °F) (Temporal)   Resp 17    Ht 1.524 m (5')   Wt 94.8 kg (209 lb)   SpO2 97%   BMI 40.82 kg/m²    Objective   Physical Exam  GENERAL APPEARANCE: Patient appears in no acute distress.   EYES: Sclera non-icteric, PERRLA.   ENT Normal appearance of ears and nose.   NECK/THYROID: Neck: no masses. Thyroid: no masses.   LYMPH NODES: No cervical or supraclavicular lymphadenopathy.   CARDIOVASCULAR Heart: RRR, no murmurs; Carotid bruits: none; Peripheral edema: none.   RESPIRATORY: Lungs: Bilateral inspiratory and expiratory wheezing; no respiratory distress.   BREASTS: Exam done both in upright and supine position.  Patient with ongoing mild peau d'orange and mild erythema right breast inferior and medially..  Lumpectomy scar from remote surgery has mild dimpling on the left. Masses: none appreciated Axillary lymphadenopathy: none.   GI (ABDOMEN) No intraabdominal mass appreciated, no hepatosplenomegaly; Hernia: none; Tenderness: none.   PSYCH: Patient oriented to time, place and person, normal affect.       Assessment/Plan   Problem List Items Addressed This Visit             ICD-10-CM    Infiltrating ductal carcinoma of right breast, stage 2 (Multi) - Primary C50.911     Patient with normal breast exam.  Bilateral mammogram normal.  Due for repeat bilateral mammogram end of June.  Will do repeat breast check in July.  Ultimately she will start doing yearly MRIs and mammograms.             Breast History:      Patient is status post ultrasound guided biopsy of right breast and axillary lymph node on 06/07/2023.   Pathology revealing right breast biopsy; invasive ductal carcinoma grade 2 of 3 with asscoicated microcalifications. ER-positive, MS - positive and HER2/katie- equivocal 2+.   Right axillary lymph node biopsy pathology revealing; portion of lymph node, negative of carcinoma.   Imaging is concordant.  Patient is status post wire localization lumpectomy right breast with sentinel node biopsy on 07/21/2023.   Pathology revealed right breast  lumpectomy at 6 o'clock position; invasive ductal carcinoma measuring 3 cm grade 3 of 3, margin is 1 mm from the closest anterior margin. DCIS present, cribriform and solid types, intermediate to high nuclear grade, margins negative.  Right breast sentinel node biopsy revealed; one lymph node positive for metastasis, tumor measures 8 mm.  Patient presents to office today for her post operative appointment and drain removal.  Patient is status post right axillary regional axillary dissection and evacuation of a right breast hematoma with KELSEY drain placement 08/16/2023.  Pathology revealed right axillary contents; fifteen lymph nodes, negative for metastasis.   Patient saw Dr. Tineo on 09/13/2023, Dr. Tineo recommended she come back into office to have area evaluated and consider drainage of the breast. Ultrasound ordered- seroma present; drain placement scheduled but not completed due to small size of seroma.   Adjuvant RT completed 12/15/23 with .  Anastrozole initiated 10/2023 with Dr. Hannon.  Last seen on 5/3/24 with lymphedema of the right breast mild erythema and peau d'orange. Recommended massage therapy; referral was placed.  At appointment on 5/3/2024 patient with lump associated with lumpectomy scar slightly more pronounced. Likely a seroma. May be scar tissue. Recent imaging called for a biopsy, so she is also here today for biopsy discussion.  Patient's recent imaging specifically showed microcalcifications in the area of the previous lumpectomy of the left breast.  This lumpectomy was done in the year 2000 and was not cancer. .  Patient with a seroma in the right breast corresponding to the palpable area of concern.  Recommending biopsy of the left breast calcifications.  Started  Exemestane but stopped due to depression symptoms     Patient returns to clinic for S/P Left Breast Lumpectomy with Magseed Localization 9/6/2024.  Final diagnosis in the left breast was atypical ductal hyperplasia.      Previous Biopsy: Yes, Left, Breast, Benign. Menarche age: 11. Age of first delivery: Nulliparous. Breastfeed: N/A. Menopause age: hysterectomy at 2014 at 59. HRT: No. Family history of breast cancer: No. Family history of ovarian cancer: No. Family history of pancreatic cancer: No.     Olivia Boone MD 02/28/25 11:48 AM

## 2025-02-26 ENCOUNTER — APPOINTMENT (OUTPATIENT)
Dept: PRIMARY CARE | Facility: CLINIC | Age: 70
End: 2025-02-26

## 2025-02-28 ENCOUNTER — OFFICE VISIT (OUTPATIENT)
Dept: SURGERY | Facility: CLINIC | Age: 70
End: 2025-02-28
Payer: MEDICARE

## 2025-02-28 VITALS
DIASTOLIC BLOOD PRESSURE: 74 MMHG | WEIGHT: 209 LBS | BODY MASS INDEX: 41.03 KG/M2 | OXYGEN SATURATION: 97 % | TEMPERATURE: 97.5 F | RESPIRATION RATE: 17 BRPM | SYSTOLIC BLOOD PRESSURE: 110 MMHG | HEIGHT: 60 IN | HEART RATE: 91 BPM

## 2025-02-28 DIAGNOSIS — C50.311 MALIGNANT NEOPLASM OF LOWER-INNER QUADRANT OF RIGHT FEMALE BREAST: ICD-10-CM

## 2025-02-28 DIAGNOSIS — C50.911 INFILTRATING DUCTAL CARCINOMA OF RIGHT BREAST, STAGE 2 (MULTI): Primary | ICD-10-CM

## 2025-02-28 PROCEDURE — 1157F ADVNC CARE PLAN IN RCRD: CPT | Performed by: SURGERY

## 2025-02-28 PROCEDURE — 4010F ACE/ARB THERAPY RXD/TAKEN: CPT | Performed by: SURGERY

## 2025-02-28 PROCEDURE — 3078F DIAST BP <80 MM HG: CPT | Performed by: SURGERY

## 2025-02-28 PROCEDURE — 1159F MED LIST DOCD IN RCRD: CPT | Performed by: SURGERY

## 2025-02-28 PROCEDURE — 99214 OFFICE O/P EST MOD 30 MIN: CPT | Performed by: SURGERY

## 2025-02-28 PROCEDURE — 4004F PT TOBACCO SCREEN RCVD TLK: CPT | Performed by: SURGERY

## 2025-02-28 PROCEDURE — 1125F AMNT PAIN NOTED PAIN PRSNT: CPT | Performed by: SURGERY

## 2025-02-28 PROCEDURE — 3008F BODY MASS INDEX DOCD: CPT | Performed by: SURGERY

## 2025-02-28 PROCEDURE — 3074F SYST BP LT 130 MM HG: CPT | Performed by: SURGERY

## 2025-02-28 ASSESSMENT — PATIENT HEALTH QUESTIONNAIRE - PHQ9
2. FEELING DOWN, DEPRESSED OR HOPELESS: NOT AT ALL
1. LITTLE INTEREST OR PLEASURE IN DOING THINGS: NOT AT ALL
SUM OF ALL RESPONSES TO PHQ9 QUESTIONS 1 & 2: 0

## 2025-02-28 ASSESSMENT — LIFESTYLE VARIABLES
HOW OFTEN DO YOU HAVE A DRINK CONTAINING ALCOHOL: MONTHLY OR LESS
AUDIT-C TOTAL SCORE: 1
HOW OFTEN DO YOU HAVE SIX OR MORE DRINKS ON ONE OCCASION: NEVER
SKIP TO QUESTIONS 9-10: 1
HOW MANY STANDARD DRINKS CONTAINING ALCOHOL DO YOU HAVE ON A TYPICAL DAY: 1 OR 2

## 2025-02-28 ASSESSMENT — ENCOUNTER SYMPTOMS
DEPRESSION: 0
OCCASIONAL FEELINGS OF UNSTEADINESS: 0
LOSS OF SENSATION IN FEET: 0

## 2025-02-28 ASSESSMENT — PAIN SCALES - GENERAL: PAINLEVEL_OUTOF10: 5

## 2025-02-28 NOTE — PATIENT INSTRUCTIONS
Radiology / Laboratory  Testing Instructions     Dr. Boone has referred you to [facility] Radiology or Laboratory for additional testing. Please schedule repeat mammogram at the end of June and follow up appointment in July.  In order for us to better assist you in the process, please follow these instructions:    Please call today to schedule your Diagnostic Imaging at [phone].      Please call our office at [phone]  once imaging is scheduled to make a follow up appointment to discuss results.     If your order is for lab work, please take your requisition to the nearest [facility] Laboratory to obtain testing.            Thank you for allowing us to care for you!

## 2025-02-28 NOTE — ASSESSMENT & PLAN NOTE
Patient with normal breast exam.  Bilateral mammogram normal.  Due for repeat bilateral mammogram end of June.  Will do repeat breast check in July.  Ultimately she will start doing yearly MRIs and mammograms.

## 2025-03-12 ENCOUNTER — APPOINTMENT (OUTPATIENT)
Dept: PRIMARY CARE | Facility: CLINIC | Age: 70
End: 2025-03-12

## 2025-03-18 ENCOUNTER — HOSPITAL ENCOUNTER (OUTPATIENT)
Dept: CARDIOLOGY | Facility: HOSPITAL | Age: 70
End: 2025-03-18
Payer: MEDICARE

## 2025-03-18 ENCOUNTER — HOSPITAL ENCOUNTER (OUTPATIENT)
Dept: RADIOLOGY | Facility: HOSPITAL | Age: 70
Discharge: HOME | End: 2025-03-18
Payer: MEDICARE

## 2025-03-18 ENCOUNTER — HOSPITAL ENCOUNTER (OUTPATIENT)
Dept: RADIOLOGY | Facility: HOSPITAL | Age: 70
End: 2025-03-18
Payer: MEDICARE

## 2025-03-18 DIAGNOSIS — R93.1 ELEVATED CORONARY ARTERY CALCIUM SCORE: ICD-10-CM

## 2025-03-18 DIAGNOSIS — R94.31 ABNORMAL ELECTROCARDIOGRAM (ECG) (EKG): ICD-10-CM

## 2025-03-19 ENCOUNTER — APPOINTMENT (OUTPATIENT)
Dept: RADIOLOGY | Facility: HOSPITAL | Age: 70
End: 2025-03-19
Payer: MEDICARE

## 2025-03-19 ENCOUNTER — TELEPHONE (OUTPATIENT)
Dept: HEMATOLOGY/ONCOLOGY | Facility: HOSPITAL | Age: 70
End: 2025-03-19
Payer: MEDICARE

## 2025-03-19 ENCOUNTER — TELEPHONE (OUTPATIENT)
Dept: HEMATOLOGY/ONCOLOGY | Facility: HOSPITAL | Age: 70
End: 2025-03-19

## 2025-03-19 NOTE — TELEPHONE ENCOUNTER
I called her---she just wanted to explain the update but she did that you needed to call her.  I'll put a note in.

## 2025-03-19 NOTE — TELEPHONE ENCOUNTER
Returned call to pt, who wanted to notify VARUN Márquez that she is having a Nuclear Stress test due to her calcium score of 724.  She is awaiting knee surgery so she had to get this done.  Explained that I did give this info to Carrie.  Pt verbalized appreciation.

## 2025-03-19 NOTE — TELEPHONE ENCOUNTER
"Sorry--skipped a word :-(   No, she just wanted to let us know but I called to see if there was anything you needed to call for.  So she does NOT need you to call.  Left out the \"NOT\".  lol"

## 2025-03-20 ENCOUNTER — HOSPITAL ENCOUNTER (OUTPATIENT)
Dept: RADIOLOGY | Facility: HOSPITAL | Age: 70
Discharge: HOME | End: 2025-03-20
Payer: MEDICARE

## 2025-03-20 ENCOUNTER — HOSPITAL ENCOUNTER (OUTPATIENT)
Dept: CARDIOLOGY | Facility: HOSPITAL | Age: 70
Discharge: HOME | End: 2025-03-20
Payer: MEDICARE

## 2025-03-20 DIAGNOSIS — R93.1 ELEVATED CORONARY ARTERY CALCIUM SCORE: ICD-10-CM

## 2025-03-20 DIAGNOSIS — R94.31 ABNORMAL ELECTROCARDIOGRAM (ECG) (EKG): ICD-10-CM

## 2025-03-20 PROCEDURE — 93017 CV STRESS TEST TRACING ONLY: CPT

## 2025-03-20 PROCEDURE — 78452 HT MUSCLE IMAGE SPECT MULT: CPT

## 2025-03-20 PROCEDURE — 93018 CV STRESS TEST I&R ONLY: CPT | Performed by: INTERNAL MEDICINE

## 2025-03-20 PROCEDURE — A9502 TC99M TETROFOSMIN: HCPCS | Performed by: INTERNAL MEDICINE

## 2025-03-20 PROCEDURE — 3430000001 HC RX 343 DIAGNOSTIC RADIOPHARMACEUTICALS: Performed by: INTERNAL MEDICINE

## 2025-03-20 PROCEDURE — 2500000004 HC RX 250 GENERAL PHARMACY W/ HCPCS (ALT 636 FOR OP/ED): Performed by: INTERNAL MEDICINE

## 2025-03-20 PROCEDURE — 93016 CV STRESS TEST SUPVJ ONLY: CPT | Performed by: INTERNAL MEDICINE

## 2025-03-20 RX ORDER — REGADENOSON 0.08 MG/ML
0.4 INJECTION, SOLUTION INTRAVENOUS ONCE
Status: COMPLETED | OUTPATIENT
Start: 2025-03-20 | End: 2025-03-20

## 2025-03-20 RX ADMIN — TETROFOSMIN 34 MILLICURIE: 0.23 INJECTION, POWDER, LYOPHILIZED, FOR SOLUTION INTRAVENOUS at 08:43

## 2025-03-20 RX ADMIN — REGADENOSON 0.4 MG: 0.08 INJECTION, SOLUTION INTRAVENOUS at 08:49

## 2025-03-21 ENCOUNTER — HOSPITAL ENCOUNTER (OUTPATIENT)
Dept: RADIOLOGY | Facility: HOSPITAL | Age: 70
Discharge: HOME | End: 2025-03-21
Payer: MEDICARE

## 2025-03-21 PROCEDURE — 3430000001 HC RX 343 DIAGNOSTIC RADIOPHARMACEUTICALS: Performed by: INTERNAL MEDICINE

## 2025-03-21 PROCEDURE — A9502 TC99M TETROFOSMIN: HCPCS | Performed by: INTERNAL MEDICINE

## 2025-03-21 RX ADMIN — TETROFOSMIN 30 MILLICURIE: 0.23 INJECTION, POWDER, LYOPHILIZED, FOR SOLUTION INTRAVENOUS at 10:49

## 2025-03-25 DIAGNOSIS — B37.31 VAGINAL CANDIDIASIS: ICD-10-CM

## 2025-03-25 RX ORDER — NYSTATIN 100000 U/G
CREAM TOPICAL
Qty: 60 G | Refills: 1 | Status: SHIPPED | OUTPATIENT
Start: 2025-03-25

## 2025-03-26 ENCOUNTER — APPOINTMENT (OUTPATIENT)
Dept: PRIMARY CARE | Facility: CLINIC | Age: 70
End: 2025-03-26
Payer: MEDICARE

## 2025-03-27 NOTE — ASSESSMENT & PLAN NOTE
We increased atorvastatin to 10 mg daily on last visit as she had been taking it every other day.  Ordered fasting lipid panel for return.  With elevated coronary artery calcium score we are going to target an LDL cholesterol of less than 70.  Most recent lipid panel with an LDL of 73.  Will thus increase the atorvastatin to 20 mg daily and repeat fasting lipid panel on return visit.

## 2025-03-31 DIAGNOSIS — I10 PRIMARY HYPERTENSION: ICD-10-CM

## 2025-04-01 RX ORDER — AMLODIPINE BESYLATE 10 MG/1
10 TABLET ORAL DAILY
Qty: 90 TABLET | Refills: 1 | Status: SHIPPED | OUTPATIENT
Start: 2025-04-01

## 2025-04-02 ENCOUNTER — OFFICE VISIT (OUTPATIENT)
Dept: PRIMARY CARE | Facility: CLINIC | Age: 70
End: 2025-04-02

## 2025-04-02 VITALS
BODY MASS INDEX: 40.11 KG/M2 | SYSTOLIC BLOOD PRESSURE: 118 MMHG | DIASTOLIC BLOOD PRESSURE: 70 MMHG | WEIGHT: 205.4 LBS | HEART RATE: 66 BPM

## 2025-04-02 DIAGNOSIS — G89.29 CHRONIC BILATERAL LOW BACK PAIN WITHOUT SCIATICA: Primary | ICD-10-CM

## 2025-04-02 DIAGNOSIS — R35.0 FREQUENCY OF URINATION: ICD-10-CM

## 2025-04-02 DIAGNOSIS — M54.50 CHRONIC BILATERAL LOW BACK PAIN WITHOUT SCIATICA: Primary | ICD-10-CM

## 2025-04-02 LAB
ALT SERPL-CCNC: 11 U/L (ref 6–29)
CHOLEST SERPL-MCNC: 147 MG/DL
CHOLEST/HDLC SERPL: 2.8 (CALC)
HDLC SERPL-MCNC: 53 MG/DL
LDLC SERPL CALC-MCNC: 73 MG/DL (CALC)
NONHDLC SERPL-MCNC: 94 MG/DL (CALC)
TRIGL SERPL-MCNC: 124 MG/DL

## 2025-04-02 PROCEDURE — 4004F PT TOBACCO SCREEN RCVD TLK: CPT | Performed by: FAMILY MEDICINE

## 2025-04-02 PROCEDURE — 4010F ACE/ARB THERAPY RXD/TAKEN: CPT | Performed by: FAMILY MEDICINE

## 2025-04-02 PROCEDURE — 3074F SYST BP LT 130 MM HG: CPT | Performed by: FAMILY MEDICINE

## 2025-04-02 PROCEDURE — 97810 ACUP 1/> WO ESTIM 1ST 15 MIN: CPT | Performed by: FAMILY MEDICINE

## 2025-04-02 PROCEDURE — 97811 ACUP 1/> W/O ESTIM EA ADD 15: CPT | Performed by: FAMILY MEDICINE

## 2025-04-02 PROCEDURE — 1159F MED LIST DOCD IN RCRD: CPT | Performed by: FAMILY MEDICINE

## 2025-04-02 PROCEDURE — 1125F AMNT PAIN NOTED PAIN PRSNT: CPT | Performed by: FAMILY MEDICINE

## 2025-04-02 PROCEDURE — 1157F ADVNC CARE PLAN IN RCRD: CPT | Performed by: FAMILY MEDICINE

## 2025-04-02 PROCEDURE — 3078F DIAST BP <80 MM HG: CPT | Performed by: FAMILY MEDICINE

## 2025-04-02 RX ORDER — HYDROCORTISONE 25 MG/G
CREAM TOPICAL
COMMUNITY
Start: 2025-03-28

## 2025-04-02 RX ORDER — CLOBETASOL PROPIONATE 0.5 MG/G
CREAM TOPICAL
COMMUNITY
Start: 2025-03-28

## 2025-04-02 RX ORDER — PAROXETINE 30 MG/1
1 TABLET, FILM COATED ORAL
COMMUNITY
Start: 2025-03-17

## 2025-04-02 ASSESSMENT — PAIN SCALES - GENERAL: PAINLEVEL_OUTOF10: 5

## 2025-04-02 NOTE — PROGRESS NOTES
Subjective   Patient ID: Tatyana Mckeon is a 70 y.o. female who presents for Acupuncture.    HPI   She presents today for acupuncture treatment.  She has been having some lower back pain.  She has been having some frequency of urination.  She is going every 2 hours that was disturbing her sleep at night.  She cut her Paxil back from 40 to 30 mg and that did seem to help.  She is only up twice last night.    She has seen her cardiologist and had a workup there for an elevated calcium score of 700.  She is seeing Dr. Hunter be following up with him.  She is scheduled to see her orthopedic surgeon on the 17th of this month for consultation.  She did have her allergy testing.  She is not allergic to nickel though she is allergic to gold according that testing, she states.    Review of Systems    Objective   /70   Pulse 66   Wt 93.2 kg (205 lb 6.4 oz)   BMI 40.11 kg/m²     Physical Exam  She is alert, no apparent distress, her affect is very pleasant.  Some tenderness with palpation through the lower back.    Assessment/Plan   Diagnoses and all orders for this visit:  Chronic bilateral low back pain without sciatica  Frequency of urination  Will see how she responds to today's treatment.  She is going to see me next week to go over some of her medical issues.  We can check her diabetes at that time to be certain that not contributing to her urination issues.    Acupuncture treatment: I used the following points bilaterally with her in the supine position with her knees bent.  BL 40 and 57, LV 2, GB 40 and 43, LI 4 and 14, XL heart.  These retained for 20 minutes with a heating lamp over her lower abdomen.  She tolerated this well

## 2025-04-03 ENCOUNTER — OFFICE VISIT (OUTPATIENT)
Facility: CLINIC | Age: 70
End: 2025-04-03
Payer: MEDICARE

## 2025-04-03 VITALS
BODY MASS INDEX: 40.04 KG/M2 | OXYGEN SATURATION: 97 % | WEIGHT: 205 LBS | DIASTOLIC BLOOD PRESSURE: 64 MMHG | SYSTOLIC BLOOD PRESSURE: 100 MMHG | HEART RATE: 94 BPM

## 2025-04-03 DIAGNOSIS — Z82.49 FAMILY HISTORY OF ISCHEMIC HEART DISEASE: ICD-10-CM

## 2025-04-03 DIAGNOSIS — Z01.810 PREOP CARDIOVASCULAR EXAM: ICD-10-CM

## 2025-04-03 DIAGNOSIS — E78.00 HIGH CHOLESTEROL: Primary | ICD-10-CM

## 2025-04-03 DIAGNOSIS — R93.1 ELEVATED CORONARY ARTERY CALCIUM SCORE: ICD-10-CM

## 2025-04-03 PROCEDURE — 3074F SYST BP LT 130 MM HG: CPT | Performed by: INTERNAL MEDICINE

## 2025-04-03 PROCEDURE — 99214 OFFICE O/P EST MOD 30 MIN: CPT | Performed by: INTERNAL MEDICINE

## 2025-04-03 PROCEDURE — 1157F ADVNC CARE PLAN IN RCRD: CPT | Performed by: INTERNAL MEDICINE

## 2025-04-03 PROCEDURE — 1159F MED LIST DOCD IN RCRD: CPT | Performed by: INTERNAL MEDICINE

## 2025-04-03 PROCEDURE — 1125F AMNT PAIN NOTED PAIN PRSNT: CPT | Performed by: INTERNAL MEDICINE

## 2025-04-03 PROCEDURE — 3078F DIAST BP <80 MM HG: CPT | Performed by: INTERNAL MEDICINE

## 2025-04-03 PROCEDURE — 4010F ACE/ARB THERAPY RXD/TAKEN: CPT | Performed by: INTERNAL MEDICINE

## 2025-04-03 PROCEDURE — 4004F PT TOBACCO SCREEN RCVD TLK: CPT | Performed by: INTERNAL MEDICINE

## 2025-04-03 RX ORDER — ATORVASTATIN CALCIUM 20 MG/1
20 TABLET, FILM COATED ORAL DAILY
Qty: 90 TABLET | Refills: 3 | Status: SHIPPED | OUTPATIENT
Start: 2025-04-03 | End: 2026-04-03

## 2025-04-03 ASSESSMENT — ENCOUNTER SYMPTOMS
NUMBNESS: 0
OCCASIONAL FEELINGS OF UNSTEADINESS: 1
DYSPNEA ON EXERTION: 0
ABDOMINAL PAIN: 0
COUGH: 0
HEMATURIA: 0
PALPITATIONS: 0
DEPRESSION: 0
LOSS OF SENSATION IN FEET: 0
DYSURIA: 0
BLURRED VISION: 0
PARESTHESIAS: 0
SHORTNESS OF BREATH: 0

## 2025-04-03 ASSESSMENT — PAIN SCALES - GENERAL: PAINLEVEL_OUTOF10: 5

## 2025-04-03 ASSESSMENT — LIFESTYLE VARIABLES: TOTAL SCORE: 0

## 2025-04-03 NOTE — PROGRESS NOTES
Subjective   Tatyana Mckeon is a 70 y.o. female.    Chief Complaint:  stress test results    HPI  Overall patient feels well other than her knee pain.  She is good be scheduled for a total knee arthroplasty in the near future.  No chest pain or angina symptoms.  No unusual shortness of breath.    Review of Systems   Constitutional: Negative for malaise/fatigue.   HENT:  Negative for congestion.    Eyes:  Negative for blurred vision.   Cardiovascular:  Negative for chest pain, dyspnea on exertion and palpitations.   Respiratory:  Negative for cough and shortness of breath.    Musculoskeletal:  Positive for joint pain.   Gastrointestinal:  Negative for abdominal pain.   Genitourinary:  Negative for dysuria and hematuria.   Neurological:  Negative for numbness and paresthesias.       Objective   Constitutional:       Appearance: Not in distress.   Eyes:      Conjunctiva/sclera: Conjunctivae normal.   Neck:      Vascular: JVD normal.   Pulmonary:      Breath sounds: Normal breath sounds. No wheezing. No rhonchi. No rales.   Cardiovascular:      Normal rate. Regular rhythm.      Murmurs: There is no murmur.      No gallop.  No click. No rub.   Abdominal:      Palpations: Abdomen is soft.   Neurological:      General: No focal deficit present.      Mental Status: Alert.         Lab Review:   Office Visit on 02/20/2025   Component Date Value    CHOLESTEROL, TOTAL 04/02/2025 147     HDL CHOLESTEROL 04/02/2025 53     TRIGLYCERIDES 04/02/2025 124     LDL-CHOLESTEROL 04/02/2025 73     CHOL/HDLC RATIO 04/02/2025 2.8     NON HDL CHOLESTEROL 04/02/2025 94     ALT 04/02/2025 11        Assessment/Plan   The primary encounter diagnosis was High cholesterol. Diagnoses of Family history of ischemic heart disease and Elevated coronary artery calcium score were also pertinent to this visit.    High cholesterol  We increased atorvastatin to 10 mg daily on last visit as she had been taking it every other day.  Ordered fasting lipid panel for  return.  With elevated coronary artery calcium score we are going to target an LDL cholesterol of less than 70.  Most recent lipid panel with an LDL of 73.  Will thus increase the atorvastatin to 20 mg daily and repeat fasting lipid panel on return visit.    Elevated coronary artery calcium score  Myocardial perfusion stress test revealed no evidence of ischemia and preserved left ventricular systolic function.  Will thus proceed with standard risk factor modification and follow-up on a clinical basis.    Family history of ischemic heart disease  Continue standard risk factor modification and follow clinically.    Preop cardiovascular exam  Cardiac wise stable with no chest pain or anginal type symptoms.  We did perform myocardial perfusion stress test which revealed no evidence of ischemia and normal ejection fraction of 65%.  Thus I feel the patient may proceed with the total knee arthroplasty procedure at an acceptable low cardiovascular risk.

## 2025-04-03 NOTE — ASSESSMENT & PLAN NOTE
Cardiac wise stable with no chest pain or anginal type symptoms.  We did perform myocardial perfusion stress test which revealed no evidence of ischemia and normal ejection fraction of 65%.  Thus I feel the patient may proceed with the total knee arthroplasty procedure at an acceptable low cardiovascular risk.

## 2025-04-03 NOTE — ASSESSMENT & PLAN NOTE
Myocardial perfusion stress test revealed no evidence of ischemia and preserved left ventricular systolic function.  Will thus proceed with standard risk factor modification and follow-up on a clinical basis.

## 2025-04-09 ENCOUNTER — APPOINTMENT (OUTPATIENT)
Dept: PRIMARY CARE | Facility: CLINIC | Age: 70
End: 2025-04-09
Payer: MEDICARE

## 2025-04-16 ENCOUNTER — OFFICE VISIT (OUTPATIENT)
Dept: PRIMARY CARE | Facility: CLINIC | Age: 70
End: 2025-04-16

## 2025-04-16 VITALS
SYSTOLIC BLOOD PRESSURE: 116 MMHG | WEIGHT: 207 LBS | DIASTOLIC BLOOD PRESSURE: 66 MMHG | BODY MASS INDEX: 40.43 KG/M2 | HEART RATE: 72 BPM

## 2025-04-16 DIAGNOSIS — Z00.00 WELLNESS EXAMINATION: Primary | ICD-10-CM

## 2025-04-16 PROCEDURE — 1125F AMNT PAIN NOTED PAIN PRSNT: CPT | Performed by: FAMILY MEDICINE

## 2025-04-16 PROCEDURE — 1157F ADVNC CARE PLAN IN RCRD: CPT | Performed by: FAMILY MEDICINE

## 2025-04-16 PROCEDURE — 97811 ACUP 1/> W/O ESTIM EA ADD 15: CPT | Performed by: FAMILY MEDICINE

## 2025-04-16 PROCEDURE — 3078F DIAST BP <80 MM HG: CPT | Performed by: FAMILY MEDICINE

## 2025-04-16 PROCEDURE — 3074F SYST BP LT 130 MM HG: CPT | Performed by: FAMILY MEDICINE

## 2025-04-16 PROCEDURE — 4004F PT TOBACCO SCREEN RCVD TLK: CPT | Performed by: FAMILY MEDICINE

## 2025-04-16 PROCEDURE — 97810 ACUP 1/> WO ESTIM 1ST 15 MIN: CPT | Performed by: FAMILY MEDICINE

## 2025-04-16 PROCEDURE — 4010F ACE/ARB THERAPY RXD/TAKEN: CPT | Performed by: FAMILY MEDICINE

## 2025-04-16 ASSESSMENT — COLUMBIA-SUICIDE SEVERITY RATING SCALE - C-SSRS
2. HAVE YOU ACTUALLY HAD ANY THOUGHTS OF KILLING YOURSELF?: NO
1. IN THE PAST MONTH, HAVE YOU WISHED YOU WERE DEAD OR WISHED YOU COULD GO TO SLEEP AND NOT WAKE UP?: NO
6. HAVE YOU EVER DONE ANYTHING, STARTED TO DO ANYTHING, OR PREPARED TO DO ANYTHING TO END YOUR LIFE?: NO

## 2025-04-16 ASSESSMENT — PAIN SCALES - GENERAL: PAINLEVEL_OUTOF10: 5

## 2025-04-16 NOTE — PROGRESS NOTES
Subjective   Patient ID: Tatyana Mckeon is a 70 y.o. female who presents for Acupuncture.    HPI   She presents today for her acupuncture treatment.  Overall, she is doing very well.  She has seen her dermatologist in the use of the cryogun to treat a number of lesions.  She had not had a lesion biopsied in doing well with that.  She is using a steroid cream on the larger psoriatic areas.  She did see Dr. Hunter she states she is cleared for her surgery.  She is taking anastrozole and tolerating it.    Review of Systems    Objective   /66   Pulse 72   Wt 93.9 kg (207 lb)   BMI 40.43 kg/m²     Physical Exam  She is alert, no apparent distress, her affect is pleasant.  Respirations are easy.  No edema.  She is able to get up onto the table on her own using stool.    Assessment/Plan   Diagnoses and all orders for this visit:  Wellness examination  Overall, she is doing very well.  Her energy has come around.  She is able now to schedule the surgery.    Acupuncture treatment Colie the following points bilaterally LI 4, LI 14, SJ 4, LV 2, GB 43, GB 40, SP 6.  These were retained for 20 minutes.  She tolerated this very well.

## 2025-04-25 ENCOUNTER — TELEPHONE (OUTPATIENT)
Dept: PHYSICAL MEDICINE AND REHAB | Facility: CLINIC | Age: 70
End: 2025-04-25
Payer: MEDICARE

## 2025-04-25 NOTE — TELEPHONE ENCOUNTER
Patient called waning a new prescription for OT lymphedema therapy.  Patient has not been seen since 10/2024.  I called patient to let her know we will need to see patient for a follow up to be evaluated again before order is written.

## 2025-04-30 ENCOUNTER — APPOINTMENT (OUTPATIENT)
Dept: PRIMARY CARE | Facility: CLINIC | Age: 70
End: 2025-04-30

## 2025-05-06 ENCOUNTER — OFFICE VISIT (OUTPATIENT)
Dept: PHYSICAL MEDICINE AND REHAB | Facility: CLINIC | Age: 70
End: 2025-05-06
Payer: MEDICARE

## 2025-05-06 VITALS
HEART RATE: 97 BPM | RESPIRATION RATE: 22 BRPM | SYSTOLIC BLOOD PRESSURE: 103 MMHG | TEMPERATURE: 97.5 F | DIASTOLIC BLOOD PRESSURE: 64 MMHG

## 2025-05-06 DIAGNOSIS — C50.911: ICD-10-CM

## 2025-05-06 DIAGNOSIS — I89.0 LYMPHEDEMA OF BREAST: ICD-10-CM

## 2025-05-06 DIAGNOSIS — I89.0 LYMPHEDEMA: Primary | ICD-10-CM

## 2025-05-06 PROCEDURE — 3074F SYST BP LT 130 MM HG: CPT | Performed by: PHYSICAL MEDICINE & REHABILITATION

## 2025-05-06 PROCEDURE — 1125F AMNT PAIN NOTED PAIN PRSNT: CPT | Performed by: PHYSICAL MEDICINE & REHABILITATION

## 2025-05-06 PROCEDURE — 99213 OFFICE O/P EST LOW 20 MIN: CPT | Performed by: PHYSICAL MEDICINE & REHABILITATION

## 2025-05-06 PROCEDURE — 1159F MED LIST DOCD IN RCRD: CPT | Performed by: PHYSICAL MEDICINE & REHABILITATION

## 2025-05-06 PROCEDURE — 4010F ACE/ARB THERAPY RXD/TAKEN: CPT | Performed by: PHYSICAL MEDICINE & REHABILITATION

## 2025-05-06 PROCEDURE — 4004F PT TOBACCO SCREEN RCVD TLK: CPT | Performed by: PHYSICAL MEDICINE & REHABILITATION

## 2025-05-06 PROCEDURE — 3078F DIAST BP <80 MM HG: CPT | Performed by: PHYSICAL MEDICINE & REHABILITATION

## 2025-05-06 ASSESSMENT — PAIN SCALES - GENERAL: PAINLEVEL_OUTOF10: 4

## 2025-05-06 NOTE — PROGRESS NOTES
[facility]  \     HPI   Ms. Mckeon is a 70 y.o.  woman with right Breast cancer Pathologic: Stage IIA (pT2, pN1a, cM0, G3, ER+, SD+, HER2-,  status post status post lumpectomy and sentinel node biopsy 7/2023, followed by axillary dissection and evacuation  of hematoma of the breast 8/2023.  She was started on arimidex but stopped due to hair loss/weight gain. Now on exemestane but taking a break due to worsening mood. She then completed adjuvant radiation to breast completed 12/2024.       Recall  States in march she is noticed a tight lenny on right breast below. Feels hard but not tender. Especially under breast, no swelling in the arm that she noticed,  Some swelling on side of breast.    Has not done any lymphedema therapy.     Tried to push on the breast.   Shoudler rom intact.  Regular bra and not wear wire. Does not wear.        She broke left 5th metatarsal and now is a boot. Had pt eval, needs garment.  Has seroma around surgery site.  Now pending biopsy left.   Last hgba1c from [facility] was 8 in July 2024.        Overall discouraged by progress w everything, frustrated. Biopsy was negative.  States measurements were improving.   She is Wearease compression bra which is helping. Fibrosis helps. Was not covered by insurance.  She got fitted for compression sleeve bilateral, Juzo 20-30 mmhg in [employer].  Silicone band does help. Multiple delays.   Had pump trial w tactile but feels might be cumbersome.      She is having R knee replacement soon. Feeling more sleepy. Could be related to increase in paxil. Occasionally has some pain in middle of chest, dpes    She has the compression bra w ribbed material, wear ease. Mabel bra. Bought it on [employer].   Also got them [employer] and now has 3 bras.   Difficulty w remembering to wear the sleeves.  States that she decided not to do the pump because she would not be complaint feels sleeve and bra is enough w the exercise.  Continues w arimidex.      She was last seen in feb 2025. At that time we discussed:  . continue to OT:  mld, compression garments, pump, shoulder rom, had eval, will also give a compression sleeve and glove order  2. Received Compression bra from [employer] and compression sleeves; wear ease. Mabel bra  3, discussed shoulder rom, posture  4. Fu 12 weeks  5. Will discuss [facility] at follow up   6.decided against pneumatic pump since feels can control w exercises and compression garments       Received compression bra wearease, now [employer]; two bras/year.   Still some fibrosis medial R breast. She sees her lymph therapist every few weeks.  States it feels comfortable. Pending R knee replacement end of may 2025.         Imaging  Reviewed w patient and personally 05/06/25    Mammo 2024  IMPRESSION:  1. Indeterminate left breast calcifications. A stereotactic biopsy is  recommended. This was discussed with the patient. A preprocedure form  has been completed.  2. Seroma at the palpable area of concern at the lumpectomy site in  the right breast. Clinical follow-up is recommended.  Mammo 2023  FINDINGS  Patient had right lumpectomy 07/21/2023.  On the PET-CT scan from 09/14/2023 a large, 9.1 x 6.3 cm fluid collection  was seen in the region of lumpectomy in the right breast. On today's exam a  large fluid collection is seen extending from the 3 o'clock to the 7 o'clock  position of the right breast, measuring 7.9 x 4.9 cm, containing small  septations/minimal debris. On Doppler ultrasound there is no internal blood  flow.  This corresponds to an improving postsurgical seroma.     IMPRESSION:  No sonographic  evidence of malignancy.  Past Medical History:   Diagnosis Date    Acquired lymphedema     right breast    Atypical ductal hyperplasia, breast     Lt breast 2024    Bipolar 1 disorder (Multi)     Breast cancer 06/2023    Right Breast - IDC and DCIS    Depression     Diabetes mellitus (Multi)     Type 2    Endometrial cancer  (Multi)     Hyperlipidemia     Hypertension     Osteoarthritis of right knee     Peripheral edema     Personal history of irradiation     Psoriasis     Toe fracture, left     Endometrial cancer sp hysterectomy  Past Surgical History:   Procedure Laterality Date    AXILLARY NODE DISSECTION  08/16/2023    Right Axillary Dissection and Evacuation of a right breast hematoma    BI MAMMO GUIDED BREAST RIGHT LOCALIZATION Right 07/21/2023    Wire localization lumpectomy right breast with sentinel node biopsy    BREAST BIOPSY Left 07/17/2024    Sterotactic    BREAST LUMPECTOMY  2000    Left breast    BREAST LUMPECTOMY W/ NEEDLE LOCALIZATION Left 09/06/2024    HYSTERECTOMY  2014    LAPAROTOMY OOPHERECTOMY  2014    US GUIDED BIOPSY LYMPH NODE SUPERFICIAL  06/07/2023    Ultrasound guided right breast and axillary lymph node biopsy     Past Medical History:   Diagnosis Date    Acquired lymphedema     right breast    Atypical ductal hyperplasia, breast     Lt breast 2024    Bipolar 1 disorder (Multi)     Breast cancer 06/2023    Right Breast - IDC and DCIS    Depression     Diabetes mellitus (Multi)     Type 2    Endometrial cancer (Multi)     Hyperlipidemia     Hypertension     Osteoarthritis of right knee     Peripheral edema     Personal history of irradiation     Psoriasis     Toe fracture, left      Family History   Problem Relation Name Age of Onset    Stroke Mother      Other (high blood pressure) Father      Prostate cancer Father      Heart disease Father          with MI at age 71    Diabetes Father      Hypertension Father      Diabetes Sister      Hypertension Sister      Other (oral cancer) Brother      Hypertension Brother       Social History     Socioeconomic History    Marital status:      Spouse name: Not on file    Number of children: Not on file    Years of education: Not on file    Highest education level: Not on file   Occupational History    Occupation: Retired   Tobacco Use    Smoking status: Every  Day     Current packs/day: 0.50     Average packs/day: 0.5 packs/day for 40.0 years (20.0 ttl pk-yrs)     Types: Cigarettes     Passive exposure: Current    Smokeless tobacco: Never   Vaping Use    Vaping status: Never Used   Substance and Sexual Activity    Alcohol use: Yes     Comment: 2x a year    Drug use: Not Currently     Comment: SMOKED MJ 40 YEARS AGO    Sexual activity: Defer   Other Topics Concern    Not on file   Social History Narrative    Not on file     Social Drivers of Health     Financial Resource Strain: Not on file   Food Insecurity: No Food Insecurity (11/16/2023)    Hunger Vital Sign     Worried About Running Out of Food in the Last Year: Never true     Ran Out of Food in the Last Year: Never true   Transportation Needs: Not on file   Physical Activity: Not on file   Stress: Not on file   Social Connections: Not on file   Intimate Partner Violence: Not on file   Housing Stability: Not on file     Lives alone  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  Denies alcohol or drug use         REVIEW OF SYSTEMS      Pain: Denies  Sleep: WNL in a regular bed  Mood: WNL  Appetite/Nutrition: WNL  Motor: Denies weakness  Sensory: Denies numbness or tingling  Skin: No ulcerations, infection or drainage  Chest Pain: breast swelling pain  Dyspnea: Denies  Nausea/Vomiting: Denies  Fatigue (include 1-10 rating if present): Denies        PHYSICAL EXAMINATION      General: Alert, normal build, no acute distress  /64 (BP Location: Left arm, Patient Position: Sitting)   Pulse 97   Temp 36.4 °C (97.5 °F)   Resp 22       Affect: Appropriate  Skin: No redness or streaking, no open areas. No drainage. No discoloration.  HEENT: WNL.  Heart: RRR  Lungs: Respirations unlabored.  Extremities: Normal contour. Stemmer's negative.     Arm Circumferences (cm):     5/2024 10/2024 5/2025   Index L 6.5 6.5    R 6.5 6.5 6.5   MCP's L 18.5 18    R 18.5 18 18.5   Below elbow L(10 cm) 25 26    R 23.5 24 23.5   Below elbow L (5cm) 26.5 26     R 26.5 26 26.5   Above elbow L (10 cm) 35.5 35 34   R 34.5 35 32.5            Wrist nbl 17                                        16.5        r wrist 17     R flexion abduction 170     Neck: Supple  Back: Normal contour  Motor: 5/5 strength all extremities  R breast lymphedema, also lateral chest wall         Reflexes: 1-2+ bilaterally  Coordination: Intact rapid alternating movements  Gait: WNL    Promis screen 7/2024 2/2025  PF: 14/130                        15/30  Fatigue: 12/15                9/15  Social' 7/15                    9/15  Xochilt; 5                                  1  Distress 5                             3     5/6/2025         Physical 13/30         Fatigue 9/15         Social 7/15         Pain Score 5/10         Distress Score 4/10               IMPRESSION Ms. Mckeon is a 69 y.o.  woman with right Breast cancer Pathologic: Stage IIA (pT2, pN1a, cM0, G3, ER+, IA+, HER2-,  status post status post lumpectomy and sentinel node biopsy 7/2023, followed by axillary dissection and evacuation  of hematoma of the breast 8/2023.  She was started on arimidex but stopped due to hair loss/weight gain. Now on exemestane but taking a break due to worsening mood and other side effects. She then completed adjuvant radiation to breast completed 12/2024. She is presenting with R breast and mild arm lymphedema which is overall stable. Dpiong well, pending R knee replacement end of may 2025. Circs stable.     PLAN      1. continue to do OT:  mld, compression garments, pump, shoulder rom, new script  2. Received Compression bra from [employer] and compression sleeves; wear ease. Mabel bra  3, discussed shoulder rom, posture  4. Fu 12 weeks  5. Gave info re [facility]    6.decided against pneumatic pump since feels can control w exercises and compression garments    Charline Hays MD  Physical Medicine and Rehabilitation

## 2025-05-07 ENCOUNTER — OFFICE VISIT (OUTPATIENT)
Dept: PRIMARY CARE | Facility: CLINIC | Age: 70
End: 2025-05-07

## 2025-05-07 VITALS
SYSTOLIC BLOOD PRESSURE: 106 MMHG | DIASTOLIC BLOOD PRESSURE: 74 MMHG | WEIGHT: 206.8 LBS | HEART RATE: 60 BPM | BODY MASS INDEX: 40.39 KG/M2

## 2025-05-07 DIAGNOSIS — M54.50 CHRONIC BILATERAL LOW BACK PAIN WITHOUT SCIATICA: Primary | ICD-10-CM

## 2025-05-07 DIAGNOSIS — J20.9 ACUTE BRONCHITIS, UNSPECIFIED ORGANISM: ICD-10-CM

## 2025-05-07 DIAGNOSIS — G89.29 CHRONIC BILATERAL LOW BACK PAIN WITHOUT SCIATICA: Primary | ICD-10-CM

## 2025-05-07 PROCEDURE — 1125F AMNT PAIN NOTED PAIN PRSNT: CPT | Performed by: FAMILY MEDICINE

## 2025-05-07 PROCEDURE — 4004F PT TOBACCO SCREEN RCVD TLK: CPT | Performed by: FAMILY MEDICINE

## 2025-05-07 PROCEDURE — 3078F DIAST BP <80 MM HG: CPT | Performed by: FAMILY MEDICINE

## 2025-05-07 PROCEDURE — 97810 ACUP 1/> WO ESTIM 1ST 15 MIN: CPT | Performed by: FAMILY MEDICINE

## 2025-05-07 PROCEDURE — 4010F ACE/ARB THERAPY RXD/TAKEN: CPT | Performed by: FAMILY MEDICINE

## 2025-05-07 PROCEDURE — 97811 ACUP 1/> W/O ESTIM EA ADD 15: CPT | Performed by: FAMILY MEDICINE

## 2025-05-07 PROCEDURE — 1159F MED LIST DOCD IN RCRD: CPT | Performed by: FAMILY MEDICINE

## 2025-05-07 PROCEDURE — 3074F SYST BP LT 130 MM HG: CPT | Performed by: FAMILY MEDICINE

## 2025-05-07 RX ORDER — ALBUTEROL SULFATE 90 UG/1
2 INHALANT RESPIRATORY (INHALATION) EVERY 4 HOURS PRN
Qty: 8.5 G | Refills: 0 | Status: SHIPPED | OUTPATIENT
Start: 2025-05-07 | End: 2025-06-06

## 2025-05-07 ASSESSMENT — PATIENT HEALTH QUESTIONNAIRE - PHQ9
SUM OF ALL RESPONSES TO PHQ9 QUESTIONS 1 AND 2: 0
1. LITTLE INTEREST OR PLEASURE IN DOING THINGS: NOT AT ALL
2. FEELING DOWN, DEPRESSED OR HOPELESS: NOT AT ALL

## 2025-05-07 ASSESSMENT — PAIN SCALES - GENERAL: PAINLEVEL_OUTOF10: 5

## 2025-05-07 NOTE — PROGRESS NOTES
Subjective   Patient ID: Tatyana Mckeon is a 70 y.o. female who presents for Acupuncture.    HPI   She presents today for her acupuncture treatment.  She is going to have her surgery on May 27.  She is ready for that.  She states her sugars been under pretty good control.  She is felt better.  Still has some psoriatic lesions but she feels these are under very good control.  She is having a productive cough now.  In coughing for the last 3 weeks without a clearing too much.    Review of Systems    Objective   /74   Pulse 60   Wt 93.8 kg (206 lb 12.8 oz)   BMI 40.39 kg/m²     Physical Exam  She is alert, no apparent stress, her affect is pleasant.  Respirations are easy.  Harsh cough.     Assessment/Plan   Diagnoses and all orders for this visit:  Chronic bilateral low back pain without sciatica  Acute bronchitis, unspecified organism  -     inhalational spacing device inhaler; Use as directed with inhalers  -     albuterol (ProAir HFA) 90 mcg/actuation inhaler; Inhale 2 puffs every 4 hours if needed for wheezing or shortness of breath.  She will add the albuterol, 2 puffs every 4 hours as needed for the coughing.  I will see her next week for her preop evaluation and we will see how she is doing.    Acupuncture treatment: I used the following points bilaterally: BL 40 and 57, GB 43, LB 2, ST 36, LI 4 and 14.  He is retained for 20 minutes.  He tolerated this well

## 2025-05-08 ENCOUNTER — OFFICE VISIT (OUTPATIENT)
Dept: HEMATOLOGY/ONCOLOGY | Facility: CLINIC | Age: 70
End: 2025-05-08
Payer: MEDICARE

## 2025-05-08 VITALS
OXYGEN SATURATION: 93 % | WEIGHT: 206.02 LBS | SYSTOLIC BLOOD PRESSURE: 126 MMHG | DIASTOLIC BLOOD PRESSURE: 55 MMHG | BODY MASS INDEX: 40.24 KG/M2 | TEMPERATURE: 97.7 F | RESPIRATION RATE: 18 BRPM | HEART RATE: 67 BPM

## 2025-05-08 DIAGNOSIS — Z17.0 MALIGNANT NEOPLASM OF BREAST IN FEMALE, ESTROGEN RECEPTOR POSITIVE, UNSPECIFIED LATERALITY, UNSPECIFIED SITE OF BREAST: ICD-10-CM

## 2025-05-08 DIAGNOSIS — C50.919 MALIGNANT NEOPLASM OF BREAST IN FEMALE, ESTROGEN RECEPTOR POSITIVE, UNSPECIFIED LATERALITY, UNSPECIFIED SITE OF BREAST: ICD-10-CM

## 2025-05-08 PROCEDURE — 3074F SYST BP LT 130 MM HG: CPT

## 2025-05-08 PROCEDURE — 4004F PT TOBACCO SCREEN RCVD TLK: CPT

## 2025-05-08 PROCEDURE — 4010F ACE/ARB THERAPY RXD/TAKEN: CPT

## 2025-05-08 PROCEDURE — 1125F AMNT PAIN NOTED PAIN PRSNT: CPT

## 2025-05-08 PROCEDURE — 3078F DIAST BP <80 MM HG: CPT

## 2025-05-08 PROCEDURE — 99215 OFFICE O/P EST HI 40 MIN: CPT

## 2025-05-08 ASSESSMENT — PAIN SCALES - GENERAL: PAINLEVEL_OUTOF10: 5

## 2025-05-08 NOTE — PROGRESS NOTES
DIVISION OF MEDICAL ONCOLOGY    Patient ID: Tatyana Mckeon is a 70 y.o. female.  MRN: [id number]  : 1955  The patient presents to clinic today for her history of breast cancer.     Cancer Staging   Breast cancer  Staging form: Breast, AJCC 8th Edition  - Pathologic stage from 2023: Stage IIA (pT2, pN1a, cM0, G3, ER+, OK+, HER2-, Oncotype DX score: 22) - Signed by Neo Hannon MD on 2024    Infiltrating ductal carcinoma of right breast, stage 2  Staging form: Breast, AJCC 8th Edition  - Clinical: Stage IIB (cT2, cN1, cM0, G3, ER+, OK+, HER2-) - Signed by Olivia Boone MD on 3/14/2024    Diagnostic/Therapeutic History:  -23: US-guided CNB showed IDC grade 2, LN was negative. ER >95% OK 90% Her2-negative (2+ IHC; LUIS ratio 1.7).  -23: Right lumpectomy/SLNBx performed. 3 cm of grade 3 IDC noted.  SLN was positive for carcinoma. LVI indeterminate, +LOC measuring 2 mm.  -23: Right ALND: 0/15 LN's involved.  -PET/CT 23: 8 mm focus of soft tissue density (SUV 4.1) in lower right axilla, unclear etiology (?reactive). Otherwise negative. Follow-up US was unremarkable.  pT2 pN1a G3 [stage IIA]  Oncotype 22, no chemotherapy recommended.  Anastrozole initiated 10/2023 (RT was delayed for seroma). Switched to exemestane then stopped due to side effects   Adjuvant RT completed 12/15/23.  2024 right lumpectomy scar has associate mass. - was a seroma   2024 also showed left breast calcifications that correlated to atypical ductal hyperplasia. Underwent a lumpectomy 24 which confirmed this      History of Present Illness (HPI)/Interval History:  Tatyana Mckeon presents today for routine FUV.   Following with Dr. Boone - Next imaging in 2025, need to schedule.   Since stopping her exemestane her depression is better. Restarted anastrozole, initially had worsening of hot flashes but switched it to taking in the evening and doing better.   Lymphedema in the right breast is  improving.  She has some orthopedic issues including arthritis in knee. She plans to have a joint replacement at the end of this month. She does note petechiae mainly around an area where a bandage was.   No new bone pain, dyspnea, cough, unintentional weight loss, abdominal pain.  She continues to smoke but it continuing to cut back.   Continues to work with psych NP for medication adjustments. Now on paxil 40 mg, does feel better with this increase.     Review of Systems:  14-point ROS otherwise negative, as per HPI/Interval History.    Past Medical History:   Diagnosis Date    Acquired lymphedema     right breast    Atypical ductal hyperplasia, breast     Lt breast 2024    Bipolar 1 disorder (Multi)     Breast cancer 06/2023    Right Breast - IDC and DCIS    Depression     Diabetes mellitus (Multi)     Type 2    Endometrial cancer (Multi)     Hyperlipidemia     Hypertension     Osteoarthritis of right knee     Peripheral edema     Personal history of irradiation     Psoriasis     Toe fracture, left      Patient Active Problem List   Diagnosis    Allergic rhinitis    Anxiety    Bipolar 1 disorder (Multi)    Candidiasis of skin and nails    Gastroesophageal reflux disease    High blood pressure    High cholesterol    Insomnia    Psoriasis    Type II diabetes mellitus (Multi)    Cigarette smoker    Obesity with body mass index 30 or greater    Breast cancer    Chronic right-sided low back pain with sciatica    Endometrial cancer (Multi)    Infiltrating ductal carcinoma of right breast, stage 2    Primary osteoarthritis of both knees    Breast pain, right    Lymphedema    Alopecia    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    Microcalcifications of the breast    Atypical ductal hyperplasia of left breast    Chronic bilateral low back pain without sciatica    Chronic pain of right knee    Family history of ischemic heart disease    Preop cardiovascular exam    Elevated coronary artery calcium  score        Past Surgical History:   Procedure Laterality Date    AXILLARY NODE DISSECTION  08/16/2023    Right Axillary Dissection and Evacuation of a right breast hematoma    BI MAMMO GUIDED BREAST RIGHT LOCALIZATION Right 07/21/2023    Wire localization lumpectomy right breast with sentinel node biopsy    BREAST BIOPSY Left 07/17/2024    Sterotactic    BREAST LUMPECTOMY  2000    Left breast    BREAST LUMPECTOMY W/ NEEDLE LOCALIZATION Left 09/06/2024    HYSTERECTOMY  2014    LAPAROTOMY OOPHERECTOMY  2014    US GUIDED BIOPSY LYMPH NODE SUPERFICIAL  06/07/2023    Ultrasound guided right breast and axillary lymph node biopsy       Social History     Socioeconomic History    Marital status:    Occupational History    Occupation: Retired   Tobacco Use    Smoking status: Every Day     Current packs/day: 0.50     Average packs/day: 0.5 packs/day for 40.0 years (20.0 ttl pk-yrs)     Types: Cigarettes     Passive exposure: Current    Smokeless tobacco: Never   Vaping Use    Vaping status: Never Used   Substance and Sexual Activity    Alcohol use: Yes     Comment: 2x a year    Drug use: Not Currently     Comment: SMOKED MJ 40 YEARS AGO    Sexual activity: Defer     Social Drivers of Health     Food Insecurity: No Food Insecurity (11/16/2023)    Hunger Vital Sign     Worried About Running Out of Food in the Last Year: Never true     Ran Out of Food in the Last Year: Never true       Family History   Problem Relation Name Age of Onset    Stroke Mother      Other (high blood pressure) Father      Prostate cancer Father      Heart disease Father          with MI at age 71    Diabetes Father      Hypertension Father      Diabetes Sister      Hypertension Sister      Other (oral cancer) Brother      Hypertension Brother         Allergies:   Allergies   Allergen Reactions    Pollen Extracts Itching    Adhesive Tape-Silicones Rash     blistering    Cephalexin Diarrhea    Naproxen Unknown         Current Outpatient  Medications:     acetaminophen (Tylenol) 500 mg tablet, Take 1 tablet (500 mg) by mouth 2 times a day., Disp: , Rfl:     albuterol (ProAir HFA) 90 mcg/actuation inhaler, Inhale 2 puffs every 4 hours if needed for wheezing or shortness of breath., Disp: 8.5 g, Rfl: 0    amLODIPine (Norvasc) 10 mg tablet, TAKE ONE TABLET BY MOUTH DAILY, Disp: 90 tablet, Rfl: 1    anastrozole (Arimidex) 1 mg tablet, Take 1 tablet (1 mg total) by mouth once daily.  Swallow whole with a drink of water., Disp: 90 tablet, Rfl: 3    aspirin 81 mg EC tablet, Take 1 tablet (81 mg) by mouth once daily., Disp: , Rfl:     atorvastatin (Lipitor) 20 mg tablet, Take 1 tablet (20 mg) by mouth once daily., Disp: 90 tablet, Rfl: 3    blood sugar diagnostic (Accu-Chek Guide test strips) strip, Inject 1 strip under the skin once daily., Disp: 100 strip, Rfl: 2    blood-glucose meter misc, Inject 1 m under the skin 3 times a day. Accu-Check Guide, Disp: 1 each, Rfl: 0    clobetasol (Temovate) 0.05 % cream, Apply topically., Disp: , Rfl:     docusate sodium (Colace) 100 mg capsule, Take 1 capsule (100 mg) by mouth once daily., Disp: , Rfl:     famotidine (Pepcid) 20 mg tablet, Take 1 tablet (20 mg) by mouth once daily., Disp: , Rfl:     furosemide (Lasix) 20 mg tablet, TAKE 1-2 TABLETS (20-40 MG) BY MOUTH 2 TIMES A DAY, Disp: 90 tablet, Rfl: 1    hydrocortisone 2.5 % cream, Apply topically., Disp: , Rfl:     ibuprofen 200 mg tablet, Take 1 tablet (200 mg) by mouth 2 times a day., Disp: , Rfl:     ketoconazole (NIZOral) 2 % shampoo, Apply topically., Disp: , Rfl:     LaMICtal 100 mg tablet, Take 1 tablet (100 mg) by mouth once daily., Disp: , Rfl:     lisinopril 20 mg tablet, TAKE ONE TABLET BY MOUTH DAILY, Disp: 90 tablet, Rfl: 1    loratadine (Claritin) 10 mg tablet, Take 1 tablet (10 mg) by mouth once daily., Disp: , Rfl:     metFORMIN (Glucophage) 500 mg tablet, TAKE ONE TABLET BY MOUTH THREE TIMES A DAY, Disp: 270 tablet, Rfl: 0    multivitamin  capsule, Take 1 capsule by mouth once daily., Disp: , Rfl:     Neurontin 400 mg capsule, Take 1 capsule (400 mg) by mouth once daily., Disp: , Rfl:     nystatin (Mycostatin) cream, APPLY EXTERNALLY TWICE DAILY, Disp: 60 g, Rfl: 1    omega 3-dha-epa-fish oil (Fish OiL) 1,000 mg (120 mg-180 mg) capsule, Take 1 capsule (1,000 mg) by mouth once daily., Disp: , Rfl:     PARoxetine (Paxil) 30 mg tablet, Take 1 tablet (30 mg) by mouth early in the morning.., Disp: , Rfl:     QUEtiapine (SEROquel) 25 mg tablet, Take 0.5 tablets (12.5 mg) by mouth once daily as needed., Disp: , Rfl:     Topamax 25 mg tablet, Take 1 tablet (25 mg) by mouth once daily., Disp: , Rfl:     triamcinolone (Kenalog) 0.1 % cream, Apply topically 2 times a day as needed for rash., Disp: 30 g, Rfl: 1    Wellbutrin  mg 24 hr tablet, Take 1 tablet (300 mg) by mouth once daily in the morning., Disp: , Rfl:     inhalational spacing device inhaler, Use as directed with inhalers (Patient not taking: Reported on 2025), Disp: 1 each, Rfl: 0     Objective    BSA: 1.99 meters squared  /55 (BP Location: Left arm, Patient Position: Sitting, BP Cuff Size: Adult long)   Pulse 67   Temp 36.5 °C (97.7 °F) (Temporal)   Resp 18   Wt 93.5 kg (206 lb 0.3 oz)   BMI 40.24 kg/m²     ECOG Performance Status:  The ECOG performance scale today is ECO- Restricted in physically strenuous activity.  Carries out light duty.    Physical Exam  Vitals reviewed.   Constitutional:       General: She is awake. She is not in acute distress.     Appearance: Normal appearance. She is not toxic-appearing.   HENT:      Head: Normocephalic and atraumatic.      Mouth/Throat:      Mouth: Mucous membranes are moist.      Pharynx: Oropharynx is clear.   Eyes:      General: No scleral icterus.     Extraocular Movements: Extraocular movements intact.      Conjunctiva/sclera: Conjunctivae normal.      Pupils: Pupils are equal, round, and reactive to light.   Neck:       Trachea: Trachea and phonation normal. No tracheal tenderness.   Cardiovascular:      Rate and Rhythm: Normal rate and regular rhythm.      Pulses: Normal pulses.      Heart sounds: Normal heart sounds. No murmur heard.  Pulmonary:      Effort: Pulmonary effort is normal. No respiratory distress.      Breath sounds: Normal breath sounds.   Chest:      Chest wall: No mass or deformity.   Breasts:     Right: No swelling, mass, nipple discharge, skin change or tenderness.      Left: No swelling, mass, nipple discharge, skin change or tenderness.      Comments: S/p bilateral lumpectomies with mild lymphedema in the breast   Abdominal:      General: Bowel sounds are normal. There is no distension.      Palpations: Abdomen is soft. There is no mass.      Tenderness: There is no abdominal tenderness. There is no guarding.   Musculoskeletal:         General: No swelling or tenderness. Normal range of motion.      Cervical back: Normal range of motion.   Lymphadenopathy:      Upper Body:      Right upper body: No supraclavicular, axillary or pectoral adenopathy.      Left upper body: No supraclavicular, axillary or pectoral adenopathy.   Skin:     General: Skin is warm and dry.      Capillary Refill: Capillary refill takes less than 2 seconds.      Coloration: Skin is not jaundiced.      Findings: No petechiae or rash.   Neurological:      General: No focal deficit present.      Mental Status: She is alert and oriented to person, place, and time.      Motor: No weakness.   Psychiatric:         Mood and Affect: Mood normal.         Behavior: Behavior normal.         Thought Content: Thought content normal.         Judgment: Judgment normal.         Laboratory Data:  Lab Results   Component Value Date    WBC 10.1 08/22/2024    HGB 13.3 08/22/2024    HCT 40.1 08/22/2024    MCV 94 08/22/2024     08/22/2024       Chemistry    Lab Results   Component Value Date/Time     09/12/2024 0933    K 4.0 09/12/2024 0933    CL  106 09/12/2024 0933    CO2 24 09/12/2024 0933    BUN 21 09/12/2024 0933    CREATININE 0.65 09/12/2024 0933    Lab Results   Component Value Date/Time    CALCIUM 9.0 09/12/2024 0933    ALKPHOS 70 04/26/2024 1250    AST 13 04/26/2024 1250    ALT 11 04/02/2025 1005    BILITOT 0.3 04/26/2024 1250        Radiology: N/A    Pathology: N/A      Assessment/Plan:  Tatyana Mckeon is a 70 y.o. female with a history of right-sided breast cancer, who presents today for follow-up.     Breast cancer (CMS/HCC)  - Never started letrozole due to side effect concerns. Restarted anastrozole, tolerating okay. Some hot flashes at night so switched her timing of anastrozole and doing better  - Mammogram 12/30/2024: cat 3, due for bilateral dx mammogram in 6 months, next due in June 2025. Need to schedule   - DEXA 1/2024: -1 normal: She will consider adjuvant Zometa. Potential toxicities discussed- declined for now.    There is no evidence of breast cancer recurrence based on her clinical exam today.     - Knee arthroplasty in a few weeks   - Asked that she reach out to me if her PLT are low at all for a further work up given her petechiae     Disposition.  - RTC ~ 6 months with Carrie Osorio PA-C   - She has our contact information and was instructed to call with concerns/questions in the interim.    No orders of the defined types were placed in this encounter.     Carrie Osorio PA-C  Hematology and Medical Oncology  [facility]

## 2025-05-08 NOTE — PROGRESS NOTES
Hot flashes increased, more at night, switched to taking anastrozole at night  Joint pain  Hair thinning

## 2025-05-12 DIAGNOSIS — E11.9 TYPE 2 DIABETES MELLITUS WITHOUT COMPLICATION, WITHOUT LONG-TERM CURRENT USE OF INSULIN: ICD-10-CM

## 2025-05-13 ENCOUNTER — PRE-ADMISSION TESTING (OUTPATIENT)
Dept: PREADMISSION TESTING | Facility: HOSPITAL | Age: 70
End: 2025-05-13
Payer: MEDICARE

## 2025-05-13 ENCOUNTER — OFFICE VISIT (OUTPATIENT)
Dept: PRIMARY CARE | Facility: CLINIC | Age: 70
End: 2025-05-13
Payer: MEDICARE

## 2025-05-13 VITALS
DIASTOLIC BLOOD PRESSURE: 68 MMHG | TEMPERATURE: 96.3 F | HEIGHT: 60 IN | RESPIRATION RATE: 16 BRPM | OXYGEN SATURATION: 100 % | BODY MASS INDEX: 40.25 KG/M2 | WEIGHT: 205 LBS | SYSTOLIC BLOOD PRESSURE: 117 MMHG | HEART RATE: 100 BPM

## 2025-05-13 VITALS
HEART RATE: 89 BPM | WEIGHT: 205 LBS | BODY MASS INDEX: 40.25 KG/M2 | HEIGHT: 60 IN | OXYGEN SATURATION: 98 % | DIASTOLIC BLOOD PRESSURE: 62 MMHG | SYSTOLIC BLOOD PRESSURE: 118 MMHG

## 2025-05-13 DIAGNOSIS — M17.11 OSTEOARTHRITIS OF RIGHT KNEE, UNSPECIFIED OSTEOARTHRITIS TYPE: Primary | ICD-10-CM

## 2025-05-13 DIAGNOSIS — F31.9 BIPOLAR 1 DISORDER (MULTI): ICD-10-CM

## 2025-05-13 DIAGNOSIS — Z01.818 PREOPERATIVE TESTING: Primary | ICD-10-CM

## 2025-05-13 DIAGNOSIS — E11.9 TYPE 2 DIABETES MELLITUS WITHOUT COMPLICATION, WITHOUT LONG-TERM CURRENT USE OF INSULIN: ICD-10-CM

## 2025-05-13 DIAGNOSIS — I10 PRIMARY HYPERTENSION: ICD-10-CM

## 2025-05-13 DIAGNOSIS — K21.9 GASTROESOPHAGEAL REFLUX DISEASE, UNSPECIFIED WHETHER ESOPHAGITIS PRESENT: ICD-10-CM

## 2025-05-13 DIAGNOSIS — E11.42 TYPE 2 DIABETES MELLITUS WITH DIABETIC POLYNEUROPATHY, WITHOUT LONG-TERM CURRENT USE OF INSULIN: ICD-10-CM

## 2025-05-13 LAB
ALBUMIN SERPL BCP-MCNC: 4.3 G/DL (ref 3.4–5)
ALP SERPL-CCNC: 59 U/L (ref 33–136)
ALT SERPL W P-5'-P-CCNC: 12 U/L (ref 7–45)
ANION GAP SERPL CALCULATED.3IONS-SCNC: 12 MMOL/L (ref 10–20)
AST SERPL W P-5'-P-CCNC: 11 U/L (ref 9–39)
BILIRUB SERPL-MCNC: 0.4 MG/DL (ref 0–1.2)
BUN SERPL-MCNC: 21 MG/DL (ref 6–23)
CALCIUM SERPL-MCNC: 9.2 MG/DL (ref 8.6–10.3)
CHLORIDE SERPL-SCNC: 106 MMOL/L (ref 98–107)
CO2 SERPL-SCNC: 27 MMOL/L (ref 21–32)
CREAT SERPL-MCNC: 0.83 MG/DL (ref 0.5–1.05)
EGFRCR SERPLBLD CKD-EPI 2021: 75 ML/MIN/1.73M*2
ERYTHROCYTE [DISTWIDTH] IN BLOOD BY AUTOMATED COUNT: 12.9 % (ref 11.5–14.5)
EST. AVERAGE GLUCOSE BLD GHB EST-MCNC: 128 MG/DL
GLUCOSE SERPL-MCNC: 114 MG/DL (ref 74–99)
HBA1C MFR BLD: 6.1 % (ref ?–5.7)
HCT VFR BLD AUTO: 44.1 % (ref 36–46)
HGB BLD-MCNC: 14.4 G/DL (ref 12–16)
MCH RBC QN AUTO: 31.2 PG (ref 26–34)
MCHC RBC AUTO-ENTMCNC: 32.7 G/DL (ref 32–36)
MCV RBC AUTO: 96 FL (ref 80–100)
PLATELET # BLD AUTO: 270 X10*3/UL (ref 150–450)
PMV BLD AUTO: 9.5 FL (ref 7.5–11.5)
POTASSIUM SERPL-SCNC: 4.6 MMOL/L (ref 3.5–5.3)
PROT SERPL-MCNC: 6.6 G/DL (ref 6.4–8.2)
RBC # BLD AUTO: 4.61 X10*6/UL (ref 4–5.2)
SODIUM SERPL-SCNC: 140 MMOL/L (ref 136–145)
WBC # BLD AUTO: 9.3 X10*3/UL (ref 4.4–11.3)

## 2025-05-13 PROCEDURE — 99204 OFFICE O/P NEW MOD 45 MIN: CPT | Performed by: NURSE PRACTITIONER

## 2025-05-13 PROCEDURE — 1160F RVW MEDS BY RX/DR IN RCRD: CPT | Performed by: FAMILY MEDICINE

## 2025-05-13 PROCEDURE — 87081 CULTURE SCREEN ONLY: CPT | Mod: TRILAB

## 2025-05-13 PROCEDURE — 4004F PT TOBACCO SCREEN RCVD TLK: CPT | Performed by: FAMILY MEDICINE

## 2025-05-13 PROCEDURE — 80053 COMPREHEN METABOLIC PANEL: CPT

## 2025-05-13 PROCEDURE — 4010F ACE/ARB THERAPY RXD/TAKEN: CPT | Performed by: FAMILY MEDICINE

## 2025-05-13 PROCEDURE — 3008F BODY MASS INDEX DOCD: CPT | Performed by: FAMILY MEDICINE

## 2025-05-13 PROCEDURE — 99214 OFFICE O/P EST MOD 30 MIN: CPT | Performed by: FAMILY MEDICINE

## 2025-05-13 PROCEDURE — 83036 HEMOGLOBIN GLYCOSYLATED A1C: CPT | Mod: TRILAB

## 2025-05-13 PROCEDURE — 85027 COMPLETE CBC AUTOMATED: CPT

## 2025-05-13 PROCEDURE — 3074F SYST BP LT 130 MM HG: CPT | Performed by: FAMILY MEDICINE

## 2025-05-13 PROCEDURE — 3078F DIAST BP <80 MM HG: CPT | Performed by: FAMILY MEDICINE

## 2025-05-13 PROCEDURE — 1159F MED LIST DOCD IN RCRD: CPT | Performed by: FAMILY MEDICINE

## 2025-05-13 PROCEDURE — 93005 ELECTROCARDIOGRAM TRACING: CPT

## 2025-05-13 PROCEDURE — 1125F AMNT PAIN NOTED PAIN PRSNT: CPT | Performed by: FAMILY MEDICINE

## 2025-05-13 PROCEDURE — 36415 COLL VENOUS BLD VENIPUNCTURE: CPT

## 2025-05-13 RX ORDER — CHLORHEXIDINE GLUCONATE ORAL RINSE 1.2 MG/ML
SOLUTION DENTAL
Qty: 473 ML | Refills: 0 | Status: SHIPPED | OUTPATIENT
Start: 2025-05-13

## 2025-05-13 RX ORDER — LISINOPRIL 20 MG/1
20 TABLET ORAL DAILY
Qty: 90 TABLET | Refills: 1 | Status: SHIPPED | OUTPATIENT
Start: 2025-05-13

## 2025-05-13 ASSESSMENT — ENCOUNTER SYMPTOMS
ARTHRALGIAS: 1
GASTROINTESTINAL NEGATIVE: 1
ACTIVITY CHANGE: 1
RESPIRATORY NEGATIVE: 1
CARDIOVASCULAR NEGATIVE: 1
PSYCHIATRIC NEGATIVE: 1
EYES NEGATIVE: 1

## 2025-05-13 ASSESSMENT — PAIN SCALES - GENERAL: PAINLEVEL_OUTOF10: 6

## 2025-05-13 NOTE — PROGRESS NOTES
Subjective   Patient ID: Tatyana Mckeon is a 70 y.o. female who presents for Surgical Clearance.    HPI   She presents for surgical clearance for her right knee surgery.  I will be on the 27th.  She did have postop clearance already.  I reviewed that EKG which showed a normal sinus rhythm and no significant ST changes.  Dr. Churchill cleared her, she states.   She did have labs drawn today and I reviewed her CMP which was normal.  Her CBC was normal.  Her hemoglobin A1c was pending.    Review of Systems  She is not having chest pain or shortness of breath.  Continues have the pain in the right hip and uses a walker for that.  Psoriasis has been controlled.    Objective   /62   Pulse 89   Ht 1.524 m (5')   Wt 93 kg (205 lb)   SpO2 98%   BMI 40.04 kg/m²     Physical Exam  Constitutional:       Appearance: Normal appearance.   Neck:      Thyroid: No thyromegaly or thyroid tenderness.      Vascular: No carotid bruit.   Cardiovascular:      Rate and Rhythm: Normal rate and regular rhythm.      Heart sounds: No murmur heard.  Pulmonary:      Effort: Pulmonary effort is normal.      Breath sounds: Normal breath sounds.   Musculoskeletal:      Cervical back: Neck supple.   Neurological:      Mental Status: She is alert.   Psychiatric:         Mood and Affect: Mood normal.         Assessment/Plan   Diagnoses and all orders for this visit:  Osteoarthritis of right knee, unspecified osteoarthritis type  Primary hypertension  Type 2 diabetes mellitus without complication, without long-term current use of insulin  Gastroesophageal reflux disease, unspecified whether esophagitis present  Bipolar 1 disorder (Multi)  She is cleared for surgery.  She has clearance already from Dr. Pizano and has cleared the preop clearance protocol.  I will await her hemoglobin A1c to make any adjustments there.  I suspect this will be under very good control.  She will continue her current medications as her hypertension, GERD, and bipolar  disorder are all under good control as is the and the psoriasis is stable.

## 2025-05-13 NOTE — PREPROCEDURE INSTRUCTIONS
Why must I stop eating and drinking near surgery time?  With sedation, food or liquid in your stomach can enter your lungs causing serious complications  Increases nausea and vomiting    When do I need to stop eating and drinking before my surgery?   Do not eat or drink after midnight the night before your surgery/procedure.  You may have small sips of water to take your medication.      PAT DISCHARGE INSTRUCTIONS    Please call the Same Day Surgery (SDS) Department of the hospital where your procedure will be performed after 2:00 PM the day before your surgery. If you are scheduled on a Monday, or a Tuesday following a Monday holiday, you will need to call on the last business day prior to your surgery.    [place] [id number] [place] [id number] [phone]  Second Floor      Please let your surgeon know if:      You develop any open sores, shingles, burning or painful urination as these may increase your risk of an infection.   You no longer wish to have the surgery.   Any other personal circumstances change that may lead to the need to cancel or defer this surgery-such as being sick or getting admitted to any hospital within one week of your planned procedure.    Your contact details change, such as a change of address or phone number.    Starting now:     Please DO NOT drink alcohol or smoke for 24 hours before surgery. It is well known that quitting smoking can make a huge difference to your health and recovery from surgery. The longer you abstain from smoking, the better your chances of a healthy recovery. If you need help with quitting, call 6-800-QUIT-NOW to be connected to a trained counselor who will discuss the best methods to help you quit.     Before your surgery:    Please stop all supplements 7 days prior to surgery. Or as directed by your surgeon.   Please stop taking NSAID pain medicine such as Advil and Motrin 7 days before surgery.    If you  develop any fever, cough, cold, rashes, cuts, scratches, scrapes, urinary symptoms or infection anywhere on your body (including teeth and gums) prior to surgery, please call your surgeon’s office as soon as possible. This may require treatment to reduce the chance of cancellation on the day of surgery.    The day before your surgery:   DIET- Please follow the diet instructions at the top of your packet.   Get a good night’s rest.  Use the special soap for bathing if you have been instructed to use one.    Scheduled surgery times may change and you will be notified if this occurs - please check your personal voicemail for any updates.     On the morning of surgery:   Wear comfortable, loose fitting clothes which open in the front. Please do not wear moisturizers, creams, lotions, makeup or perfume.    Please bring with you to surgery:   Photo ID and insurance card   Current list of medicines and allergies   Pacemaker/ Defibrillator/Heart stent cards   CPAP machine and mask    Slings/ splints/ crutches   A copy of your complete advanced directive/DHPOA.    Please do NOT bring with you to surgery:   All jewelry and valuables should be left at home.   Prosthetic devices such as contact lenses, hearing aids, dentures, eyelash extensions, hairpins and body piercings must be removed prior to going in to the surgical suite.    After outpatient surgery:   A responsible adult MUST accompany you at the time of discharge and stay with you for 24 hours after your surgery. You may NOT drive yourself home after surgery.    Do not drive, operate machinery, make critical decisions or do activities that require co-ordination or balance until after a night’s sleep.   Do not drink alcoholic beverages for 24 hours.   Instructions for resuming your medications will be provided by your surgeon.    CALL YOUR DOCTOR AFTER SURGERY IF YOU HAVE:     Chills and/or a fever of 101° F or higher.    Redness, swelling, pus or drainage from your  surgical wound or a bad smell from the wound.    Lightheadedness, fainting or confusion.    Persistent vomiting (throwing up) and are not able to eat or drink for 12 hours.    Three or more loose, watery bowel movements in 24 hours (diarrhea).   Difficulty or pain while urinating( after non-urological surgery)    Pain and swelling in your legs, especially if it is only on one side.    Difficulty breathing or are breathing faster than normal.    Any new concerning symptoms.        Patient Information: Pre-Operative Infection Prevention Measures     Why did I have my nose, under my arms, and groin swabbed?  The purpose of the swab is to identify Staphylococcus aureus inside your nose or on your skin.  The swab was sent to the laboratory for culture.  A positive swab/culture for Staphylococcus aureus is called colonization or carriage.      What is Staphylococcus aureus?  Staphylococcus aureus, also known as “staph”, is a germ found on the skin or in the nose of healthy people.  Sometimes Staphylococcus aureus can get into the body and cause an infection.  This can be minor (such as pimples, boils, or other skin problems).  It might also be serious (such as a blood infection, pneumonia, or a surgical site infection).    What is Staphylococcus aureus colonization or carriage?  Colonization or carriage means that a person has the germ but is not sick from it.  These bacteria can be spread on the hands or when breathing or sneezing.    How is Staphylococcus aureus spread?  It is most often spread by close contact with a person or item that carries it.    What happens if my culture is positive for Staphylococcus aureus?  Your doctor/medical team will use this information to guide any antibiotic treatment which may be necessary.  Regardless of the culture results, we will clean the inside of your nose with a betadine swab just before you have your surgery.      Will I get an infection if I have Staphylococcus aureus in my  nose or on my skin?  Anyone can get an infection with Staphylococcus aureus.  However, the best way to reduce your risk of infection is to follow the instructions provided to you for the use of your CHG soap and dental rinse.        Patient Information: Oral/Dental Rinse    What is oral/dental rinse?   It is a mouthwash. It is a way of cleaning the mouth with a germ-killing solution before your surgery.  The solution contains chlorhexidine, commonly known as CHG.   It is used inside the mouth to kill a bacteria known as Staphylococcus aureus.  Let your doctor know if you are allergic to Chlorhexidine.    Why do I need to use CHG oral/dental rinse?  The CHG oral/dental rinse helps to kill a bacteria in your mouth known as Staphylococcus aureus.     This reduces the risk of infection at the surgical site.      Using your CHG oral/dental rinse  STEPS:  Use your CHG oral/dental rinse after you brush your teeth the night before (at bedtime) and the morning of your surgery.  Follow all directions on your prescription label.    Use the cap on the container to measure 15ml   Swish (gargle if you can) the mouthwash in your mouth for at least 30 seconds, (do not swallow) and spit out  After you use your CHG rinse, do not rinse your mouth with water, drink or eat.  Please refer to the prescription label for the appropriate time to resume oral intake      What side effects might I have using the CHG oral/dental rinse?  CHG rinse will stick to plaque on the teeth.  Brush and floss just before use.  Teeth brushing will help avoid staining of plaque during use.      Patient Information: Home Preoperative Antibacterial Shower      What is a home preoperative antibacterial shower?  This shower is a way of cleaning the skin with a germ-killing solution before surgery.  The solution contains chlorhexidine, commonly known as CHG.  CHG is a skin cleanser with germ-killing ability.  Let your doctor know if you are allergic to  chlorhexidine.    Why do I need to take a preoperative antibacterial shower?  Skin is not sterile.  It is best to try to make your skin as free of germs as possible before surgery.  Proper cleansing with a germ-killing soap before surgery can lower the number of germs on your skin.  This helps to reduce the risk of infection at the surgical site.  Following the instructions listed below will help you prepare your skin for surgery.      How do I use the solution?  Steps:  Begin using your CHG soap 5 days before your scheduled surgery on ________________________.    First, wash and rinse your hair using the CHG soap. Keep CHG soap away from ear canals and eyes.  Rinse completely, do not condition.  Hair extensions should be removed.  Wash your face with your normal soap and rinse.    Apply the CHG solution to a clean wet washcloth.  Turn the water off or move away from the water spray to avoid premature rinsing of the CHG soap as you are applying.   Firmly lather your entire body from the neck down.  Do not use on your face.  Pay special attention to the area(s) where your incision(s) will be located unless they are on your face.  Avoid scrubbing your skin too hard.  The important point is to have the CHG soap sit on your skin for 3 minutes.    When the 3 minutes are up, turn on the water and rinse the CHG solution off your body completely.   DO NOT wash with regular soap after you have used the CHG soap solution  Pat yourself dry with a clean, freshly-laundered towel.  DO NOT apply powders, deodorants, or lotions.  Dress in clean, freshly laundered nightclothes.    Be sure to sleep with clean, freshly laundered sheets.  Be aware that CHG will cause stains on fabrics; if you wash them with bleach after use.  Rinse your washcloth and other linens that have contact with CHG completely.  Use only non-chlorine detergents to launder the items used.   The morning of surgery is the fifth day.  Repeat the above steps and  dress in clean comfortable clothing     Whom should I contact if I have any questions regarding the use of CHG soap?  Call the [facility] at [phone] if you have any questions.                  Medication List            Accurate as of May 13, 2025 10:08 AM. Always use your most recent med list.                Accu-Chek Guide test strips  Generic drug: blood sugar diagnostic  Inject 1 strip under the skin once daily.  Medication Adjustments for Surgery: Take/Use as prescribed     acetaminophen 500 mg tablet  Commonly known as: Tylenol  Medication Adjustments for Surgery: Take/Use as prescribed     albuterol 90 mcg/actuation inhaler  Commonly known as: ProAir HFA  Inhale 2 puffs every 4 hours if needed for wheezing or shortness of breath.  Medication Adjustments for Surgery: Take/Use as prescribed     amLODIPine 10 mg tablet  Commonly known as: Norvasc  TAKE ONE TABLET BY MOUTH DAILY  Medication Adjustments for Surgery: Take on the morning of surgery     anastrozole 1 mg tablet  Commonly known as: Arimidex  Take 1 tablet (1 mg total) by mouth once daily.  Swallow whole with a drink of water.  Notes to patient: Take evening dose before surgery.     aspirin 81 mg EC tablet  Additional Medication Adjustments for Surgery: Take last dose 7 days before surgery     atorvastatin 20 mg tablet  Commonly known as: Lipitor  Take 1 tablet (20 mg) by mouth once daily.  Notes to patient: Take evening dose before surgery.     blood-glucose meter misc  Inject 1 m under the skin 3 times a day. Accu-Check Guide     chlorhexidine 0.12 % solution  Commonly known as: Peridex  Use cap to measure 15 mL.  Swish/gargle mouthwash for at least 30 seconds.  Do not swallow.  Use night before surgery after brushing teeth and morning of surgery after brushing teeth.  Medication Adjustments for Surgery: Take/Use as prescribed     clobetasol 0.05 % cream  Commonly known as:  Temovate  Medication Adjustments for Surgery: Take/Use as prescribed     docusate sodium 100 mg capsule  Commonly known as: Colace  Medication Adjustments for Surgery: Do Not take on the morning of surgery     famotidine 20 mg tablet  Commonly known as: Pepcid  Medication Adjustments for Surgery: Take on the morning of surgery     Fish OiL 1,000 (120-180) mg capsule  Generic drug: omega 3-dha-epa-fish oil  Additional Medication Adjustments for Surgery: Take last dose 7 days before surgery     furosemide 20 mg tablet  Commonly known as: Lasix  TAKE 1-2 TABLETS (20-40 MG) BY MOUTH 2 TIMES A DAY  Notes to patient: NOT TAKING ANY LONGER     hydrocortisone 2.5 % cream  Medication Adjustments for Surgery: Take/Use as prescribed     ibuprofen 200 mg tablet  Additional Medication Adjustments for Surgery: Take last dose 7 days before surgery     inhalational spacing device inhaler  Use as directed with inhalers     ketoconazole 2 % shampoo  Commonly known as: NIZOral  Medication Adjustments for Surgery: Take/Use as prescribed     LaMICtal 100 mg tablet  Generic drug: lamoTRIgine  Notes to patient: Take evening dose before surgery.     lisinopril 20 mg tablet  TAKE ONE TABLET BY MOUTH DAILY  Medication Adjustments for Surgery: Take last dose 1 day (24 hours) before surgery     loratadine 10 mg tablet  Commonly known as: Claritin  Medication Adjustments for Surgery: Take on the morning of surgery     metFORMIN 500 mg tablet  Commonly known as: Glucophage  TAKE ONE TABLET BY MOUTH THREE TIMES A DAY  Medication Adjustments for Surgery: Do Not take on the morning of surgery     multivitamin capsule  Additional Medication Adjustments for Surgery: Take last dose 7 days before surgery     Neurontin 400 mg capsule  Generic drug: gabapentin  Notes to patient: Take evening dose before surgery.     nystatin cream  Commonly known as: Mycostatin  APPLY EXTERNALLY TWICE DAILY  Medication Adjustments for Surgery: Take/Use as prescribed      PARoxetine 30 mg tablet  Commonly known as: Paxil  Medication Adjustments for Surgery: Take on the morning of surgery     QUEtiapine 25 mg tablet  Commonly known as: SEROquel  Medication Adjustments for Surgery: Take/Use as prescribed     Topamax 25 mg tablet  Generic drug: topiramate  Notes to patient: Take evening dose before surgery.     triamcinolone 0.1 % cream  Commonly known as: Kenalog  Apply topically 2 times a day as needed for rash.  Medication Adjustments for Surgery: Take/Use as prescribed     Wellbutrin  mg 24 hr tablet  Generic drug: buPROPion XL  Medication Adjustments for Surgery: Take on the morning of surgery                                         Patient and Family Education Quitting Smoking or Tobacco       Recognizing Dangerous Situations:   Alcohol use during the first month after quitting   Being around smoke or someone who smokes    Times situation routinely smoked   Triggers: car, breaks, coffee, when awakening, social events     Coping Skills:   Learning new ways to manage stress   Exercising   Relaxation breathing   Change routines   Distraction techniques     Websites:   Smoke-Free - offers free text messages and an alfred to help you quit. Info includes eating and mood issues that may come with quitting. There is a Live Helpline to talk to an expert. Go to smokefree.gov     Become an Ex-Smoker - the free EX Plan is based on scientific research and useful advice from ex-smokers. It isn't just about quitting smoking. It's about re-learning life without cigarettes using a 3-step program. Go to becomeanex.org     Centers for Disease Control - offer many suggestions for helping you quit. Includes a Quit Guide and real-life stories. There are sections for specific groups such as LGBT, , different ethnic groups, and pregnant women. Go to cdc.gov/tobacco/campaign/tips     Other Resources:     [place] Tobacco Quit Line - call 1-800-QUIT-NOW or 1-649-615-5181.   [facility] 2-1-1 - to  find local programs and resources. Call 211 or go to EndoChoice.ForMune. [facility] Tobacco Cessation Program - call 455-691-6705.   American Lung Association - offers classes for quitting smoking. Some places may charge a fee. For a list of classes, go to lung.org or call 5-560-LUNG-[place].     Some things to think about:   The health benefits of quitting smoking can help most of the major parts of your body.   There is no safe amount of cigarette smoke. Quitting smoking can add years to your life.   When you quit, you'll also protect your loved ones from dangerous secondhand smoke.   Make a plan, join a support group, and talk to your physician to assist in quitting smoking.     [facility], 11/20; PI-602 (6/22)          Patient and Family Education             Ways You Can Help Prevent Blood Clots             This handout explains some simple things you can do to help prevent blood clots.      Blood clots are blockages that can form in the body's veins. When a blood clot forms in your deep veins, it may be called a deep vein thrombosis, or DVT for short. Blood clots can happen in any part of the body where blood flows, but they are most common in the arms and legs. If a piece of a blood clot breaks free and travels to the lungs, it is called a pulmonary embolus (PE). A PE can be a very serious problem.         Being in the hospital or having surgery can raise your chances of getting a blood clot because you may not be well enough to move around as much as you normally do.         Ways you can help prevent blood clots in the hospital         Wearing SCDs. SCDs stands for Sequential Compression Devices.   SCDs are special sleeves that wrap around your legs  They attach to a pump that fills them with air to gently squeeze your legs every few minutes.   This helps return the blood in your legs to your heart.   SCDs should only be taken off when walking or bathing.   SCDs may not be comfortable, but they can  help save your life.               Wearing compression stockings - if your doctor orders them. These special snug fitting stockings gently squeeze your legs to help blood flow.       Walking. Walking helps move the blood in your legs.   If your doctor says it is ok, try walking the halls at least   5 times a day. Ask us to help you get up, so you don't fall.      Taking any blood thinning medicines your doctor orders.        Page 1 of 2     [facility]; 3/23   Ways you can help prevent blood clots at home       Wearing compression stockings - if your doctor orders them. ? Walking - to help move the blood in your legs.       Taking any blood thinning medicines your doctor orders.      Signs of a blood clot or PE      Tell your doctor or nurse know right away if you have of the problems listed below.    If you are at home, seek medical care right away. Call 911 for chest pain or problems breathing.               Signs of a blood clot (DVT) - such as pain,  swelling, redness or warmth in your arm or leg      Signs of a pulmonary embolism (PE) - such as chest     pain or feeling short of breath              [facility]    Preoperative Deep Breathing Exercises    Why it is important to do deep breathing exercises before my surgery?  Deep breathing exercises strengthen your breathing muscles.  This helps you to recover after your surgery and decreases the chance of breathing complications.      How are the deep breathing exercises done?  Sit straight with your back supported.  Breathe in deeply and slowly through your nose. Your lower rib cage should expand and your abdomen may move forward.  Hold that breath for 3 to 5 seconds.  Breathe out through pursed lips, slowly and completely.  Rest and repeat 10 times every hour while awake.  Rest longer if you become dizzy or lightheaded.      Patient Information: Incentive Spirometer  What is an incentive spirometer?  An incentive spirometer  is a device used before and after surgery to “exercise” your lungs.  It helps you to take deeper breaths to expand your lungs.  Below is an example of a basic incentive spirometer.  The device you receive may differ slightly but they all function the same.    Why do I need to use an incentive spirometer?  Using your incentive spirometer prepares your lungs for surgery and helps prevent lung problems after surgery.  How do I use my incentive spirometer?  When you're using your incentive spirometer, make sure to breathe through your mouth. If you breathe through your nose, the incentive spirometer won't work properly. You can hold your nose if you have trouble.  If you feel dizzy at any time, stop and rest. Try again at a later time.  Follow the steps below:  Set up your incentive spirometer, expand the flexible tubing and connect to the outlet.  Sit upright in a chair or bed. Hold the incentive spirometer at eye level.   Put the mouthpiece in your mouth and close your lips tightly around it. Slowly breathe out (exhale) completely.  Breathe in (inhale) slowly through your mouth as deeply as you can. As you take a breath, you will see the piston rise inside the large column. While the piston rises, the indicator should move upwards. It should stay in between the 2 arrows (see Figure).  Try to get the piston as high as you can, while keeping the indicator between the arrows.   If the indicator doesn't stay between the arrows, you're breathing either too fast or too slow.  When you get it as high as you can, hold your breath for 10 seconds, or as long as possible. While you're holding your breath, the piston will slowly fall to the base of the spirometer.  Once the piston reaches the bottom of the spirometer, breathe out slowly through your mouth. Rest for a few seconds.  Repeat 10 times. Try to get the piston to the same level with each breath.  Repeat every hour while awake  You can carefully clean the outside of the  mouthpiece with an alcohol wipe or soap and water.

## 2025-05-13 NOTE — H&P (VIEW-ONLY)
CPM/PAT Evaluation       Name: Tatyana Mckeon (Tatyana Mckeon)  /Age: 1955/70 y.o.     In-Person       Chief Complaint: RIGHT KNEE OSTEOARTHRITIS     HPI  A 70-year-old female with right knee osteoarthritis.  History of progressive right knee pain that has become more severe in the last 3 years.  Pain radiates into right hip and back. Symptoms increased with prolonged standing, ambulation, or transitioning from sitting to standing interfering with ADLs and quality of life.  Conservative treatments/injections help some.  Denies fever, chills, chest pain, shortness of breath, syncope, or right lower extremity numbness.  She is scheduled for open right total knee arthroplasty.    Medical History[1]    Surgical History[2]      Allergies[3]    Current Medications[4]    Review of Systems   Constitutional:  Positive for activity change.   HENT: Negative.     Eyes: Negative.    Respiratory: Negative.          Smoker   Cardiovascular: Negative.    Gastrointestinal: Negative.    Genitourinary: Negative.    Musculoskeletal:  Positive for arthralgias and gait problem.        Chronic right knee pain   Skin: Negative.    Psychiatric/Behavioral: Negative.          Physical Exam  Vitals reviewed.   Constitutional:       Appearance: She is obese.   HENT:      Head: Normocephalic and atraumatic.      Mouth/Throat:      Mouth: Mucous membranes are moist.   Eyes:      Pupils: Pupils are equal, round, and reactive to light.   Cardiovascular:      Rate and Rhythm: Normal rate and regular rhythm.   Pulmonary:      Effort: Pulmonary effort is normal.   Abdominal:      Palpations: Abdomen is soft.      Comments: Soft, obese   Musculoskeletal:      Cervical back: Normal range of motion.      Comments: Chronic right  knee pain, antalgic gait, uses a walker   Skin:     General: Skin is warm and dry.   Neurological:      Mental Status: She is alert and oriented to person, place, and time.   Psychiatric:         Mood and Affect: Mood normal.           PAT AIRWAY:   Airway:     Mallampati::  II    Neck ROM::  Full  normal        /68   Pulse 100   Temp 35.7 °C (96.3 °F) (Temporal)   Resp 16   Ht 1.524 m (5')   Wt 93 kg (205 lb)   SpO2 100%   BMI 40.04 kg/m²       Stop Bang Score 5     Flowsheet Row Most Recent Value   Do you snore loudly? 1   Do you often feel tired or fatigued after your sleep? 1   Has anyone ever observed you stop breathing in your sleep? 0   Do you have or are you being treated for high blood pressure? 1   Recent BMI (Calculated) 25.2   Is BMI greater than 35 kg/m2? 0=No   Age older than 50 years old? 1=Yes   Is your neck circumference greater than 17 inches (Male) or 16 inches (Female)? 0        CHADS: 4.0%  DASI risk score: 15.95  METS: 4.7  LESLEY: 0.18%  RCRI:6.6%  ASA: II  ARISCAT:1.6% score 19  Cardiac clearance done 4/21/2025  Medical clearance  for 5/13/2025  Nuclear stress test done 3/21/2025  -Negative myocardial perfusion study without evidence of inducible   myocardial ischemia or prior infarction.   -The left ventricle is normal in size.   -Normal LV wall motion with a post-stress LV EF estimated at   greater than 65%.   PAT orders CBC, CMP, hemoglobin A1c, MRSA, EKG  EKG at PAT normal sinus rhythm, low voltage QRS (preliminary)  **H/O right breast lymphedema     Assessment and Plan:     RIGHT KNEE OSTEOARTHRITIS Plan: Open right total knee arthroplasty  Diabetes managed with metformin-hemoglobin A1c ordered  CAD-elevated CT calcium score-recent negative nuclear stress test  Hypertension managed with amlodipine, lisinopril  Neuropathy takes Neurontin  Hyperlipidemia takes atorvastatin, aspirin, clobetasol  Anxiety/bipolar takes Seroquel, Paxil, Lamictal, Wellbutrin  History of breast cancer s/p right breast wire localization lumpectomy with sentinel node biopsy 7/2023, 8/16/2023 axillary lymph node dissection and evacuation of right breast hematoma. Pt completed radiation treatment 12/2023-takes anastrozole  H/O  endometrial cancer s/p hysterectomy   GERD: pt is taking famotidine   Psoriasis: Multiple Psoriasis patches   Long-term cigarette smoker 0.5-1 packs/day x 44 years  Obesity BMI 40.24             [1]   Past Medical History:  Diagnosis Date    Acquired lymphedema     right breast    Allergic 1980’s    Anxiety 1998    Arthritis 2008    Atypical ductal hyperplasia, breast     Lt breast 2024    Bipolar 1 disorder (Multi)     Breast cancer 06/2023    Right Breast - IDC and DCIS    Depression     Diabetes mellitus (Multi)     Type 2    Endometrial cancer (Multi)     GERD (gastroesophageal reflux disease) ?2014    Hyperlipidemia     Hypertension     Lumbosacral disc disease 2010    Osteoarthritis of right knee     Peripheral edema     Personal history of irradiation     Psoriasis     Toe fracture, left    [2]   Past Surgical History:  Procedure Laterality Date    AXILLARY NODE DISSECTION  08/16/2023    Right Axillary Dissection and Evacuation of a right breast hematoma    BI MAMMO GUIDED BREAST RIGHT LOCALIZATION Right 07/21/2023    Wire localization lumpectomy right breast with sentinel node biopsy    BREAST BIOPSY Left 07/17/2024    Sterotactic    BREAST LUMPECTOMY  2000    Left breast    BREAST LUMPECTOMY W/ NEEDLE LOCALIZATION Left 09/06/2024    HYSTERECTOMY  2014    JOINT REPLACEMENT  May 27, 2025    LAPAROTOMY OOPHERECTOMY  2014    LYMPH NODE BIOPSY  6/2023    US GUIDED BIOPSY LYMPH NODE SUPERFICIAL  06/07/2023    Ultrasound guided right breast and axillary lymph node biopsy   [3]   Allergies  Allergen Reactions    Pollen Extracts Itching    Adhesive Tape-Silicones Rash     Blistering-PAPER TAPE    Cephalexin Diarrhea    Naproxen Dizziness   [4]   Current Outpatient Medications   Medication Sig Dispense Refill    acetaminophen (Tylenol) 500 mg tablet Take 1 tablet (500 mg) by mouth 2 times a day as needed.      albuterol (ProAir HFA) 90 mcg/actuation inhaler Inhale 2 puffs every 4 hours if needed for wheezing or  shortness of breath. 8.5 g 0    amLODIPine (Norvasc) 10 mg tablet TAKE ONE TABLET BY MOUTH DAILY 90 tablet 1    anastrozole (Arimidex) 1 mg tablet Take 1 tablet (1 mg total) by mouth once daily.  Swallow whole with a drink of water. 90 tablet 3    aspirin 81 mg EC tablet Take 1 tablet (81 mg) by mouth once daily.      atorvastatin (Lipitor) 20 mg tablet Take 1 tablet (20 mg) by mouth once daily. 90 tablet 3    blood sugar diagnostic (Accu-Chek Guide test strips) strip Inject 1 strip under the skin once daily. 100 strip 2    blood-glucose meter misc Inject 1 m under the skin 3 times a day. Accu-Check Guide 1 each 0    chlorhexidine (Peridex) 0.12 % solution Use cap to measure 15 mL.  Swish/gargle mouthwash for at least 30 seconds.  Do not swallow.  Use night before surgery after brushing teeth and morning of surgery after brushing teeth. 473 mL 0    clobetasol (Temovate) 0.05 % cream Apply topically if needed.      docusate sodium (Colace) 100 mg capsule Take 1 capsule (100 mg) by mouth once daily as needed.      famotidine (Pepcid) 20 mg tablet Take 1 tablet (20 mg) by mouth once daily.      furosemide (Lasix) 20 mg tablet TAKE 1-2 TABLETS (20-40 MG) BY MOUTH 2 TIMES A DAY 90 tablet 1    hydrocortisone 2.5 % cream Apply topically once daily as needed.      ibuprofen 200 mg tablet Take 1 tablet (200 mg) by mouth every 8 hours if needed.      inhalational spacing device inhaler Use as directed with inhalers (Patient not taking: Reported on 5/8/2025) 1 each 0    ketoconazole (NIZOral) 2 % shampoo Apply topically once daily as needed.      LaMICtal 100 mg tablet Take 1 tablet (100 mg) by mouth once daily.      lisinopril 20 mg tablet TAKE ONE TABLET BY MOUTH DAILY 90 tablet 1    loratadine (Claritin) 10 mg tablet Take 1 tablet (10 mg) by mouth once daily.      metFORMIN (Glucophage) 500 mg tablet TAKE ONE TABLET BY MOUTH THREE TIMES A  tablet 0    multivitamin capsule Take 1 capsule by mouth once daily.       Neurontin 400 mg capsule Take 1 capsule (400 mg) by mouth once daily.      nystatin (Mycostatin) cream APPLY EXTERNALLY TWICE DAILY (Patient taking differently: Apply topically if needed.) 60 g 1    omega 3-dha-epa-fish oil (Fish OiL) 1,000 mg (120 mg-180 mg) capsule Take 1 capsule (1,000 mg) by mouth once daily.      PARoxetine (Paxil) 30 mg tablet Take 1 tablet (30 mg) by mouth early in the morning..      QUEtiapine (SEROquel) 25 mg tablet Take 0.5 tablets (12.5 mg) by mouth once daily as needed.      Topamax 25 mg tablet Take 1 tablet (25 mg) by mouth once daily.      triamcinolone (Kenalog) 0.1 % cream Apply topically 2 times a day as needed for rash. 30 g 1    Wellbutrin  mg 24 hr tablet Take 1 tablet (300 mg) by mouth once daily in the morning.       No current facility-administered medications for this visit.

## 2025-05-15 LAB — STAPHYLOCOCCUS SPEC CULT: NORMAL

## 2025-05-17 LAB
ATRIAL RATE: 100 BPM
P AXIS: 59 DEGREES
P OFFSET: 188 MS
P ONSET: 130 MS
PR INTERVAL: 174 MS
Q ONSET: 217 MS
QRS COUNT: 16 BEATS
QRS DURATION: 76 MS
QT INTERVAL: 356 MS
QTC CALCULATION(BAZETT): 459 MS
QTC FREDERICIA: 422 MS
R AXIS: 47 DEGREES
T AXIS: 46 DEGREES
T OFFSET: 395 MS
VENTRICULAR RATE: 100 BPM

## 2025-05-19 DIAGNOSIS — E11.9 TYPE 2 DIABETES MELLITUS WITHOUT COMPLICATION, WITHOUT LONG-TERM CURRENT USE OF INSULIN: ICD-10-CM

## 2025-05-19 RX ORDER — METFORMIN HYDROCHLORIDE 500 MG/1
500 TABLET ORAL 3 TIMES DAILY
Qty: 270 TABLET | Refills: 1 | Status: SHIPPED | OUTPATIENT
Start: 2025-05-19

## 2025-05-20 ENCOUNTER — APPOINTMENT (OUTPATIENT)
Dept: PHYSICAL MEDICINE AND REHAB | Facility: CLINIC | Age: 70
End: 2025-05-20
Payer: MEDICARE

## 2025-05-27 ENCOUNTER — ANESTHESIA (OUTPATIENT)
Dept: OPERATING ROOM | Facility: HOSPITAL | Age: 70
End: 2025-05-27
Payer: MEDICARE

## 2025-05-27 ENCOUNTER — APPOINTMENT (OUTPATIENT)
Dept: RADIOLOGY | Facility: HOSPITAL | Age: 70
End: 2025-05-27
Payer: MEDICARE

## 2025-05-27 ENCOUNTER — ANESTHESIA EVENT (OUTPATIENT)
Dept: OPERATING ROOM | Facility: HOSPITAL | Age: 70
End: 2025-05-27
Payer: MEDICARE

## 2025-05-27 ENCOUNTER — HOSPITAL ENCOUNTER (OUTPATIENT)
Facility: HOSPITAL | Age: 70
Discharge: HOME | End: 2025-05-28
Attending: ORTHOPAEDIC SURGERY | Admitting: ORTHOPAEDIC SURGERY
Payer: MEDICARE

## 2025-05-27 DIAGNOSIS — M17.11 ARTHRITIS OF RIGHT KNEE: Primary | ICD-10-CM

## 2025-05-27 LAB
GLUCOSE BLD MANUAL STRIP-MCNC: 126 MG/DL (ref 74–99)
GLUCOSE BLD MANUAL STRIP-MCNC: 205 MG/DL (ref 74–99)
GLUCOSE BLD MANUAL STRIP-MCNC: 218 MG/DL (ref 74–99)

## 2025-05-27 PROCEDURE — 3600000017 HC OR TIME - EACH INCREMENTAL 1 MINUTE - PROCEDURE LEVEL SIX: Performed by: ORTHOPAEDIC SURGERY

## 2025-05-27 PROCEDURE — A27447 PR TOTAL KNEE ARTHROPLASTY: Performed by: ANESTHESIOLOGY

## 2025-05-27 PROCEDURE — 2780000003 HC OR 278 NO HCPCS: Performed by: ORTHOPAEDIC SURGERY

## 2025-05-27 PROCEDURE — 99100 ANES PT EXTEME AGE<1 YR&>70: CPT | Performed by: ANESTHESIOLOGY

## 2025-05-27 PROCEDURE — 2500000005 HC RX 250 GENERAL PHARMACY W/O HCPCS: Performed by: ORTHOPAEDIC SURGERY

## 2025-05-27 PROCEDURE — 3700000002 HC GENERAL ANESTHESIA TIME - EACH INCREMENTAL 1 MINUTE: Performed by: ORTHOPAEDIC SURGERY

## 2025-05-27 PROCEDURE — 2500000005 HC RX 250 GENERAL PHARMACY W/O HCPCS: Performed by: ANESTHESIOLOGIST ASSISTANT

## 2025-05-27 PROCEDURE — 82947 ASSAY GLUCOSE BLOOD QUANT: CPT

## 2025-05-27 PROCEDURE — 97530 THERAPEUTIC ACTIVITIES: CPT | Mod: GP

## 2025-05-27 PROCEDURE — 7100000011 HC EXTENDED STAY RECOVERY HOURLY - NURSING UNIT

## 2025-05-27 PROCEDURE — 2500000002 HC RX 250 W HCPCS SELF ADMINISTERED DRUGS (ALT 637 FOR MEDICARE OP, ALT 636 FOR OP/ED): Performed by: NURSE PRACTITIONER

## 2025-05-27 PROCEDURE — 82947 ASSAY GLUCOSE BLOOD QUANT: CPT | Mod: 91

## 2025-05-27 PROCEDURE — 3600000018 HC OR TIME - INITIAL BASE CHARGE - PROCEDURE LEVEL SIX: Performed by: ORTHOPAEDIC SURGERY

## 2025-05-27 PROCEDURE — 7100000002 HC RECOVERY ROOM TIME - EACH INCREMENTAL 1 MINUTE: Performed by: ORTHOPAEDIC SURGERY

## 2025-05-27 PROCEDURE — 2720000007 HC OR 272 NO HCPCS: Performed by: ORTHOPAEDIC SURGERY

## 2025-05-27 PROCEDURE — 7100000001 HC RECOVERY ROOM TIME - INITIAL BASE CHARGE: Performed by: ORTHOPAEDIC SURGERY

## 2025-05-27 PROCEDURE — 2500000004 HC RX 250 GENERAL PHARMACY W/ HCPCS (ALT 636 FOR OP/ED): Performed by: ANESTHESIOLOGIST ASSISTANT

## 2025-05-27 PROCEDURE — 99221 1ST HOSP IP/OBS SF/LOW 40: CPT | Performed by: NURSE PRACTITIONER

## 2025-05-27 PROCEDURE — A4649 SURGICAL SUPPLIES: HCPCS | Performed by: ORTHOPAEDIC SURGERY

## 2025-05-27 PROCEDURE — 2500000002 HC RX 250 W HCPCS SELF ADMINISTERED DRUGS (ALT 637 FOR MEDICARE OP, ALT 636 FOR OP/ED): Performed by: ORTHOPAEDIC SURGERY

## 2025-05-27 PROCEDURE — 2500000001 HC RX 250 WO HCPCS SELF ADMINISTERED DRUGS (ALT 637 FOR MEDICARE OP): Performed by: ORTHOPAEDIC SURGERY

## 2025-05-27 PROCEDURE — 96372 THER/PROPH/DIAG INJ SC/IM: CPT | Performed by: ORTHOPAEDIC SURGERY

## 2025-05-27 PROCEDURE — C1776 JOINT DEVICE (IMPLANTABLE): HCPCS | Performed by: ORTHOPAEDIC SURGERY

## 2025-05-27 PROCEDURE — 64447 NJX AA&/STRD FEMORAL NRV IMG: CPT | Performed by: ANESTHESIOLOGY

## 2025-05-27 PROCEDURE — 2500000004 HC RX 250 GENERAL PHARMACY W/ HCPCS (ALT 636 FOR OP/ED): Mod: JZ | Performed by: ANESTHESIOLOGY

## 2025-05-27 PROCEDURE — 2500000004 HC RX 250 GENERAL PHARMACY W/ HCPCS (ALT 636 FOR OP/ED): Mod: JZ | Performed by: ORTHOPAEDIC SURGERY

## 2025-05-27 PROCEDURE — 97161 PT EVAL LOW COMPLEX 20 MIN: CPT | Mod: GP

## 2025-05-27 PROCEDURE — 2500000005 HC RX 250 GENERAL PHARMACY W/O HCPCS: Performed by: ANESTHESIOLOGY

## 2025-05-27 PROCEDURE — 3700000001 HC GENERAL ANESTHESIA TIME - INITIAL BASE CHARGE: Performed by: ORTHOPAEDIC SURGERY

## 2025-05-27 PROCEDURE — 2500000002 HC RX 250 W HCPCS SELF ADMINISTERED DRUGS (ALT 637 FOR MEDICARE OP, ALT 636 FOR OP/ED): Performed by: ANESTHESIOLOGY

## 2025-05-27 PROCEDURE — 94640 AIRWAY INHALATION TREATMENT: CPT

## 2025-05-27 PROCEDURE — A9999 DME SUPPLY OR ACCESSORY, NOS: HCPCS | Performed by: ORTHOPAEDIC SURGERY

## 2025-05-27 PROCEDURE — C1713 ANCHOR/SCREW BN/BN,TIS/BN: HCPCS | Performed by: ORTHOPAEDIC SURGERY

## 2025-05-27 PROCEDURE — 73560 X-RAY EXAM OF KNEE 1 OR 2: CPT | Mod: RT

## 2025-05-27 PROCEDURE — A27447 PR TOTAL KNEE ARTHROPLASTY: Performed by: ANESTHESIOLOGIST ASSISTANT

## 2025-05-27 PROCEDURE — 9420000001 HC RT PATIENT EDUCATION 5 MIN

## 2025-05-27 PROCEDURE — 73560 X-RAY EXAM OF KNEE 1 OR 2: CPT | Mod: RIGHT SIDE | Performed by: RADIOLOGY

## 2025-05-27 PROCEDURE — 2500000004 HC RX 250 GENERAL PHARMACY W/ HCPCS (ALT 636 FOR OP/ED): Performed by: ORTHOPAEDIC SURGERY

## 2025-05-27 DEVICE — POSTERIOR STABILIZED FEMORAL
Type: IMPLANTABLE DEVICE | Site: KNEE | Status: FUNCTIONAL
Brand: TRIATHLON

## 2025-05-27 DEVICE — TIBIAL BEARING INSERT
Type: IMPLANTABLE DEVICE | Site: KNEE | Status: FUNCTIONAL
Brand: TRIATHLON

## 2025-05-27 DEVICE — PATELLA
Type: IMPLANTABLE DEVICE | Site: KNEE | Status: FUNCTIONAL
Brand: TRIATHLON

## 2025-05-27 DEVICE — PRIMARY TIBIAL BASEPLATE
Type: IMPLANTABLE DEVICE | Site: KNEE | Status: FUNCTIONAL
Brand: TRIATHLON

## 2025-05-27 DEVICE — SIMPLEX® HV IS A FAST-SETTING ACRYLIC RESIN FOR USE IN BONE SURGERY. MIXING THE TWO SEPARATE STERILE COMPONENTS PRODUCES A DUCTILE BONE CEMENT WHICH, AFTER HARDENING, FIXES THE IMPLANT AND TRANSFERS STRESSES PRODUCED DURING MOVEMENT EVENLY TO THE BONE. SIMPLEX® HV CEMENT POWDER ALSO CONTAINS INSOLUBLE ZIRCONIUM DIOXIDE AS AN X-RAY CONTRAST MEDIUM. SIMPLEX® HV DOES NOT EMIT A SIGNAL AND DOES NOT POSE A SAFETY RISK IN A MAGNETIC RESONANCE ENVIRONMENT.
Type: IMPLANTABLE DEVICE | Site: KNEE | Status: FUNCTIONAL
Brand: SIMPLEX HV

## 2025-05-27 RX ORDER — ENOXAPARIN SODIUM 100 MG/ML
40 INJECTION SUBCUTANEOUS DAILY
Status: DISCONTINUED | OUTPATIENT
Start: 2025-05-27 | End: 2025-05-28 | Stop reason: HOSPADM

## 2025-05-27 RX ORDER — KETOROLAC TROMETHAMINE 30 MG/ML
15 INJECTION, SOLUTION INTRAMUSCULAR; INTRAVENOUS ONCE
Status: DISCONTINUED | OUTPATIENT
Start: 2025-05-28 | End: 2025-05-28 | Stop reason: HOSPADM

## 2025-05-27 RX ORDER — BUPROPION HYDROCHLORIDE 300 MG/1
300 TABLET ORAL EVERY MORNING
Status: DISCONTINUED | OUTPATIENT
Start: 2025-05-28 | End: 2025-05-28 | Stop reason: HOSPADM

## 2025-05-27 RX ORDER — TRANEXAMIC ACID 1 G/10ML
INJECTION, SOLUTION INTRAVENOUS AS NEEDED
Status: DISCONTINUED | OUTPATIENT
Start: 2025-05-27 | End: 2025-05-27

## 2025-05-27 RX ORDER — GABAPENTIN 400 MG/1
400 CAPSULE ORAL DAILY
Status: DISCONTINUED | OUTPATIENT
Start: 2025-05-27 | End: 2025-05-28 | Stop reason: HOSPADM

## 2025-05-27 RX ORDER — OXYCODONE HCL 20 MG/1
20 TABLET, FILM COATED, EXTENDED RELEASE ORAL ONCE
Status: COMPLETED | OUTPATIENT
Start: 2025-05-27 | End: 2025-05-27

## 2025-05-27 RX ORDER — SODIUM CHLORIDE, SODIUM LACTATE, POTASSIUM CHLORIDE, CALCIUM CHLORIDE 600; 310; 30; 20 MG/100ML; MG/100ML; MG/100ML; MG/100ML
INJECTION, SOLUTION INTRAVENOUS CONTINUOUS PRN
Status: DISCONTINUED | OUTPATIENT
Start: 2025-05-27 | End: 2025-05-27

## 2025-05-27 RX ORDER — ANASTROZOLE 1 MG/1
1 TABLET ORAL DAILY
Status: DISCONTINUED | OUTPATIENT
Start: 2025-05-27 | End: 2025-05-28 | Stop reason: HOSPADM

## 2025-05-27 RX ORDER — SODIUM CHLORIDE, SODIUM LACTATE, POTASSIUM CHLORIDE, CALCIUM CHLORIDE 600; 310; 30; 20 MG/100ML; MG/100ML; MG/100ML; MG/100ML
100 INJECTION, SOLUTION INTRAVENOUS CONTINUOUS
Status: DISCONTINUED | OUTPATIENT
Start: 2025-05-27 | End: 2025-05-27 | Stop reason: HOSPADM

## 2025-05-27 RX ORDER — ROCURONIUM BROMIDE 10 MG/ML
INJECTION, SOLUTION INTRAVENOUS AS NEEDED
Status: DISCONTINUED | OUTPATIENT
Start: 2025-05-27 | End: 2025-05-27

## 2025-05-27 RX ORDER — MIDAZOLAM HYDROCHLORIDE 1 MG/ML
2 INJECTION, SOLUTION INTRAMUSCULAR; INTRAVENOUS ONCE
Status: COMPLETED | OUTPATIENT
Start: 2025-05-27 | End: 2025-05-27

## 2025-05-27 RX ORDER — TOPIRAMATE 25 MG/1
25 TABLET, FILM COATED ORAL DAILY
Status: DISCONTINUED | OUTPATIENT
Start: 2025-05-27 | End: 2025-05-28 | Stop reason: HOSPADM

## 2025-05-27 RX ORDER — OXYCODONE HYDROCHLORIDE 5 MG/1
10 TABLET ORAL EVERY 4 HOURS PRN
Status: DISCONTINUED | OUTPATIENT
Start: 2025-05-27 | End: 2025-05-28

## 2025-05-27 RX ORDER — FENTANYL CITRATE 50 UG/ML
50 INJECTION, SOLUTION INTRAMUSCULAR; INTRAVENOUS EVERY 5 MIN PRN
Status: DISCONTINUED | OUTPATIENT
Start: 2025-05-27 | End: 2025-05-27 | Stop reason: HOSPADM

## 2025-05-27 RX ORDER — IPRATROPIUM BROMIDE AND ALBUTEROL SULFATE 2.5; .5 MG/3ML; MG/3ML
3 SOLUTION RESPIRATORY (INHALATION)
Status: DISCONTINUED | OUTPATIENT
Start: 2025-05-27 | End: 2025-05-27

## 2025-05-27 RX ORDER — CELECOXIB 200 MG/1
400 CAPSULE ORAL ONCE
Status: COMPLETED | OUTPATIENT
Start: 2025-05-27 | End: 2025-05-27

## 2025-05-27 RX ORDER — ONDANSETRON HYDROCHLORIDE 2 MG/ML
INJECTION, SOLUTION INTRAVENOUS AS NEEDED
Status: DISCONTINUED | OUTPATIENT
Start: 2025-05-27 | End: 2025-05-27

## 2025-05-27 RX ORDER — HYDRALAZINE HYDROCHLORIDE 20 MG/ML
5 INJECTION INTRAMUSCULAR; INTRAVENOUS EVERY 30 MIN PRN
Status: DISCONTINUED | OUTPATIENT
Start: 2025-05-27 | End: 2025-05-27 | Stop reason: HOSPADM

## 2025-05-27 RX ORDER — ATORVASTATIN CALCIUM 20 MG/1
20 TABLET, FILM COATED ORAL NIGHTLY
Status: DISCONTINUED | OUTPATIENT
Start: 2025-05-27 | End: 2025-05-28 | Stop reason: HOSPADM

## 2025-05-27 RX ORDER — DOCUSATE SODIUM 100 MG/1
100 CAPSULE, LIQUID FILLED ORAL 2 TIMES DAILY
Status: DISCONTINUED | OUTPATIENT
Start: 2025-05-27 | End: 2025-05-28 | Stop reason: HOSPADM

## 2025-05-27 RX ORDER — IPRATROPIUM BROMIDE AND ALBUTEROL SULFATE 2.5; .5 MG/3ML; MG/3ML
3 SOLUTION RESPIRATORY (INHALATION) EVERY 6 HOURS PRN
Status: DISCONTINUED | OUTPATIENT
Start: 2025-05-27 | End: 2025-05-27

## 2025-05-27 RX ORDER — PROPOFOL 10 MG/ML
INJECTION, EMULSION INTRAVENOUS AS NEEDED
Status: DISCONTINUED | OUTPATIENT
Start: 2025-05-27 | End: 2025-05-27

## 2025-05-27 RX ORDER — INSULIN LISPRO 100 [IU]/ML
0-5 INJECTION, SOLUTION INTRAVENOUS; SUBCUTANEOUS
Status: DISCONTINUED | OUTPATIENT
Start: 2025-05-27 | End: 2025-05-28 | Stop reason: HOSPADM

## 2025-05-27 RX ORDER — PREGABALIN 75 MG/1
75 CAPSULE ORAL ONCE
Status: COMPLETED | OUTPATIENT
Start: 2025-05-27 | End: 2025-05-27

## 2025-05-27 RX ORDER — LIDOCAINE HYDROCHLORIDE 20 MG/ML
INJECTION, SOLUTION EPIDURAL; INFILTRATION; INTRACAUDAL; PERINEURAL AS NEEDED
Status: DISCONTINUED | OUTPATIENT
Start: 2025-05-27 | End: 2025-05-27

## 2025-05-27 RX ORDER — LIDOCAINE HYDROCHLORIDE 10 MG/ML
0.1 INJECTION, SOLUTION INFILTRATION; PERINEURAL ONCE
Status: DISCONTINUED | OUTPATIENT
Start: 2025-05-27 | End: 2025-05-27 | Stop reason: HOSPADM

## 2025-05-27 RX ORDER — BISMUTH SUBSALICYLATE 262 MG
1 TABLET,CHEWABLE ORAL DAILY
Status: DISCONTINUED | OUTPATIENT
Start: 2025-05-27 | End: 2025-05-28 | Stop reason: HOSPADM

## 2025-05-27 RX ORDER — MEPERIDINE HYDROCHLORIDE 25 MG/ML
12.5 INJECTION INTRAMUSCULAR; INTRAVENOUS; SUBCUTANEOUS EVERY 10 MIN PRN
Status: DISCONTINUED | OUTPATIENT
Start: 2025-05-27 | End: 2025-05-27 | Stop reason: HOSPADM

## 2025-05-27 RX ORDER — CEFAZOLIN SODIUM 2 G/100ML
2 INJECTION, SOLUTION INTRAVENOUS EVERY 8 HOURS
Status: COMPLETED | OUTPATIENT
Start: 2025-05-27 | End: 2025-05-28

## 2025-05-27 RX ORDER — FENTANYL CITRATE 50 UG/ML
50 INJECTION, SOLUTION INTRAMUSCULAR; INTRAVENOUS ONCE AS NEEDED
Status: COMPLETED | OUTPATIENT
Start: 2025-05-27 | End: 2025-05-27

## 2025-05-27 RX ORDER — LAMOTRIGINE 100 MG/1
100 TABLET ORAL DAILY
Status: DISCONTINUED | OUTPATIENT
Start: 2025-05-27 | End: 2025-05-28 | Stop reason: HOSPADM

## 2025-05-27 RX ORDER — SODIUM CHLORIDE, SODIUM LACTATE, POTASSIUM CHLORIDE, CALCIUM CHLORIDE 600; 310; 30; 20 MG/100ML; MG/100ML; MG/100ML; MG/100ML
125 INJECTION, SOLUTION INTRAVENOUS CONTINUOUS
Status: DISCONTINUED | OUTPATIENT
Start: 2025-05-27 | End: 2025-05-28 | Stop reason: HOSPADM

## 2025-05-27 RX ORDER — OXYCODONE HYDROCHLORIDE 5 MG/1
5 TABLET ORAL EVERY 4 HOURS PRN
Status: DISCONTINUED | OUTPATIENT
Start: 2025-05-27 | End: 2025-05-28

## 2025-05-27 RX ORDER — IPRATROPIUM BROMIDE AND ALBUTEROL SULFATE 2.5; .5 MG/3ML; MG/3ML
3 SOLUTION RESPIRATORY (INHALATION)
Status: DISCONTINUED | OUTPATIENT
Start: 2025-05-27 | End: 2025-05-28 | Stop reason: HOSPADM

## 2025-05-27 RX ORDER — ONDANSETRON HYDROCHLORIDE 2 MG/ML
4 INJECTION, SOLUTION INTRAVENOUS EVERY 8 HOURS PRN
Status: DISCONTINUED | OUTPATIENT
Start: 2025-05-27 | End: 2025-05-28 | Stop reason: HOSPADM

## 2025-05-27 RX ORDER — DEXTROSE 50 % IN WATER (D50W) INTRAVENOUS SYRINGE
12.5
Status: DISCONTINUED | OUTPATIENT
Start: 2025-05-27 | End: 2025-05-28 | Stop reason: HOSPADM

## 2025-05-27 RX ORDER — MORPHINE SULFATE 2 MG/ML
2 INJECTION, SOLUTION INTRAMUSCULAR; INTRAVENOUS EVERY 2 HOUR PRN
Status: DISCONTINUED | OUTPATIENT
Start: 2025-05-27 | End: 2025-05-28 | Stop reason: HOSPADM

## 2025-05-27 RX ORDER — FAMOTIDINE 20 MG/1
20 TABLET, FILM COATED ORAL DAILY
Status: DISCONTINUED | OUTPATIENT
Start: 2025-05-28 | End: 2025-05-28 | Stop reason: HOSPADM

## 2025-05-27 RX ORDER — ACETAMINOPHEN 500 MG
1000 TABLET ORAL EVERY 6 HOURS
Status: DISCONTINUED | OUTPATIENT
Start: 2025-05-27 | End: 2025-05-28 | Stop reason: HOSPADM

## 2025-05-27 RX ORDER — ONDANSETRON HYDROCHLORIDE 2 MG/ML
4 INJECTION, SOLUTION INTRAVENOUS ONCE AS NEEDED
Status: DISCONTINUED | OUTPATIENT
Start: 2025-05-27 | End: 2025-05-27 | Stop reason: HOSPADM

## 2025-05-27 RX ORDER — KETOROLAC TROMETHAMINE 30 MG/ML
15 INJECTION, SOLUTION INTRAMUSCULAR; INTRAVENOUS EVERY 6 HOURS PRN
Status: DISCONTINUED | OUTPATIENT
Start: 2025-05-27 | End: 2025-05-28 | Stop reason: HOSPADM

## 2025-05-27 RX ORDER — AMLODIPINE BESYLATE 10 MG/1
10 TABLET ORAL DAILY
Status: DISCONTINUED | OUTPATIENT
Start: 2025-05-28 | End: 2025-05-28 | Stop reason: HOSPADM

## 2025-05-27 RX ORDER — POLYETHYLENE GLYCOL 3350 17 G/17G
17 POWDER, FOR SOLUTION ORAL DAILY
Status: DISCONTINUED | OUTPATIENT
Start: 2025-05-27 | End: 2025-05-28 | Stop reason: HOSPADM

## 2025-05-27 RX ORDER — MIDAZOLAM HYDROCHLORIDE 1 MG/ML
1 INJECTION, SOLUTION INTRAMUSCULAR; INTRAVENOUS ONCE AS NEEDED
Status: DISCONTINUED | OUTPATIENT
Start: 2025-05-27 | End: 2025-05-27 | Stop reason: HOSPADM

## 2025-05-27 RX ORDER — ALBUTEROL SULFATE 0.83 MG/ML
2.5 SOLUTION RESPIRATORY (INHALATION) ONCE AS NEEDED
Status: COMPLETED | OUTPATIENT
Start: 2025-05-27 | End: 2025-05-27

## 2025-05-27 RX ORDER — DEXTROSE 50 % IN WATER (D50W) INTRAVENOUS SYRINGE
25
Status: DISCONTINUED | OUTPATIENT
Start: 2025-05-27 | End: 2025-05-28 | Stop reason: HOSPADM

## 2025-05-27 RX ORDER — IPRATROPIUM BROMIDE AND ALBUTEROL SULFATE 2.5; .5 MG/3ML; MG/3ML
3 SOLUTION RESPIRATORY (INHALATION) EVERY 2 HOUR PRN
Status: DISCONTINUED | OUTPATIENT
Start: 2025-05-27 | End: 2025-05-28 | Stop reason: HOSPADM

## 2025-05-27 RX ORDER — BUPIVACAINE HCL/EPINEPHRINE 0.5-1:200K
VIAL (ML) INJECTION
Status: DISCONTINUED | OUTPATIENT
Start: 2025-05-27 | End: 2025-05-27

## 2025-05-27 RX ORDER — FENTANYL CITRATE 50 UG/ML
INJECTION, SOLUTION INTRAMUSCULAR; INTRAVENOUS AS NEEDED
Status: DISCONTINUED | OUTPATIENT
Start: 2025-05-27 | End: 2025-05-27

## 2025-05-27 RX ORDER — ACETAMINOPHEN 325 MG/1
650 TABLET ORAL ONCE
Status: COMPLETED | OUTPATIENT
Start: 2025-05-27 | End: 2025-05-27

## 2025-05-27 RX ORDER — CEFAZOLIN SODIUM 2 G/100ML
2 INJECTION, SOLUTION INTRAVENOUS ONCE
Status: COMPLETED | OUTPATIENT
Start: 2025-05-27 | End: 2025-05-27

## 2025-05-27 RX ADMIN — ACETAMINOPHEN 1000 MG: 500 TABLET ORAL at 15:58

## 2025-05-27 RX ADMIN — IPRATROPIUM BROMIDE AND ALBUTEROL SULFATE 3 ML: 2.5; .5 SOLUTION RESPIRATORY (INHALATION) at 10:23

## 2025-05-27 RX ADMIN — HYDROMORPHONE HYDROCHLORIDE 0.4 MG: 1 INJECTION, SOLUTION INTRAMUSCULAR; INTRAVENOUS; SUBCUTANEOUS at 13:34

## 2025-05-27 RX ADMIN — CEFAZOLIN SODIUM 2 G: 2 INJECTION, SOLUTION INTRAVENOUS at 18:25

## 2025-05-27 RX ADMIN — CELECOXIB 400 MG: 200 CAPSULE ORAL at 09:24

## 2025-05-27 RX ADMIN — LAMOTRIGINE 100 MG: 100 TABLET ORAL at 20:20

## 2025-05-27 RX ADMIN — HYDROMORPHONE HYDROCHLORIDE 0.4 MG: 2 INJECTION INTRAMUSCULAR; INTRAVENOUS; SUBCUTANEOUS at 11:57

## 2025-05-27 RX ADMIN — ROCURONIUM BROMIDE 50 MG: 10 INJECTION, SOLUTION INTRAVENOUS at 10:58

## 2025-05-27 RX ADMIN — PROPOFOL 120 MG: 10 INJECTION, EMULSION INTRAVENOUS at 10:58

## 2025-05-27 RX ADMIN — POVIDONE-IODINE 1 APPLICATION: 5 SOLUTION TOPICAL at 09:24

## 2025-05-27 RX ADMIN — LIDOCAINE HYDROCHLORIDE 25 MG: 20 INJECTION, SOLUTION EPIDURAL; INFILTRATION; INTRACAUDAL; PERINEURAL at 10:58

## 2025-05-27 RX ADMIN — DOCUSATE SODIUM 100 MG: 100 CAPSULE, LIQUID FILLED ORAL at 20:20

## 2025-05-27 RX ADMIN — ALBUTEROL SULFATE 2.5 MG: 2.5 SOLUTION RESPIRATORY (INHALATION) at 13:18

## 2025-05-27 RX ADMIN — ONDANSETRON 4 MG: 2 INJECTION, SOLUTION INTRAMUSCULAR; INTRAVENOUS at 12:45

## 2025-05-27 RX ADMIN — FENTANYL CITRATE 25 MCG: 50 INJECTION, SOLUTION INTRAMUSCULAR; INTRAVENOUS at 11:34

## 2025-05-27 RX ADMIN — ANASTROZOLE 1 MG: 1 TABLET, COATED ORAL at 17:21

## 2025-05-27 RX ADMIN — SODIUM CHLORIDE, POTASSIUM CHLORIDE, SODIUM LACTATE AND CALCIUM CHLORIDE: 600; 310; 30; 20 INJECTION, SOLUTION INTRAVENOUS at 10:53

## 2025-05-27 RX ADMIN — FENTANYL CITRATE 50 MCG: 50 INJECTION INTRAMUSCULAR; INTRAVENOUS at 10:15

## 2025-05-27 RX ADMIN — FENTANYL CITRATE 25 MCG: 50 INJECTION, SOLUTION INTRAMUSCULAR; INTRAVENOUS at 10:58

## 2025-05-27 RX ADMIN — ACETAMINOPHEN 1000 MG: 500 TABLET ORAL at 20:20

## 2025-05-27 RX ADMIN — TOPIRAMATE 25 MG: 25 TABLET, FILM COATED ORAL at 21:08

## 2025-05-27 RX ADMIN — Medication 2 L/MIN: at 16:10

## 2025-05-27 RX ADMIN — PROPOFOL 80 MG: 10 INJECTION, EMULSION INTRAVENOUS at 11:18

## 2025-05-27 RX ADMIN — TRANEXAMIC ACID 1000 MG: 1 INJECTION, SOLUTION INTRAVENOUS at 11:01

## 2025-05-27 RX ADMIN — ACETAMINOPHEN 650 MG: 325 TABLET ORAL at 09:24

## 2025-05-27 RX ADMIN — FENTANYL CITRATE 25 MCG: 50 INJECTION, SOLUTION INTRAMUSCULAR; INTRAVENOUS at 11:18

## 2025-05-27 RX ADMIN — MULTIVITAMIN TABLET 1 TABLET: TABLET at 15:56

## 2025-05-27 RX ADMIN — ENOXAPARIN SODIUM 40 MG: 40 INJECTION SUBCUTANEOUS at 15:56

## 2025-05-27 RX ADMIN — IPRATROPIUM BROMIDE AND ALBUTEROL SULFATE 3 ML: 2.5; .5 SOLUTION RESPIRATORY (INHALATION) at 16:08

## 2025-05-27 RX ADMIN — HYDROMORPHONE HYDROCHLORIDE 0.4 MG: 2 INJECTION INTRAMUSCULAR; INTRAVENOUS; SUBCUTANEOUS at 12:04

## 2025-05-27 RX ADMIN — HYDROMORPHONE HYDROCHLORIDE 0.4 MG: 2 INJECTION INTRAMUSCULAR; INTRAVENOUS; SUBCUTANEOUS at 11:51

## 2025-05-27 RX ADMIN — TRANEXAMIC ACID 1000 MG: 1 INJECTION, SOLUTION INTRAVENOUS at 12:07

## 2025-05-27 RX ADMIN — GABAPENTIN 400 MG: 400 CAPSULE ORAL at 20:20

## 2025-05-27 RX ADMIN — OXYCODONE HYDROCHLORIDE 10 MG: 5 TABLET ORAL at 15:57

## 2025-05-27 RX ADMIN — MIDAZOLAM HYDROCHLORIDE 2 MG: 1 INJECTION, SOLUTION INTRAMUSCULAR; INTRAVENOUS at 10:15

## 2025-05-27 RX ADMIN — IPRATROPIUM BROMIDE AND ALBUTEROL SULFATE 3 ML: 2.5; .5 SOLUTION RESPIRATORY (INHALATION) at 20:04

## 2025-05-27 RX ADMIN — FENTANYL CITRATE 25 MCG: 50 INJECTION, SOLUTION INTRAMUSCULAR; INTRAVENOUS at 11:26

## 2025-05-27 RX ADMIN — ATORVASTATIN CALCIUM 20 MG: 20 TABLET, FILM COATED ORAL at 20:20

## 2025-05-27 RX ADMIN — Medication 20 ML: at 10:15

## 2025-05-27 RX ADMIN — OXYCODONE HYDROCHLORIDE 20 MG: 20 TABLET, FILM COATED, EXTENDED RELEASE ORAL at 09:24

## 2025-05-27 RX ADMIN — SODIUM CHLORIDE, POTASSIUM CHLORIDE, SODIUM LACTATE AND CALCIUM CHLORIDE 125 ML/HR: 600; 310; 30; 20 INJECTION, SOLUTION INTRAVENOUS at 16:17

## 2025-05-27 RX ADMIN — POLYETHYLENE GLYCOL 3350 17 G: 17 POWDER, FOR SOLUTION ORAL at 15:56

## 2025-05-27 RX ADMIN — CEFAZOLIN SODIUM 2 G: 2 INJECTION, SOLUTION INTRAVENOUS at 11:00

## 2025-05-27 RX ADMIN — SUGAMMADEX 200 MG: 100 INJECTION, SOLUTION INTRAVENOUS at 12:59

## 2025-05-27 RX ADMIN — DEXAMETHASONE SODIUM PHOSPHATE 10 MG: 4 INJECTION, SOLUTION INTRAMUSCULAR; INTRAVENOUS at 11:01

## 2025-05-27 RX ADMIN — PREGABALIN 75 MG: 75 CAPSULE ORAL at 09:24

## 2025-05-27 RX ADMIN — INSULIN LISPRO 2 UNITS: 100 INJECTION, SOLUTION INTRAVENOUS; SUBCUTANEOUS at 17:21

## 2025-05-27 SDOH — SOCIAL STABILITY: SOCIAL INSECURITY: ABUSE: ADULT

## 2025-05-27 SDOH — SOCIAL STABILITY: SOCIAL INSECURITY: WITHIN THE LAST YEAR, HAVE YOU BEEN HUMILIATED OR EMOTIONALLY ABUSED IN OTHER WAYS BY YOUR PARTNER OR EX-PARTNER?: NO

## 2025-05-27 SDOH — ECONOMIC STABILITY: INCOME INSECURITY: IN THE PAST 12 MONTHS HAS THE ELECTRIC, GAS, OIL, OR WATER COMPANY THREATENED TO SHUT OFF SERVICES IN YOUR HOME?: NO

## 2025-05-27 SDOH — SOCIAL STABILITY: SOCIAL INSECURITY: WITHIN THE LAST YEAR, HAVE YOU BEEN AFRAID OF YOUR PARTNER OR EX-PARTNER?: NO

## 2025-05-27 SDOH — ECONOMIC STABILITY: FOOD INSECURITY: WITHIN THE PAST 12 MONTHS, THE FOOD YOU BOUGHT JUST DIDN'T LAST AND YOU DIDN'T HAVE MONEY TO GET MORE.: NEVER TRUE

## 2025-05-27 SDOH — ECONOMIC STABILITY: FOOD INSECURITY: WITHIN THE PAST 12 MONTHS, YOU WORRIED THAT YOUR FOOD WOULD RUN OUT BEFORE YOU GOT THE MONEY TO BUY MORE.: NEVER TRUE

## 2025-05-27 SDOH — SOCIAL STABILITY: SOCIAL INSECURITY
WITHIN THE LAST YEAR, HAVE YOU BEEN KICKED, HIT, SLAPPED, OR OTHERWISE PHYSICALLY HURT BY YOUR PARTNER OR EX-PARTNER?: NO

## 2025-05-27 SDOH — SOCIAL STABILITY: SOCIAL INSECURITY: ARE YOU OR HAVE YOU BEEN THREATENED OR ABUSED PHYSICALLY, EMOTIONALLY, OR SEXUALLY BY ANYONE?: NO

## 2025-05-27 SDOH — HEALTH STABILITY: PHYSICAL HEALTH
HOW OFTEN DO YOU NEED TO HAVE SOMEONE HELP YOU WHEN YOU READ INSTRUCTIONS, PAMPHLETS, OR OTHER WRITTEN MATERIAL FROM YOUR DOCTOR OR PHARMACY?: NEVER

## 2025-05-27 SDOH — SOCIAL STABILITY: SOCIAL INSECURITY: DO YOU FEEL UNSAFE GOING BACK TO THE PLACE WHERE YOU ARE LIVING?: NO

## 2025-05-27 SDOH — SOCIAL STABILITY: SOCIAL INSECURITY
WITHIN THE LAST YEAR, HAVE YOU BEEN RAPED OR FORCED TO HAVE ANY KIND OF SEXUAL ACTIVITY BY YOUR PARTNER OR EX-PARTNER?: NO

## 2025-05-27 SDOH — SOCIAL STABILITY: SOCIAL INSECURITY: HAVE YOU HAD ANY THOUGHTS OF HARMING ANYONE ELSE?: NO

## 2025-05-27 SDOH — SOCIAL STABILITY: SOCIAL INSECURITY: ARE THERE ANY APPARENT SIGNS OF INJURIES/BEHAVIORS THAT COULD BE RELATED TO ABUSE/NEGLECT?: NO

## 2025-05-27 SDOH — ECONOMIC STABILITY: HOUSING INSECURITY: DO YOU FEEL UNSAFE GOING BACK TO THE PLACE WHERE YOU LIVE?: NO

## 2025-05-27 SDOH — SOCIAL STABILITY: SOCIAL INSECURITY: HAVE YOU HAD THOUGHTS OF HARMING ANYONE ELSE?: NO

## 2025-05-27 SDOH — SOCIAL STABILITY: SOCIAL INSECURITY: HAS ANYONE EVER THREATENED TO HURT YOUR FAMILY OR YOUR PETS?: NO

## 2025-05-27 SDOH — SOCIAL STABILITY: SOCIAL INSECURITY: DO YOU FEEL ANYONE HAS EXPLOITED OR TAKEN ADVANTAGE OF YOU FINANCIALLY OR OF YOUR PERSONAL PROPERTY?: NO

## 2025-05-27 SDOH — HEALTH STABILITY: MENTAL HEALTH: CURRENT SMOKER: 0

## 2025-05-27 SDOH — SOCIAL STABILITY: SOCIAL INSECURITY: DOES ANYONE TRY TO KEEP YOU FROM HAVING/CONTACTING OTHER FRIENDS OR DOING THINGS OUTSIDE YOUR HOME?: NO

## 2025-05-27 SDOH — SOCIAL STABILITY: SOCIAL INSECURITY
ASK PARENT OR GUARDIAN: ARE THERE TIMES WHEN YOU, YOUR CHILD(REN), OR ANY MEMBER OF YOUR HOUSEHOLD FEEL UNSAFE, HARMED, OR THREATENED AROUND PERSONS WITH WHOM YOU KNOW OR LIVE?: NO

## 2025-05-27 SDOH — SOCIAL STABILITY: SOCIAL INSECURITY: WERE YOU ABLE TO COMPLETE ALL THE BEHAVIORAL HEALTH SCREENINGS?: YES

## 2025-05-27 ASSESSMENT — COGNITIVE AND FUNCTIONAL STATUS - GENERAL
WALKING IN HOSPITAL ROOM: A LOT
CLIMB 3 TO 5 STEPS WITH RAILING: A LOT
PATIENT BASELINE BEDBOUND: NO
TOILETING: A LITTLE
WALKING IN HOSPITAL ROOM: A LOT
TURNING FROM BACK TO SIDE WHILE IN FLAT BAD: A LITTLE
MOVING TO AND FROM BED TO CHAIR: A LITTLE
DAILY ACTIVITIY SCORE: 21
MOBILITY SCORE: 16
DRESSING REGULAR LOWER BODY CLOTHING: A LITTLE
CLIMB 3 TO 5 STEPS WITH RAILING: A LOT
MOVING FROM LYING ON BACK TO SITTING ON SIDE OF FLAT BED WITH BEDRAILS: A LITTLE
STANDING UP FROM CHAIR USING ARMS: A LITTLE
MOBILITY SCORE: 16
HELP NEEDED FOR BATHING: A LITTLE
MOVING FROM LYING ON BACK TO SITTING ON SIDE OF FLAT BED WITH BEDRAILS: A LITTLE
TURNING FROM BACK TO SIDE WHILE IN FLAT BAD: A LITTLE
MOVING TO AND FROM BED TO CHAIR: A LITTLE
STANDING UP FROM CHAIR USING ARMS: A LITTLE

## 2025-05-27 ASSESSMENT — PAIN - FUNCTIONAL ASSESSMENT
PAIN_FUNCTIONAL_ASSESSMENT: 0-10

## 2025-05-27 ASSESSMENT — PAIN SCALES - GENERAL
PAINLEVEL_OUTOF10: 8
PAIN_LEVEL: 2
PAINLEVEL_OUTOF10: 0 - NO PAIN
PAINLEVEL_OUTOF10: 7
PAINLEVEL_OUTOF10: 8
PAINLEVEL_OUTOF10: 0 - NO PAIN
PAINLEVEL_OUTOF10: 3
PAINLEVEL_OUTOF10: 6
PAINLEVEL_OUTOF10: 4

## 2025-05-27 ASSESSMENT — ACTIVITIES OF DAILY LIVING (ADL)
ADEQUATE_TO_COMPLETE_ADL: YES
WALKS IN HOME: INDEPENDENT
FEEDING YOURSELF: INDEPENDENT
TOILETING: NEEDS ASSISTANCE
LACK_OF_TRANSPORTATION: NO
HEARING - RIGHT EAR: FUNCTIONAL
BATHING: NEEDS ASSISTANCE
JUDGMENT_ADEQUATE_SAFELY_COMPLETE_DAILY_ACTIVITIES: YES
PATIENT'S MEMORY ADEQUATE TO SAFELY COMPLETE DAILY ACTIVITIES?: YES
HEARING - LEFT EAR: FUNCTIONAL
ASSISTIVE_DEVICE: WALKER;EYEGLASSES
GROOMING: INDEPENDENT
ADL_ASSISTANCE: INDEPENDENT
DRESSING YOURSELF: NEEDS ASSISTANCE

## 2025-05-27 ASSESSMENT — PAIN DESCRIPTION - DESCRIPTORS
DESCRIPTORS: THROBBING
DESCRIPTORS: ACHING

## 2025-05-27 ASSESSMENT — LIFESTYLE VARIABLES
HOW OFTEN DO YOU HAVE A DRINK CONTAINING ALCOHOL: MONTHLY OR LESS
AUDIT-C TOTAL SCORE: 1
SKIP TO QUESTIONS 9-10: 1
HOW OFTEN DO YOU HAVE 6 OR MORE DRINKS ON ONE OCCASION: NEVER
HOW MANY STANDARD DRINKS CONTAINING ALCOHOL DO YOU HAVE ON A TYPICAL DAY: 1 OR 2
PRESCIPTION_ABUSE_PAST_12_MONTHS: NO
SUBSTANCE_ABUSE_PAST_12_MONTHS: NO
AUDIT-C TOTAL SCORE: 1

## 2025-05-27 ASSESSMENT — PAIN DESCRIPTION - ORIENTATION: ORIENTATION: RIGHT

## 2025-05-27 ASSESSMENT — PAIN DESCRIPTION - LOCATION: LOCATION: LEG

## 2025-05-27 NOTE — PROGRESS NOTES
Physical Therapy    Physical Therapy Evaluation & Treatment    Patient Name: Tatyana Mckeon  MRN: [id number]  Department: [address]  Room: [address] [place] [address]  Today's Date: 5/27/2025   Time Calculation  Start Time: 1635  Stop Time: 1710  Time Calculation (min): 35 min    Assessment/Plan   PT Assessment  PT Assessment Results: Decreased strength, Decreased range of motion, Decreased endurance, Impaired balance, Decreased mobility, Decreased coordination, Decreased safety awareness, Decreased skin integrity, Orthopedic restrictions, Pain  Rehab Prognosis: Good  Barriers to Discharge Home: No anticipated barriers  Evaluation/Treatment Tolerance: Patient limited by fatigue, Patient limited by pain  Medical Staff Made Aware: Yes  Strengths: Premorbid level of function  Barriers to Participation: Comorbidities  End of Session Communication: Bedside nurse, PCT/NA/CTA  Assessment Comment: Pt gives effort as able w/ high pain level, grogginess w/ pain meds. Limited amb w/ min assist of 2, no buckling. Pt would benefit from continued PT services to progress safe mobility an dmaximize post op outcome.  End of Session Patient Position: Bed, 3 rail up, Alarm on   IP OR SWING BED PT PLAN  Inpatient or Swing Bed: Inpatient  PT Plan  Treatment/Interventions: Bed mobility, Transfer training, Gait training, Stair training, Balance training, Strengthening, Endurance training, Range of motion, Therapeutic exercise, Therapeutic activity, Home exercise program  PT Plan: Ongoing PT  PT Frequency: BID  PT Discharge Recommendations: Low intensity level of continued care  Equipment Recommended upon Discharge: Wheeled walker  PT Recommended Transfer Status: Assist x1, Assist x2, Assistive device  PT - OK to Discharge: Yes      Subjective     PT Visit Info:  PT Received On: 05/27/25  General Visit Information:  General  Reason for Referral: recent surgery; s/p Rt TKA  Referred By: Dr Juares  Past Medical History Relevant to Rehab: OA, Rt breast cancer s/p  lumpectomy and radiation, Rt breast lymphedema, anxiety, depression, bipolar d/o, DM, endometrial cancer, HTN, psoriasis, CAD, tobacco abuse  Family/Caregiver Present: Yes  Caregiver Feedback: deandreCatarina duarte, present, supportive  Prior to Session Communication: Bedside nurse  Patient Position Received: Bed, 3 rail up, Alarm on  Preferred Learning Style: verbal, visual  General Comment: Pt is a 70 y.o. female adm for elective Rt TKA w/ Dr Juares on 5/27/25. Pt was cleared for PT eval and agreeable.  Home Living:  Home Living  Type of Home: House  Lives With: Alone  Home Adaptive Equipment: Walker rolling or standard, Quad cane, Cane, Reacher, Long-handled shoehorn (wooden cane not for pt's height)  Home Layout: Two level, Able to live on main level with bedroom/bathroom (reports doesn't need to use basement)  Home Access: Ramped entrance  Bathroom Shower/Tub: Walk-in shower  Bathroom Toilet: Handicapped height  Bathroom Equipment: Grab bars in shower, Shower chair with back  Prior Level of Function:  Prior Function Per Pt/Caregiver Report  Level of [place]: Independent with ADLs and functional transfers, Independent with homemaking with ambulation  Receives Help From: Family, Friends (Pt plans to have her niece and 2 friends stay w/ her post op)  ADL Assistance: Independent  Homemaking Assistance: Independent  Ambulatory Assistance: Independent (no AD in house, uses RW when outside)  Prior Function Comments: Pt drives, denies falls in the past 6 months, sleeps in a regular bed.  Precautions:  Precautions  Hearing/Visual Limitations: reading glasses  LE Weight Bearing Status: Weight Bearing as Tolerated (RLE)  Medical Precautions: Fall precautions, Oxygen therapy device and L/min (3L O2)  Post-Surgical Precautions: Right total knee precautions  Precautions Comment: Pt was educated on post op TKA precautions. Pt very sleepy from recent pain med, often drifting off during session.       Objective   Pain:  Pain  Assessment  Pain Assessment: 0-10  0-10 (Numeric) Pain Score: 8  Pain Type: Surgical pain  Pain Location: Knee  Pain Orientation: Right  Pain Interventions: Repositioned (pt ha pain med just prior to session)  Response to Interventions: Decrease in pain (to 6/10)  Cognition:  Cognition  Overall Cognitive Status: Within Functional Limits (at baseline; currently groggy)  Following Commands: Follows multistep commands with increased time (and repetition)  Cognition Comments: Pt is pleasant and cooperative. groggy post pain med, often not completing sentences before drifting off, easily redirected. More engaged during active mobility tasks.  Processing Speed: Delayed    General Assessments:     Activity Tolerance  Endurance: Decreased tolerance for upright activites  Activity Tolerance Comments: Fair (sleepy post pain med)     Strength  Strength Comments: encouraged to perform anti-embolics on own  Coordination  Coordination Comment: difficulty moving RLE post op    Postural Control  Posture Comment: forward rounded; cues for head up    Dynamic Standing Balance  Dynamic Standing-Balance Support: Bilateral upper extremity supported  Dynamic Standing-Level of Assistance: Minimum assistance (+2)  Dynamic Standing-Balance: Turning (steps at bedside)  Dynamic Standing-Comments: Fair, no buckling  Functional Assessments:  ADL  ADL's Addressed:  (pt able to perform some hygiene in standing after using BSC, required assist at posterior)    Bed Mobility  Bed Mobility: Yes  Bed Mobility 1  Bed Mobility 1: Supine to sitting  Level of Assistance 1: Minimum assistance  Bed Mobility Comments 1: to Lt EOB, HOB elevated, assist for RLE  Bed Mobility 2  Bed Mobility  2: Sitting to supine  Level of Assistance 2: Minimum assistance  Bed Mobility Comments 2: down into Lt sidelying, assist for LEs onto bed    Transfers  Transfer: Yes  Transfer 1  Technique 1: Sit to stand, Stand to sit  Transfer Device 1: Walker, Gait belt  Transfer Level  of Assistance 1: Minimum assistance, +2  Trials/Comments 1: from/to EOB, to/from BSC; cues for safe hand placement    Ambulation/Gait Training  Ambulation/Gait Training Performed: Yes  Ambulation/Gait Training 1  Surface 1: Level tile  Device 1: Rolling walker  Gait Support Devices: Gait belt  Assistance 1: Minimum assistance (+2)  Comments/Distance (ft) 1: Pt able ot amb 3-4' x 2, slow, small steps to/from BSC. Cues to direct activity and for erect posture, head up. No buckling.  Extremity/Trunk Assessments:  RLE   RLE : Exceptions to WFL  AROM RLE (degrees)  R Knee Flexion 0-130: 75  R Knee Extension 0-130: -10  R Ankle Dorsiflexion 0-20: 5  LLE   LLE : Within Functional Limits  Treatments:  Bed Mobility  Bed Mobility: Yes  Bed Mobility 1  Bed Mobility 1: Supine to sitting  Level of Assistance 1: Minimum assistance  Bed Mobility Comments 1: to Lt EOB, HOB elevated, assist for RLE  Bed Mobility 2  Bed Mobility  2: Sitting to supine  Level of Assistance 2: Minimum assistance  Bed Mobility Comments 2: down into Lt sidelying, assist for LEs onto bed    Ambulation/Gait Training  Ambulation/Gait Training Performed: Yes  Ambulation/Gait Training 1  Surface 1: Level tile  Device 1: Rolling walker  Gait Support Devices: Gait belt  Assistance 1: Minimum assistance (+2)  Comments/Distance (ft) 1: Pt able ot amb 3-4' x 2, slow, small steps to/from BSC. Cues to direct activity and for erect posture, head up. No buckling.  Transfers  Transfer: Yes  Transfer 1  Technique 1: Sit to stand, Stand to sit  Transfer Device 1: Walker, Gait belt  Transfer Level of Assistance 1: Minimum assistance, +2  Trials/Comments 1: from/to EOB, to/from BSC; cues for safe hand placement  Outcome Measures:  [facility] Basic Mobility  Turning from your back to your side while in a flat bed without using bedrails: A little  Moving from lying on your back to sitting on the side of a flat bed without using bedrails: A little  Moving to and from bed to chair  (including a wheelchair): A little  Standing up from a chair using your arms (e.g. wheelchair or bedside chair): A little  To walk in hospital room: A lot  Climbing 3-5 steps with railing: A lot  Basic Mobility - Total Score: 16    Encounter Problems       Encounter Problems (Active)       Mobility       STG - Patient will ambulate 75' w/ 2WW and distant supervision  (Progressing)       Start:  05/27/25    Expected End:  05/28/25            STG - Patient will ambulate up and down a curb/step w/ RW and min assist (Progressing)       Start:  05/27/25    Expected End:  05/28/25            Pt will demonstrate good understanding and performance of post op TKA HEP (Progressing)       Start:  05/27/25    Expected End:  05/28/25               PT Transfers       STG - Patient to transfer to and from sit to supine IND (Progressing)       Start:  05/27/25    Expected End:  05/28/25            STG - Patient will transfer sit to and from stand w/ distant supervision and safe technique  (Progressing)       Start:  05/27/25    Expected End:  05/28/25               Safety       LTG - Patient will adhere to post op TKA precautions during functional mobility (Progressing)       Start:  05/27/25    Expected End:  05/28/25                   Education Documentation  Precautions, taught by Olivia Hood, PT at 5/27/2025  6:30 PM.  Learner: Patient  Readiness: Acceptance  Method: Explanation, Demonstration  Response: Needs Reinforcement  Comment: very sleepy post op, decreased processing    Mobility Training, taught by Olivia Hood, PT at 5/27/2025  6:30 PM.  Learner: Patient  Readiness: Acceptance  Method: Explanation, Demonstration  Response: Needs Reinforcement  Comment: very sleepy post op, decreased processing    Education Comments  No comments found.

## 2025-05-27 NOTE — NURSING NOTE
Patient arrived to room from PACU. Not in any acute distress. Oriented to Call light system and room. Denies any needs. Call light and possessions within reach. Bed alarm on.

## 2025-05-27 NOTE — ANESTHESIA PROCEDURE NOTES
Airway  Date/Time: 5/27/2025 10:59 AM  Reason: elective    Airway not difficult    Staffing  Performed: ROZINA   Authorized by: Taz Peterson MD    Performed by: ROZINA Castle  Patient location during procedure: OR    Patient Condition  Indications for airway management: anesthesia and airway protection  Patient position: sniffing  Planned trial extubation  Sedation level: deep     Final Airway Details   Preoxygenated: yes  Final airway type: endotracheal airway  Successful airway: ETT  Cuffed: yes   Successful intubation technique: direct laryngoscopy  Adjuncts used in placement: intubating stylet  Blade: Navid  Blade size: #3  ETT size (mm): 7.0  Cormack-Lehane Classification: grade I - full view of glottis  Placement verified by: chest auscultation, capnometry and palpation of cuff   Measured from: lips  ETT to lips (cm): 20  Number of attempts at approach: 1

## 2025-05-27 NOTE — ANESTHESIA PREPROCEDURE EVALUATION
Patient: Tatyana Mckeon    Procedure Information       Date/Time: 05/27/25 1010    Procedure: Open Total Knee Arthroplasty (Right: Knee) - FABIAN CEMENTED    Location: [facility]    Surgeons: Kash Juares MD            Relevant Problems   Cardiac   (+) Breast pain, right   (+) High blood pressure   (+) High cholesterol      Neuro   (+) Anxiety   (+) Bipolar 1 disorder (Multi)      GI   (+) Gastroesophageal reflux disease      Endocrine   (+) Type II diabetes mellitus (Multi)      Musculoskeletal   (+) Chronic bilateral low back pain without sciatica   (+) Chronic right-sided low back pain with sciatica   (+) Primary osteoarthritis of both knees      ID   (+) Candidiasis of skin and nails      GYN   (+) Breast cancer   (+) Endometrial cancer (Multi)   (+) Infiltrating ductal carcinoma of right breast, stage 2       Clinical information reviewed:   Tobacco  Allergies  Meds   Med Hx  Surg Hx  OB Status  Fam Hx  Soc   Hx        NPO Detail:  NPO/Void Status  Carbohydrate Drink Given Prior to Surgery? : N  Date of Last Liquid: 05/27/25  Time of Last Liquid: 0700  Date of Last Solid: 05/26/25  Time of Last Solid: 1700  Last Intake Type: Clear fluids  Time of Last Void: 0849         Physical Exam    Airway  Mallampati: II  TM distance: >3 FB  Neck ROM: full  Mouth opening: 3 or more finger widths     Cardiovascular - normal exam   Dental        Pulmonary - normal exam   Abdominal - normal exam           Anesthesia Plan    History of general anesthesia?: yes  History of complications of general anesthesia?: no    ASA 2     general     The patient is not a current smoker.  Patient was not previously instructed to abstain from smoking on day of procedure.  Patient did not smoke on day of procedure.  Education provided regarding risk of obstructive sleep apnea.  intravenous induction   Postoperative pain plan includes opioids.  Trial extubation is planned.  Anesthetic plan and risks discussed with  patient.  Use of blood products discussed with patient who consented to blood products.    Plan discussed with CAA, attending and CRNA.

## 2025-05-27 NOTE — OP NOTE
Open Total Knee Arthroplasty  [address] - [place] ARTHRP KNE CONDYLE&PLATU MEDIAL&LAT COMPARTMENTS   Operative Note     Date: 2025  OR Location: [facility]    Name: Tatyana Mckeon : 1955, Age: 70 y.o., MRN: [id number], Sex: female    PRE Diagnosis  Right knee osteoarthritis  Morbid obesity BMI 40    POST Diagnosis  Same    Procedures  Right total knee arthroplasty subvastus approach    Surgeons      * Kash Juares - Primary    Resident/Fellow/Other Assistant:  herbert Cam    The above-named assistant(s) was/were integral to all portions of the procedure including patient positioning, exposure, implantation, closure and transportation.     Procedure Summary  Implants:  Styker Cemented Triathlon Total Knee: Femur size 4 posterior stabilized, size 4 primary tibial baseplate, size A32 N2Vac Patella, size 4x9 posterior stabilized N2Vac tibial bearing insert     Anesthesia: General LMA adductor canal block  ASA: II  Anesthesia Staff:   Anesthesiologist: Taz Peterson MD  C-AA: ROZINA Castle  Estimated Blood Loss: 50 mL    Specimen: No specimens collected     Staff:   Circulator: Fabio Whaley RN  Scrub Person: Stefanie Hernández         Drains and/or Catheters:   2 Hemovac    Tourniquet Times:     Total Tourniquet Time Documented:  Thigh (Right) - 72 minutes  Total: Thigh (Right) - 72 minutes             Findings: Severe degenerative change of all 3 joint compartments with massive osteophytes along the dorsal femur as well as posterior femur and popliteal space.  Stable implantation of the above components.  Neutral alignment stable in extension to an axial loading exam.  Patella tracked normally through the subvastus approach.    Indications: Tatyana Mckeon is an 70 y.o. female who is having surgery for RIGHT KNEE OSTEOARTHRITIS.   Patient presented with pain and symptoms of her right knee.  Progressive in nature not relieved with conservative care.  X-rays revealed severe degenerative changes  with massive osteophytes.  I discussed with her further options and she elected proceed with a knee replacement surgery.  I discussed with the patient the risks, benefits, alternatives, complications of a total knee replacement.  The risks, including but not limited to, deep vein thrombosis, pulmonary embolism, infection, knee stiffness, periprosthetic fracture, damage to ligaments tendons or neurovascular structures was discussed.  Despite these risks they elected to proceed.      Procedure Details: Medications:  Antibiotic Ancef 2 g IV,Tranexamic acid 2 g IV, Decadron 10 milligrams IV    Patient was seen and evaluated in the preoperative area and the right leg was marked as the operative extremity.  They were then brought back to the operating room after anesthesia administered an adductor canal block.  Patient was laid supine on the table and anesthesia team controlled the head neck and airway administered general anesthetic.   The well leg was wrapped and secured to the table.  The operative leg was prepped with a well-padded thigh tourniquet.  We then washed the skin with a chlorhexidine wash and alcohol.  We then prepped with a ChloraPrep and draped in the usual fashion.  An operative time-out was performed.  I then exsanguinated the leg with an Esmarch and elevated the tourniquet to 250 millimeters of mercury.      I marked out the anterior landmarks of the knee and a midline incision.  I created the incision with the scalpel and used the Bovie to dissect down through the subcutaneous tissue to the level of the extensor mechanism.  I created full-thickness skin flaps medial and laterally.  Identified the VMO and placed a Hohmann retractor deep to this to retract the extensor mechanism laterally.  Marked out the subvastus approach.  I created this with the scalpel.  I then used the Bovie to dissect a subperiosteal dissection off the proximal medial tibia.  I removed the infrapatellar fat pad.  I released the  lateral synovium and was able to flex the knee and translate the patella laterally.  Patient had severe wear of the tricompartmental compartment.  I marked out Whitesides line and debrided the anterior cruciate ligament.  I debrided the intercondylar notch and used a drill to enter the femoral canal.  I then used a flexible intramedullary alignment guide with distal cutting jig set for 5 degree valgus with an 8 millimeter resection.  I set this on the more prominent medial condyle.  I pinned this into position and protected the soft tissues while I  resected the distal femur.  My cut measured at 8 millimeters.   I now turned my attention to the tibia.  I used a extramedullary guide centered in the tibial spines.  I aligned it with respect to the medial 3rd of the tubercle, to tibial crest, and centered over talus.  I adjusted for a neutral slope.  I pinned the block in position with  a 4 millimeter resection off the medial side.  I then used a drop leonardo as a secondary check.  I then protected the soft tissues and resected the proximal tibia.  I measured the lateral thickness at 8 millimeters.  I now brought the knee into extension and used the gap balancing block.  I was satisfied with the alignment and the extension gap.  Now returned to the femur using the 3 degree external rotation sizing guide and pinned this into position  with respect to Whitesides line, epicondylar axis and a tibial cut.  I measured the femur at a size 5. I placed the 4 in 1 cutting block and pinned this down.  I then resected the anterior and posterior condyles and chamfers.  I then cleaned the bone cuts with a rongeur and an osteotome.  I now placed the box cutting guide in line with the lateral border of the femur.  There was a lot of overhang.  I elected to downsized to a size 4.  I placed the 4-in-1 cutting block.  There was no significant notching after resecting the additional cuts were the size 4.  I placed the box cutting guide along  with the lateral border.  Still slightly wide but centered on the distal femur.  I pinned this into position then used a distal chisel and oscillating saw to resect the box and removed the PCL with the Bovie.  I now clean the posterior aspect of the knee with the Bovie removing the medial and lateral meniscus in their entirety.  I preserved the popliteus tendon.  I then took a curved osteotome and resected the posterior condylar osteophytes bilaterally  decompressing the posterior space.  These osteophytes were very large.  Associated loose bodies were also identified.  I provisionally sized the tibia to a size 4. I did a trial reduction with the femur, tibia and a 9 millimeter insert.   I brought the knee through range of motion was satisfied with the tracking and varus valgus stability throughout.  The native patella tracked normally.  I everted the patella with 2 towel clips.  I circumferentially burned around this with the Bovie.    The patella measured 20 in thickness at its thickest position and 18 at the most worn position.. I took a freehand resection down to 14 millimeters of bone circumferentially.   I sized an asymmetric 32.  I alligned the drill guide with the medial border and drilled for the cemented component.  I then placed a trial and tested the kinematics and the patella tracked normally through the sub vastus approach.  I now removed the trial components and finished my tibial preparation.  I pinned the base plate into position and then used the keel punch and an oversized keel punched for the cemented technique.  I now removed this and thoroughly irrigated the soft tissue and bone in preparation for implantation.  I used standard cementation technique.  I flexed the knee and translated the tibia forward.  I finger packed the cement into the keel and then seated the tibial component reducing the removing the extra cement.  I then finger packed cement onto the distal femur seated the femoral  component and placed the trial polyethylene as the cement cured.  I also irrigated clean and dried the patella seated the patellar component. After cement had thoroughly cured I tested the kinematics with the size 9 millimeter insert and was satisfied with the tracking, alignment, and stability of the knee.  I removed this insert trial, cleaned the locking mechanism and seated the posterior stabilized tibial bearing insert.  The fit was secure medial and laterally.  I extended the knee and irrigated with Betadine.  I washed this out with normal saline.  I packed the need and dropped the tourniquet to obtain hemostasis. I then placed 1 intra-articular drain and closed the arthrotomy in a semi flexed position with #2 Ethibond for a watertight seal tested through range of motion.  I then placed an extra-articular drain, irrigated and closed in a layered fashion of running 0 Vicryl, buried interrupted 2-0 Vicryl, running 4-0 Monocryl.  Dermabond and a silver dressing were applied.  The patient tolerated procedure well and was awakened taken to PACU in stable condition.  No complications encountered.  Complications:  None; patient tolerated the procedure well.    Disposition: PACU - hemodynamically stable.  Condition: stable             Attending Attestation: I performed the procedure.    Kash Juares  Phone Number: [phone]

## 2025-05-27 NOTE — ANESTHESIA POSTPROCEDURE EVALUATION
Patient: Tatyana Mckeon    Procedure Summary       Date: 05/27/25 Room / Location: [facility] / [facility]    Anesthesia Start: 1051 Anesthesia Stop: 1316    Procedure: Open Total Knee Arthroplasty (Right: Knee) Diagnosis:       Primary osteoarthritis of right knee      (RIGHT KNEE OSTEOARTHRITIS)    Surgeons: Kash Juares MD Responsible Provider: Taz Peterson MD    Anesthesia Type: general ASA Status: 2            Anesthesia Type: general    Vitals Value Taken Time   /74 05/27/25 14:06   Temp 36.3 °C (97.3 °F) 05/27/25 13:13   Pulse 96 05/27/25 14:08   Resp 14 05/27/25 14:08   SpO2 96 % 05/27/25 14:06   Vitals shown include unfiled device data.    Anesthesia Post Evaluation    Patient location during evaluation: PACU  Patient participation: complete - patient participated  Level of consciousness: sleepy but conscious  Pain score: 2  Pain management: adequate  Multimodal analgesia pain management approach  Airway patency: patent  Cardiovascular status: acceptable  Respiratory status: acceptable  Hydration status: acceptable  Postoperative Nausea and Vomiting: none        There were no known notable events for this encounter.

## 2025-05-27 NOTE — CARE PLAN
The patient's goals for the shift include pain control and comfort.    The clinical goals for the shift include pain management, comfort, maintain safety, IV fluids, IV antibiotics, encourage OOB to chair, work with PT/OT, knee precautions and monitor labs/VS.    Problem: Pain - Adult  Goal: Verbalizes/displays adequate comfort level or baseline comfort level  Outcome: Progressing     Problem: Safety - Adult  Goal: Free from fall injury  Outcome: Progressing     Problem: Discharge Planning  Goal: Discharge to home or other facility with appropriate resources  Outcome: Progressing     Problem: Chronic Conditions and Co-morbidities  Goal: Patient's chronic conditions and co-morbidity symptoms are monitored and maintained or improved  Outcome: Progressing     Problem: Nutrition  Goal: Nutrient intake appropriate for maintaining nutritional needs  Outcome: Progressing     Problem: Deep Vein Thrombosis  Goal: I will remain free from complications of deep vein thrombosis and maintain current level of mobility  Outcome: Progressing     Problem: Fall/Injury  Goal: Not fall by end of shift  Outcome: Progressing  Goal: Be free from injury by end of the shift  Outcome: Progressing  Goal: Verbalize understanding of personal risk factors for fall in the hospital  Outcome: Progressing  Goal: Verbalize understanding of risk factor reduction measures to prevent injury from fall in the home  Outcome: Progressing  Goal: Use assistive devices by end of the shift  Outcome: Progressing  Goal: Pace activities to prevent fatigue by end of the shift  Outcome: Progressing     Problem: Pain  Goal: Takes deep breaths with improved pain control throughout the shift  Outcome: Progressing  Goal: Turns in bed with improved pain control throughout the shift  Outcome: Progressing  Goal: Walks with improved pain control throughout the shift  Outcome: Progressing  Goal: Performs ADL's with improved pain control throughout shift  Outcome:  Progressing  Goal: Participates in PT with improved pain control throughout the shift  Outcome: Progressing  Goal: Free from opioid side effects throughout the shift  Outcome: Progressing  Goal: Free from acute confusion related to pain meds throughout the shift  Outcome: Progressing

## 2025-05-27 NOTE — ANESTHESIA PROCEDURE NOTES
Peripheral Block    Patient location during procedure: pre-op  Medication administered at: 5/27/2025 10:15 AM  End time: 5/27/2025 10:16 AM  Reason for block: at surgeon's request and post-op pain management  Staffing  Performed: attending   Authorized by: Taz Peterson MD    Performed by: Taz Peterson MD  Preanesthetic Checklist  Completed: patient identified, IV checked, site marked, risks and benefits discussed, surgical consent, monitors and equipment checked, pre-op evaluation and timeout performed   Timeout performed at: 5/27/2025 10:10 AM  Peripheral Block  Patient position: laying flat  Prep: ChloraPrep  Patient monitoring: heart rate, cardiac monitor and continuous pulse ox  Block type: adductor canal  Laterality: right  Injection technique: single-shot  Guidance: ultrasound guided  Needle  Needle type: short-bevel   Needle gauge: 21 G  Needle length: 10 cm  Needle localization: ultrasound guidance  Assessment  Injection assessment: negative aspiration for heme, no paresthesia on injection, incremental injection and local visualized surrounding nerve on ultrasound  Paresthesia pain: none  Heart rate change: no  Slow fractionated injection: no  Medications Administered  bupivacaine-EPINEPHrine (MARCAINE w/EPI) 0.5% -[id number] Perineural - perineural injection   20 mL - 5/27/2025 10:15:00 AM

## 2025-05-27 NOTE — ADDENDUM NOTE
Addendum  created 05/27/25 1411 by Taz Peterson MD    Child order released for a procedure order, Clinical Note Signed, Intraprocedure Blocks edited, SmartForm saved

## 2025-05-27 NOTE — DISCHARGE INSTRUCTIONS
Orthopaedic Surgery:  Kash Juares MD was your surgeon this visit.    Dressing: remove after 1 week. Call if any drainage is coming out of the bandage.  Compression socks: wear on both legs for 12 hours a day each day for 4 weeks after surgery.  Weight bearing: weight bear as tolerated  Showering: may shower  Bathing: no tub bathing or pools  Driving: no driving    Call with any concerns.

## 2025-05-27 NOTE — CONSULTS
Inpatient consult to Medicine  Consult performed by: MIGUEL Putnam-CNP  Consult ordered by: Kash Juares MD        Reason For Consult  Perioperative management DM, HTN.     History Of Present Illness  Tatyana Mckeon is a 70 y.o. female presenting with right knee pain. She has been having significant knee pain for the past few years and surgical intervention was recommended. She is currently resting in bed, pain is moderate. She denies any chest pain, palpitations, SOB, nausea, abd pain, urinary symptoms, diarrhea/constipation.      Past Medical History  She has a past medical history of Acquired lymphedema, Allergic (1980’s), Anxiety (1998), Arthritis (2008), Atypical ductal hyperplasia, breast, Bipolar 1 disorder (Multi), Breast cancer (06/2023), Depression, Diabetes mellitus (Multi), Endometrial cancer (Multi), GERD (gastroesophageal reflux disease) (?2014), Hyperlipidemia, Hypertension, Lumbosacral disc disease (2010), Osteoarthritis of right knee, Peripheral edema, Personal history of irradiation, Psoriasis, and Toe fracture, left.    Surgical History  She has a past surgical history that includes US guided biopsy lymph node superficial (06/07/2023); BI mammo guided breast right localization (Right, 07/21/2023); Axillary node dissection (08/16/2023); Hysterectomy (2014); Laparotomy oopherectomy (2014); Breast lumpectomy (2000); Breast biopsy (Left, 07/17/2024); Breast lumpectomy w/ needle localization (Left, 09/06/2024); Lymph node biopsy (6/2023); and Joint replacement (May 27, 2025).     Social History  Home alone. Still smokes ~10 cigarettes per day. Very rare ETOH.     Family History  Family History[1]     Allergies  Pollen extracts, Adhesive tape-silicones, Cephalexin, and Naproxen    Review of Systems    Please see pertinent positives in the HPI and past medical and surgical histories.      Physical Exam    General: Pleasant, awake, alert.   HEENT: PERRLA, no facial asymmetry noted. Normocephalic,  mucous membranes moist.   Neck: No JVD, supple.  Cardiovascular: Regular rate and rhythm. Normal S1 and S2. No murmurs, rubs or gallops.   Respiratory: Clear to auscultation on 2L NC.    Abdomen: Soft, round, non-tender to palpation. Bowel sounds present and normoactive.  Extremities: No peripheral cyanosis. No edema. RLE ACE wrap in place with hemovac drain, sang drainage.    Neurologic: Alert and oriented to person, place and time. Normal sensation.   Dermatologic: No rash, ecchymosis, or jaundice.  Psychological: Appropriate affect and behavior.      Last Recorded Vitals  /74 (BP Location: Right arm, Patient Position: Sitting)   Pulse 94   Temp 36.3 °C (97.3 °F) (Temporal)   Resp 17   Wt 93 kg (205 lb)   SpO2 94%     Relevant Results    Preop labs reviewed and unremarkable. HgA1c 6.1.     Assessment/Plan     DM 2  -HgA1c 6.1 (5/13/25).   -Holding home dose Metformin in the immediate post op period.   -Will order SSI coverage while inpatient.   -Monitor blood sugars.     HTN  -Continue amlodipine. Holding lisinopril and lasix in the immediate post op period.   -BP currently stable, monitor.     Hx Breast CA  -On anastrazole, continue.   -S/P lumpectomy with radiation.     Bipolar Disorder / Anxiety / Depression  -Continue her home medications- wellbutrin, paxil, lamictal.     HLD  -Continue statin.     CAD  -Recent stress test, negative.     Right Knee OA  -POD #0.   -Continue post op care per ortho.   -Pain control per ortho.   -PT/OT evals.   -Encourage IS, pulmonary hygiene.     DVT Risk  -Lovenox subcutaneous.   -SCDs.     Plan   Case and plan was discussed with patient, she is agreeable.   Case and plan was also discussed with my collaborating physician.     Maritza Reardon, MIGUEL-CNP             [1]   Family History  Problem Relation Name Age of Onset    Stroke Mother EmJ     Depression Mother EmJ     Hyperlipidemia Mother EmJ     Other (high blood pressure) Father RayG     Prostate cancer Father  RayG     Heart disease Father RayG         with MI at age 71    Diabetes Father RayG     Hypertension Father RayG     Cancer Father RayG     Diabetes Sister KathF     Hypertension Sister KathF     Cancer Sister KathF     Other (oral cancer) Brother LittleRay     Cancer Brother LittleRay     Alcohol abuse Brother LittleRay     Heart disease Brother LittleRay     Hypertension Brother TVJ     Diabetes Brother TVJ     Alcohol abuse Brother TVJ     Heart disease Brother TVJ     Hyperlipidemia Brother TVJ

## 2025-05-28 ENCOUNTER — HOME HEALTH ADMISSION (OUTPATIENT)
Dept: HOME HEALTH SERVICES | Facility: HOME HEALTH | Age: 70
End: 2025-05-28
Payer: MEDICARE

## 2025-05-28 ENCOUNTER — DOCUMENTATION (OUTPATIENT)
Dept: HOME HEALTH SERVICES | Facility: HOME HEALTH | Age: 70
End: 2025-05-28
Payer: MEDICARE

## 2025-05-28 VITALS
OXYGEN SATURATION: 98 % | HEIGHT: 60 IN | HEART RATE: 79 BPM | DIASTOLIC BLOOD PRESSURE: 58 MMHG | WEIGHT: 205 LBS | RESPIRATION RATE: 16 BRPM | BODY MASS INDEX: 40.25 KG/M2 | TEMPERATURE: 97.7 F | SYSTOLIC BLOOD PRESSURE: 116 MMHG

## 2025-05-28 LAB
ANION GAP SERPL CALCULATED.3IONS-SCNC: 14 MMOL/L (ref 10–20)
BUN SERPL-MCNC: 21 MG/DL (ref 6–23)
CALCIUM SERPL-MCNC: 8.7 MG/DL (ref 8.6–10.3)
CHLORIDE SERPL-SCNC: 104 MMOL/L (ref 98–107)
CO2 SERPL-SCNC: 22 MMOL/L (ref 21–32)
CREAT SERPL-MCNC: 0.71 MG/DL (ref 0.5–1.05)
EGFRCR SERPLBLD CKD-EPI 2021: 90 ML/MIN/1.73M*2
ERYTHROCYTE [DISTWIDTH] IN BLOOD BY AUTOMATED COUNT: 12.7 % (ref 11.5–14.5)
GLUCOSE BLD MANUAL STRIP-MCNC: 135 MG/DL (ref 74–99)
GLUCOSE SERPL-MCNC: 155 MG/DL (ref 74–99)
HCT VFR BLD AUTO: 32.9 % (ref 36–46)
HGB BLD-MCNC: 10.7 G/DL (ref 12–16)
MCH RBC QN AUTO: 30.8 PG (ref 26–34)
MCHC RBC AUTO-ENTMCNC: 32.5 G/DL (ref 32–36)
MCV RBC AUTO: 95 FL (ref 80–100)
NRBC BLD-RTO: 0 /100 WBCS (ref 0–0)
PLATELET # BLD AUTO: 218 X10*3/UL (ref 150–450)
POTASSIUM SERPL-SCNC: 4.3 MMOL/L (ref 3.5–5.3)
RBC # BLD AUTO: 3.47 X10*6/UL (ref 4–5.2)
SODIUM SERPL-SCNC: 136 MMOL/L (ref 136–145)
WBC # BLD AUTO: 12.4 X10*3/UL (ref 4.4–11.3)

## 2025-05-28 PROCEDURE — 96372 THER/PROPH/DIAG INJ SC/IM: CPT | Performed by: ORTHOPAEDIC SURGERY

## 2025-05-28 PROCEDURE — 82947 ASSAY GLUCOSE BLOOD QUANT: CPT | Mod: 59

## 2025-05-28 PROCEDURE — 2500000004 HC RX 250 GENERAL PHARMACY W/ HCPCS (ALT 636 FOR OP/ED): Performed by: ORTHOPAEDIC SURGERY

## 2025-05-28 PROCEDURE — 36415 COLL VENOUS BLD VENIPUNCTURE: CPT | Performed by: ORTHOPAEDIC SURGERY

## 2025-05-28 PROCEDURE — 97165 OT EVAL LOW COMPLEX 30 MIN: CPT | Mod: GO

## 2025-05-28 PROCEDURE — 80048 BASIC METABOLIC PNL TOTAL CA: CPT | Performed by: ORTHOPAEDIC SURGERY

## 2025-05-28 PROCEDURE — 2500000002 HC RX 250 W HCPCS SELF ADMINISTERED DRUGS (ALT 637 FOR MEDICARE OP, ALT 636 FOR OP/ED): Performed by: ORTHOPAEDIC SURGERY

## 2025-05-28 PROCEDURE — 97535 SELF CARE MNGMENT TRAINING: CPT | Mod: GO

## 2025-05-28 PROCEDURE — 7100000011 HC EXTENDED STAY RECOVERY HOURLY - NURSING UNIT

## 2025-05-28 PROCEDURE — 97116 GAIT TRAINING THERAPY: CPT | Mod: GP,CQ

## 2025-05-28 PROCEDURE — 85027 COMPLETE CBC AUTOMATED: CPT | Performed by: ORTHOPAEDIC SURGERY

## 2025-05-28 PROCEDURE — 94640 AIRWAY INHALATION TREATMENT: CPT

## 2025-05-28 PROCEDURE — 2500000001 HC RX 250 WO HCPCS SELF ADMINISTERED DRUGS (ALT 637 FOR MEDICARE OP): Performed by: ORTHOPAEDIC SURGERY

## 2025-05-28 PROCEDURE — 97530 THERAPEUTIC ACTIVITIES: CPT | Mod: GO

## 2025-05-28 PROCEDURE — 97110 THERAPEUTIC EXERCISES: CPT | Mod: GP,CQ

## 2025-05-28 RX ORDER — TRAMADOL HYDROCHLORIDE 50 MG/1
100 TABLET, FILM COATED ORAL EVERY 6 HOURS PRN
Status: DISCONTINUED | OUTPATIENT
Start: 2025-05-28 | End: 2025-05-28 | Stop reason: HOSPADM

## 2025-05-28 RX ORDER — TRAMADOL HYDROCHLORIDE 50 MG/1
50 TABLET, FILM COATED ORAL EVERY 6 HOURS PRN
Qty: 40 TABLET | Refills: 0 | Status: SHIPPED | OUTPATIENT
Start: 2025-05-28 | End: 2025-06-12

## 2025-05-28 RX ORDER — DOCUSATE SODIUM 100 MG/1
100 CAPSULE, LIQUID FILLED ORAL 2 TIMES DAILY
Qty: 30 CAPSULE | Refills: 0 | Status: SHIPPED | OUTPATIENT
Start: 2025-05-28 | End: 2025-06-12

## 2025-05-28 RX ORDER — IBUPROFEN 600 MG/1
600 TABLET, FILM COATED ORAL EVERY 6 HOURS PRN
Qty: 20 TABLET | Refills: 0 | Status: SHIPPED | OUTPATIENT
Start: 2025-05-28 | End: 2025-06-02

## 2025-05-28 RX ORDER — TRAMADOL HYDROCHLORIDE 50 MG/1
50 TABLET, FILM COATED ORAL EVERY 6 HOURS PRN
Status: DISCONTINUED | OUTPATIENT
Start: 2025-05-28 | End: 2025-05-28 | Stop reason: HOSPADM

## 2025-05-28 RX ORDER — ACETAMINOPHEN 500 MG
1000 TABLET ORAL EVERY 6 HOURS PRN
Qty: 120 TABLET | Refills: 0 | Status: SHIPPED | OUTPATIENT
Start: 2025-05-28 | End: 2025-06-12

## 2025-05-28 RX ORDER — ENOXAPARIN SODIUM 100 MG/ML
40 INJECTION SUBCUTANEOUS DAILY
Qty: 28 EACH | Refills: 0 | Status: SHIPPED | OUTPATIENT
Start: 2025-05-28 | End: 2025-06-25

## 2025-05-28 RX ADMIN — PAROXETINE HYDROCHLORIDE 30 MG: 20 TABLET, FILM COATED ORAL at 06:06

## 2025-05-28 RX ADMIN — OXYCODONE HYDROCHLORIDE 10 MG: 5 TABLET ORAL at 00:57

## 2025-05-28 RX ADMIN — ANASTROZOLE 1 MG: 1 TABLET, COATED ORAL at 09:58

## 2025-05-28 RX ADMIN — DOCUSATE SODIUM 100 MG: 100 CAPSULE, LIQUID FILLED ORAL at 09:58

## 2025-05-28 RX ADMIN — FAMOTIDINE 20 MG: 20 TABLET, FILM COATED ORAL at 09:58

## 2025-05-28 RX ADMIN — AMLODIPINE BESYLATE 10 MG: 10 TABLET ORAL at 09:58

## 2025-05-28 RX ADMIN — ENOXAPARIN SODIUM 40 MG: 40 INJECTION SUBCUTANEOUS at 09:57

## 2025-05-28 RX ADMIN — CEFAZOLIN SODIUM 2 G: 2 INJECTION, SOLUTION INTRAVENOUS at 02:55

## 2025-05-28 RX ADMIN — ACETAMINOPHEN 1000 MG: 500 TABLET ORAL at 09:58

## 2025-05-28 RX ADMIN — MULTIVITAMIN TABLET 1 TABLET: TABLET at 09:58

## 2025-05-28 RX ADMIN — IPRATROPIUM BROMIDE AND ALBUTEROL SULFATE 3 ML: 2.5; .5 SOLUTION RESPIRATORY (INHALATION) at 07:29

## 2025-05-28 RX ADMIN — TRAMADOL HYDROCHLORIDE 100 MG: 50 TABLET, COATED ORAL at 11:40

## 2025-05-28 RX ADMIN — POLYETHYLENE GLYCOL 3350 17 G: 17 POWDER, FOR SOLUTION ORAL at 09:57

## 2025-05-28 RX ADMIN — BUPROPION HYDROCHLORIDE 300 MG: 300 TABLET, EXTENDED RELEASE ORAL at 09:58

## 2025-05-28 RX ADMIN — OXYCODONE HYDROCHLORIDE 10 MG: 5 TABLET ORAL at 07:15

## 2025-05-28 SDOH — ECONOMIC STABILITY: INCOME INSECURITY: IN THE PAST 12 MONTHS HAS THE ELECTRIC, GAS, OIL, OR WATER COMPANY THREATENED TO SHUT OFF SERVICES IN YOUR HOME?: NO

## 2025-05-28 SDOH — ECONOMIC STABILITY: HOUSING INSECURITY: IN THE LAST 12 MONTHS, WAS THERE A TIME WHEN YOU WERE NOT ABLE TO PAY THE MORTGAGE OR RENT ON TIME?: NO

## 2025-05-28 SDOH — ECONOMIC STABILITY: HOUSING INSECURITY: AT ANY TIME IN THE PAST 12 MONTHS, WERE YOU HOMELESS OR LIVING IN A SHELTER (INCLUDING NOW)?: NO

## 2025-05-28 SDOH — ECONOMIC STABILITY: FOOD INSECURITY: WITHIN THE PAST 12 MONTHS, THE FOOD YOU BOUGHT JUST DIDN'T LAST AND YOU DIDN'T HAVE MONEY TO GET MORE.: NEVER TRUE

## 2025-05-28 SDOH — SOCIAL STABILITY: SOCIAL INSECURITY: WITHIN THE LAST YEAR, HAVE YOU BEEN AFRAID OF YOUR PARTNER OR EX-PARTNER?: NO

## 2025-05-28 SDOH — ECONOMIC STABILITY: FOOD INSECURITY: WITHIN THE PAST 12 MONTHS, YOU WORRIED THAT YOUR FOOD WOULD RUN OUT BEFORE YOU GOT THE MONEY TO BUY MORE.: NEVER TRUE

## 2025-05-28 SDOH — SOCIAL STABILITY: SOCIAL INSECURITY: WITHIN THE LAST YEAR, HAVE YOU BEEN HUMILIATED OR EMOTIONALLY ABUSED IN OTHER WAYS BY YOUR PARTNER OR EX-PARTNER?: NO

## 2025-05-28 SDOH — ECONOMIC STABILITY: FOOD INSECURITY: HOW HARD IS IT FOR YOU TO PAY FOR THE VERY BASICS LIKE FOOD, HOUSING, MEDICAL CARE, AND HEATING?: NOT HARD AT ALL

## 2025-05-28 SDOH — ECONOMIC STABILITY: TRANSPORTATION INSECURITY: IN THE PAST 12 MONTHS, HAS LACK OF TRANSPORTATION KEPT YOU FROM MEDICAL APPOINTMENTS OR FROM GETTING MEDICATIONS?: NO

## 2025-05-28 SDOH — ECONOMIC STABILITY: HOUSING INSECURITY: IN THE PAST 12 MONTHS, HOW MANY TIMES HAVE YOU MOVED WHERE YOU WERE LIVING?: 0

## 2025-05-28 ASSESSMENT — COGNITIVE AND FUNCTIONAL STATUS - GENERAL
TOILETING: A LITTLE
MOVING TO AND FROM BED TO CHAIR: A LITTLE
TURNING FROM BACK TO SIDE WHILE IN FLAT BAD: A LITTLE
HELP NEEDED FOR BATHING: A LITTLE
MOVING TO AND FROM BED TO CHAIR: A LITTLE
DRESSING REGULAR LOWER BODY CLOTHING: A LITTLE
PERSONAL GROOMING: A LITTLE
CLIMB 3 TO 5 STEPS WITH RAILING: A LITTLE
DRESSING REGULAR LOWER BODY CLOTHING: A LITTLE
STANDING UP FROM CHAIR USING ARMS: A LITTLE
TURNING FROM BACK TO SIDE WHILE IN FLAT BAD: A LITTLE
MOBILITY SCORE: 18
HELP NEEDED FOR BATHING: A LITTLE
EATING MEALS: A LITTLE
WALKING IN HOSPITAL ROOM: A LITTLE
STANDING UP FROM CHAIR USING ARMS: A LITTLE
CLIMB 3 TO 5 STEPS WITH RAILING: A LOT
WALKING IN HOSPITAL ROOM: A LITTLE
TOILETING: A LITTLE
MOVING FROM LYING ON BACK TO SITTING ON SIDE OF FLAT BED WITH BEDRAILS: A LITTLE
DRESSING REGULAR UPPER BODY CLOTHING: A LITTLE
DAILY ACTIVITIY SCORE: 18
MOBILITY SCORE: 18
DAILY ACTIVITIY SCORE: 21

## 2025-05-28 ASSESSMENT — PAIN DESCRIPTION - ORIENTATION
ORIENTATION: RIGHT

## 2025-05-28 ASSESSMENT — PAIN - FUNCTIONAL ASSESSMENT
PAIN_FUNCTIONAL_ASSESSMENT: 0-10
PAIN_FUNCTIONAL_ASSESSMENT: UNABLE TO SELF-REPORT
PAIN_FUNCTIONAL_ASSESSMENT: 0-10
PAIN_FUNCTIONAL_ASSESSMENT: 0-10

## 2025-05-28 ASSESSMENT — PAIN SCALES - GENERAL
PAINLEVEL_OUTOF10: 8
PAINLEVEL_OUTOF10: 7
PAINLEVEL_OUTOF10: 4
PAINLEVEL_OUTOF10: 8

## 2025-05-28 ASSESSMENT — ACTIVITIES OF DAILY LIVING (ADL)
LACK_OF_TRANSPORTATION: NO
ADL_ASSISTANCE: INDEPENDENT
HOME_MANAGEMENT_TIME_ENTRY: 20
BATHING_ASSISTANCE: MINIMAL
LACK_OF_TRANSPORTATION: NO

## 2025-05-28 ASSESSMENT — PAIN DESCRIPTION - DESCRIPTORS
DESCRIPTORS: ACHING;SORE;DISCOMFORT
DESCRIPTORS: ACHING;SORE

## 2025-05-28 ASSESSMENT — PAIN DESCRIPTION - LOCATION
LOCATION: LEG
LOCATION: KNEE
LOCATION: KNEE

## 2025-05-28 NOTE — PROGRESS NOTES
Tatyana Mckeon is a 70 y.o. female on day 0 of admission presenting with Arthritis of right knee.    Subjective   Doing ok. Thinks oxy is too strong, wants tramadol. Pain controlled. Denies cp/sob. Denies dizziness. PT ok.       Objective     Physical Exam  A/ox3  RLE: drain out. Dressing dry. Compartments soft. Dnvi. Active pf/df.  Last Recorded Vitals  Blood pressure 116/58, pulse 79, temperature 36.5 °C (97.7 °F), temperature source Temporal, resp. rate 16, height 1.524 m (5'), weight 93 kg (205 lb), SpO2 98%.      Relevant Results  Reviewed post-op xray. Stable TKA    Results for orders placed or performed during the hospital encounter of 05/27/25 (from the past 24 hours)   POCT GLUCOSE   Result Value Ref Range    POCT Glucose 126 (H) 74 - 99 mg/dL   POCT GLUCOSE   Result Value Ref Range    POCT Glucose 218 (H) 74 - 99 mg/dL   POCT GLUCOSE   Result Value Ref Range    POCT Glucose 205 (H) 74 - 99 mg/dL   CBC   Result Value Ref Range    WBC 12.4 (H) 4.4 - 11.3 x10*3/uL    nRBC 0.0 0.0 - 0.0 /100 WBCs    RBC 3.47 (L) 4.00 - 5.20 x10*6/uL    Hemoglobin 10.7 (L) 12.0 - 16.0 g/dL    Hematocrit 32.9 (L) 36.0 - 46.0 %    MCV 95 80 - 100 fL    MCH 30.8 26.0 - 34.0 pg    MCHC 32.5 32.0 - 36.0 g/dL    RDW 12.7 11.5 - 14.5 %    Platelets 218 150 - 450 x10*3/uL   Basic metabolic panel   Result Value Ref Range    Glucose 155 (H) 74 - 99 mg/dL    Sodium 136 136 - 145 mmol/L    Potassium 4.3 3.5 - 5.3 mmol/L    Chloride 104 98 - 107 mmol/L    Bicarbonate 22 21 - 32 mmol/L    Anion Gap 14 10 - 20 mmol/L    Urea Nitrogen 21 6 - 23 mg/dL    Creatinine 0.71 0.50 - 1.05 mg/dL    eGFR 90 >60 mL/min/1.73m*2    Calcium 8.7 8.6 - 10.3 mg/dL   POCT GLUCOSE   Result Value Ref Range    POCT Glucose 135 (H) 74 - 99 mg/dL       Assessment/Plan   Assessment & Plan  Arthritis of right knee      Problem List:  RT TKA: postop day 1. Mobilzie PT/OT, wbat. Plan [facility]  Dvt risk: lovenox daily  Anemia du to acute blood loss.  Monitor        Kash Juares MD

## 2025-05-28 NOTE — PROGRESS NOTES
Occupational Therapy    Evaluation/Treatment    Patient Name: Tatyana Mckeon  MRN: [id number]  Department: [address]  Room: [address] [place] [address]  Today's Date: 5/28/2025  Time Calculation  Start Time: 0840  Stop Time: 0943  Time Calculation (min): 63 min    Assessment  IP OT Assessment  OT Assessment: Pt is 71 y/o female admitted for elective Rt TKA. Pt presents w/ decreased ADL skills/ functional mobility, decreased activity tolerance, decreased safety awawrenss/ judgement and surgical limitations. recommend OT services to increase independence in dailyt aska and functional mobility.  Prognosis: Good  Barriers to Discharge Home: No anticipated barriers  Evaluation/Treatment Tolerance: Patient tolerated treatment well  Medical Staff Made Aware: Yes  End of Session Communication: Bedside nurse  End of Session Patient Position: Up in chair, Alarm on  Plan:  Treatment Interventions: ADL retraining, Functional transfer training, Endurance training, Patient/family training, Equipment evaluation/education, Compensatory technique education  OT Frequency: 4 times per week  OT Discharge Recommendations: Low intensity level of continued care  Equipment Recommended upon Discharge: Wheeled walker  OT Recommended Transfer Status: Assist of 1, Assistive equipment (Comment)  OT - OK to Discharge: Yes    Subjective   Current Problem:  1. Arthritis of right knee  Walker rolling    docusate sodium (Colace) 100 mg capsule    acetaminophen (Tylenol) 500 mg tablet    ibuprofen 600 mg tablet    enoxaparin (Lovenox) 40 mg/0.4 mL syringe    traMADol (Ultram) 50 mg tablet    Referral to Home Health        OT Visit Info:  OT Received On: 05/28/25  General Visit Info:  General  Reason for Referral: recent surgery s/p Rt TKA  Referred By: Dr Juares  Past Medical History Relevant to Rehab: OA, Rt breast cancer s/p lumpectomy and radiation, Rt breast lymphedema, anxiety, depression, bipolar d/o, DM, endometrial cancer, HTN, psoriasis, CAD, tobacco  abuse  Family/Caregiver Present: No  Prior to Session Communication: Bedside nurse  Patient Position Received: Up in chair, Alarm on  Preferred Learning Style: verbal, visual  General Comment: Cleared to see for eval, pt agreeable to therapy  Precautions:  Hearing/Visual Limitations: reading glasses  LE Weight Bearing Status: Weight Bearing as Tolerated  Medical Precautions: Fall precautions  Post-Surgical Precautions: Right total knee precautions  Precautions Comment: Educated pt in Rt TKA precautions, issued handout           Pain:  Pain Assessment  Pain Assessment: 0-10  0-10 (Numeric) Pain Score: 8  Pain Type: Surgical pain  Pain Location: Knee  Pain Orientation: Right  Pain Interventions: Repositioned  Response to Interventions: No change in pain    Objective   Cognition:  Overall Cognitive Status: Within Functional Limits  Orientation Level: Oriented X4  Following Commands: Follows multistep commands with increased time (and repetition)  Cognition Comments: Pt pleasant and cooperative, distractable at times  Processing Speed: Delayed           Home Living:  Type of Home: House  Lives With: Alone  Home Adaptive Equipment: Walker rolling or standard, Quad cane, Cane, Reacher, Long-handled shoehorn (wooden cane, not for pt's height.)  Home Layout: Two level, Able to live on main level with bedroom/bathroom (doesn't need to use basement)  Home Access: Ramped entrance  Bathroom Shower/Tub: Walk-in shower  Bathroom Toilet: Handicapped height  Bathroom Equipment: Grab bars in shower, Shower chair with back   Prior Function:  Level of [place]: Independent with ADLs and functional transfers, Independent with homemaking with ambulation  Receives Help From: Family, Friends (Pt plans to have niece and friends stay w/ her post op)  ADL Assistance: Independent  Homemaking Assistance: Independent  Ambulatory Assistance: Independent (no Ad in house,uses RW when outside)  Prior Function Comments: Pt drives  IADL  History:  IADL Comments: Pt does own cooking, cleaning  ADL:  Eating Assistance: Stand by  Eating Deficit: Setup (for breakfast)  Grooming Assistance: Stand by  Grooming Deficit: Supervision/safety (to wash hands at sink)  Bathing Assistance: Minimal  Bathing Deficit: Steadying, Supervision/safety, Increased time to complete , Perineal area, Buttocks, Right lower leg including foot, Left lower leg including foot (per clinical judgement)  UE Dressing Assistance: Stand by  UE Dressing Deficit: Setup (to doff gown, don bra and shirt)  LE Dressing Assistance: Minimal  LE Dressing Deficit: Steadying, Supervision/safety, Increased time to complete (to don pants, socks and shoes w/ AE for socks)  Toileting Assistance with Device: Stand by  Toileting Deficit: Supervison/safety, Clothing management up, Clothing management down  Functional Assistance: Stand by  Functional Deficit: Supervision/safety, Toilet transfer (verbal cues for hand placement)  ADL Comments: Pt educated in\ demon. use of AE for ease in LB dressing, issued gift shop list  Activity Tolerance:  Endurance: Decreased tolerance for upright activites  Activity Tolerance Comments: fair  Bed Mobility/Transfers: Transfers  Transfer: Yes  Transfers 2  Transfer From 2: Stand to  Transfer to 2: Sit, Toilet  Technique 2: Stand to sit, Sit to stand  Transfer Device 2: Walker  Transfer Level of Assistance 2: Contact guard  Trials/Comments 2: Verbal cuesa for hand placement and placing Rt leg froward  Transfers 3  Transfer From 3: Stand to  Transfer to 3: Sit, Chair with arms  Technique 3: Stand to sit, Sit to stand  Transfer Device 3: Walker  Transfer Level of Assistance 3: Contact guard  Trials/Comments 3: To/ from chair w/ verbal cues for hand placement and placing Rt leg forward.  Transfers 4  Trials/Comments 4: Pt educated in car transfer tech, issued handout  Transfers 5  Trials/Comments 5: Pt educated in safe shower transfer tech.      Functional  Mobility:  Functional Mobility  Functional Mobility Performed: Yes  Functional Mobility 1  Surface 1: Level tile  Device 1: Rolling walker  Assistance 1: Contact guard  Comments 1: Pt ambulated from bathroom to chair w/ CGA and FWW. Verbal cues for tech, walkeer safety  Modalities:     IADL's:   IADL Comments: Pt does own cooking, cleaning  Vision: Vision - Basic Assessment  Current Vision: Wears glasses only for reading  Sensation:  Sensation Comment: Pt reports numbness/ tingling in first 3 fingers of Lt hand  Strength:  Strength Comments: BUE's 4/5  Perception:     Coordination:  Movements are Fluid and Coordinated: Yes   Hand Function:  Hand Function  Gross Grasp: Functional  Coordination: Functional  Extremities: RUE   RUE : Within Functional Limits and LUE   LUE: Within Functional Limits    Treatment: Pt educated in purpose of OT, POC. Pt educate instructed in use of AE for eae in LB dressing,. Pt educated / instructed in safe transfr tech for car, shower. Pt educated in knee prec. Pt issued handout for car transfer, Gift shop list and knee prec.    Outcome Measures: [facility] Daily Activity  Putting on and taking off regular lower body clothing: A little  Bathing (including washing, rinsing, drying): A little  Putting on and taking off regular upper body clothing: A little  Toileting, which includes using toilet, bedpan or urinal: A little  Taking care of personal grooming such as brushing teeth: A little  Eating Meals: A little  Daily Activity - Total Score: 18      Education Documentation  Precautions, taught by Inna Eaton OT at 5/28/2025 10:55 AM.  Learner: Patient  Readiness: Acceptance  Method: Explanation  Response: Demonstrated Understanding, Needs Reinforcement  Comment: Pt educated in purpose of oT, POC. Pt educated / instructed in use of Ae for ease in LB dressing. Pt educated in knee precautiions. Pt educated /instructed in safe transfer tech. issued handouts for car trransfer, sift sahop l sit  and knee precuations.    ADL Training, taught by Inna Eaton OT at 5/28/2025 10:55 AM.  Learner: Patient  Readiness: Acceptance  Method: Explanation  Response: Demonstrated Understanding, Needs Reinforcement  Comment: Pt educated in purpose of oT, POC. Pt educated / instructed in use of Ae for ease in LB dressing. Pt educated in knee precautiions. Pt educated /instructed in safe transfer tech. issued handouts for car angelo, fuad pardo l sit and knee precuations.    Education Comments  No comments found.      Goals:   Encounter Problems       Encounter Problems (Active)       OT Goals       ADL's (Progressing)       Start:  05/28/25    Expected End:  06/04/25       Patient will complete ADL tasks, with modified independent using AE need in order to increase patient's safety and independence with self-care tasks.           Functional transfers (Progressing)       Start:  05/28/25    Expected End:  06/04/25       Patient will complete functional transfers with modified independent using least restrictive device, in order to increase patient's safety and independence with daily tasks.           Precautions (Progressing)       Start:  05/28/25    Expected End:  06/04/25       Patient will demonstrate the ability to recall and adhere to knee precautions 100% of the time in order to increase patient's safety and independence with daily tasks.

## 2025-05-28 NOTE — PROGRESS NOTES
Physical Therapy    Physical Therapy Treatment    Patient Name: aTtyana Mckeon  MRN: [id number]  Department: [address]  Room: [address] [place] [address]  Today's Date: 5/28/2025  Time Calculation  Start Time: 0805  Stop Time: 0855  Time Calculation (min): 50 min         Assessment/Plan   PT Assessment  PT Assessment Results: Decreased strength, Decreased range of motion, Decreased endurance, Impaired balance, Decreased mobility, Decreased coordination, Decreased safety awareness, Decreased skin integrity, Orthopedic restrictions, Pain  Rehab Prognosis: Good  Barriers to Discharge Home: No anticipated barriers  Evaluation/Treatment Tolerance: Patient limited by pain  Medical Staff Made Aware: Yes  Strengths: Premorbid level of function  Barriers to Participation: Comorbidities  End of Session Communication: Bedside nurse  Assessment Comment: Patient motivated due to wanting to go home this am. Increased gait/ transfers with contact guard assist Cues to extend R LE with sitting Gait cues to stay in frame of RW for upright posture. Managing well Has her own RW, Written HEP sheet given  End of Session Patient Position: Up in chair, Alarm on (OT present)     PT Plan  Treatment/Interventions: Bed mobility, Transfer training, Gait training, Balance training, Neuromuscular re-education, Strengthening, Endurance training, Range of motion, Therapeutic exercise, Therapeutic activity, Home exercise program  PT Plan: Ongoing PT  PT Frequency: BID  PT Discharge Recommendations: Low intensity level of continued care  Equipment Recommended upon Discharge: Wheeled walker  PT Recommended Transfer Status: Contact guard, Assistive device  PT - OK to Discharge: Yes    PT Visit Info:  PT Received On: 05/28/25     General Visit Information:   General  Reason for Referral: recent surgery; s/p Rt TKA  Referred By: Dr Juares  Past Medical History Relevant to Rehab: OA, Rt breast cancer s/p lumpectomy and radiation, Rt breast lymphedema, anxiety, depression, bipolar  d/o, DM, endometrial cancer, HTN, psoriasis, CAD, tobacco abuse  Family/Caregiver Present: No  Prior to Session Communication: Bedside nurse  Patient Position Received: Bed, 3 rail up, Alarm on  Preferred Learning Style: visual, verbal  General Comment: Cleared by nurse to see. Patient agreeable to therapy    Subjective   Precautions:  Precautions  Hearing/Visual Limitations: reading glasses  LE Weight Bearing Status: Weight Bearing as Tolerated  Medical Precautions: Fall precautions  Post-Surgical Precautions: Right total knee precautions  Precautions Comment: Reviewed R knee precautions with patient            Objective   Pain:  Pain Assessment  Pain Assessment: 0-10  0-10 (Numeric) Pain Score: 8  Pain Type: Surgical pain  Pain Location: Knee  Pain Orientation: Right  Pain Descriptors: Aching, Sore  Pain Frequency:  (with mobility)  Pain Onset: Ongoing  Pain Interventions: Repositioned  Cognition:  Cognition  Overall Cognitive Status: Within Functional Limits  Orientation Level: Oriented X4  Following Commands: Follows multistep commands with increased time  Cognition Comments: Pleasant and cooperative  Processing Speed: Delayed  Coordination:     Postural Control:     Extremity/Trunk Assessments:    Activity Tolerance:  Activity Tolerance  Endurance: Decreased tolerance for upright activites  Activity Tolerance Comments: Fair  Treatments:  Therapeutic Exercise  Therapeutic Exercise Performed: Yes  Therapeutic Exercise Activity 1: B LE supine ther ex: ankle pumps,quad/gluteal sets, heelslides, SAQs, hip abduction/adduction, SLRs x 15    Bed Mobility  Bed Mobility: Yes  Bed Mobility 1  Bed Mobility 1: Supine to sitting  Level of Assistance 1: Contact guard  Bed Mobility Comments 1: Patient able to move R LE to EOB and over EOB with slight assist    Ambulation/Gait Training  Ambulation/Gait Training Performed: Yes  Ambulation/Gait Training 1  Surface 1: Level tile  Device 1: Rolling walker  Assistance 1: Contact  guard  Quality of Gait 1: Diminished heel strike  Comments/Distance (ft) 1: Patient able to walk15' x 2  into/ out of bathroom with RW with contact guard assist with slow step to gait pattern  Ambulation/Gait Training 2  Surface 2: Level tile  Device 2: Rolling walker  Assistance 2: Contact guard  Quality of Gait 2: Diminished heel strike  Comments/Distance (ft) 2: Patient able to walk 80' x 2 with RW with slow step to gait pattern with contact guard assist with cues to stay in frame of RW for upright posture.Seated rest between walks. Manging well.  Transfers  Transfer: Yes  Transfer 1  Transfer From 1: Bed to  Transfer to 1: Stand  Technique 1: Sit to stand  Transfer Device 1: Walker  Transfer Level of Assistance 1: Contact guard  Trials/Comments 1: STS from EOB x 2 trials with contact guard assist with cues for safe hand placement  Transfers 2  Transfer From 2: Stand to  Transfer to 2: Sit, Toilet  Technique 2: Stand to sit, Sit to stand  Transfer Device 2: Walker  Transfer Level of Assistance 2: Contact guard  Trials/Comments 2: STS to/from toilet with contact guard assist with cuies for safe hand placement. Cues to slightly extend R LE with sitting  Transfers 3  Transfer From 3: Stand to  Transfer to 3: Sit, Chair with arms  Technique 3: Stand to sit, Sit to stand  Transfer Device 3: Walker  Transfer Level of Assistance 3: Contact guard  Trials/Comments 3: STS to/ from bedside chair with contact guard assist with cues for safe hand placement Cues to slighlty extend R LE with sitting    Outcome Measures:  [facility] Basic Mobility  Turning from your back to your side while in a flat bed without using bedrails: A little  Moving from lying on your back to sitting on the side of a flat bed without using bedrails: A little  Moving to and from bed to chair (including a wheelchair): A little  Standing up from a chair using your arms (e.g. wheelchair or bedside chair): A little  To walk in hospital room: A little  Climbing  3-5 steps with railing: A little  Basic Mobility - Total Score: 18    Education Documentation  Handouts, taught by Margaret Olmstead PTA at 5/28/2025  9:51 AM.  Learner: Patient  Readiness: Acceptance  Method: Explanation  Response: Verbalizes Understanding  Comment: Cues for safe hand placement. Cues to slighlty extend R LE with sittingt    Precautions, taught by Margaret Olmstead PTA at 5/28/2025  9:51 AM.  Learner: Patient  Readiness: Acceptance  Method: Explanation  Response: Verbalizes Understanding  Comment: Cues for safe hand placement. Cues to slighlty extend R LE with sittingt    Body Mechanics, taught by Margaret Olmstead PTA at 5/28/2025  9:51 AM.  Learner: Patient  Readiness: Acceptance  Method: Explanation  Response: Verbalizes Understanding  Comment: Cues for safe hand placement. Cues to slighlty extend R LE with sittingt    Home Exercise Program, taught by Margaret Olmstead PTA at 5/28/2025  9:51 AM.  Learner: Patient  Readiness: Acceptance  Method: Explanation  Response: Verbalizes Understanding  Comment: Cues for safe hand placement. Cues to slighlty extend R LE with sittingt    Mobility Training, taught by Margaret Omlstead PTA at 5/28/2025  9:51 AM.  Learner: Patient  Readiness: Acceptance  Method: Explanation  Response: Verbalizes Understanding  Comment: Cues for safe hand placement. Cues to slighlty extend R LE with sittingt    Education Comments  No comments found.        OP EDUCATION:       Encounter Problems       Encounter Problems (Active)       Mobility       STG - Patient will ambulate 75' w/ 2WW and distant supervision  (Progressing)       Start:  05/27/25    Expected End:  05/28/25            STG - Patient will ambulate up and down a curb/step w/ RW and min assist (Progressing)       Start:  05/27/25    Expected End:  05/28/25            Pt will demonstrate good understanding and performance of post op TKA HEP (Progressing)       Start:  05/27/25    Expected End:  05/28/25                PT Transfers       STG - Patient to transfer to and from sit to supine IND (Progressing)       Start:  05/27/25    Expected End:  05/28/25            STG - Patient will transfer sit to and from stand w/ distant supervision and safe technique  (Progressing)       Start:  05/27/25    Expected End:  05/28/25               Safety       LTG - Patient will adhere to post op TKA precautions during functional mobility (Progressing)       Start:  05/27/25    Expected End:  05/28/25

## 2025-05-28 NOTE — CARE PLAN
Problem: Fall/Injury  Goal: Not fall by end of shift  Outcome: Progressing  Goal: Be free from injury by end of the shift  Outcome: Progressing  Goal: Verbalize understanding of personal risk factors for fall in the hospital  Outcome: Progressing  Goal: Use assistive devices by end of the shift  Outcome: Progressing  Goal: Pace activities to prevent fatigue by end of the shift  Outcome: Progressing     Problem: Pain  Goal: Takes deep breaths with improved pain control throughout the shift  Outcome: Progressing  Goal: Turns in bed with improved pain control throughout the shift  Outcome: Progressing  Goal: Walks with improved pain control throughout the shift  Outcome: Progressing   The patient's goals for the shift include pain control and comfort    The clinical goals for the shift include Pain control

## 2025-05-28 NOTE — PROGRESS NOTES
Spiritual Care Visit  Spiritual Care Request    Reason for Visit:  Routine Visit: Introduction     Request Received From:       Focus of Care:  Visited With: Patient         Refer to :          Spiritual Care Assessment    Spiritual Assessment:                      Care Provided:       Sense of Community and or Bahai Affiliation:  Yarsanism   Values/Beliefs  Spiritual Requests During Hospitalization: I had a visit with Tatyana today. She did not want any sacraments from the Zoroastrianism.     Addressed Needs/Concerns and/or Saray Through:     Sacramental Encounters  Communion: Does not want communion  Communion Given Indicator: No  Sacrament of Sick-Anointing: Patient declined anointing    Outcome:        Advance Directives:         Spiritual Care Annotation    Annotation:  I had a visit with Tatyana today. She did not want any sacraments from the Shinto.  Luis Enrique Melendez

## 2025-05-28 NOTE — CARE PLAN
The patient's goals for the shift include pain control and comfort    The clinical goals for the shift include pain management, comfort, maintain safety, encourage OOB to chair, work with PT/OT, knee precautions, assist with safe discharge plan, and monitor labs/VS.    Problem: Safety - Adult  Goal: Free from fall injury  Outcome: Progressing     Problem: Discharge Planning  Goal: Discharge to home or other facility with appropriate resources  Outcome: Progressing     Problem: Chronic Conditions and Co-morbidities  Goal: Patient's chronic conditions and co-morbidity symptoms are monitored and maintained or improved  Outcome: Progressing     Problem: Nutrition  Goal: Nutrient intake appropriate for maintaining nutritional needs  Outcome: Progressing     Problem: Deep Vein Thrombosis  Goal: I will remain free from complications of deep vein thrombosis and maintain current level of mobility  Outcome: Progressing     Problem: Fall/Injury  Goal: Not fall by end of shift  Outcome: Progressing  Goal: Be free from injury by end of the shift  Outcome: Progressing  Goal: Verbalize understanding of personal risk factors for fall in the hospital  Outcome: Progressing  Goal: Verbalize understanding of risk factor reduction measures to prevent injury from fall in the home  Outcome: Progressing  Goal: Use assistive devices by end of the shift  Outcome: Progressing  Goal: Pace activities to prevent fatigue by end of the shift  Outcome: Progressing     Problem: Pain  Goal: Takes deep breaths with improved pain control throughout the shift  Outcome: Progressing  Goal: Turns in bed with improved pain control throughout the shift  Outcome: Progressing  Goal: Walks with improved pain control throughout the shift  Outcome: Progressing  Goal: Performs ADL's with improved pain control throughout shift  Outcome: Progressing  Goal: Participates in PT with improved pain control throughout the shift  Outcome: Progressing  Goal: Free from  opioid side effects throughout the shift  Outcome: Progressing  Goal: Free from acute confusion related to pain meds throughout the shift  Outcome: Progressing     Problem: Respiratory  Goal: Clear secretions with interventions this shift  Outcome: Progressing  Goal: Minimize anxiety/maximize coping throughout shift  Outcome: Progressing  Goal: Minimal/no exertional discomfort or dyspnea this shift  Outcome: Progressing  Goal: No signs of respiratory distress (eg. Use of accessory muscles. Peds grunting)  Outcome: Progressing  Goal: Wean oxygen to maintain O2 saturation per order/standard this shift  Outcome: Progressing

## 2025-05-28 NOTE — DISCHARGE SUMMARY
Discharge Diagnosis  Arthritis of right knee    Issues Requiring Follow-Up  Right TKA    Test Results Pending At Discharge  Pending Labs       No current pending labs.            Hospital Course   Brought to OR day of admission right tka. Uncomplicated surgery. Plan for home with [facility].    Pertinent Physical Exam At Time of Discharge  Physical Exam    Home Medications     Medication List      START taking these medications     enoxaparin 40 mg/0.4 mL syringe; Commonly known as: Lovenox; Inject 0.4   mL (40 mg) under the skin once daily for 28 days.   traMADol 50 mg tablet; Commonly known as: Ultram; Take 1 tablet (50 mg)   by mouth every 6 hours if needed for severe pain (7 - 10) for up to 15   days.     CHANGE how you take these medications     * acetaminophen 500 mg tablet; Commonly known as: Tylenol; What changed:   Another medication with the same name was added. Make sure you understand   how and when to take each.   * acetaminophen 500 mg tablet; Commonly known as: Tylenol; Take 2   tablets (1,000 mg) by mouth every 6 hours if needed for mild pain (1 - 3)   for up to 15 days.; What changed: You were already taking a medication   with the same name, and this prescription was added. Make sure you   understand how and when to take each.   * docusate sodium 100 mg capsule; Commonly known as: Colace; What   changed: Another medication with the same name was added. Make sure you   understand how and when to take each.   * docusate sodium 100 mg capsule; Commonly known as: Colace; Take 1   capsule (100 mg) by mouth 2 times a day for 15 days.; What changed: You   were already taking a medication with the same name, and this prescription   was added. Make sure you understand how and when to take each.   * ibuprofen 200 mg tablet; What changed: Another medication with the   same name was added. Make sure you understand how and when to take each.   * ibuprofen 600 mg tablet; Take 1 tablet (600 mg) by mouth every 6 hours    if needed for mild pain (1 - 3) for up to 5 days.; What changed: You were   already taking a medication with the same name, and this prescription was   added. Make sure you understand how and when to take each.   nystatin cream; Commonly known as: Mycostatin; APPLY EXTERNALLY TWICE   DAILY; What changed: See the new instructions.  * This list has 6 medication(s) that are the same as other medications   prescribed for you. Read the directions carefully, and ask your doctor or   other care provider to review them with you.     CONTINUE taking these medications     Accu-Chek Guide test strips; Generic drug: blood sugar diagnostic;   Inject 1 strip under the skin once daily.   albuterol 90 mcg/actuation inhaler; Commonly known as: ProAir HFA;   Inhale 2 puffs every 4 hours if needed for wheezing or shortness of   breath.   amLODIPine 10 mg tablet; Commonly known as: Norvasc; TAKE ONE TABLET BY   MOUTH DAILY   anastrozole 1 mg tablet; Commonly known as: Arimidex; Take 1 tablet (1   mg total) by mouth once daily.  Swallow whole with a drink of water.   aspirin 81 mg EC tablet   atorvastatin 20 mg tablet; Commonly known as: Lipitor; Take 1 tablet (20   mg) by mouth once daily.   blood-glucose meter misc; Inject 1 m under the skin 3 times a day.   Accu-Check Guide   clobetasol 0.05 % cream; Commonly known as: Temovate   famotidine 20 mg tablet; Commonly known as: Pepcid   Fish OiL 1,000 (120-180) mg capsule; Generic drug: omega 3-dha-epa-fish   oil   furosemide 20 mg tablet; Commonly known as: Lasix; TAKE 1-2 TABLETS   (20-40 MG) BY MOUTH 2 TIMES A DAY   inhalational spacing device inhaler; Use as directed with inhalers   ketoconazole 2 % shampoo; Commonly known as: NIZOral   LaMICtal 100 mg tablet; Generic drug: lamoTRIgine   lisinopril 20 mg tablet; TAKE ONE TABLET BY MOUTH DAILY   loratadine 10 mg tablet; Commonly known as: Claritin   metFORMIN 500 mg tablet; Commonly known as: Glucophage; TAKE ONE TABLET   BY MOUTH  THREE TIMES A DAY   multivitamin capsule   Neurontin 400 mg capsule; Generic drug: gabapentin   PARoxetine 30 mg tablet; Commonly known as: Paxil   QUEtiapine 25 mg tablet; Commonly known as: SEROquel   Topamax 25 mg tablet; Generic drug: topiramate   triamcinolone 0.1 % cream; Commonly known as: Kenalog; Apply topically 2   times a day as needed for rash.   Wellbutrin  mg 24 hr tablet; Generic drug: buPROPion XL     ASK your doctor about these medications     hydrocortisone 2.5 % cream       Outpatient Follow-Up  Future Appointments   Date Time Provider Department Center   6/16/2025 10:45 AM Perla Rich, PT TRIMadPT [facility]   6/30/2025 11:00 AM [facility] 2 [facility]   7/1/2025 10:00 AM Osvaldo Wilcox MD [email or web address] [facility]   7/23/2025  9:30 AM Osvaldo Wilcox MD [email or web address] [facility]   7/30/2025 11:00 AM Osvaldo Wilcox MD [email or web address] [facility]   8/4/2025  1:00 PM LUIS Pa [facility]   8/13/2025 11:00 AM Osvaldo Wilcox MD [email or web address] [facility]   8/29/2025 11:00 AM Osvaldo Wilcox MD [email or web address] [facility]   11/12/2025 11:30 AM Carrie Osorio PA-C [id number] [facility]       Kash Juares MD

## 2025-05-28 NOTE — PROGRESS NOTES
05/28/25 1022   Discharge Planning   Living Arrangements Alone   Support Systems Family members;Friends/neighbors   Assistance Needed Patient reports she is independent of all ADLs and IADLs.   Type of Residence Private residence   Who is requesting discharge planning? Provider   Home or Post Acute Services None   Expected Discharge Disposition Home H   Does the patient need discharge transport arranged? No   Financial Resource Strain   How hard is it for you to pay for the very basics like food, housing, medical care, and heating? Not hard   Housing Stability   In the last 12 months, was there a time when you were not able to pay the mortgage or rent on time? N   In the past 12 months, how many times have you moved where you were living? 0   At any time in the past 12 months, were you homeless or living in a shelter (including now)? N   Transportation Needs   In the past 12 months, has lack of transportation kept you from medical appointments or from getting medications? no   In the past 12 months, has lack of transportation kept you from meetings, work, or from getting things needed for daily living? No   Patient Choice   Provider Choice list and CMS website (https://medicare.gov/care-compare#search) for post-acute Quality and Resource Measure Data were provided and reviewed with: Patient   Patient / Family choosing to utilize agency / facility established prior to hospitalization Yes   Stroke Family Assessment   Stroke Family Assessment Needed No   Intensity of Service   Intensity of Service 0-30 min     MSW met with patient at bedside. She reports she lives a luan alone and is typically independent. Patient reports she has friends/family that are going to rotate staying with there during the day and evenings for the first week post-op to assist her. If she needs additional help after one week she will make arrangements for the same. Patient denies home going needs or concerns at this time but is agreeable to   Home Care. Order has already been placed.  Home Care made aware of the same and are processing the referral for SOC.     10:25 AM  SOC processed for 5/29.

## 2025-05-28 NOTE — HH CARE COORDINATION
Home Care received a Referral for Physical Therapy. We have processed the referral for a Start of Care on 5/29/25..     If you have any questions or concerns, please feel free to contact us at [phone]. Follow the prompts, enter your five digit zip code, and you will be directed to your care team on EAST 1.

## 2025-06-02 ENCOUNTER — TELEPHONE (OUTPATIENT)
Dept: INPATIENT UNIT | Facility: HOSPITAL | Age: 70
End: 2025-06-02
Payer: MEDICARE

## 2025-06-02 ENCOUNTER — HOME CARE VISIT (OUTPATIENT)
Dept: HOME HEALTH SERVICES | Facility: HOME HEALTH | Age: 70
End: 2025-06-02
Payer: MEDICARE

## 2025-06-02 VITALS — HEART RATE: 78 BPM | TEMPERATURE: 97.9 F | DIASTOLIC BLOOD PRESSURE: 92 MMHG | SYSTOLIC BLOOD PRESSURE: 118 MMHG

## 2025-06-02 PROCEDURE — 169592 NO-PAY CLAIM PROCEDURE

## 2025-06-02 PROCEDURE — G0151 HHCP-SERV OF PT,EA 15 MIN: HCPCS | Mod: HHH

## 2025-06-02 SDOH — HEALTH STABILITY: PHYSICAL HEALTH: EXERCISE TYPE: LE THER EX

## 2025-06-02 ASSESSMENT — ACTIVITIES OF DAILY LIVING (ADL)
AMBULATION_DISTANCE/DURATION_TOLERATED: 50 FT
CURRENT_FUNCTION: ONE PERSON
ENTERING_EXITING_HOME: NEEDS ASSISTANCE
AMBULATION ASSISTANCE: ONE PERSON
OASIS_M1830: 05
AMBULATION ASSISTANCE: STAND BY ASSIST
PHYSICAL_TRANSFERS_DEVICES: FWW
CURRENT_FUNCTION: STAND BY ASSIST
PHYSICAL TRANSFERS ASSESSED: 1
AMBULATION ASSISTANCE ON FLAT SURFACES: 1
AMBULATION ASSISTANCE: 1
CURRENT_FUNCTION: CONTACT GUARD ASSIST
AMBULATION ASSISTANCE: CONTACT GUARD ASSIST

## 2025-06-02 ASSESSMENT — ENCOUNTER SYMPTOMS
PAIN LOCATION: RIGHT KNEE
PAIN: 1
MUSCLE WEAKNESS: 1
HIGHEST PAIN SEVERITY IN PAST 24 HOURS: 10/10
HYPERTENSION: 1
PAIN LOCATION - PAIN SEVERITY: 8/10
PERSON REPORTING PAIN: PATIENT
LIMITED RANGE OF MOTION: 1

## 2025-06-04 ENCOUNTER — HOME CARE VISIT (OUTPATIENT)
Dept: HOME HEALTH SERVICES | Facility: HOME HEALTH | Age: 70
End: 2025-06-04
Payer: MEDICARE

## 2025-06-04 VITALS — TEMPERATURE: 97.3 F | SYSTOLIC BLOOD PRESSURE: 114 MMHG | HEART RATE: 102 BPM | DIASTOLIC BLOOD PRESSURE: 66 MMHG

## 2025-06-04 PROCEDURE — G0151 HHCP-SERV OF PT,EA 15 MIN: HCPCS | Mod: HHH

## 2025-06-04 SDOH — HEALTH STABILITY: PHYSICAL HEALTH: EXERCISE TYPE: LE THER EX

## 2025-06-04 ASSESSMENT — ACTIVITIES OF DAILY LIVING (ADL)
AMBULATION ASSISTANCE: STAND BY ASSIST
CURRENT_FUNCTION: CONTACT GUARD ASSIST
CURRENT_FUNCTION: STAND BY ASSIST
CURRENT_FUNCTION: ONE PERSON
AMBULATION ASSISTANCE: 1
AMBULATION ASSISTANCE: ONE PERSON
PHYSICAL_TRANSFERS_DEVICES: FWW
AMBULATION ASSISTANCE ON FLAT SURFACES: 1
AMBULATION ASSISTANCE: CONTACT GUARD ASSIST
AMBULATION_DISTANCE/DURATION_TOLERATED: 50 FT
PHYSICAL TRANSFERS ASSESSED: 1

## 2025-06-04 ASSESSMENT — ENCOUNTER SYMPTOMS
MUSCLE WEAKNESS: 1
LIMITED RANGE OF MOTION: 1
PAIN LOCATION: RIGHT KNEE
PAIN: 1
PERSON REPORTING PAIN: PATIENT
HIGHEST PAIN SEVERITY IN PAST 24 HOURS: 10/10
PAIN LOCATION - PAIN SEVERITY: 8/10

## 2025-06-09 ENCOUNTER — HOME CARE VISIT (OUTPATIENT)
Dept: HOME HEALTH SERVICES | Facility: HOME HEALTH | Age: 70
End: 2025-06-09
Payer: MEDICARE

## 2025-06-09 VITALS — TEMPERATURE: 97.8 F | DIASTOLIC BLOOD PRESSURE: 70 MMHG | HEART RATE: 96 BPM | SYSTOLIC BLOOD PRESSURE: 128 MMHG

## 2025-06-09 PROCEDURE — G0151 HHCP-SERV OF PT,EA 15 MIN: HCPCS | Mod: HHH

## 2025-06-09 SDOH — HEALTH STABILITY: PHYSICAL HEALTH: EXERCISE TYPE: LE THER EX

## 2025-06-09 ASSESSMENT — ACTIVITIES OF DAILY LIVING (ADL)
AMBULATION_DISTANCE/DURATION_TOLERATED: 60 FT
CURRENT_FUNCTION: CONTACT GUARD ASSIST
AMBULATION ASSISTANCE: 1
PHYSICAL TRANSFERS ASSESSED: 1
AMBULATION ASSISTANCE ON FLAT SURFACES: 1
PHYSICAL_TRANSFERS_DEVICES: FWW
CURRENT_FUNCTION: STAND BY ASSIST
AMBULATION ASSISTANCE: STAND BY ASSIST
CURRENT_FUNCTION: ONE PERSON

## 2025-06-09 ASSESSMENT — ENCOUNTER SYMPTOMS
LIMITED RANGE OF MOTION: 1
PAIN: 1
PAIN LOCATION: RIGHT KNEE
PERSON REPORTING PAIN: PATIENT
PAIN LOCATION - PAIN SEVERITY: 7/10
MUSCLE WEAKNESS: 1
HIGHEST PAIN SEVERITY IN PAST 24 HOURS: 8/10

## 2025-06-11 ENCOUNTER — HOME CARE VISIT (OUTPATIENT)
Dept: HOME HEALTH SERVICES | Facility: HOME HEALTH | Age: 70
End: 2025-06-11
Payer: MEDICARE

## 2025-06-11 VITALS — HEART RATE: 96 BPM | SYSTOLIC BLOOD PRESSURE: 100 MMHG | TEMPERATURE: 97.3 F | DIASTOLIC BLOOD PRESSURE: 64 MMHG

## 2025-06-11 PROCEDURE — G0151 HHCP-SERV OF PT,EA 15 MIN: HCPCS | Mod: HHH

## 2025-06-11 SDOH — HEALTH STABILITY: PHYSICAL HEALTH: EXERCISE TYPE: LE THER EX

## 2025-06-11 ASSESSMENT — ACTIVITIES OF DAILY LIVING (ADL)
AMBULATION ASSISTANCE ON FLAT SURFACES: 1
PHYSICAL TRANSFERS ASSESSED: 1
AMBULATION_DISTANCE/DURATION_TOLERATED: 100 FT
AMBULATION ASSISTANCE: STAND BY ASSIST
HOME_HEALTH_OASIS: 01
CURRENT_FUNCTION: STAND BY ASSIST
AMBULATION ASSISTANCE: SUPERVISION
AMBULATION ASSISTANCE: 1
PHYSICAL_TRANSFERS_DEVICES: FWW
OASIS_M1830: 02
SHOPPING ASSESSED: 1

## 2025-06-11 ASSESSMENT — ENCOUNTER SYMPTOMS
MUSCLE WEAKNESS: 1
PAIN LOCATION - PAIN SEVERITY: 7/10
PAIN: 1
HIGHEST PAIN SEVERITY IN PAST 24 HOURS: 9/10
PERSON REPORTING PAIN: PATIENT
PAIN LOCATION: RIGHT KNEE
LIMITED RANGE OF MOTION: 1

## 2025-06-12 DIAGNOSIS — B37.31 VAGINAL CANDIDIASIS: ICD-10-CM

## 2025-06-14 RX ORDER — NYSTATIN 100000 U/G
CREAM TOPICAL AS NEEDED
Qty: 60 G | Refills: 3 | Status: SHIPPED | OUTPATIENT
Start: 2025-06-14

## 2025-06-16 ENCOUNTER — EVALUATION (OUTPATIENT)
Dept: PHYSICAL THERAPY | Facility: CLINIC | Age: 70
End: 2025-06-16
Payer: MEDICARE

## 2025-06-16 DIAGNOSIS — Z96.651 TOTAL KNEE REPLACEMENT STATUS, RIGHT: ICD-10-CM

## 2025-06-16 DIAGNOSIS — M25.461 EFFUSION OF RIGHT KNEE: ICD-10-CM

## 2025-06-16 DIAGNOSIS — M25.661 KNEE STIFFNESS, RIGHT: Primary | ICD-10-CM

## 2025-06-16 PROCEDURE — 97140 MANUAL THERAPY 1/> REGIONS: CPT | Mod: GP | Performed by: PHYSICAL THERAPIST

## 2025-06-16 PROCEDURE — 97163 PT EVAL HIGH COMPLEX 45 MIN: CPT | Mod: GP | Performed by: PHYSICAL THERAPIST

## 2025-06-16 NOTE — PROGRESS NOTES
"Physical Therapy Evaluation and Treatment    Patient Name: Tatyana Mckeon  MRN: [id number]  Date: 6/20/2025  Time Calculation  Start Time: 1050  Stop Time: 1135  Time Calculation (min): 45 min       PT Therapeutic Procedures Time Entry  Manual Therapy Time Entry: 10    INSURANCE:  Visit Number: 1  Insurance Type: Payor: MED[facility] MEDICARE / Plan: [employer] [facility] MEDICARE / Product Type: *No Product type* /   Authorization info:  NO AUTH / $35 COPAY / $4600 OOP not met /  [place] /     CMS Re-Certification Period:6/16/25 to 9/13/25    CURRENT PROBLEMS:   1. Knee stiffness, right        2. Total knee replacement status, right  Referral to Physical Therapy    Follow Up In Physical Therapy      3. Effusion of right knee            PRECAUTIONS:  Medical:  CA, UE lymphedema, open wound,     Fall risk:   high without use of walker.        PMH: (pertinent to PT evaluation)  Hypertension, diabetes type 2, obesity, breast cancer with metastases, anxiety, bipolar, right sided low back pain with sciatica, psoriasis, lymphedema.  *See Epic EMR for complete list.    Medical History Form: Reviewed (scanned into chart)      SPECIAL CONCERNS:   Pt also having \"burst blood vessels\" on breast, chest, and torso (shown to PT) Pt will contact doctor.      SUBJECTIVE  The pt underwent and elective  R TKA on 5/27/25.   She needed it 2 years ago but was diagnosed with breast CA and had to have treatment for that. She also has lymph edema.  She went home after one night in the hospital after surgery and received home PT until 6/11/25.  The pt is now walking with a rolling walker .    The pt  is having issues with stiffness, pain,  and swelling    PAIN:    Current  7 /10  RANGE: 6 /10  to  9  /10  Location: lateral, medial and posterior thigh and knee  Description:  dull, achy, tight  Aggravating: last 2 hours of the 6 hours of medication  Reducing: ice       OUTCOME MEASURE  KOOS 12/24    HOME LIVING:  The pt lives in a one " story home with a ramp to enter with a railing  (not to the bottom)  in the garage.    Lives alone     WORK/HOBBIES:  Retired nurse    PRIOR LEVEL OF FUNCTION  Limited with need for TKA - used walker for 2 years    OBJECTIVE:  Lower Extremity     ROM (in degrees)           Right   Knee flex       90   Knee ext        -12   Dorsiflexion            MMT:    (out of 5)           Right   Hip flexion    4   Hip extension    4   Hip abduction     4   Knee flex      4+   Knee ext         4+   Dorsiflexion      4+       SPECIAL TESTS      Knee:    R    Patellar mobility        ( Very limited )              FLEXIBILITY:                        R              Hamstrings (at 90/90)     restricted    Quads     restricted     EDEMA:   R             Infrapatellar  severe      SKIN:   Scar is closed with some scabbing  Open wound on the lateral thigh (thought to be drain site)    PALPATION  Tightness noted in  R knee and surrounding tissue    BED MOBILITY:  Sit to supine:  modified  independent  Supine to sit: modified  independent    TRANSFERS:  Sit to stand: modified  independent   with B UE to push up   Stand to sit: modified  independent  with UE to  control descent    GAIT  Distance: 50 ft observed  Assistive device:  RW  Assistance needed: none  Deviations:  +Antalgic with decreased stance time on the R LE  +Decreased step length on B LE  + decreased heel strike on the R LE - tries to correct  + decreased push off on the R  LE - tries to correct  +slow deejay  + Knee flexion in mid stance of the R LE  + reduced knee flexion in swing on the R LE !!        BALANCE:    Static  good  Dynamic   TBA     TREATMENTS:  Manual Therapy:   STM to R medial knee  E[place]leurage  Tubi  G issued    EDUCATION:  Pt educated in:  + current status, general goals, treatment plan  + pain/spasm/swelling cycle  + edema control and  cold pack use  + safe use of Tubigrip  + realistic expectations of rehab      Home Exercise program:  Per home  care    ASSESSMENT  Pt is a 70 y.o. Female who presents with impairments of R knee  pain, edema, reduced strength, reduced ROM/flexibility, and soft tissue restriction following total knee replacement    These impairments have led to functional limitations including  difficulty with transfers,  sitting, standing, stairs/ramp navication, reduced balance, housework, and  walking.     Pt would benefit from skilled physical therapy intervention to improve above impairments and facilitate return to function.    Complexity of Evaluation:   high  Based on the history including personal factors and/or comorbidities, examination of body systems including body structures and function, activity limitations, and/or participation restrictions, as well as clinical presentation, patient meets criteria for a high complexity evaluation.      Rehab potential:  good    GOALS:  Pt stated goal:  Get rid of the walker  and walk normally    Short term goals:  + Increase strength by 1/3 grade  + Increase knee ROM by 10  degrees  + Reduce pain by 2 points  + Independent in a basic HEP    Long term goals:  + Decrease pain to <4/10  + Increase knee ROM to 0-120 degrees as needed for stairs and transfers  + Increase LE strength to 5/5  as needed for stairs, community mobility and house cleaning  + Reduce edema for improved mobility and reduced pain  + Able to perform sit to stand with no increased pain.   + Ambulate community distances without deviation on level and advanced surfaces for full community reintegration  + Able to ascend and descend 8 inch steps with good eccentric control   + Improve LE flexibility for reduced stress on joints and spine  + Assess balance and achieve low fall risk for safety   + Independent in a HEP for self-management of any further limitations from prior level of function.  + Able to perform sit to stand with no increased pain.     PLAN     Skilled physical therapy 2 times per week for 8-12 weeks  Treatment may  include:  Therapeutic Exercise (52834)  Therapeutic Activity (12442)  Manual therapy (43812)  Neuro-muscular re-education (88119)  Gait Training (16816)  Unattended Electrical Stimulation (/08546)  Attended Electrical Stimulation (94230)  Light therapy (04972)      The pt agreed with above stated treatment plan and general goals.    Some of This note was created using voice recognition software and was not corrected for typographical or grammatical errors.

## 2025-06-18 ENCOUNTER — TREATMENT (OUTPATIENT)
Dept: PHYSICAL THERAPY | Facility: CLINIC | Age: 70
End: 2025-06-18
Payer: MEDICARE

## 2025-06-18 DIAGNOSIS — M25.661 KNEE STIFFNESS, RIGHT: Primary | ICD-10-CM

## 2025-06-18 DIAGNOSIS — M25.461 EFFUSION OF RIGHT KNEE: ICD-10-CM

## 2025-06-18 DIAGNOSIS — Z96.651 TOTAL KNEE REPLACEMENT STATUS, RIGHT: ICD-10-CM

## 2025-06-18 PROCEDURE — 97140 MANUAL THERAPY 1/> REGIONS: CPT | Mod: GP | Performed by: PHYSICAL THERAPIST

## 2025-06-18 PROCEDURE — 97110 THERAPEUTIC EXERCISES: CPT | Mod: GP | Performed by: PHYSICAL THERAPIST

## 2025-06-18 NOTE — PROGRESS NOTES
"Physical Therapy Treatment     Patient Name: Tatyana Mckeon  MRN: [id number]  Today's Date: 6/18/2025  Time Calculation  Start Time: 1345  Stop Time: 1435  Time Calculation (min): 50 min       PT Therapeutic Procedures Time Entry  Therapeutic Exercise Time Entry: 25  Manual Therapy Time Entry: 15    INSURANCE:  Visit Number: 2  Insurance Type: Payor: [facility] MEDICARE / Plan: [facility] MEDICARE / Product Type: *No Product type* /   Authorization info:  NO AUTH / $35 COPAY / $4600 OOP not met /  [place] /      CMS Re-Certification Period:6/16/25 to 9/13/25    Current Problem  1. Knee stiffness, right        2. Effusion of right knee        3. Total knee replacement status, right  Follow Up In Physical Therapy          PRECAUTIONS:  Medical:  CA, UE lymphedema, open wound,     Fall risk:   high without use of walker.         PMH: (pertinent to PT evaluation)  Hypertension, diabetes type 2, obesity, breast cancer with metastases, anxiety, bipolar, right sided low back pain with sciatica, psoriasis, lymphedema.  *See Epic EMR for complete list.    Medical History Form: Reviewed (scanned into chart)        SPECIAL CONCERNS:   Pt also having \"burst blood vessels\" on breast, chest, and torso (shown to PT) Pt will contact doctor.       PAIN  Start of Treatment: 7/10 without meds      SUBJECTIVE  The pt brought her ibuprofen for her pain to take. She takes it every 6 hours.    Doctor answered her about th bruising and she is stopping the Lovenox.  Tubigrip seems to help.  Wonders if PT wants to take picture of tape burn area to send to the doctor, but will see the doctor in 2 days    OBJECTIVE  TREATMENT:  Manual Therapy:   STM to R knee and proximal lower leg  around incision and open area    Therapeutic Exercise:    Seated LE exercise for strengthening   LAQ 1 x10 with 0 #  Marching 1 x10 0 #  Hamstring curl  1 x10 with GTB  Abduction with Green theraband  2 x10  ball squeeze  - DNP  ankle df  2 " "x10  Ankle pf  2 x10   Hamstring stretch 3x30 seconds  Swing and dangle - 2 minutes    Supine:  SAQ   Hook lying IR (to stretch glut)      Home exercise program: (HEP)  Per home care    Education/instruction:  New:    Show open area to doctor - no need for pictures   Swing and dangle    Prior:   + current status, general goals, treatment plan  + pain/spasm/swelling cycle  + edema control and  cold pack use  + safe use of Tubigrip  + realistic expectations of rehab  PAIN  End of session pain rating:  \"a little better    ASSESSMENT:  The pt had reduced tightness in the knee following manual.  She has significant edema that increases during the day.  Tubi- is helping with this.  Gait was less guarded in and out of therapy today.  Some hip discomfort with SAQ addressed with stretching. Increased knee extension noted    Assessment of current progress against goals:     insufficient evidence      GOALS:  Pt stated goal:  Get rid of the walker  and walk normally     Short term goals:  + Increase strength by 1/3 grade  + Increase knee ROM by 10  degrees  + Reduce pain by 2 points  + Independent in a basic HEP     Long term goals:  + Decrease pain to <4/10  + Increase knee ROM to 0-120 degrees as needed for stairs and transfers  + Increase LE strength to 5/5  as needed for stairs, community mobility and house cleaning  + Reduce edema for improved mobility and reduced pain  + Able to perform sit to stand with no increased pain.   + Ambulate community distances without deviation on level and advanced surfaces for full community reintegration  + Able to ascend and descend 8 inch steps with good eccentric control   + Improve LE flexibility for reduced stress on joints and spine  + Assess balance and achieve low fall risk for safety   + Independent in a HEP for self-management of any further limitations from prior level of function.  + Able to perform sit to stand with no increased pain.     PLAN:  Progress as " tolerated    Skilled physical therapy 2 times per week for 8-12 weeks  Treatment may include:  Therapeutic Exercise (03023)  Therapeutic Activity (23206)  Manual therapy (03365)  Neuro-muscular re-education (18906)  Gait Training (85668)  Unattended Electrical Stimulation (/13263)  Attended Electrical Stimulation (47358)  Light therapy (87449)     Some of This note was created using voice recognition software and was not corrected for typographical or grammatical errors.

## 2025-06-23 ENCOUNTER — TREATMENT (OUTPATIENT)
Dept: PHYSICAL THERAPY | Facility: CLINIC | Age: 70
End: 2025-06-23
Payer: MEDICARE

## 2025-06-23 DIAGNOSIS — Z96.651 TOTAL KNEE REPLACEMENT STATUS, RIGHT: Primary | ICD-10-CM

## 2025-06-23 PROCEDURE — 97140 MANUAL THERAPY 1/> REGIONS: CPT | Mod: GP,CQ

## 2025-06-23 PROCEDURE — 97110 THERAPEUTIC EXERCISES: CPT | Mod: GP,CQ

## 2025-06-23 NOTE — PROGRESS NOTES
"Physical Therapy Treatment     Patient Name: Tatyana Mckeon  MRN: [id number]  Today's Date: 6/23/2025  Time Calculation  Start Time: 0240  Stop Time: 0340  Time Calculation (min): 60 min       PT Therapeutic Procedures Time Entry  Therapeutic Exercise Time Entry: 50  Manual Therapy Time Entry: 10    INSURANCE:  Visit Number: 3  Insurance Type: Payor: [facility] MEDICARE / Plan: [facility] MEDICARE / Product Type: *No Product type* /   Authorization info:  NO AUTH / $35 COPAY / $4600 OOP not met /  [place] /      CMS Re-Certification Period:6/16/25 to 9/13/25      Current Problem  1. Knee stiffness, right        2. Effusion of right knee        3. Total knee replacement status, right  Follow Up In Physical Therapy          PRECAUTIONS:  Medical:  CA, UE lymphedema, open wound,     Fall risk:   high without use of walker.         PMH: (pertinent to PT evaluation)  Hypertension, diabetes type 2, obesity, breast cancer with metastases, anxiety, bipolar, right sided low back pain with sciatica, psoriasis, lymphedema.  *See Epic EMR for complete list.    Medical History Form: Reviewed (scanned into chart)        SPECIAL CONCERNS:   Pt also having \"burst blood vessels\" on breast, chest, and torso (shown to PT) Pt will contact doctor.       PAIN  Start of Treatment: 7/10 without meds      SUBJECTIVE :  edema medial aspect R knee noted        The pt brought her ibuprofen for her pain to take. She takes it every 6 hours.    Doctor answered her about th bruising and she is stopping the Lovenox.  Tubigrip seems to help.  Wonders if PT wants to take picture of tape burn area to send to the doctor, but will see the doctor in 2 days    OBJECTIVE  TREATMENT:  Manual Therapy:   STM to R knee and proximal lower leg  around incision and open area    Therapeutic Exercise:    Seated LE exercise for strengthening   LAQ 1 x10 with 0 #  Marching 1 x10 0 #  Hamstring curl  1 x10 with GTB  Abduction with Green theraband  " "2 x10  ball squeeze  -   ankle df  2 x10  Ankle pf  2 x10   Hamstring stretch 3x30 seconds    Standing:  with UE support for strengthening:  Hip flexion/marching   1 x10 with 0 #  Hip abduction 1 x10 with 0 #  Hamstring curl 1 x10 with 0 #  Toe raise1 x10     Supine exercise for LE strengthening:  +SLR 1 x 10 with 0 #  +Heel slide 1 x10 with 0 #  +Hip abduction 1 x10 with 0 #  +SAQ 1 x10 with 0 #  + Quad Set 1 x10 with 3 second hold     Trial use 9 wide base Quad cane inside parallel bars per pt. request    Home exercise program: (HEP)  Per home care    Education/instruction:  New:    Show open area to doctor - no need for pictures   Swing and dangle    Prior:   + current status, general goals, treatment plan  + pain/spasm/swelling cycle  + edema control and  cold pack use  + safe use of Tubigrip  + realistic expectations of rehab  PAIN  End of session pain rating:  \"a little better    ASSESSMENT: Pt. Brought in wide base Quad cane , adjusted  to height discussed appropriate use and gait pattern , trial use inside parallel bars , however to unsteady to use on own , discussed this with pt. , will progress when ready       The pt had reduced tightness in the knee following manual.  She has significant edema that increases during the day.  Tubi- is helping with this.  Gait was less guarded in and out of therapy today.  Some hip discomfort with SAQ addressed with stretching. Increased knee extension noted    Assessment of current progress against goals:     insufficient evidence      GOALS:  Pt stated goal:  Get rid of the walker  and walk normally     Short term goals:  + Increase strength by 1/3 grade  + Increase knee ROM by 10  degrees  + Reduce pain by 2 points  + Independent in a basic HEP     Long term goals:  + Decrease pain to <4/10  + Increase knee ROM to 0-120 degrees as needed for stairs and transfers  + Increase LE strength to 5/5  as needed for stairs, community mobility and house cleaning  + Reduce " edema for improved mobility and reduced pain  + Able to perform sit to stand with no increased pain.   + Ambulate community distances without deviation on level and advanced surfaces for full community reintegration  + Able to ascend and descend 8 inch steps with good eccentric control   + Improve LE flexibility for reduced stress on joints and spine  + Assess balance and achieve low fall risk for safety   + Independent in a HEP for self-management of any further limitations from prior level of function.  + Able to perform sit to stand with no increased pain.     PLAN:  Progress as tolerated    Skilled physical therapy 2 times per week for 8-12 weeks  Treatment may include:  Therapeutic Exercise (35852)  Therapeutic Activity (30958)  Manual therapy (77557)  Neuro-muscular re-education (13448)  Gait Training (70159)  Unattended Electrical Stimulation (/90364)  Attended Electrical Stimulation (22799)  Light therapy (95861)     Some of This note was created using voice recognition software and was not corrected for typographical or grammatical errors.

## 2025-06-25 ENCOUNTER — TREATMENT (OUTPATIENT)
Dept: PHYSICAL THERAPY | Facility: CLINIC | Age: 70
End: 2025-06-25
Payer: MEDICARE

## 2025-06-25 DIAGNOSIS — Z96.651 TOTAL KNEE REPLACEMENT STATUS, RIGHT: ICD-10-CM

## 2025-06-25 PROCEDURE — 97140 MANUAL THERAPY 1/> REGIONS: CPT | Mod: GP,CQ

## 2025-06-25 PROCEDURE — 97110 THERAPEUTIC EXERCISES: CPT | Mod: GP,CQ

## 2025-06-25 NOTE — PROGRESS NOTES
"Physical Therapy Treatment     Patient Name: Tatyana Mckeon  MRN: [id number]  Today's Date: 6/25/2025  Time Calculation  Start Time: 0155  Stop Time: 0240  Time Calculation (min): 45 min       PT Therapeutic Procedures Time Entry  Therapeutic Exercise Time Entry: 35  Manual Therapy Time Entry: 10    INSURANCE:  Visit Number: 4  Insurance Type: Payor: [facility] MEDICARE / Plan: [facility] MEDICARE / Product Type: *No Product type* /   Authorization info:  NO AUTH / $35 COPAY / $4600 OOP not met /  [place] /      CMS Re-Certification Period:6/16/25 to 9/13/25      Current Problem  1. Knee stiffness, right        2. Effusion of right knee        3. Total knee replacement status, right   5-27-25 Follow Up In Physical Therapy          PRECAUTIONS:  Medical:  CA, UE lymphedema, open wound,     Fall risk:   high without use of walker.         PMH: (pertinent to PT evaluation)  Hypertension, diabetes type 2, obesity, breast cancer with metastases, anxiety, bipolar, right sided low back pain with sciatica, psoriasis, lymphedema.  *See Epic EMR for complete list.    Medical History Form: Reviewed (scanned into chart)     SPECIAL CONCERNS:   Pt also having \"burst blood vessels\" on breast, chest, and torso (shown to PT) Pt will contact doctor.     PAIN  Start of Treatment: 7/10 without meds    SUBJECTIVE :  over did working at home doing things now increased LBP noted    The pt brought her ibuprofen for her pain to take. She takes it every 6 hours.    Doctor answered her about th bruising and she is stopping the Lovenox.  Tubigrip seems to help.  Wonders if PT wants to take picture of tape burn area to send to the doctor, but will see the doctor in 2 days    OBJECTIVE  TREATMENT:  Manual Therapy:   STM to R knee and proximal lower leg  around incision and open area    Therapeutic Exercise:    Seated LE exercise for strengthening   LAQ 1 x10 with 0 #  Marching 1 x10 0 #  Hamstring curl  1 x10 with " "GTB  Abduction with Green theraband  2 x10  ball squeeze  -   Ankle pf  2 x10   Hamstring stretch 3x30 seconds    Standing:  with UE support for strengthening:  Hip flexion/marching   1 x10 with 0 #  Hip abduction 1 x10 with 0 #  Hamstring curl 1 x10 with 0 #  Toe raise1 x10     Supine exercise for LE strengthening:  +SLR 1 x 10 with 0 #  +Heel slide 1 x10 with 0 #  +Hip abduction 1 x10 with 0 #  +SAQ 1 x10 with 0 #  + Quad Set 1 x10 with 3 second hold     Trial use 9 wide base Quad cane inside parallel bars per pt. Request      Measurement taken supine :  -4 full ext to 100 flexion      Home exercise program: (HEP)  Per home care    Education/instruction:  New:    Show open area to doctor - no need for pictures   Swing and dangle    Prior:   + current status, general goals, treatment plan  + pain/spasm/swelling cycle  + edema control and  cold pack use  + safe use of Tubigrip  + realistic expectations of rehab  PAIN  End of session pain rating:  \"a little better    ASSESSMENT:  Verbal encouragement  required  Pt . Is progressing well towards goals        Pt. Brought in wide base Quad cane , adjusted  to height discussed appropriate use and gait pattern , trial use inside parallel bars , however to unsteady to use on own , discussed this with pt. , will progress when ready     The pt had reduced tightness in the knee following manual.  She has significant edema that increases during the day.  Tubi- is helping with this.  Gait was less guarded in and out of therapy today.  Some hip discomfort with SAQ addressed with stretching. Increased knee extension noted    Assessment of current progress against goals:     insufficient evidence    GOALS:  Pt stated goal:  Get rid of the walker  and walk normally     Short term goals:  + Increase strength by 1/3 grade  + Increase knee ROM by 10  degrees  + Reduce pain by 2 points  + Independent in a basic HEP     Long term goals:  + Decrease pain to <4/10  + Increase knee ROM " to 0-120 degrees as needed for stairs and transfers  + Increase LE strength to 5/5  as needed for stairs, community mobility and house cleaning  + Reduce edema for improved mobility and reduced pain  + Able to perform sit to stand with no increased pain.   + Ambulate community distances without deviation on level and advanced surfaces for full community reintegration  + Able to ascend and descend 8 inch steps with good eccentric control   + Improve LE flexibility for reduced stress on joints and spine  + Assess balance and achieve low fall risk for safety   + Independent in a HEP for self-management of any further limitations from prior level of function.  + Able to perform sit to stand with no increased pain.     PLAN:  Progress as tolerated    Skilled physical therapy 2 times per week for 8-12 weeks  Treatment may include:  Therapeutic Exercise (88634)  Therapeutic Activity (04124)  Manual therapy (34932)  Neuro-muscular re-education (38162)  Gait Training (03737)  Unattended Electrical Stimulation (/56077)  Attended Electrical Stimulation (48154)  Light therapy (74681)     Some of This note was created using voice recognition software and was not corrected for typographical or grammatical errors.

## 2025-06-30 ENCOUNTER — APPOINTMENT (OUTPATIENT)
Dept: RADIOLOGY | Facility: HOSPITAL | Age: 70
End: 2025-06-30
Payer: MEDICARE

## 2025-06-30 ENCOUNTER — APPOINTMENT (OUTPATIENT)
Dept: PHYSICAL THERAPY | Facility: CLINIC | Age: 70
End: 2025-06-30
Payer: MEDICARE

## 2025-07-01 ENCOUNTER — APPOINTMENT (OUTPATIENT)
Dept: PRIMARY CARE | Facility: CLINIC | Age: 70
End: 2025-07-01

## 2025-07-02 ENCOUNTER — TREATMENT (OUTPATIENT)
Dept: PHYSICAL THERAPY | Facility: CLINIC | Age: 70
End: 2025-07-02
Payer: MEDICARE

## 2025-07-02 DIAGNOSIS — Z96.651 TOTAL KNEE REPLACEMENT STATUS, RIGHT: ICD-10-CM

## 2025-07-02 PROCEDURE — 97110 THERAPEUTIC EXERCISES: CPT | Mod: GP | Performed by: PHYSICAL THERAPIST

## 2025-07-02 PROCEDURE — 97116 GAIT TRAINING THERAPY: CPT | Mod: GP | Performed by: PHYSICAL THERAPIST

## 2025-07-02 PROCEDURE — 97140 MANUAL THERAPY 1/> REGIONS: CPT | Mod: GP | Performed by: PHYSICAL THERAPIST

## 2025-07-02 NOTE — PROGRESS NOTES
"Physical Therapy Treatment     Patient Name: Tatyana Mckeon  MRN: [id number]  Today's Date: 7/2/2025  Time Calculation  Start Time: 1310  Stop Time: 1400  Time Calculation (min): 50 min       PT Therapeutic Procedures Time Entry  Therapeutic Exercise Time Entry: 20  Gait Training Time Entry: 15  Manual Therapy Time Entry: 10    INSURANCE:  Visit Number: 5  Insurance Type: Payor: [facility] MEDICARE / Plan: [facility] MEDICARE / Product Type: *No Product type* /   Authorization info:  NO AUTH / $35 COPAY / $4600 OOP not met /  [place] /      CMS Re-Certification Period:6/16/25 to 9/13/25      Current Problem  1. Knee stiffness, right        2. Effusion of right knee        3. Total knee replacement status, right   5-27-25 Follow Up In Physical Therapy          PRECAUTIONS:  Medical:  CA, UE lymphedema, open wound,     Fall risk:   high without use of walker.         PMH: (pertinent to PT evaluation)  Hypertension, diabetes type 2, obesity, breast cancer with metastases, anxiety, bipolar, right sided low back pain with sciatica, psoriasis, lymphedema.  *See Epic EMR for complete list.    Medical History Form: Reviewed (scanned into chart)     SPECIAL CONCERNS:   Pt also having \"burst blood vessels\" on breast, chest, and torso (shown to PT) Pt will contact doctor.     PAIN  Start of Treatment: 7/10 without meds    SUBJECTIVE :  Pt saw the chiropractor yesterday.  (Hasn't been in 6 weeks) He said the pec was torn.  He did spinal adjustments L4-5 will need a couple more sessions.  Her hip feels better now that she is aligned.  He also identified the ITB as being tight.      She has been massaging and icing it,      The pt brought her ibuprofen for her pain to take. She takes it every 6 hours.    Doctor answered her about th bruising and she is stopping the Lovenox.  Tubigrip seems to help.  Wonders if PT wants to take picture of tape burn area to send to the doctor, but will see the doctor in 2 " "days    OBJECTIVE  Pt arrived 10 minutes last due to traffic.  Able to accommodate.    TREATMENT:  Manual Therapy:   STM to R knee and proximal lower leg  around incision, entire length of ITB, and effleurage    Therapeutic Exercise:    Seated LE exercise for strengthening   LAQ 1 x10 with 0 #  Marching 1 x10 0 # - DNP  Hamstring curl  1 x10 with GTB  Abduction with Green theraband  2 x10  ball squeeze  - 2 x10  Ankle pf  2 x10   Hamstring stretch 3x30 seconds - DNP    Standing:  with UE support for strengthening:  Hip flexion/marching   1 x10 with 0 #  Hip abduction 1 x10 with 0 #  Hip extension 1 x10 with 0 #  Toe raise1 x10 - DNP     Supine exercise for LE strengthening:  +SLR 1 x 10 with 0 # - DNP  +Heel slide 2 x10 with 0 # AAROM  +Hip abduction 1 x10 with 0 # - DNP  +SAQ 1 x10 with 0 #  + Quad Set 1 x10 with 3 second hold  - DNP    Gait training:  Pre-gait in parallel bars  **Wt shift L and R with SBQC in L UE  Stepping L and R with  with SBQC in L UE - required Min A  **Stepping L and R with railing on L side      Measurement taken supine :  -4 full ext to 105 flexion      Home exercise program: (HEP)  Per home care    Added  \"**\" exercise to HEP    Education/instruction:  New:    Purpose of exercise to improve skills needed for the cane (with pt and friend)    Prior:   + current status, general goals, treatment plan  + pain/spasm/swelling cycle  + edema control and  cold pack use  + safe use of Tubigrip  + realistic expectations of rehab  Show open area to doctor - no need for pictures   Swing and dangle      PAIN  End of session pain rating:  \"a little better    ASSESSMENT:    Pt with noted ease with sit to stand transfers.  Lacks confidence in the cane and needs to learn to keep legs of cane on the floor with wt shifting.  She is fearful with stepping without B UE support. Recommend she practice this with the more stable kitchen sink vs cane       Assessment of current progress against goals:  " progressing    GOALS:  Pt stated goal:  Get rid of the walker  and walk normally     Short term goals:  + Increase strength by 1/3 grade  + Increase knee ROM by 10  degrees  + Reduce pain by 2 points  + Independent in a basic HEP     Long term goals:  + Decrease pain to <4/10  + Increase knee ROM to 0-120 degrees as needed for stairs and transfers  + Increase LE strength to 5/5  as needed for stairs, community mobility and house cleaning  + Reduce edema for improved mobility and reduced pain  + Able to perform sit to stand with no increased pain.   + Ambulate community distances without deviation on level and advanced surfaces for full community reintegration  + Able to ascend and descend 8 inch steps with good eccentric control   + Improve LE flexibility for reduced stress on joints and spine  + Assess balance and achieve low fall risk for safety   + Independent in a HEP for self-management of any further limitations from prior level of function.  + Able to perform sit to stand with no increased pain.     PLAN:  Progress as tolerated    Skilled physical therapy 2 times per week for 8-12 weeks  Treatment may include:  Therapeutic Exercise (96649)  Therapeutic Activity (53037)  Manual therapy (57576)  Neuro-muscular re-education (73301)  Gait Training (90152)  Unattended Electrical Stimulation (/63174)  Attended Electrical Stimulation (79461)  Light therapy (04318)     Some of This note was created using voice recognition software and was not corrected for typographical or grammatical errors.

## 2025-07-07 ENCOUNTER — TREATMENT (OUTPATIENT)
Dept: PHYSICAL THERAPY | Facility: CLINIC | Age: 70
End: 2025-07-07
Payer: MEDICARE

## 2025-07-07 DIAGNOSIS — M25.661 KNEE STIFFNESS, RIGHT: Primary | ICD-10-CM

## 2025-07-07 DIAGNOSIS — Z96.651 TOTAL KNEE REPLACEMENT STATUS, RIGHT: ICD-10-CM

## 2025-07-07 DIAGNOSIS — M25.461 EFFUSION OF RIGHT KNEE: ICD-10-CM

## 2025-07-07 PROCEDURE — 97140 MANUAL THERAPY 1/> REGIONS: CPT | Mod: GP | Performed by: PHYSICAL THERAPIST

## 2025-07-07 PROCEDURE — 97110 THERAPEUTIC EXERCISES: CPT | Mod: GP | Performed by: PHYSICAL THERAPIST

## 2025-07-07 PROCEDURE — 97116 GAIT TRAINING THERAPY: CPT | Mod: GP | Performed by: PHYSICAL THERAPIST

## 2025-07-07 NOTE — PROGRESS NOTES
"Physical Therapy Treatment     Patient Name: Tatyana Mckeon  MRN: [id number]  Today's Date: 7/7/2025  Time Calculation  Start Time: 1138  Stop Time: 1220  Time Calculation (min): 42 min       PT Therapeutic Procedures Time Entry  Therapeutic Exercise Time Entry: 15  Gait Training Time Entry: 10  Manual Therapy Time Entry: 15    INSURANCE:  Visit Number: 6  Insurance Type: Payor: [facility] MEDICARE / Plan: [facility] MEDICARE / Product Type: *No Product type* /   Authorization info:  NO AUTH / $35 COPAY / $4600 OOP not met /  [place] /      CMS Re-Certification Period:6/16/25 to 9/13/25      Current Problem  1. Knee stiffness, right        2. Effusion of right knee        3. Total knee replacement status, right   5-27-25 Follow Up In Physical Therapy          PRECAUTIONS:  Medical:  CA, UE lymphedema, open wound,     Fall risk:   high without use of walker.         PMH: (pertinent to PT evaluation)  Hypertension, diabetes type 2, obesity, breast cancer with metastases, anxiety, bipolar, right sided low back pain with sciatica, psoriasis, lymphedema.  *See Epic EMR for complete list.    Medical History Form: Reviewed (scanned into chart)     SPECIAL CONCERNS:   Pt also having \"burst blood vessels\" on breast, chest, and torso (shown to PT) Pt will contact doctor.     PAIN  Start of Treatment: 5/10 without meds   back is 8/10    SUBJECTIVE :    Pt is sore after the chiropractor last week.  She is icing the back as much as she can.  Her swelling is better. She is getting around better without the walker. She is doing the exercises at the sink, but still fearful of stepping with the R.    She is icing and massaging the R ITB and it feels better.  She is also moving around better with her shower.    She went back to work for 5 hours last week. She had ice packs with her.  She did swell (which she did before surgery).        PRIOR  The pt brought her ibuprofen for her pain to take. She takes it every 6 " "hours.    Doctor answered her about th bruising and she is stopping the Lovenox.  Tubigrip seems to help.  Wonders if PT wants to take picture of tape burn area to send to the doctor, but will see the doctor in 2 days    OBJECTIVE  Pt arrived 8 minutes late.  Able to accommodate.    TREATMENT:  Manual Therapy:   STM to R knee and proximal lower leg  around incision, entire length of ITB, and effleurage  IASTM to proximal ITB in supine.      Therapeutic Exercise:    Standing:  with UE support for strengthening:  Hip flexion/marching   1 x10 with 0 #  Hip abduction 1 x10 with 0 #  Hip extension 1 x10 with 0 #  Stepping with the R and L        Gait training:  Pre-gait in parallel bars  March in place with cane  Stepping L and R with  with SBQC in L UE   Walking with cane in parallel bars  Walk 30 feet with SBQC in department    =====================DNP==================  Seated LE exercise for strengthening   LAQ 1 x10 with 0 #  Marching 1 x10 0 # - DNP  Hamstring curl  1 x10 with GTB  Abduction with Green theraband  2 x10  ball squeeze  - 2 x10  Ankle pf  2 x10   Hamstring stretch 3x30 seconds - DNP    Supine exercise for LE strengthening:  +SLR 1 x 10 with 0 # - DNP  +Heel slide 2 x10 with 0 # AAROM  +Hip abduction 1 x10 with 0 # - DNP  +SAQ 1 x10 with 0 #  + Quad Set 1 x10 with 3 second hold  - DNP  ============================================  Measurement taken supine :  -4 full ext to 105 flexion      Home exercise program: (HEP)  Per home care  **Stepping L and R with railing on L side  **Wt shift L and R with SBQC in L UE -   Added  \"**\" exercise to HEP    Education/instruction:  New:    Ways to progress gait at home with the cane safely   Use of walker as needed     Prior:   + current status, general goals, treatment plan  + pain/spasm/swelling cycle  + edema control and  cold pack use  + safe use of Tubigrip  + realistic expectations of rehab  Show open area to doctor - no need for pictures   Swing and " dangle  Purpose of exercise to improve skills needed for the cane (with pt and friend)      PAIN  End of session pain rating:  looser ITB after [place]     ASSESSMENT:    Pt with noted ease with the cane staring in the parallel bars and progressing to out of the bars with self verbal cues for pattern.  Reduced restriction in ITB noted after manual.  Less heat in the knee after exercise.       Assessment of current progress against goals:  progressing    GOALS:  Pt stated goal:  Get rid of the walker  and walk normally     Short term goals:  + Increase strength by 1/3 grade  + Increase knee ROM by 10  degrees  + Reduce pain by 2 points  + Independent in a basic HEP     Long term goals:  + Decrease pain to <4/10  + Increase knee ROM to 0-120 degrees as needed for stairs and transfers  + Increase LE strength to 5/5  as needed for stairs, community mobility and house cleaning  + Reduce edema for improved mobility and reduced pain  + Able to perform sit to stand with no increased pain.   + Ambulate community distances without deviation on level and advanced surfaces for full community reintegration  + Able to ascend and descend 8 inch steps with good eccentric control   + Improve LE flexibility for reduced stress on joints and spine  + Assess balance and achieve low fall risk for safety   + Independent in a HEP for self-management of any further limitations from prior level of function.  + Able to perform sit to stand with no increased pain.     PLAN:  Progress as tolerated    Skilled physical therapy 2 times per week for 8-12 weeks  Treatment may include:  Therapeutic Exercise (28953)  Therapeutic Activity (45719)  Manual therapy (55906)  Neuro-muscular re-education (33621)  Gait Training (24384)  Unattended Electrical Stimulation (/70066)  Attended Electrical Stimulation (54523)  Light therapy (51295)     Some of This note was created using voice recognition software and was not corrected for typographical or  grammatical errors.

## 2025-07-09 ENCOUNTER — TREATMENT (OUTPATIENT)
Dept: PHYSICAL THERAPY | Facility: CLINIC | Age: 70
End: 2025-07-09
Payer: MEDICARE

## 2025-07-09 DIAGNOSIS — M25.661 KNEE STIFFNESS, RIGHT: Primary | ICD-10-CM

## 2025-07-09 DIAGNOSIS — Z96.651 TOTAL KNEE REPLACEMENT STATUS, RIGHT: ICD-10-CM

## 2025-07-09 DIAGNOSIS — M25.461 EFFUSION OF RIGHT KNEE: ICD-10-CM

## 2025-07-09 PROCEDURE — 97140 MANUAL THERAPY 1/> REGIONS: CPT | Mod: GP | Performed by: PHYSICAL THERAPIST

## 2025-07-09 PROCEDURE — 97110 THERAPEUTIC EXERCISES: CPT | Mod: GP | Performed by: PHYSICAL THERAPIST

## 2025-07-09 NOTE — PROGRESS NOTES
"Physical Therapy Treatment     Patient Name: Tatyana Mckeon  MRN: [id number]  Today's Date: 7/9/2025  Time Calculation  Start Time: 1345  Stop Time: 1430  Time Calculation (min): 45 min       PT Therapeutic Procedures Time Entry  Therapeutic Exercise Time Entry: 30  Manual Therapy Time Entry: 10    INSURANCE:  Visit Number: 7  Insurance Type: Payor: [facility] MEDICARE / Plan: [facility] MEDICARE / Product Type: *No Product type* /   Authorization info:  NO AUTH / $35 COPAY / $4600 OOP not met /  [place] /      CMS Re-Certification Period:6/16/25 to 9/13/25      Current Problem  1. Knee stiffness, right        2. Effusion of right knee        3. Total knee replacement status, right   5-27-25 Follow Up In Physical Therapy          PRECAUTIONS:  Medical:  CA, UE lymphedema, open wound,     Fall risk:   high without use of walker.         PMH: (pertinent to PT evaluation)  Hypertension, diabetes type 2, obesity, breast cancer with metastases, anxiety, bipolar, right sided low back pain with sciatica, psoriasis, lymphedema.  *See Epic EMR for complete list.    Medical History Form: Reviewed (scanned into chart)     SPECIAL CONCERNS:   Pt also having \"burst blood vessels\" on breast, chest, and torso (shown to PT) Pt will contact doctor.     PAIN  Start of Treatment: 7/10 without meds   back is 8/10    SUBJECTIVE :    Pt is sore in the knee more that is has been. She has been out in the community a lot today.  The knee is burning and she noticed that it is warm     She saw the chiropractor  yesterday.    Her ITB is getting better.  She was out at the hip a little, but her back is more aligned.   she also had acupuncture and worked 5 hours.  She is almost exclusively on OTC meds now.   She saw  and had an injection in the L knee.          OBJECTIVE  TREATMENT:  Manual Therapy:   STM to R knee and proximal lower leg  around incision, entire length of ITB, and effleurage  IASTM to proximal ITB " "in supine.      Therapeutic Exercise:    Seated LE exercise for strengthening   LAQ 1 x15 with 2 #  Marching 1 x15  2 #   Hamstring curl  1 x10 with GTB  Abduction with Green theraband - x60 seconds  ball squeeze  - x60 seconds  Ankle pf  2 x10   Hamstring stretch 3x30 seconds    Standing:  with UE support for strengthening:  Hip flexion/marching   1 x10 with 2#  Hip abduction 1 x10 with 2 #  Hip extension 1 x10 with 2 #  Smal squat x10  Heel raise x10     Parallel bars  Tap up on 8 inch step 1x10  Rocking   1x10    Gait   20 feet x 2 with small-based quad cane and contact-guard assistance.  Patient gives herself verbal cues for sequence.      =====================DNP==================  Pre-gait in parallel bars  March in place with cane  Stepping L and R with  with SBQC in L UE   Walking with cane in parallel bars  Walk 30 feet with SBQC in department    Supine exercise for LE strengthening:  +SLR 1 x 10 with 0 # - DNP  +Heel slide 2 x10 with 0 # AAROM  +Hip abduction 1 x10 with 0 # - DNP  +SAQ 1 x10 with 0 #  + Quad Set 1 x10 with 3 second hold  - DNP  ============================================  Measurement taken supine :  -4 full ext to 105 flexion      Home exercise program: (HEP)  Per home care  **Stepping L and R with railing on L side  **Wt shift L and R with SBQC in L UE -   Added  \"**\" exercise to HEP    Education/instruction:  New:    Discussed progress and purpose of exercise    Prior:   + current status, general goals, treatment plan  + pain/spasm/swelling cycle  + edema control and  cold pack use  + safe use of Tubigrip  + realistic expectations of rehab  Show open area to doctor - no need for pictures   Swing and dangle  Purpose of exercise to improve skills needed for the cane (with pt and friend)  Ways to progress gait at home with the cane safely   Use of walker as needed       PAIN  End of session pain rating: knee 5/10 and hip 6/10    ASSESSMENT:    Pt is improving with her ambulation with a " small-based quad cane.  She was able to resume sitting and standing exercises including use of small resistance.  She has reduced restriction in ITB noted after manual.     Assessment of current progress against goals:  progressing    GOALS:  Pt stated goal:  Get rid of the walker  and walk normally     Short term goals:  + Increase strength by 1/3 grade  + Increase knee ROM by 10  degrees  + Reduce pain by 2 points  + Independent in a basic HEP     Long term goals:  + Decrease pain to <4/10  + Increase knee ROM to 0-120 degrees as needed for stairs and transfers  + Increase LE strength to 5/5  as needed for stairs, community mobility and house cleaning  + Reduce edema for improved mobility and reduced pain  + Able to perform sit to stand with no increased pain.   + Ambulate community distances without deviation on level and advanced surfaces for full community reintegration  + Able to ascend and descend 8 inch steps with good eccentric control   + Improve LE flexibility for reduced stress on joints and spine  + Assess balance and achieve low fall risk for safety   + Independent in a HEP for self-management of any further limitations from prior level of function.  + Able to perform sit to stand with no increased pain.     PLAN:  Progress as tolerated    Skilled physical therapy 2 times per week for 8-12 weeks  Treatment may include:  Therapeutic Exercise (90368)  Therapeutic Activity (01681)  Manual therapy (82847)  Neuro-muscular re-education (00668)  Gait Training (14901)  Unattended Electrical Stimulation (/50963)  Attended Electrical Stimulation (57025)  Light therapy (31668)     Some of This note was created using voice recognition software and was not corrected for typographical or grammatical errors.

## 2025-07-14 ENCOUNTER — APPOINTMENT (OUTPATIENT)
Dept: PHYSICAL THERAPY | Facility: CLINIC | Age: 70
End: 2025-07-14
Payer: MEDICARE

## 2025-07-14 ENCOUNTER — TREATMENT (OUTPATIENT)
Dept: PHYSICAL THERAPY | Facility: CLINIC | Age: 70
End: 2025-07-14
Payer: MEDICARE

## 2025-07-14 DIAGNOSIS — M25.661 KNEE STIFFNESS, RIGHT: Primary | ICD-10-CM

## 2025-07-14 DIAGNOSIS — Z96.651 TOTAL KNEE REPLACEMENT STATUS, RIGHT: ICD-10-CM

## 2025-07-14 DIAGNOSIS — M25.461 EFFUSION OF RIGHT KNEE: ICD-10-CM

## 2025-07-14 PROCEDURE — 97140 MANUAL THERAPY 1/> REGIONS: CPT | Mod: GP,CQ

## 2025-07-14 PROCEDURE — 97110 THERAPEUTIC EXERCISES: CPT | Mod: GP,CQ

## 2025-07-14 NOTE — PROGRESS NOTES
"Physical Therapy Treatment     Patient Name: Tatyana Mckeon  MRN: [id number]  Today's Date: 7/14/2025  Time Calculation  Start Time: 1150  Stop Time: 1235  Time Calculation (min): 45 min       PT Therapeutic Procedures Time Entry  Therapeutic Exercise Time Entry: 30  Manual Therapy Time Entry: 15    INSURANCE:  Visit Number: 8  Insurance Type: Payor: [facility] MEDICARE / Plan: [facility] MEDICARE / Product Type: *No Product type* /   Authorization info:  NO AUTH / $35 COPAY / $4600 OOP not met /  [place] /      CMS Re-Certification Period:6/16/25 to 9/13/25      Current Problem  1. Knee stiffness, right        2. Effusion of right knee        3. Total knee replacement status, right   5-27-25 Follow Up In Physical Therapy          PRECAUTIONS:  Medical:  CA, UE lymphedema, open wound,     Fall risk:   high without use of walker.         PMH: (pertinent to PT evaluation)  Hypertension, diabetes type 2, obesity, breast cancer with metastases, anxiety, bipolar, right sided low back pain with sciatica, psoriasis, lymphedema.  *See Epic EMR for complete list.    Medical History Form: Reviewed (scanned into chart)     SPECIAL CONCERNS:   Pt also having \"burst blood vessels\" on breast, chest, and torso (shown to PT) Pt will contact doctor.     PAIN  Start of Treatment: 7/10 without meds   back is 8/10    SUBJECTIVE :  Discomfort persist R Lower back, effecting most movements         Pt is sore in the knee more that is has been. She has been out in the community a lot today.  The knee is burning and she noticed that it is warm     She saw the chiropractor  yesterday.    Her ITB is getting better.  She was out at the hip a little, but her back is more aligned.   she also had acupuncture and worked 5 hours.  She is almost exclusively on OTC meds now.   She saw  and had an injection in the L knee.          OBJECTIVE  TREATMENT:  Manual Therapy:   STM to R knee and proximal lower leg  around " "incision, entire length of ITB, and effleurage  IASTM to proximal ITB in supine.      Therapeutic Exercise:    Seated LE exercise for strengthening   LAQ 1 x15 with 2 #  Marching 1 x15  2 #   Hamstring curl  1 x10 with GTB  Abduction with Green theraband - x60 seconds  ball squeeze  - x60 seconds  Hamstring stretch 3x30 seconds    Standing:  with UE support for strengthening:  Hip flexion/marching   1 x10 with 2#  Hip abduction 1 x10 with 2 #  Smal squat x10  Heel raise x10     Parallel bars  Step up / Step down  up on 4 inch step 1x10  Rocking   1x10    Gait   20 feet x 2 with small-based quad cane and contact-guard assistance.  Patient gives herself verbal cues for sequence.      =====================DNP==================  Pre-gait in parallel bars  March in place with cane  Stepping L and R with  with SBQC in L UE   Walking with cane in parallel bars  Walk 30 feet with SBQC in department    Supine exercise for LE strengthening:  +SLR 1 x 10 with 0 # - DNP  +Heel slide 2 x10 with 0 # AAROM  +Hip abduction 1 x10 with 0 # - DNP  +SAQ 1 x10 with 0 #  + Quad Set 1 x10 with 3 second hold  - DNP  ============================================  Measurement taken supine :  -4 full ext to 105 flexion      Home exercise program: (HEP)  Per home care  **Stepping L and R with railing on L side  **Wt shift L and R with SBQC in L UE -   Added  \"**\" exercise to HEP    Education/instruction:  New:    Discussed progress and purpose of exercise    Prior:   + current status, general goals, treatment plan  + pain/spasm/swelling cycle  + edema control and  cold pack use  + safe use of Tubigrip  + realistic expectations of rehab  Show open area to doctor - no need for pictures   Swing and dangle  Purpose of exercise to improve skills needed for the cane (with pt and friend)  Ways to progress gait at home with the cane safely   Use of walker as needed       PAIN  End of session pain rating: knee 5/10 and hip 6/10    ASSESSMENT:    Pt is " improving with her ambulation with a small-based quad cane.  She was able to resume sitting and standing exercises including use of small resistance.  She has reduced restriction in ITB noted after manual.  Unchanged this session    Assessment of current progress against goals:  progressing    GOALS:  Pt stated goal:  Get rid of the walker  and walk normally     Short term goals:  + Increase strength by 1/3 grade  + Increase knee ROM by 10  degrees  + Reduce pain by 2 points  + Independent in a basic HEP     Long term goals:  + Decrease pain to <4/10  + Increase knee ROM to 0-120 degrees as needed for stairs and transfers  + Increase LE strength to 5/5  as needed for stairs, community mobility and house cleaning  + Reduce edema for improved mobility and reduced pain  + Able to perform sit to stand with no increased pain.   + Ambulate community distances without deviation on level and advanced surfaces for full community reintegration  + Able to ascend and descend 8 inch steps with good eccentric control   + Improve LE flexibility for reduced stress on joints and spine  + Assess balance and achieve low fall risk for safety   + Independent in a HEP for self-management of any further limitations from prior level of function.  + Able to perform sit to stand with no increased pain.     PLAN:  Progress as tolerated    Skilled physical therapy 2 times per week for 8-12 weeks  Treatment may include:  Therapeutic Exercise (43594)  Therapeutic Activity (75168)  Manual therapy (99152)  Neuro-muscular re-education (18106)  Gait Training (22178)  Unattended Electrical Stimulation (/[id number])  Attended Electrical Stimulation (43485)  Light therapy (04592)     Some of This note was created using voice recognition software and was not corrected for typographical or grammatical errors.

## 2025-07-16 ENCOUNTER — TREATMENT (OUTPATIENT)
Dept: PHYSICAL THERAPY | Facility: CLINIC | Age: 70
End: 2025-07-16
Payer: MEDICARE

## 2025-07-16 ENCOUNTER — HOSPITAL ENCOUNTER (OUTPATIENT)
Dept: RADIOLOGY | Facility: HOSPITAL | Age: 70
Discharge: HOME | End: 2025-07-16
Payer: MEDICARE

## 2025-07-16 VITALS — WEIGHT: 205 LBS | BODY MASS INDEX: 40.25 KG/M2 | HEIGHT: 60 IN

## 2025-07-16 DIAGNOSIS — M25.661 KNEE STIFFNESS, RIGHT: Primary | ICD-10-CM

## 2025-07-16 DIAGNOSIS — Z96.651 TOTAL KNEE REPLACEMENT STATUS, RIGHT: ICD-10-CM

## 2025-07-16 DIAGNOSIS — C50.311 MALIGNANT NEOPLASM OF LOWER-INNER QUADRANT OF RIGHT FEMALE BREAST: ICD-10-CM

## 2025-07-16 DIAGNOSIS — C50.911: ICD-10-CM

## 2025-07-16 DIAGNOSIS — M25.461 EFFUSION OF RIGHT KNEE: ICD-10-CM

## 2025-07-16 PROCEDURE — 97110 THERAPEUTIC EXERCISES: CPT | Mod: GP,CQ

## 2025-07-16 PROCEDURE — 77066 DX MAMMO INCL CAD BI: CPT

## 2025-07-16 PROCEDURE — 97140 MANUAL THERAPY 1/> REGIONS: CPT | Mod: GP,CQ

## 2025-07-16 NOTE — PROGRESS NOTES
"Physical Therapy Treatment     Patient Name: Tatyana Mckeon  MRN: [id number]  Today's Date: 7/16/2025  Time Calculation  Start Time: 0110  Stop Time: 0155  Time Calculation (min): 45 min       PT Therapeutic Procedures Time Entry  Therapeutic Exercise Time Entry: 35  Manual Therapy Time Entry: 10    INSURANCE:  Visit Number: 9  Insurance Type: Payor: [facility] MEDICARE / Plan: [facility] MEDICARE / Product Type: *No Product type* /   Authorization info:  NO AUTH / $35 COPAY / $4600 OOP not met /  [place] /      CMS Re-Certification Period:6/16/25 to 9/13/25      Current Problem  1. Knee stiffness, right        2. Effusion of right knee        3. Total knee replacement status, right   5-27-25 Follow Up In Physical Therapy          PRECAUTIONS:  Medical:  CA, UE lymphedema, open wound,     Fall risk:   high without use of walker.         PMH: (pertinent to PT evaluation)  Hypertension, diabetes type 2, obesity, breast cancer with metastases, anxiety, bipolar, right sided low back pain with sciatica, psoriasis, lymphedema.  *See Epic EMR for complete list.    Medical History Form: Reviewed (scanned into chart)     SPECIAL CONCERNS:   Pt also having \"burst blood vessels\" on breast, chest, and torso (shown to PT) Pt will contact doctor.     PAIN  Start of Treatment: 7/10 without meds   back is 8/10    SUBJECTIVE :  LBP and R hip more than R knee discomfort        Discomfort persist R Lower back, effecting most movements     Pt is sore in the knee more that is has been. She has been out in the community a lot today.  The knee is burning and she noticed that it is warm     She saw the chiropractor  yesterday.    Her ITB is getting better.  She was out at the hip a little, but her back is more aligned.   she also had acupuncture and worked 5 hours.  She is almost exclusively on OTC meds now.   She saw  and had an injection in the L knee.          OBJECTIVE  TREATMENT:  Manual Therapy:   STM " "to R knee and proximal lower leg  around incision,        Therapeutic Exercise:    Seated LE exercise for strengthening   LAQ 1 x15 with 2 #  Marching 1 x15  2 #   Hamstring curl  1 x10 with GTB  Abduction with Green theraband - x60 seconds  ball squeeze  - x60 seconds  Hamstring stretch 3x30 seconds    Standing:  with UE support for strengthening:  Hip flexion/marching   1 x10 with 2#  Hip abduction 1 x10 with 2 #  Smal squat x10  Heel raise x10     Parallel bars--- DNP  Step up / Step down  up on 4 inch step 1x10  Rocking   1x10    Supine exercise for LE strengthening:  +SLR 1 x 10 with 0 # -   +Heel slide 2 x10 with 0 # AAROM  +SAQ 1 x10 with 0 #  + Quad Set 1 x10 with 3 second hold  -     Measurement taken supine :  -4 full ext to 111 flexion    Cont working on proper gait pattern using quad cane, step through gait VS step to  gait    Supine with LE elevated onto small step stool with pillows the other LE extended to relax the hips ,pelvis and spine  X 1 min then switch LEs    Home exercise program: (HEP)  Per home care  **Stepping L and R with railing on L side  **Wt shift L and R with SBQC in L UE -   Added  \"**\" exercise to HEP    Education/instruction:  New:    Discussed progress and purpose of exercise    Prior:   + current status, general goals, treatment plan  + pain/spasm/swelling cycle  + edema control and  cold pack use  + safe use of Tubigrip  + realistic expectations of rehab  Show open area to doctor - no need for pictures   Swing and dangle  Purpose of exercise to improve skills needed for the cane (with pt and friend)  Ways to progress gait at home with the cane safely   Use of walker as needed       PAIN  End of session pain rating: knee 5/10 and hip 6/10    ASSESSMENT:  Discomfort varies throughout session with LBP/ R hip pain, slight increased edema noted R knee area this visit      Pt is improving with her ambulation with a small-based quad cane.  She was able to resume sitting and standing " exercises including use of small resistance.  She has reduced restriction in ITB noted after manual.  Unchanged this session    Assessment of current progress against goals:  progressing    GOALS:  Pt stated goal:  Get rid of the walker  and walk normally     Short term goals:  + Increase strength by 1/3 grade  + Increase knee ROM by 10  degrees  + Reduce pain by 2 points  + Independent in a basic HEP     Long term goals:  + Decrease pain to <4/10  + Increase knee ROM to 0-120 degrees as needed for stairs and transfers  + Increase LE strength to 5/5  as needed for stairs, community mobility and house cleaning  + Reduce edema for improved mobility and reduced pain  + Able to perform sit to stand with no increased pain.   + Ambulate community distances without deviation on level and advanced surfaces for full community reintegration  + Able to ascend and descend 8 inch steps with good eccentric control   + Improve LE flexibility for reduced stress on joints and spine  + Assess balance and achieve low fall risk for safety   + Independent in a HEP for self-management of any further limitations from prior level of function.  + Able to perform sit to stand with no increased pain.     PLAN:  Progress as tolerated    Skilled physical therapy 2 times per week for 8-12 weeks  Treatment may include:  Therapeutic Exercise (23702)  Therapeutic Activity (78980)  Manual therapy (96459)  Neuro-muscular re-education (64469)  Gait Training (00999)  Unattended Electrical Stimulation (/77866)  Attended Electrical Stimulation (79638)  Light therapy (30850)     Some of This note was created using voice recognition software and was not corrected for typographical or grammatical errors.

## 2025-07-21 ENCOUNTER — TREATMENT (OUTPATIENT)
Dept: PHYSICAL THERAPY | Facility: CLINIC | Age: 70
End: 2025-07-21
Payer: MEDICARE

## 2025-07-21 DIAGNOSIS — M25.661 KNEE STIFFNESS, RIGHT: Primary | ICD-10-CM

## 2025-07-21 DIAGNOSIS — Z96.651 TOTAL KNEE REPLACEMENT STATUS, RIGHT: ICD-10-CM

## 2025-07-21 DIAGNOSIS — M25.461 EFFUSION OF RIGHT KNEE: ICD-10-CM

## 2025-07-21 PROCEDURE — 97110 THERAPEUTIC EXERCISES: CPT | Mod: GP | Performed by: PHYSICAL THERAPIST

## 2025-07-21 NOTE — PROGRESS NOTES
"Physical Therapy Treatment     Patient Name: Tatyana Mckeon  MRN: [id number]  Today's Date: 7/21/2025  Time Calculation  Start Time: 1137  Stop Time: 1223  Time Calculation (min): 46 min       PT Therapeutic Procedures Time Entry  Therapeutic Exercise Time Entry: 42    INSURANCE:  Visit Number: 10  Insurance Type: Payor: [facility] MEDICARE / Plan: [facility] MEDICARE / Product Type: *No Product type* /   Authorization info:  NO AUTH / $35 COPAY / $4600 OOP not met /  [place] /      CMS Re-Certification Period:6/16/25 to 9/13/25      Current Problem  1. Knee stiffness, right        2. Effusion of right knee        3. Total knee replacement status, right   5-27-25 Follow Up In Physical Therapy          PRECAUTIONS:  Medical:  CA, UE lymphedema, open wound,     Fall risk:   high without use of walker.         PMH: (pertinent to PT evaluation)  Hypertension, diabetes type 2, obesity, breast cancer with metastases, anxiety, bipolar, right sided low back pain with sciatica, psoriasis, lymphedema.  *See Epic EMR for complete list.    Medical History Form: Reviewed (scanned into chart)     SPECIAL CONCERNS:   Pt also having \"burst blood vessels\" on breast, chest, and torso (shown to PT) Pt will contact doctor.     PAIN  Start of Treatment: 6/10 without meds      SUBJECTIVE :   The pt reports that her hip flexor is very tight.  She had some relief with stretching per last session.   Ibpurofen is every 4 hours.  She is sleeping in the recliner and icing.  She can get 3 hours of things done in the morning (mainly sitting) then the pain starts.          OBJECTIVE  TREATMENT:  Therapeutic Exercise:    Standing   hip flexor stretch 3x30 seconds    Seated LE exercise for strengthening   LAQ 1 x10 with 2 #  Marching 1 x10  2 #   Hamstring curl  1 x10 with GTB  Abduction with mint Green theraband - x60 seconds  ball squeeze  - x60 seconds  Hamstring stretch 3x30 seconds    Standing:  with UE support for " "strengthening:  Hip flexion/marching   1 x10 with 2#  Hip abduction 1 x10 with 2 #  Hip extnesion 1 x10 with 2 #  Smal squat x10  Heel raise x10     Gait with SBQC with CGA and verbal cues  ===============DNP===================  Parallel bars--- DNP  Step up / Step down  up on 4 inch step 1x10  Rocking   1x10    Supine exercise for LE strengthening:  +SLR 1 x 10 with 0 # -   +Heel slide 2 x10 with 0 # AAROM  +SAQ 1 x10 with 0 #  + Quad Set 1 x10 with 3 second hold  -   =====================================    Home exercise program: (HEP)  Per home care  **Stepping L and R with railing on L side  **Wt shift L and R with SBQC in L UE -   Added  \"**\" exercise to HEP    Education/instruction:  New:    Discussed progress and purpose of exercise    Prior:   + current status, general goals, treatment plan  + pain/spasm/swelling cycle  + edema control and  cold pack use  + safe use of Tubigrip  + realistic expectations of rehab  Show open area to doctor - no need for pictures   Swing and dangle  Purpose of exercise to improve skills needed for the cane (with pt and friend)  Ways to progress gait at home with the cane safely   Use of walker as needed       PAIN  End of session pain rating: not rated    ASSESSMENT:    Alternative stretching for hip flexor was effective in replacing supine stretch as she is not able to perform in her recliner.  Strength is improving.        Assessment of current progress against goals:  progressing    GOALS:  Pt stated goal:  Get rid of the walker  and walk normally     Short term goals:  + Increase strength by 1/3 grade  + Increase knee ROM by 10  degrees  + Reduce pain by 2 points  + Independent in a basic HEP     Long term goals:  + Decrease pain to <4/10  + Increase knee ROM to 0-120 degrees as needed for stairs and transfers  + Increase LE strength to 5/5  as needed for stairs, community mobility and house cleaning  + Reduce edema for improved mobility and reduced pain  + Able to perform " sit to stand with no increased pain.   + Ambulate community distances without deviation on level and advanced surfaces for full community reintegration  + Able to ascend and descend 8 inch steps with good eccentric control   + Improve LE flexibility for reduced stress on joints and spine  + Assess balance and achieve low fall risk for safety   + Independent in a HEP for self-management of any further limitations from prior level of function.  + Able to perform sit to stand with no increased pain.     PLAN:  Progress as tolerated    Skilled physical therapy 2 times per week for 8-12 weeks  Treatment may include:  Therapeutic Exercise (97287)  Therapeutic Activity (71753)  Manual therapy (65435)  Neuro-muscular re-education (61694)  Gait Training (10524)  Unattended Electrical Stimulation (/11677)  Attended Electrical Stimulation (73759)  Light therapy (87627)     Some of This note was created using voice recognition software and was not corrected for typographical or grammatical errors.

## 2025-07-23 ENCOUNTER — TREATMENT (OUTPATIENT)
Dept: PHYSICAL THERAPY | Facility: CLINIC | Age: 70
End: 2025-07-23
Payer: MEDICARE

## 2025-07-23 ENCOUNTER — OFFICE VISIT (OUTPATIENT)
Dept: PRIMARY CARE | Facility: CLINIC | Age: 70
End: 2025-07-23

## 2025-07-23 VITALS
DIASTOLIC BLOOD PRESSURE: 62 MMHG | WEIGHT: 202.6 LBS | BODY MASS INDEX: 39.57 KG/M2 | HEART RATE: 88 BPM | SYSTOLIC BLOOD PRESSURE: 114 MMHG

## 2025-07-23 DIAGNOSIS — M25.461 EFFUSION OF RIGHT KNEE: ICD-10-CM

## 2025-07-23 DIAGNOSIS — Z96.651 TOTAL KNEE REPLACEMENT STATUS, RIGHT: ICD-10-CM

## 2025-07-23 DIAGNOSIS — M25.661 KNEE STIFFNESS, RIGHT: Primary | ICD-10-CM

## 2025-07-23 DIAGNOSIS — Z00.00 WELLNESS EXAMINATION: Primary | ICD-10-CM

## 2025-07-23 PROCEDURE — 97140 MANUAL THERAPY 1/> REGIONS: CPT | Mod: GP | Performed by: PHYSICAL THERAPIST

## 2025-07-23 PROCEDURE — 97110 THERAPEUTIC EXERCISES: CPT | Mod: GP | Performed by: PHYSICAL THERAPIST

## 2025-07-23 PROCEDURE — 1125F AMNT PAIN NOTED PAIN PRSNT: CPT | Performed by: FAMILY MEDICINE

## 2025-07-23 PROCEDURE — 3078F DIAST BP <80 MM HG: CPT | Performed by: FAMILY MEDICINE

## 2025-07-23 PROCEDURE — 1159F MED LIST DOCD IN RCRD: CPT | Performed by: FAMILY MEDICINE

## 2025-07-23 PROCEDURE — 3074F SYST BP LT 130 MM HG: CPT | Performed by: FAMILY MEDICINE

## 2025-07-23 PROCEDURE — 97810 ACUP 1/> WO ESTIM 1ST 15 MIN: CPT | Performed by: FAMILY MEDICINE

## 2025-07-23 PROCEDURE — 4010F ACE/ARB THERAPY RXD/TAKEN: CPT | Performed by: FAMILY MEDICINE

## 2025-07-23 PROCEDURE — 97811 ACUP 1/> W/O ESTIM EA ADD 15: CPT | Performed by: FAMILY MEDICINE

## 2025-07-23 ASSESSMENT — PAIN SCALES - GENERAL: PAINLEVEL_OUTOF10: 6

## 2025-07-23 NOTE — PATIENT INSTRUCTIONS
We are doing very well, Tatyana.  As we discussed, the quitting smoking can help with psoriasis since the lung and the skin are in the same meridian.  Also, having the surgery behind you now  Allows all your energy to move forward, which is another reason you are feeling better.  Keep up the great work!

## 2025-07-23 NOTE — PROGRESS NOTES
"Physical Therapy Treatment  and re-assessment    Patient Name: Tatyana Mckeon  MRN: [id number]  Today's Date: 7/23/2025  Time Calculation  Start Time: 1050  Stop Time: 1130  Time Calculation (min): 40 min       PT Therapeutic Procedures Time Entry  Therapeutic Exercise Time Entry: 30  Manual Therapy Time Entry: 10    INSURANCE:  Visit Number: 11  Insurance Type: Payor: [facility] MEDICARE / Plan: [facility] MEDICARE / Product Type: *No Product type* /   Authorization info:  NO AUTH / $35 COPAY / $4600 OOP not met /  [place] /      CMS Re-Certification Period:6/16/25 to 9/13/25      Current Problem  1. Knee stiffness, right        2. Effusion of right knee        3. Total knee replacement status, right   5-27-25 Follow Up In Physical Therapy          PRECAUTIONS:  Medical:  CA, UE lymphedema, open wound,     Fall risk:   high without use of walker.         PMH: (pertinent to PT evaluation)  Hypertension, diabetes type 2, obesity, breast cancer with metastases, anxiety, bipolar, right sided low back pain with sciatica, psoriasis, lymphedema.  *See Epic EMR for complete list.    Medical History Form: Reviewed (scanned into chart)     SPECIAL CONCERNS:   Pt also having \"burst blood vessels\" on breast, chest, and torso (shown to PT) Pt will contact doctor.     PAIN  Current  5 /10  RANGE: 4/10  to  8 /10  Location: lateral, medial and posterior thigh and knee  Description:  dull, achy, tight  Aggravating: last 2 hours of the 6 hours of medication  Reducing: ice       SUBJECTIVE :   The pt just saw her doctor who notices improvement in ITB and hip flexors.    She had chiropractic treatment yesterday and went into place very easily.  She has some pain in the back above the R buttock        OBJECTIVE  ROM (in degrees)                            Right   Knee flex      AROM 105   PORM  111   Knee ext        0   Dorsiflexion    13                                MMT:    (out of 5)                            " Right   Hip flexion    4+   Hip extension    4+   Hip abduction     5   Knee flex      5   Knee ext         4+   Dorsiflexion      5         SPECIAL TESTS      Knee:                                       R                        Patellar mobility                  mild limitation            FLEXIBILITY:                        R              Hamstrings (at 90/90)     restricted    Quads                            restricted            EDEMA:                        R                 L   Infrapatellar                 39.1 cm 37.3 cm        SKIN:   Scar is closed with  scab at distal end  Scar mobility restricted in distal 1/2   Healed area on the lateral thigh (thought to be drain site)     PALPATION  Tightness noted in  R knee and surrounding tissue     BED MOBILITY:  Sit to supine:  modified  independent  Supine to sit: modified  independent     TRANSFERS:  Sit to stand: modified  independent   with B UE to push up   Stand to sit: modified  independent  with UE to  control descent     GAIT  Distance: 50 ft observed  Assistive device:  RW  Assistance needed: none  Deviations:  +reduced Antalgic with decreased stance time on the R LE  +improving  step length on B LE  + improving  heel strike on the R LE - tries to correct  + improving  push off on the R  LE - tries to correct  +improving deejay  + improved Knee flexion in mid stance of the R LE  + improving knee flexion in swing on the R LE!      Distance: 50 ft observed  Assistive device:  SBQC  Assistance needed: CGA/SBA  Deviations:  +Antalgic with decreased stance time on the R LE  +Decreased step length on B LE  + decreased heel strike on the R LE   + decreased push off on the R  LE   +slow deejay self cues for the pattern  + Knee flexion in mid stance of the R LE  + improving knee flexion in swing on the R LE      BALANCE:    Static  good  Dynamic   TBA     TREATMENT:  Manual therapy:   STM, effleurage, scar mobilization, and patellar mobilization in  "supine    Therapeutic Exercise:    See objective measures  Standing:  with UE support for strengthening:  Hip flexion/marching   1 x10 with 0#  Hip abduction 1 x10 with 0 #  Hip extnesion 1 x10 with 0 #      Gait with SBQC with CGA and verbal cues  ===============DNP===================  Standing   hip flexor stretch 3x30 seconds  Smal squat x10  Heel raise x10     Parallel bars--- DNP  Step up / Step down  up on 4 inch step 1x10  Rocking   1x10    Seated LE exercise for strengthening   LAQ 1 x10 with 2 #  Marching 1 x10  2 #   Hamstring curl  1 x10 with GTB  Abduction with mint Green theraband - x60 seconds  ball squeeze  - x60 seconds  Hamstring stretch 3x30 seconds    Supine exercise for LE strengthening:  +SLR 1 x 10 with 0 # -   +Heel slide 2 x10 with 0 # AAROM  +SAQ 1 x10 with 0 #  + Quad Set 1 x10 with 3 second hold  -   =====================================    Home exercise program: (HEP)  Per home care  **Stepping L and R with railing on L side  **Wt shift L and R with SBQC in L UE -   Added  \"**\" exercise to HEP    Education/instruction:  New:    Discussed progress and purpose of exercise    Prior:   + current status, general goals, treatment plan  + pain/spasm/swelling cycle  + edema control and  cold pack use  + safe use of Tubigrip  + realistic expectations of rehab  Show open area to doctor - no need for pictures   Swing and dangle  Purpose of exercise to improve skills needed for the cane (with pt and friend)  Ways to progress gait at home with the cane safely   Use of walker as needed       PAIN  End of session pain rating: not rated    ASSESSMENT:    This patient is making progress in all areas.  She has significantly less edema throughout the surgical limb allowing for greater range of motion.  Strength is improving.  She continues to be challenged with proper pattern with small-based quad cane, but is determined to progress to the quad cane from the walker.      Assessment of current progress " against goals:  progressing    GOALS:  Pt stated goal:  Get rid of the walker  and walk normally     Short term goals:  + Increase strength by 1/3 grade (MET)  + Increase knee ROM by 10  degrees (MET)  + Reduce pain by 2 points (PARTIALLY MET)  + Independent in a basic HEP (MET)     Long term goals:  + Decrease pain to <4/10 (IMPROVING)  + Increase knee ROM to 0-120 degrees as needed for stairs and transfers (IMPROVING)  + Increase LE strength to 5/5  as needed for stairs, community mobility and house cleaning (IMPROVING)  + Reduce edema for improved mobility and reduced pain (IMPROVING)  + Able to perform sit to stand with no increased pain.  (IMPROVING)  + Ambulate community distances without deviation on level and advanced surfaces for full community reintegration  + Able to ascend and descend 8 inch steps with good eccentric control   + Improve LE flexibility for reduced stress on joints and spine (IMPROVING)  + Assess balance and achieve low fall risk for safety   + Independent in a HEP for self-management of any further limitations from prior level of function. (IMPROVING)  + Able to perform sit to stand with no increased pain.  (IMPROVING)    PLAN:  Progress as tolerated    Skilled physical therapy 2 times per week for 4-6 weeks  Treatment may include:  Therapeutic Exercise (02452)  Therapeutic Activity (41528)  Manual therapy (87184)  Neuro-muscular re-education (99117)  Gait Training (00575)  Unattended Electrical Stimulation (/76809)  Attended Electrical Stimulation (03444)  Light therapy (36525)     Some of This note was created using voice recognition software and was not corrected for typographical or grammatical errors.

## 2025-07-23 NOTE — PROGRESS NOTES
Subjective   Patient ID: Tatyana Mckeon is a 70 y.o. female who presents for Acupuncture.    HPI   She presents today for acupuncture treatment.  Overall, she is doing much better now.  She quit smoking.  She has had her knee surgery.  She states her psoriasis is actually improved since surgery.  She is having regular physical therapy.  Her range of motion is good.  She has that 110 degrees now.  Still has trouble getting around just overall weakness which she feels as improving as well.  She had a mammogram, which was normal.  She did have a lot of inflammation after the surgery, especially in her thigh.  She still has some inflammation proximally now which she thinks is the main reason for her pain.    Review of Systems    Objective   /62   Pulse 88   Wt 91.9 kg (202 lb 9.6 oz)   BMI 39.57 kg/m²     Physical Exam  She is alert, no apparent distress, her affect is pleasant.  Her right knee scar is well-healed.    Assessment/Plan   Diagnoses and all orders for this visit:  Wellness examination  She is doing great.  She will continue with her chiropractic care and her acupuncture.  She will continue physical therapy.    Acupuncture treatment Padminie use the following points bilaterally LI 4, XL heart, GB 43, LB 2, BL 57 and 59.  These retained for 20 minutes with a heating lamp over her feet.  She tolerated this well.

## 2025-07-28 ENCOUNTER — TREATMENT (OUTPATIENT)
Dept: PHYSICAL THERAPY | Facility: CLINIC | Age: 70
End: 2025-07-28
Payer: MEDICARE

## 2025-07-28 DIAGNOSIS — M25.661 KNEE STIFFNESS, RIGHT: Primary | ICD-10-CM

## 2025-07-28 DIAGNOSIS — M25.461 EFFUSION OF RIGHT KNEE: ICD-10-CM

## 2025-07-28 DIAGNOSIS — Z96.651 TOTAL KNEE REPLACEMENT STATUS, RIGHT: ICD-10-CM

## 2025-07-28 PROCEDURE — 97140 MANUAL THERAPY 1/> REGIONS: CPT | Mod: GP,CQ

## 2025-07-28 PROCEDURE — 97110 THERAPEUTIC EXERCISES: CPT | Mod: GP,CQ

## 2025-07-28 NOTE — PROGRESS NOTES
"Physical Therapy Treatment      Patient Name: Tatyana Mckeon  MRN: [id number]  Today's Date: 7/28/2025  Time Calculation  Start Time: 1150  Stop Time: 1230  Time Calculation (min): 40 min       PT Therapeutic Procedures Time Entry  Therapeutic Exercise Time Entry: 30  Manual Therapy Time Entry: 10    INSURANCE:  Visit Number: 12  Insurance Type: Payor: [facility] MEDICARE / Plan: [employer] [facility] [place] MEDICARE / Product Type: *No Product type* /   Authorization info:  NO AUTH / $35 COPAY / $4600 OOP not met /  [place] /      CMS Re-Certification Period:6/16/25 to 9/13/25      Current Problem  1. Knee stiffness, right        2. Effusion of right knee        3. Total knee replacement status, right   5-27-25 Follow Up In Physical Therapy          PRECAUTIONS:  Medical:  CA, UE lymphedema, open wound,     Fall risk:   high without use of walker.         PMH: (pertinent to PT evaluation)  Hypertension, diabetes type 2, obesity, breast cancer with metastases, anxiety, bipolar, right sided low back pain with sciatica, psoriasis, lymphedema.  *See Epic EMR for complete list.    Medical History Form: Reviewed (scanned into chart)     SPECIAL CONCERNS:   Pt also having \"burst blood vessels\" on breast, chest, and torso (shown to PT) Pt will contact doctor.     PAIN  Current  5 /10  RANGE: 4/10  to  8 /10  Location: lateral, medial and posterior thigh and knee  Description:  dull, achy, tight  Aggravating: last 2 hours of the 6 hours of medication  Reducing: ice       SUBJECTIVE : Hip flexors and IT band  discomfort/ tightness persist    PRIOR  She had chiropractic treatment yesterday and went into place very easily.  She has some pain in the back above the R buttock        OBJECTIVE   Distance: 50 ft observed  Assistive device:  RW  Assistance needed: none  Deviations:  +reduced Antalgic with decreased stance time on the R LE  +improving  step length on B LE  + improving  heel strike on the R LE - tries to correct  + " "improving  push off on the R  LE - tries to correct  +improving deejay  + improved Knee flexion in mid stance of the R LE  + improving knee flexion in swing on the R LE!      Distance: 50 ft observed  Assistive device:  SBQC  Assistance needed: CGA/SBA  Deviations:  +Antalgic with decreased stance time on the R LE  +Decreased step length on B LE  + decreased heel strike on the R LE   + decreased push off on the R  LE   +slow deejay self cues for the pattern  + Knee flexion in mid stance of the R LE  + improving knee flexion in swing on the R LE      BALANCE:    Static  good  Dynamic   TBA     TREATMENT:  Manual therapy:   STM, effleurage, scar mobilization, and patellar mobilization in supine    Therapeutic Exercise:    See objective measures  Standing:  with UE support for stretching :inside parallel bars  Knee flexion  10 sec on 5 sec off x 10 reps onto 8 inch block      Gait with SBQC with CGA and verbal cues    Supine exercise for LE strengthening:( supine to sit min assist)  +SLR 1 x 10 with 0 # -   +Heel slide 2 x10 with 0 # AAROM with green strap and slide board  +SAQ 1 x10 with 0 #  + Quad Set 1 x10 with 3 second hold  -       ===============DNP===================  Standing   hip flexor stretch 3x30 seconds  Smal squat x10  Heel raise x10     Parallel bars  Step up / Step down  up on 4 inch step 1x10  Rocking   1x10    Seated LE exercise for strengthening   LAQ 1 x10 with 2 #  Marching 1 x10  2 #   Hamstring curl  1 x10 with GTB  Abduction with mint Green theraband - x60 seconds  ball squeeze  - x60 seconds  Hamstring stretch 3x30 seconds    =====================================    Home exercise program: (HEP)  Per home care  **Stepping L and R with railing on L side  **Wt shift L and R with SBQC in L UE -   Added  \"**\" exercise to HEP    Education/instruction:  New:    Discussed progress and purpose of exercise    Prior:   + current status, general goals, treatment plan  + pain/spasm/swelling cycle  + " edema control and  cold pack use  + safe use of Tubigrip  + realistic expectations of rehab  Show open area to doctor - no need for pictures   Swing and dangle  Purpose of exercise to improve skills needed for the cane (with pt and friend)  Ways to progress gait at home with the cane safely   Use of walker as needed       PAIN  End of session pain rating: not rated    ASSESSMENT:  Pt cont with ice at home  ,cont with stretching,  still uncomfortable  to amb with quad cane at home       PRIOR  This patient is making progress in all areas.  She has significantly less edema throughout the surgical limb allowing for greater range of motion.  Strength is improving.  She continues to be challenged with proper pattern with small-based quad cane, but is determined to progress to the quad cane from the walker.      Assessment of current progress against goals:  progressing    GOALS:  Pt stated goal:  Get rid of the walker  and walk normally     Short term goals:  + Increase strength by 1/3 grade (MET)  + Increase knee ROM by 10  degrees (MET)  + Reduce pain by 2 points (PARTIALLY MET)  + Independent in a basic HEP (MET)     Long term goals:  + Decrease pain to <4/10 (IMPROVING)  + Increase knee ROM to 0-120 degrees as needed for stairs and transfers (IMPROVING)  + Increase LE strength to 5/5  as needed for stairs, community mobility and house cleaning (IMPROVING)  + Reduce edema for improved mobility and reduced pain (IMPROVING)  + Able to perform sit to stand with no increased pain.  (IMPROVING)  + Ambulate community distances without deviation on level and advanced surfaces for full community reintegration  + Able to ascend and descend 8 inch steps with good eccentric control   + Improve LE flexibility for reduced stress on joints and spine (IMPROVING)  + Assess balance and achieve low fall risk for safety   + Independent in a HEP for self-management of any further limitations from prior level of function. (IMPROVING)  +  Able to perform sit to stand with no increased pain.  (IMPROVING)    PLAN:  Progress as tolerated    Skilled physical therapy 2 times per week for 4-6 weeks  Treatment may include:  Therapeutic Exercise (38999)  Therapeutic Activity (92803)  Manual therapy (78199)  Neuro-muscular re-education (72351)  Gait Training (29044)  Unattended Electrical Stimulation (/95067)  Attended Electrical Stimulation (89821)  Light therapy (34999)     Some of This note was created using voice recognition software and was not corrected for typographical or grammatical errors.

## 2025-07-30 ENCOUNTER — APPOINTMENT (OUTPATIENT)
Dept: PRIMARY CARE | Facility: CLINIC | Age: 70
End: 2025-07-30

## 2025-07-30 RX ORDER — AMOXICILLIN 500 MG/1
500 TABLET, FILM COATED ORAL
COMMUNITY
Start: 2025-07-25

## 2025-07-31 ENCOUNTER — APPOINTMENT (OUTPATIENT)
Dept: PHYSICAL THERAPY | Facility: CLINIC | Age: 70
End: 2025-07-31
Payer: MEDICARE

## 2025-07-31 DIAGNOSIS — M25.461 EFFUSION OF RIGHT KNEE: ICD-10-CM

## 2025-07-31 DIAGNOSIS — M25.661 KNEE STIFFNESS, RIGHT: Primary | ICD-10-CM

## 2025-08-06 ENCOUNTER — TREATMENT (OUTPATIENT)
Dept: PHYSICAL THERAPY | Facility: CLINIC | Age: 70
End: 2025-08-06
Payer: MEDICARE

## 2025-08-06 DIAGNOSIS — Z96.651 TOTAL KNEE REPLACEMENT STATUS, RIGHT: ICD-10-CM

## 2025-08-06 DIAGNOSIS — M25.661 KNEE STIFFNESS, RIGHT: Primary | ICD-10-CM

## 2025-08-06 DIAGNOSIS — M25.461 EFFUSION OF RIGHT KNEE: ICD-10-CM

## 2025-08-06 PROCEDURE — 97110 THERAPEUTIC EXERCISES: CPT | Mod: GP,CQ

## 2025-08-06 NOTE — PROGRESS NOTES
"Physical Therapy Treatment      Patient Name: Tatyana Mckeon  MRN: [id number]  Today's Date: 8/6/2025  Time Calculation  Start Time: 1020  Stop Time: 1100  Time Calculation (min): 40 min       PT Therapeutic Procedures Time Entry  Therapeutic Exercise Time Entry: 40    INSURANCE:  Visit Number: 13  Insurance Type: Payor: [facility] MEDICARE / Plan: [facility] MEDICARE / Product Type: *No Product type* /   Authorization info:  NO AUTH / $35 COPAY / $4600 OOP not met /  [place] /      CMS Re-Certification Period:6/16/25 to 9/13/25      Current Problem  1. Knee stiffness, right        2. Effusion of right knee        3. Total knee replacement status, right   5-27-25 Follow Up In Physical Therapy          PRECAUTIONS:  Medical:  CA, UE lymphedema, open wound,     Fall risk:   high without use of walker.         PMH: (pertinent to PT evaluation)  Hypertension, diabetes type 2, obesity, breast cancer with metastases, anxiety, bipolar, right sided low back pain with sciatica, psoriasis, lymphedema.  *See Epic EMR for complete list.    Medical History Form: Reviewed (scanned into chart)     SPECIAL CONCERNS:   Pt also having \"burst blood vessels\" on breast, chest, and torso (shown to PT) Pt will contact doctor.     PAIN  Current  5 /10  RANGE: 4/10  to  8 /10  Location: lateral, medial and posterior thigh and knee  Description:  dull, achy, tight  Aggravating: last 2 hours of the 6 hours of medication  Reducing: ice       SUBJECTIVE : Hip flexors and IT band  discomfort/ tightness persist espec Quad area R     PRIOR  She had chiropractic treatment yesterday and went into place very easily.  She has some pain in the back above the R buttock    OBJECTIVE   Distance: 50 ft observed  Assistive device:  RW  Assistance needed: none  Deviations:  +reduced Antalgic with decreased stance time on the R LE  +improving  step length on B LE  + improving  heel strike on the R LE - tries to correct  + improving  push " "off on the R  LE - tries to correct  +improving deejay  + improved Knee flexion in mid stance of the R LE  + improving knee flexion in swing on the R LE!      Distance: 50 ft observed  Assistive device:  SBQC  Assistance needed: CGA/SBA  Deviations:  +Antalgic with decreased stance time on the R LE  +Decreased step length on B LE  + decreased heel strike on the R LE   + decreased push off on the R  LE   +slow deejay self cues for the pattern  + Knee flexion in mid stance of the R LE  + improving knee flexion in swing on the R LE      TREATMENT:  Manual therapy:   STM, effleurage, scar mobilization, and patellar mobilization in supine    Therapeutic Exercise:    See objective measures  Standing:  with UE support for stretching :inside parallel bars  Knee flexion  10 sec on 5 sec off x 10 reps onto 8 inch block    Hip flexor/ Quad stretch over EOB    Gait with SBQC  and straight cane with  Supervision and verbal cues    Supine exercise for LE strengthening:( supine to sit min assist)  +SLR 1 x 10 with 0 # -   +Heel slide 2 x10 with 0 # AAROM with green strap and slide board  +SAQ 1 x10 with 0 #  + Quad Set 1 x10 with 3 second hold  -     Seated LE exercise for strengthening   LAQ 1 x10 with 4 #  Marching 1 x10  2 #   Abduction with mint Green theraband - x60 seconds  ball squeeze  - x60 seconds      MEASUREMENT full ext  to  110 flexion  =====================================    Home exercise program: (HEP)  Per home care  **Stepping L and R with railing on L side  **Wt shift L and R with SBQC in L UE -   Added  \"**\" exercise to HEP    Education/instruction:  New:    Discussed progress and purpose of exercise    Prior:   + current status, general goals, treatment plan  + pain/spasm/swelling cycle  + edema control and  cold pack use  + safe use of Tubigrip  + realistic expectations of rehab  Show open area to doctor - no need for pictures   Swing and dangle  Purpose of exercise to improve skills needed for the cane " (with pt and friend)  Ways to progress gait at home with the cane safely   Use of walker as needed       PAIN  End of session pain rating: not rated    ASSESSMENT:  Pt cont with ice at home  ,cont with stretching,  still uncomfortable  to amb with quad cane at home , experienced increased discomfort in R Quad into R hip following ex / amb . Able to perform # 4 wt with LAQ      PRIOR  This patient is making progress in all areas.  She has significantly less edema throughout the surgical limb allowing for greater range of motion.  Strength is improving.  She continues to be challenged with proper pattern with small-based quad cane, but is determined to progress to the quad cane from the walker.      Assessment of current progress against goals:  progressing    GOALS:  Pt stated goal:  Get rid of the walker  and walk normally     Short term goals:  + Increase strength by 1/3 grade (MET)  + Increase knee ROM by 10  degrees (MET)  + Reduce pain by 2 points (PARTIALLY MET)  + Independent in a basic HEP (MET)     Long term goals:  + Decrease pain to <4/10 (IMPROVING)  + Increase knee ROM to 0-120 degrees as needed for stairs and transfers (IMPROVING)  + Increase LE strength to 5/5  as needed for stairs, community mobility and house cleaning (IMPROVING)  + Reduce edema for improved mobility and reduced pain (IMPROVING)  + Able to perform sit to stand with no increased pain.  (IMPROVING)  + Ambulate community distances without deviation on level and advanced surfaces for full community reintegration  + Able to ascend and descend 8 inch steps with good eccentric control   + Improve LE flexibility for reduced stress on joints and spine (IMPROVING)  + Assess balance and achieve low fall risk for safety   + Independent in a HEP for self-management of any further limitations from prior level of function. (IMPROVING)  + Able to perform sit to stand with no increased pain.  (IMPROVING)    PLAN:  Progress as tolerated    Skilled  physical therapy 2 times per week for 4-6 weeks  Treatment may include:  Therapeutic Exercise (09549)  Therapeutic Activity (94794)  Manual therapy (60464)  Neuro-muscular re-education (58515)  Gait Training (00439)  Unattended Electrical Stimulation (/33309)  Attended Electrical Stimulation (34037)  Light therapy (43124)     Some of This note was created using voice recognition software and was not corrected for typographical or grammatical errors.

## 2025-08-13 ENCOUNTER — OFFICE VISIT (OUTPATIENT)
Dept: PRIMARY CARE | Facility: CLINIC | Age: 70
End: 2025-08-13

## 2025-08-13 VITALS
HEART RATE: 76 BPM | DIASTOLIC BLOOD PRESSURE: 72 MMHG | WEIGHT: 202.4 LBS | SYSTOLIC BLOOD PRESSURE: 122 MMHG | BODY MASS INDEX: 39.53 KG/M2

## 2025-08-13 DIAGNOSIS — F32.A DEPRESSION, UNSPECIFIED DEPRESSION TYPE: ICD-10-CM

## 2025-08-13 DIAGNOSIS — R35.1 NOCTURIA: ICD-10-CM

## 2025-08-13 DIAGNOSIS — M79.604 PAIN OF RIGHT LOWER EXTREMITY: Primary | ICD-10-CM

## 2025-08-13 PROCEDURE — 1125F AMNT PAIN NOTED PAIN PRSNT: CPT | Performed by: FAMILY MEDICINE

## 2025-08-13 PROCEDURE — 3074F SYST BP LT 130 MM HG: CPT | Performed by: FAMILY MEDICINE

## 2025-08-13 PROCEDURE — 97810 ACUP 1/> WO ESTIM 1ST 15 MIN: CPT | Performed by: FAMILY MEDICINE

## 2025-08-13 PROCEDURE — 4010F ACE/ARB THERAPY RXD/TAKEN: CPT | Performed by: FAMILY MEDICINE

## 2025-08-13 PROCEDURE — 1159F MED LIST DOCD IN RCRD: CPT | Performed by: FAMILY MEDICINE

## 2025-08-13 PROCEDURE — 3078F DIAST BP <80 MM HG: CPT | Performed by: FAMILY MEDICINE

## 2025-08-13 PROCEDURE — 97811 ACUP 1/> W/O ESTIM EA ADD 15: CPT | Performed by: FAMILY MEDICINE

## 2025-08-13 RX ORDER — BUPROPION HYDROCHLORIDE 150 MG/1
150 TABLET ORAL
COMMUNITY
Start: 2025-07-31

## 2025-08-13 ASSESSMENT — PAIN SCALES - GENERAL: PAINLEVEL_OUTOF10: 7

## 2025-08-14 ENCOUNTER — APPOINTMENT (OUTPATIENT)
Dept: PHYSICAL THERAPY | Facility: CLINIC | Age: 70
End: 2025-08-14
Payer: MEDICARE

## 2025-08-14 DIAGNOSIS — M25.661 KNEE STIFFNESS, RIGHT: Primary | ICD-10-CM

## 2025-08-14 DIAGNOSIS — M25.461 EFFUSION OF RIGHT KNEE: ICD-10-CM

## 2025-08-20 ENCOUNTER — TREATMENT (OUTPATIENT)
Dept: PHYSICAL THERAPY | Facility: CLINIC | Age: 70
End: 2025-08-20
Payer: MEDICARE

## 2025-08-20 DIAGNOSIS — Z96.651 TOTAL KNEE REPLACEMENT STATUS, RIGHT: ICD-10-CM

## 2025-08-20 DIAGNOSIS — M25.661 KNEE STIFFNESS, RIGHT: Primary | ICD-10-CM

## 2025-08-20 DIAGNOSIS — M25.461 EFFUSION OF RIGHT KNEE: ICD-10-CM

## 2025-08-20 PROCEDURE — 97110 THERAPEUTIC EXERCISES: CPT | Mod: GP | Performed by: PHYSICAL THERAPIST

## 2025-08-20 PROCEDURE — 97116 GAIT TRAINING THERAPY: CPT | Mod: GP | Performed by: PHYSICAL THERAPIST

## 2025-08-20 PROCEDURE — 97112 NEUROMUSCULAR REEDUCATION: CPT | Mod: GP | Performed by: PHYSICAL THERAPIST

## 2025-08-27 ENCOUNTER — TREATMENT (OUTPATIENT)
Dept: PHYSICAL THERAPY | Facility: CLINIC | Age: 70
End: 2025-08-27
Payer: MEDICARE

## 2025-08-27 DIAGNOSIS — M25.461 EFFUSION OF RIGHT KNEE: ICD-10-CM

## 2025-08-27 DIAGNOSIS — Z96.651 TOTAL KNEE REPLACEMENT STATUS, RIGHT: ICD-10-CM

## 2025-08-27 DIAGNOSIS — M25.661 KNEE STIFFNESS, RIGHT: Primary | ICD-10-CM

## 2025-08-27 PROCEDURE — 97110 THERAPEUTIC EXERCISES: CPT | Mod: GP | Performed by: PHYSICAL THERAPIST

## 2025-08-28 ENCOUNTER — APPOINTMENT (OUTPATIENT)
Dept: SURGERY | Facility: CLINIC | Age: 70
End: 2025-08-28
Payer: MEDICARE

## 2025-08-29 ENCOUNTER — OFFICE VISIT (OUTPATIENT)
Dept: PRIMARY CARE | Facility: CLINIC | Age: 70
End: 2025-08-29

## 2025-08-29 VITALS
SYSTOLIC BLOOD PRESSURE: 124 MMHG | HEART RATE: 82 BPM | WEIGHT: 200.4 LBS | DIASTOLIC BLOOD PRESSURE: 68 MMHG | BODY MASS INDEX: 39.14 KG/M2

## 2025-08-29 DIAGNOSIS — M79.604 PAIN OF RIGHT LOWER EXTREMITY: Primary | ICD-10-CM

## 2025-08-29 PROCEDURE — 4010F ACE/ARB THERAPY RXD/TAKEN: CPT | Performed by: FAMILY MEDICINE

## 2025-08-29 PROCEDURE — 1125F AMNT PAIN NOTED PAIN PRSNT: CPT | Performed by: FAMILY MEDICINE

## 2025-08-29 PROCEDURE — 97810 ACUP 1/> WO ESTIM 1ST 15 MIN: CPT | Performed by: FAMILY MEDICINE

## 2025-08-29 PROCEDURE — 3078F DIAST BP <80 MM HG: CPT | Performed by: FAMILY MEDICINE

## 2025-08-29 PROCEDURE — 3074F SYST BP LT 130 MM HG: CPT | Performed by: FAMILY MEDICINE

## 2025-08-29 PROCEDURE — 3044F HG A1C LEVEL LT 7.0%: CPT | Performed by: FAMILY MEDICINE

## 2025-08-29 PROCEDURE — 1159F MED LIST DOCD IN RCRD: CPT | Performed by: FAMILY MEDICINE

## 2025-08-29 PROCEDURE — 97811 ACUP 1/> W/O ESTIM EA ADD 15: CPT | Performed by: FAMILY MEDICINE

## 2025-08-29 PROCEDURE — 4004F PT TOBACCO SCREEN RCVD TLK: CPT | Performed by: FAMILY MEDICINE

## 2025-08-29 ASSESSMENT — PAIN SCALES - GENERAL: PAINLEVEL_OUTOF10: 5

## 2025-09-05 ENCOUNTER — TREATMENT (OUTPATIENT)
Dept: PHYSICAL THERAPY | Facility: CLINIC | Age: 70
End: 2025-09-05
Payer: MEDICARE

## 2025-09-05 DIAGNOSIS — M25.661 KNEE STIFFNESS, RIGHT: Primary | ICD-10-CM

## 2025-09-05 DIAGNOSIS — Z96.651 TOTAL KNEE REPLACEMENT STATUS, RIGHT: ICD-10-CM

## 2025-09-05 DIAGNOSIS — M25.461 EFFUSION OF RIGHT KNEE: ICD-10-CM

## 2025-09-05 PROCEDURE — 97110 THERAPEUTIC EXERCISES: CPT | Mod: GP | Performed by: PHYSICAL THERAPIST

## 2025-09-10 ENCOUNTER — APPOINTMENT (OUTPATIENT)
Dept: PRIMARY CARE | Facility: CLINIC | Age: 70
End: 2025-09-10

## (undated) DEVICE — SUTURE, VICRYL, 2-0, 36 IN, CT-1, UNDYED

## (undated) DEVICE — TOWEL PACK 10-PK

## (undated) DEVICE — CEMENT, MIXEVAC III, 10S BOWL, KNEES

## (undated) DEVICE — ELECTRODE, ELECTROSURGICAL, BLADE, INSULATED, ENT/IMA, STERILE

## (undated) DEVICE — PROBE COVER, INTRAOPERATIVE, 13 X 244CM (5 X 96IN)

## (undated) DEVICE — STRIP, SKIN CLOSURE, STERI STRIP, REINFORCED, 0.5 X 4 IN

## (undated) DEVICE — GLOVE, SURGICAL, PROTEXIS PI , 6.5, PF, LF

## (undated) DEVICE — BLADE, SURGICAL, POLYMER COATED P15, STERILE, DISP

## (undated) DEVICE — SUTURE, SILK, 2-0, 30 IN, SH, BLACK

## (undated) DEVICE — DRAPE, INSTRUMENT, W/POUCH, STERI DRAPE, 7 X 11 IN, DISPOSABLE, STERILE

## (undated) DEVICE — SUTURE, VICRYL, 0, 36 IN, CT-1, UNDYED

## (undated) DEVICE — WATER STERILE, FOR IRRIGATION, 1000ML, W/HANGER

## (undated) DEVICE — DRAPE, TAPE, ORTHO

## (undated) DEVICE — SUTURE, ETHIBOND, P2, V-37, 30 IN, GREEN

## (undated) DEVICE — Device

## (undated) DEVICE — GLOVE, SURGICAL, PROTEXIS PI BLUE W/NEUTHERA, 8.0, PF, LF

## (undated) DEVICE — SLEEVE, VASO PRESS, CALF GARMENT, MEDIUM, GREEN

## (undated) DEVICE — SUTURE, MONOCRYL, 4-0, 18 IN, PS2, UNDYED

## (undated) DEVICE — GLOVE, SURGICAL, PROTEXIS PI , 7.5, PF, LF

## (undated) DEVICE — DRAPE, LEGGINGS, 48 X 31 IN, STERILE, LF

## (undated) DEVICE — SOLUTION, IRRIGATION, STERILE WATER, 1000 ML, POUR BOTTLE

## (undated) DEVICE — TUBING, IRRIGATION, HIGH FLOW, HAND PIECE SET

## (undated) DEVICE — SUTURE, VICRYL, 2-0, 27 IN, SH, UNDYED

## (undated) DEVICE — SOLUTION, IRRIGATION, X RX SODIUM CHL 0.9%, 1000ML BTL

## (undated) DEVICE — TIP, SUCTION, FRAZIER, W/CONTROL VENT, 12 FR

## (undated) DEVICE — MARKER, SURGICAL, SKIN, REG TIP, W/ RULER & LABELS

## (undated) DEVICE — ADHESIVE, SKIN, DERMABOND ADVANCED, 15CM, PEN-STYLE

## (undated) DEVICE — ASSEMBLY, STRYKER FLOW 2, SUCTION IRRIGATOR, WITH TIP

## (undated) DEVICE — SUTURE, VICRYL, 3-0, 27 IN, SH

## (undated) DEVICE — FACE SHIELD, OPTI-COM

## (undated) DEVICE — PIN, HEADLESS 1/8 X 3.5 FLUTE 4/PK STERILE

## (undated) DEVICE — DRESSING, DRAIN SPONGE, EXCILON AMD, 4X4 IN, 6 PLY, ST

## (undated) DEVICE — SUTURE, MONOCRYL, 4-0, 27 IN, PS-2, UNDYED

## (undated) DEVICE — APPLICATOR, CHLORAPREP, W/ORANGE TINT, 26ML

## (undated) DEVICE — IRRIGATION SYSTEM, WOUND, SURGIPHOR, 450ML, STERILE

## (undated) DEVICE — BLADE, SAW, SAGITTAL, 18.0 X 1.27 X 90MM

## (undated) DEVICE — TIP, SUCTION, YANKAUER, BULB, ADULT